# Patient Record
Sex: FEMALE | Race: WHITE | NOT HISPANIC OR LATINO | URBAN - METROPOLITAN AREA
[De-identification: names, ages, dates, MRNs, and addresses within clinical notes are randomized per-mention and may not be internally consistent; named-entity substitution may affect disease eponyms.]

---

## 2017-03-07 ENCOUNTER — OUTPATIENT (OUTPATIENT)
Dept: OUTPATIENT SERVICES | Facility: HOSPITAL | Age: 62
LOS: 1 days | Discharge: HOME | End: 2017-03-07
Payer: COMMERCIAL

## 2017-03-07 DIAGNOSIS — Z98.890 OTHER SPECIFIED POSTPROCEDURAL STATES: Chronic | ICD-10-CM

## 2017-03-07 DIAGNOSIS — S62.109A FRACTURE OF UNSPECIFIED CARPAL BONE, UNSPECIFIED WRIST, INITIAL ENCOUNTER FOR CLOSED FRACTURE: Chronic | ICD-10-CM

## 2017-03-07 DIAGNOSIS — Z90.49 ACQUIRED ABSENCE OF OTHER SPECIFIED PARTS OF DIGESTIVE TRACT: Chronic | ICD-10-CM

## 2017-03-07 DIAGNOSIS — I65.23 OCCLUSION AND STENOSIS OF BILATERAL CAROTID ARTERIES: ICD-10-CM

## 2017-03-07 LAB
ABO RH CONFIRMATION: SIGNIFICANT CHANGE UP
ANION GAP SERPL CALC-SCNC: 9 MMOL/L — SIGNIFICANT CHANGE UP (ref 5–17)
APPEARANCE UR: CLEAR — SIGNIFICANT CHANGE UP
APTT BLD: 33.2 SEC — SIGNIFICANT CHANGE UP (ref 27.5–37.4)
BACTERIA # UR AUTO: (no result)
BASOPHILS # BLD AUTO: 0.1 K/UL — SIGNIFICANT CHANGE UP (ref 0–0.2)
BASOPHILS NFR BLD AUTO: 1.4 % — SIGNIFICANT CHANGE UP (ref 0–2)
BILIRUB UR-MCNC: NEGATIVE — SIGNIFICANT CHANGE UP
BLD GP AB SCN SERPL QL: SIGNIFICANT CHANGE UP
BUN SERPL-MCNC: 19 MG/DL — SIGNIFICANT CHANGE UP (ref 7–23)
CALCIUM SERPL-MCNC: 9.7 MG/DL — SIGNIFICANT CHANGE UP (ref 8.5–10.1)
CHLORIDE SERPL-SCNC: 104 MMOL/L — SIGNIFICANT CHANGE UP (ref 96–108)
CO2 SERPL-SCNC: 28 MMOL/L — SIGNIFICANT CHANGE UP (ref 22–31)
COLOR SPEC: YELLOW — SIGNIFICANT CHANGE UP
CREAT SERPL-MCNC: 0.81 MG/DL — SIGNIFICANT CHANGE UP (ref 0.5–1.3)
DIFF PNL FLD: (no result)
EOSINOPHIL # BLD AUTO: 0.2 K/UL — SIGNIFICANT CHANGE UP (ref 0–0.5)
EOSINOPHIL NFR BLD AUTO: 2.2 % — SIGNIFICANT CHANGE UP (ref 0–6)
EPI CELLS # UR: SIGNIFICANT CHANGE UP
GLUCOSE SERPL-MCNC: 93 MG/DL — SIGNIFICANT CHANGE UP (ref 70–99)
GLUCOSE UR QL: NEGATIVE MG/DL — SIGNIFICANT CHANGE UP
HCT VFR BLD CALC: 42 % — SIGNIFICANT CHANGE UP (ref 34.5–45)
HGB BLD-MCNC: 14.4 G/DL — SIGNIFICANT CHANGE UP (ref 11.5–15.5)
INR BLD: 1.06 RATIO — SIGNIFICANT CHANGE UP (ref 0.88–1.16)
KETONES UR-MCNC: NEGATIVE — SIGNIFICANT CHANGE UP
LEUKOCYTE ESTERASE UR-ACNC: (no result)
LYMPHOCYTES # BLD AUTO: 1.8 K/UL — SIGNIFICANT CHANGE UP (ref 1–3.3)
LYMPHOCYTES # BLD AUTO: 26.2 % — SIGNIFICANT CHANGE UP (ref 13–44)
MCHC RBC-ENTMCNC: 31.4 PG — SIGNIFICANT CHANGE UP (ref 27–34)
MCHC RBC-ENTMCNC: 34.2 GM/DL — SIGNIFICANT CHANGE UP (ref 32–36)
MCV RBC AUTO: 91.9 FL — SIGNIFICANT CHANGE UP (ref 80–100)
MONOCYTES # BLD AUTO: 0.3 K/UL — SIGNIFICANT CHANGE UP (ref 0–0.9)
MONOCYTES NFR BLD AUTO: 4.7 % — SIGNIFICANT CHANGE UP (ref 2–14)
NEUTROPHILS # BLD AUTO: 4.6 K/UL — SIGNIFICANT CHANGE UP (ref 1.8–7.4)
NEUTROPHILS NFR BLD AUTO: 65.5 % — SIGNIFICANT CHANGE UP (ref 43–77)
NITRITE UR-MCNC: NEGATIVE — SIGNIFICANT CHANGE UP
PH UR: 5 — SIGNIFICANT CHANGE UP (ref 4.8–8)
PLATELET # BLD AUTO: 284 K/UL — SIGNIFICANT CHANGE UP (ref 150–400)
POTASSIUM SERPL-MCNC: 4.3 MMOL/L — SIGNIFICANT CHANGE UP (ref 3.5–5.3)
POTASSIUM SERPL-SCNC: 4.3 MMOL/L — SIGNIFICANT CHANGE UP (ref 3.5–5.3)
PROT UR-MCNC: NEGATIVE MG/DL — SIGNIFICANT CHANGE UP
PROTHROM AB SERPL-ACNC: 11.7 SEC — SIGNIFICANT CHANGE UP (ref 10–13.1)
RBC # BLD: 4.57 M/UL — SIGNIFICANT CHANGE UP (ref 3.8–5.2)
RBC # FLD: 13 % — SIGNIFICANT CHANGE UP (ref 10.3–14.5)
RBC CASTS # UR COMP ASSIST: NEGATIVE /HPF — SIGNIFICANT CHANGE UP (ref 0–4)
SODIUM SERPL-SCNC: 141 MMOL/L — SIGNIFICANT CHANGE UP (ref 135–145)
SP GR SPEC: 1.01 — SIGNIFICANT CHANGE UP (ref 1.01–1.02)
TYPE + AB SCN PNL BLD: SIGNIFICANT CHANGE UP
UROBILINOGEN FLD QL: NEGATIVE MG/DL — SIGNIFICANT CHANGE UP
WBC # BLD: 7 K/UL — SIGNIFICANT CHANGE UP (ref 3.8–10.5)
WBC # FLD AUTO: 7 K/UL — SIGNIFICANT CHANGE UP (ref 3.8–10.5)
WBC UR QL: SIGNIFICANT CHANGE UP

## 2017-03-07 PROCEDURE — 71020: CPT | Mod: 26

## 2017-03-07 PROCEDURE — 93010 ELECTROCARDIOGRAM REPORT: CPT

## 2017-03-07 NOTE — PATIENT PROFILE ADULT. - PSH
delivery delivered  1989  Fracture dislocation of wrist joint    H/O arthroscopy of shoulder  Left. "Many years ago"  H/O colonoscopy    S/P cholecystectomy  1991

## 2017-03-07 NOTE — PATIENT PROFILE ADULT. - PMH
Acute recurrent sinusitis, unspecified location    Anxiety and depression    Carotid stenosis, left    Deviated septum    Essential hypertension    Fatty liver disease, nonalcoholic    Gall stones  gall bladder removed  Heart murmur    Hiatal hernia with GERD    Hyperlipidemia, unspecified hyperlipidemia type    Hypothyroidism, unspecified type    Irritable bowel syndrome with both constipation and diarrhea    Joint pain of lower limb  bilateral  Mitral valve insufficiency, unspecified etiology    Morbid obesity due to excess calories    Nonintractable episodic headache, unspecified headache type    Peripheral edema  bilateral  Urinary tract infection with hematuria, site unspecified  frequent.  Presently on antibiotics

## 2017-03-07 NOTE — CHART NOTE - NSCHARTNOTEFT_GEN_A_CORE
Vital Signs  Heart rate 65  Resp. 18  Temp.   97.1  B/P 148/61  Ht 64 inches Wt 79.6  Plan   1. NPO after midnight  2. Take the following medications with sips of water on the day of procedure: Synthrois, Amlodipine ? Benazapril  3. Use E-Z sponge as directed  4. Drink a quart of extra  fluids the day before your surgery.  5. Medical clearance with Dr. Abraham  6. CBC, BMP,  PT /PTT /INR, Urinalysis, Type and Screen sent to lab  7. EKG and CXR done Vital Signs  Heart rate 65  Resp. 18  Temp.   97.1  B/P 148/61  Ht 64 inches Wt 124.7  Plan   1. NPO after midnight  2. Take the following medications with sips of water on the day of procedure: Synthrois, Amlodipine ? Benazapril  3. Use E-Z sponge as directed  4. Drink a quart of extra  fluids the day before your surgery.  5. Medical clearance with Dr. Abraham  6. CBC, BMP,  PT /PTT /INR, Urinalysis, Type and Screen sent to lab  7. EKG and CXR done

## 2017-03-07 NOTE — PATIENT PROFILE ADULT. - VISION (WITH CORRECTIVE LENSES IF THE PATIENT USUALLY WEARS THEM):
Progressive lenses/Normal vision: sees adequately in most situations; can see medication labels, newsprint

## 2017-03-07 NOTE — PATIENT PROFILE ADULT. - FAMILY HISTORY
Mother  Still living? No  Family history of hepatic cirrhosis, Age at diagnosis: Age Unknown  Family history of heart disease, Age at diagnosis: Age Unknown  Family history of diabetes mellitus, Age at diagnosis: Age Unknown

## 2017-03-15 ENCOUNTER — RESULT REVIEW (OUTPATIENT)
Age: 62
End: 2017-03-15

## 2017-03-15 RX ORDER — ONDANSETRON 8 MG/1
4 TABLET, FILM COATED ORAL ONCE
Qty: 0 | Refills: 0 | Status: COMPLETED | OUTPATIENT
Start: 2017-03-16 | End: 2017-03-16

## 2017-03-15 RX ORDER — SODIUM CHLORIDE 9 MG/ML
1000 INJECTION INTRAMUSCULAR; INTRAVENOUS; SUBCUTANEOUS
Qty: 0 | Refills: 0 | Status: DISCONTINUED | OUTPATIENT
Start: 2017-03-16 | End: 2017-03-16

## 2017-03-15 RX ORDER — ACETAMINOPHEN 500 MG
975 TABLET ORAL ONCE
Qty: 0 | Refills: 0 | Status: COMPLETED | OUTPATIENT
Start: 2017-03-16 | End: 2017-03-16

## 2017-03-15 RX ORDER — OXYCODONE HYDROCHLORIDE 5 MG/1
10 TABLET ORAL ONCE
Qty: 0 | Refills: 0 | Status: DISCONTINUED | OUTPATIENT
Start: 2017-03-16 | End: 2017-03-16

## 2017-03-16 ENCOUNTER — INPATIENT (INPATIENT)
Facility: HOSPITAL | Age: 62
LOS: 0 days | Discharge: HOME | End: 2017-03-17
Attending: THORACIC SURGERY (CARDIOTHORACIC VASCULAR SURGERY) | Admitting: THORACIC SURGERY (CARDIOTHORACIC VASCULAR SURGERY)
Payer: COMMERCIAL

## 2017-03-16 VITALS
WEIGHT: 272.93 LBS | RESPIRATION RATE: 16 BRPM | DIASTOLIC BLOOD PRESSURE: 69 MMHG | TEMPERATURE: 98 F | HEART RATE: 60 BPM | SYSTOLIC BLOOD PRESSURE: 152 MMHG | HEIGHT: 66 IN

## 2017-03-16 DIAGNOSIS — Z98.890 OTHER SPECIFIED POSTPROCEDURAL STATES: Chronic | ICD-10-CM

## 2017-03-16 DIAGNOSIS — S62.109A FRACTURE OF UNSPECIFIED CARPAL BONE, UNSPECIFIED WRIST, INITIAL ENCOUNTER FOR CLOSED FRACTURE: Chronic | ICD-10-CM

## 2017-03-16 DIAGNOSIS — Z90.49 ACQUIRED ABSENCE OF OTHER SPECIFIED PARTS OF DIGESTIVE TRACT: Chronic | ICD-10-CM

## 2017-03-16 LAB
ANION GAP SERPL CALC-SCNC: 9 MMOL/L — SIGNIFICANT CHANGE UP (ref 5–17)
BUN SERPL-MCNC: 9 MG/DL — SIGNIFICANT CHANGE UP (ref 7–23)
CALCIUM SERPL-MCNC: 7.9 MG/DL — LOW (ref 8.5–10.1)
CHLORIDE SERPL-SCNC: 112 MMOL/L — HIGH (ref 96–108)
CK SERPL-CCNC: 79 U/L — SIGNIFICANT CHANGE UP (ref 26–192)
CO2 SERPL-SCNC: 25 MMOL/L — SIGNIFICANT CHANGE UP (ref 22–31)
CREAT SERPL-MCNC: 0.65 MG/DL — SIGNIFICANT CHANGE UP (ref 0.5–1.3)
GLUCOSE SERPL-MCNC: 116 MG/DL — HIGH (ref 70–99)
HCT VFR BLD CALC: 33.4 % — LOW (ref 34.5–45)
HCT VFR BLD CALC: 36 % — SIGNIFICANT CHANGE UP (ref 34.5–45)
HGB BLD-MCNC: 11 G/DL — LOW (ref 11.5–15.5)
HGB BLD-MCNC: 11.9 G/DL — SIGNIFICANT CHANGE UP (ref 11.5–15.5)
MCHC RBC-ENTMCNC: 30 PG — SIGNIFICANT CHANGE UP (ref 27–34)
MCHC RBC-ENTMCNC: 30.2 PG — SIGNIFICANT CHANGE UP (ref 27–34)
MCHC RBC-ENTMCNC: 33 GM/DL — SIGNIFICANT CHANGE UP (ref 32–36)
MCHC RBC-ENTMCNC: 33.2 GM/DL — SIGNIFICANT CHANGE UP (ref 32–36)
MCV RBC AUTO: 90.7 FL — SIGNIFICANT CHANGE UP (ref 80–100)
MCV RBC AUTO: 91 FL — SIGNIFICANT CHANGE UP (ref 80–100)
PLATELET # BLD AUTO: 222 K/UL — SIGNIFICANT CHANGE UP (ref 150–400)
PLATELET # BLD AUTO: 237 K/UL — SIGNIFICANT CHANGE UP (ref 150–400)
POTASSIUM SERPL-MCNC: 3.3 MMOL/L — LOW (ref 3.5–5.3)
POTASSIUM SERPL-SCNC: 3.3 MMOL/L — LOW (ref 3.5–5.3)
RBC # BLD: 3.68 M/UL — LOW (ref 3.8–5.2)
RBC # BLD: 3.95 M/UL — SIGNIFICANT CHANGE UP (ref 3.8–5.2)
RBC # FLD: 13 % — SIGNIFICANT CHANGE UP (ref 10.3–14.5)
RBC # FLD: 13.1 % — SIGNIFICANT CHANGE UP (ref 10.3–14.5)
SODIUM SERPL-SCNC: 146 MMOL/L — HIGH (ref 135–145)
TROPONIN I SERPL-MCNC: 0.02 NG/ML — SIGNIFICANT CHANGE UP (ref 0.01–0.04)
TROPONIN I SERPL-MCNC: 0.02 NG/ML — SIGNIFICANT CHANGE UP (ref 0.01–0.04)
TROPONIN I SERPL-MCNC: <0.015 NG/ML — SIGNIFICANT CHANGE UP (ref 0.01–0.04)
WBC # BLD: 7.7 K/UL — SIGNIFICANT CHANGE UP (ref 3.8–10.5)
WBC # BLD: 7.8 K/UL — SIGNIFICANT CHANGE UP (ref 3.8–10.5)
WBC # FLD AUTO: 7.7 K/UL — SIGNIFICANT CHANGE UP (ref 3.8–10.5)
WBC # FLD AUTO: 7.8 K/UL — SIGNIFICANT CHANGE UP (ref 3.8–10.5)

## 2017-03-16 PROCEDURE — 93010 ELECTROCARDIOGRAM REPORT: CPT

## 2017-03-16 PROCEDURE — 88304 TISSUE EXAM BY PATHOLOGIST: CPT | Mod: 26

## 2017-03-16 PROCEDURE — 88311 DECALCIFY TISSUE: CPT | Mod: 26

## 2017-03-16 RX ORDER — ONDANSETRON 8 MG/1
4 TABLET, FILM COATED ORAL ONCE
Qty: 0 | Refills: 0 | Status: COMPLETED | OUTPATIENT
Start: 2017-03-16 | End: 2017-03-16

## 2017-03-16 RX ORDER — DEXTRAN 40 IN DEXTROSE 5 % 10 %
500 INTRAVENOUS SOLUTION INTRAVENOUS
Qty: 0 | Refills: 0 | Status: DISCONTINUED | OUTPATIENT
Start: 2017-03-16 | End: 2017-03-17

## 2017-03-16 RX ORDER — PROCHLORPERAZINE MALEATE 5 MG
10 TABLET ORAL ONCE
Qty: 0 | Refills: 0 | Status: COMPLETED | OUTPATIENT
Start: 2017-03-16 | End: 2017-03-16

## 2017-03-16 RX ORDER — PANTOPRAZOLE SODIUM 20 MG/1
40 TABLET, DELAYED RELEASE ORAL ONCE
Qty: 0 | Refills: 0 | Status: DISCONTINUED | OUTPATIENT
Start: 2017-03-16 | End: 2017-03-16

## 2017-03-16 RX ORDER — ALPRAZOLAM 0.25 MG
0.5 TABLET ORAL EVERY 8 HOURS
Qty: 0 | Refills: 0 | Status: DISCONTINUED | OUTPATIENT
Start: 2017-03-16 | End: 2017-03-17

## 2017-03-16 RX ORDER — MORPHINE SULFATE 50 MG/1
4 CAPSULE, EXTENDED RELEASE ORAL EVERY 4 HOURS
Qty: 0 | Refills: 0 | Status: DISCONTINUED | OUTPATIENT
Start: 2017-03-16 | End: 2017-03-17

## 2017-03-16 RX ORDER — ONDANSETRON 8 MG/1
4 TABLET, FILM COATED ORAL EVERY 6 HOURS
Qty: 0 | Refills: 0 | Status: DISCONTINUED | OUTPATIENT
Start: 2017-03-16 | End: 2017-03-17

## 2017-03-16 RX ORDER — CEFAZOLIN SODIUM 1 G
3000 VIAL (EA) INJECTION EVERY 8 HOURS
Qty: 0 | Refills: 0 | Status: COMPLETED | OUTPATIENT
Start: 2017-03-16 | End: 2017-03-17

## 2017-03-16 RX ORDER — FENTANYL CITRATE 50 UG/ML
50 INJECTION INTRAVENOUS
Qty: 0 | Refills: 0 | Status: DISCONTINUED | OUTPATIENT
Start: 2017-03-16 | End: 2017-03-16

## 2017-03-16 RX ORDER — CIPROFLOXACIN LACTATE 400MG/40ML
1 VIAL (ML) INTRAVENOUS
Qty: 0 | Refills: 0 | COMMUNITY

## 2017-03-16 RX ORDER — ASPIRIN/CALCIUM CARB/MAGNESIUM 324 MG
81 TABLET ORAL DAILY
Qty: 0 | Refills: 0 | Status: DISCONTINUED | OUTPATIENT
Start: 2017-03-16 | End: 2017-03-17

## 2017-03-16 RX ORDER — ONDANSETRON 8 MG/1
TABLET, FILM COATED ORAL
Qty: 0 | Refills: 0 | Status: DISCONTINUED | OUTPATIENT
Start: 2017-03-16 | End: 2017-03-17

## 2017-03-16 RX ORDER — PANTOPRAZOLE SODIUM 20 MG/1
40 TABLET, DELAYED RELEASE ORAL ONCE
Qty: 0 | Refills: 0 | Status: COMPLETED | OUTPATIENT
Start: 2017-03-16 | End: 2017-03-16

## 2017-03-16 RX ORDER — PSEUDOEPHEDRINE HCL 120 MG
1 TABLET, EXTENDED RELEASE ORAL
Qty: 0 | Refills: 0 | COMMUNITY

## 2017-03-16 RX ADMIN — Medication 0.5 MILLIGRAM(S): at 18:48

## 2017-03-16 RX ADMIN — Medication 20 MILLILITER(S): at 11:55

## 2017-03-16 RX ADMIN — OXYCODONE HYDROCHLORIDE 10 MILLIGRAM(S): 5 TABLET ORAL at 06:49

## 2017-03-16 RX ADMIN — Medication 10 MILLIGRAM(S): at 11:41

## 2017-03-16 RX ADMIN — Medication 200 MILLIGRAM(S): at 18:20

## 2017-03-16 RX ADMIN — ONDANSETRON 4 MILLIGRAM(S): 8 TABLET, FILM COATED ORAL at 11:10

## 2017-03-16 RX ADMIN — ONDANSETRON 4 MILLIGRAM(S): 8 TABLET, FILM COATED ORAL at 14:31

## 2017-03-16 RX ADMIN — SODIUM CHLORIDE 75 MILLILITER(S): 9 INJECTION INTRAMUSCULAR; INTRAVENOUS; SUBCUTANEOUS at 11:09

## 2017-03-16 RX ADMIN — ONDANSETRON 4 MILLIGRAM(S): 8 TABLET, FILM COATED ORAL at 23:52

## 2017-03-16 RX ADMIN — OXYCODONE HYDROCHLORIDE 10 MILLIGRAM(S): 5 TABLET ORAL at 17:13

## 2017-03-16 RX ADMIN — PANTOPRAZOLE SODIUM 40 MILLIGRAM(S): 20 TABLET, DELAYED RELEASE ORAL at 20:56

## 2017-03-16 NOTE — BRIEF OPERATIVE NOTE - PROCEDURE
Carotid endarterectomy  03/16/2017  L CEA and bovine pericardial patch angioplasty including L external carotid endarterectomy  Active  PARUL

## 2017-03-17 VITALS — RESPIRATION RATE: 15 BRPM | OXYGEN SATURATION: 100 %

## 2017-03-17 LAB
ANION GAP SERPL CALC-SCNC: 9 MMOL/L — SIGNIFICANT CHANGE UP (ref 5–17)
BUN SERPL-MCNC: 7 MG/DL — SIGNIFICANT CHANGE UP (ref 7–23)
CALCIUM SERPL-MCNC: 8.5 MG/DL — SIGNIFICANT CHANGE UP (ref 8.5–10.1)
CHLORIDE SERPL-SCNC: 111 MMOL/L — HIGH (ref 96–108)
CO2 SERPL-SCNC: 27 MMOL/L — SIGNIFICANT CHANGE UP (ref 22–31)
CREAT SERPL-MCNC: 0.62 MG/DL — SIGNIFICANT CHANGE UP (ref 0.5–1.3)
GLUCOSE SERPL-MCNC: 102 MG/DL — HIGH (ref 70–99)
HCT VFR BLD CALC: 36.6 % — SIGNIFICANT CHANGE UP (ref 34.5–45)
HGB BLD-MCNC: 11.9 G/DL — SIGNIFICANT CHANGE UP (ref 11.5–15.5)
MCHC RBC-ENTMCNC: 30.2 PG — SIGNIFICANT CHANGE UP (ref 27–34)
MCHC RBC-ENTMCNC: 32.4 GM/DL — SIGNIFICANT CHANGE UP (ref 32–36)
MCV RBC AUTO: 93.1 FL — SIGNIFICANT CHANGE UP (ref 80–100)
PLATELET # BLD AUTO: 220 K/UL — SIGNIFICANT CHANGE UP (ref 150–400)
POTASSIUM SERPL-MCNC: 3.9 MMOL/L — SIGNIFICANT CHANGE UP (ref 3.5–5.3)
POTASSIUM SERPL-SCNC: 3.9 MMOL/L — SIGNIFICANT CHANGE UP (ref 3.5–5.3)
RBC # BLD: 3.93 M/UL — SIGNIFICANT CHANGE UP (ref 3.8–5.2)
RBC # FLD: 13.3 % — SIGNIFICANT CHANGE UP (ref 10.3–14.5)
SODIUM SERPL-SCNC: 147 MMOL/L — HIGH (ref 135–145)
WBC # BLD: 7.3 K/UL — SIGNIFICANT CHANGE UP (ref 3.8–10.5)
WBC # FLD AUTO: 7.3 K/UL — SIGNIFICANT CHANGE UP (ref 3.8–10.5)

## 2017-03-17 PROCEDURE — 93010 ELECTROCARDIOGRAM REPORT: CPT

## 2017-03-17 RX ORDER — FLUTICASONE PROPIONATE 50 MCG
2 SPRAY, SUSPENSION NASAL
Qty: 0 | Refills: 0 | COMMUNITY

## 2017-03-17 RX ORDER — LEVOTHYROXINE SODIUM 125 MCG
1 TABLET ORAL
Qty: 0 | Refills: 0 | COMMUNITY

## 2017-03-17 RX ORDER — CLOPIDOGREL BISULFATE 75 MG/1
75 TABLET, FILM COATED ORAL DAILY
Qty: 0 | Refills: 0 | Status: DISCONTINUED | OUTPATIENT
Start: 2017-03-17 | End: 2017-03-17

## 2017-03-17 RX ORDER — ASPIRIN/CALCIUM CARB/MAGNESIUM 324 MG
1 TABLET ORAL
Qty: 0 | Refills: 0 | DISCHARGE
Start: 2017-03-17

## 2017-03-17 RX ORDER — CLOPIDOGREL BISULFATE 75 MG/1
1 TABLET, FILM COATED ORAL
Qty: 0 | Refills: 0 | DISCHARGE
Start: 2017-03-17

## 2017-03-17 RX ORDER — PANTOPRAZOLE SODIUM 20 MG/1
1 TABLET, DELAYED RELEASE ORAL
Qty: 0 | Refills: 0 | DISCHARGE
Start: 2017-03-17

## 2017-03-17 RX ORDER — OMEPRAZOLE 10 MG/1
1 CAPSULE, DELAYED RELEASE ORAL
Qty: 0 | Refills: 0 | COMMUNITY

## 2017-03-17 RX ORDER — SODIUM CHLORIDE 9 MG/ML
3 INJECTION INTRAMUSCULAR; INTRAVENOUS; SUBCUTANEOUS EVERY 8 HOURS
Qty: 0 | Refills: 0 | Status: DISCONTINUED | OUTPATIENT
Start: 2017-03-17 | End: 2017-03-17

## 2017-03-17 RX ORDER — PANTOPRAZOLE SODIUM 20 MG/1
40 TABLET, DELAYED RELEASE ORAL
Qty: 0 | Refills: 0 | Status: DISCONTINUED | OUTPATIENT
Start: 2017-03-17 | End: 2017-03-17

## 2017-03-17 RX ADMIN — Medication 81 MILLIGRAM(S): at 09:28

## 2017-03-17 RX ADMIN — CLOPIDOGREL BISULFATE 75 MILLIGRAM(S): 75 TABLET, FILM COATED ORAL at 09:28

## 2017-03-17 RX ADMIN — ONDANSETRON 4 MILLIGRAM(S): 8 TABLET, FILM COATED ORAL at 05:40

## 2017-03-17 RX ADMIN — Medication 0.5 MILLIGRAM(S): at 03:16

## 2017-03-17 RX ADMIN — PANTOPRAZOLE SODIUM 40 MILLIGRAM(S): 20 TABLET, DELAYED RELEASE ORAL at 08:30

## 2017-03-17 RX ADMIN — Medication 200 MILLIGRAM(S): at 02:17

## 2017-03-17 NOTE — DISCHARGE NOTE ADULT - MEDICATION SUMMARY - MEDICATIONS TO TAKE
I will START or STAY ON the medications listed below when I get home from the hospital:    Aspirin Enteric Coated 81 mg oral delayed release tablet  -- 1 tab(s) by mouth once a day  -- Indication: For Carotid stenosis, left    rosuvastatin 10 mg oral tablet  -- 1 tab(s) by mouth once a day (at bedtime)  -- Indication: For CAROTID STENOSIS    amLODIPine-benazepril 5 mg-20 mg oral capsule  -- 1 cap(s) by mouth once a day (in the morning)  -- Indication: For HTN    clopidogrel 75 mg oral tablet  -- 1 tab(s) by mouth once a day  -- Indication: For CAROTID STENOSIS    Xanax 0.5 mg oral tablet  -- 1 tab(s) by mouth 3 times a day, As Needed  -- for anxiety  -- Indication: For anxiety

## 2017-03-17 NOTE — DISCHARGE NOTE ADULT - NS AS DC STROKE ED MATERIALS
Need for Followup After Discharge/Stroke Warning Signs and Symptoms/Call 911 for Stroke/Risk Factors for Stroke/Prescribed Medications/Stroke Education Booklet

## 2017-03-17 NOTE — DISCHARGE NOTE ADULT - NS AS ACTIVITY OBS
No Heavy lifting/straining/Return to Work/School allowed/Do not drive or operate machinery/Walking-Indoors allowed/Bathing allowed/Sex allowed/no driving for one week; may return to work in one week/Showering allowed/Walking-Outdoors allowed/Stairs allowed

## 2017-03-17 NOTE — PROGRESS NOTE ADULT - ASSESSMENT
stable post op  check K  OOB  diet  discharge later today if remains stable, neurologically intact, ambulates

## 2017-03-17 NOTE — DISCHARGE NOTE ADULT - CARE PROVIDER_API CALL
Terry Vo), Vascular Surgery  [address] [place] [address]  Phone: [phone]  Fax: [phone]

## 2017-03-17 NOTE — DISCHARGE NOTE ADULT - HOSPITAL COURSE
Patient underwent L CEA on March 16 and had an uneventful post operative course remaining neurologically intact with stable vital signs and no evidence of cervical hematoma

## 2017-03-17 NOTE — DISCHARGE NOTE ADULT - CARE PLAN
Principal Discharge DX:	Carotid stenosis, left  Goal:	improved stroke free survival  Instructions for follow-up, activity and diet:	no driving for one week  resume pre operative diet

## 2017-03-17 NOTE — DISCHARGE NOTE ADULT - PATIENT PORTAL LINK FT
“You can access the Follow[employer] Patient Portal, offered by [facility], by registering with the following website: [email or web address]”

## 2017-03-17 NOTE — PROGRESS NOTE ADULT - SUBJECTIVE AND OBJECTIVE BOX
GOOLEY, DENISE    61y    Vital Signs Last 24 Hrs  T(C): 36.1, Max: 36.7 (-16 @ 10:55)  T(F): 97, Max: 98.1 (03-16 @ 10:55)  HR: 64 (41 - 73)  BP: 148/46 (101/82 - 148/58)  BP(mean): 73 (50 - 92)  RR: 14 (9 - 19)  SpO2: 95% (92% - 100%)    PAST MEDICAL & SURGICAL HISTORY:  Anxiety and depression  Morbid obesity due to excess calories  Hypothyroidism, unspecified type  Joint pain of lower limb: bilateral  Urinary tract infection with hematuria, site unspecified: frequent.  Presently on antibiotics  Fatty liver disease, nonalcoholic  Gall stones: gall bladder removed  Irritable bowel syndrome with both constipation and diarrhea  Hiatal hernia with GERD  Gastroesophageal reflux disease, esophagitis presence not specified  Peripheral edema: bilateral  Mitral valve insufficiency, unspecified etiology  Heart murmur  Hyperlipidemia, unspecified hyperlipidemia type  Essential hypertension  Acute recurrent sinusitis, unspecified location  Deviated septum  Nonintractable episodic headache, unspecified headache type  Carotid stenosis, left  H/O colonoscopy:   H/O arthroscopy of shoulder: Left. &quot;Many years ago&quot;   delivery delivered: 1989  S/P cholecystectomy: 1991  Fracture dislocation of wrist joint      PHYSICAL EXAMINATION:    Pt neurologically intact  cervical incision without hematoma  spontaneous and fluent of speech    Allergies    No Known Allergies    Intolerances         MEDICATIONS  (STANDING):  aspirin enteric coated 81milliGRAM(s) Oral daily  dextran-40 10% in dextrose 5% 500milliLiter(s) IV Continuous <Continuous>  ondansetron Injectable 4milliGRAM(s) IV Push every 6 hours  ondansetron Injectable       MEDICATIONS  (PRN):  morphine  - Injectable 4milliGRAM(s) IV Push every 4 hours PRN Moderate Pain (4 - 6)  ALPRAZolam 0.5milliGRAM(s) Oral every 8 hours PRN anxiety      CBC Full  -  ( 16 Mar 2017 16:11 )  WBC Count : 7.7 K/uL  Hemoglobin : 11.9 g/dL  Hematocrit : 36.0 %  Platelet Count - Automated : 237 K/uL  Mean Cell Volume : 91.0 fl  Mean Cell Hemoglobin : 30.2 pg  Mean Cell Hemoglobin Concentration : 33.2 gm/dL  Auto Neutrophil # : x  Auto Lymphocyte # : x  Auto Monocyte # : x  Auto Eosinophil # : x  Auto Basophil # : x  Auto Neutrophil % : x  Auto Lymphocyte % : x  Auto Monocyte % : x  Auto Eosinophil % : x  Auto Basophil % : x      16 Mar 2017 11:41    146    |  112    |  9      ----------------------------<  116    3.3     |  25     |  0.65     Ca    7.9        16 Mar 2017 11:41

## 2017-03-17 NOTE — DISCHARGE NOTE ADULT - INSTRUCTIONS
none If any increase in pain, redness, swelling, or drainage at incision site , or temperature greater than 100.5 orally call the MD or return to ED.

## 2017-03-20 DIAGNOSIS — I65.22 OCCLUSION AND STENOSIS OF LEFT CAROTID ARTERY: ICD-10-CM

## 2017-03-20 DIAGNOSIS — E78.00 PURE HYPERCHOLESTEROLEMIA, UNSPECIFIED: ICD-10-CM

## 2017-03-20 DIAGNOSIS — K58.2 MIXED IRRITABLE BOWEL SYNDROME: ICD-10-CM

## 2017-03-20 DIAGNOSIS — I34.0 NONRHEUMATIC MITRAL (VALVE) INSUFFICIENCY: ICD-10-CM

## 2017-03-20 DIAGNOSIS — E66.01 MORBID (SEVERE) OBESITY DUE TO EXCESS CALORIES: ICD-10-CM

## 2017-03-20 DIAGNOSIS — E78.5 HYPERLIPIDEMIA, UNSPECIFIED: ICD-10-CM

## 2017-03-20 DIAGNOSIS — K21.9 GASTRO-ESOPHAGEAL REFLUX DISEASE WITHOUT ESOPHAGITIS: ICD-10-CM

## 2017-03-20 DIAGNOSIS — K76.0 FATTY (CHANGE OF) LIVER, NOT ELSEWHERE CLASSIFIED: ICD-10-CM

## 2017-03-20 DIAGNOSIS — I10 ESSENTIAL (PRIMARY) HYPERTENSION: ICD-10-CM

## 2017-03-20 DIAGNOSIS — E03.9 HYPOTHYROIDISM, UNSPECIFIED: ICD-10-CM

## 2017-03-20 DIAGNOSIS — K44.9 DIAPHRAGMATIC HERNIA WITHOUT OBSTRUCTION OR GANGRENE: ICD-10-CM

## 2017-03-21 LAB — SURGICAL PATHOLOGY FINAL REPORT - CH: SIGNIFICANT CHANGE UP

## 2017-05-19 ENCOUNTER — APPOINTMENT (OUTPATIENT)
Dept: DERMATOLOGY | Facility: CLINIC | Age: 62
End: 2017-05-19

## 2017-05-19 VITALS — BODY MASS INDEX: 38.32 KG/M2 | WEIGHT: 230 LBS | HEIGHT: 65 IN

## 2017-05-19 RX ORDER — OMEPRAZOLE 40 MG/1
40 CAPSULE, DELAYED RELEASE ORAL
Refills: 0 | Status: ACTIVE | COMMUNITY

## 2017-05-19 RX ORDER — LEVOTHYROXINE SODIUM 50 UG/1
50 TABLET ORAL
Refills: 0 | Status: ACTIVE | COMMUNITY

## 2017-05-19 RX ORDER — ROSUVASTATIN CALCIUM 5 MG/1
TABLET, FILM COATED ORAL
Refills: 0 | Status: ACTIVE | COMMUNITY

## 2017-05-24 LAB — CORE LAB BIOPSY: NORMAL

## 2017-06-12 ENCOUNTER — APPOINTMENT (OUTPATIENT)
Dept: DERMATOLOGY | Facility: CLINIC | Age: 62
End: 2017-06-12

## 2017-06-26 ENCOUNTER — APPOINTMENT (OUTPATIENT)
Dept: DERMATOLOGY | Facility: CLINIC | Age: 62
End: 2017-06-26

## 2017-06-26 DIAGNOSIS — C44.310 BASAL CELL CARCINOMA OF SKIN OF UNSPECIFIED PARTS OF FACE: ICD-10-CM

## 2017-06-28 LAB — CORE LAB BIOPSY: NORMAL

## 2017-07-03 ENCOUNTER — APPOINTMENT (OUTPATIENT)
Dept: DERMATOLOGY | Facility: CLINIC | Age: 62
End: 2017-07-03

## 2017-08-01 ENCOUNTER — APPOINTMENT (OUTPATIENT)
Dept: DERMATOLOGY | Facility: CLINIC | Age: 62
End: 2017-08-01
Payer: COMMERCIAL

## 2017-08-01 PROCEDURE — 99024 POSTOP FOLLOW-UP VISIT: CPT

## 2018-01-08 ENCOUNTER — APPOINTMENT (OUTPATIENT)
Dept: DERMATOLOGY | Facility: CLINIC | Age: 63
End: 2018-01-08
Payer: COMMERCIAL

## 2018-01-08 VITALS — HEIGHT: 65 IN | WEIGHT: 219 LBS | BODY MASS INDEX: 36.49 KG/M2

## 2018-01-08 DIAGNOSIS — Z85.828 PERSONAL HISTORY OF OTHER MALIGNANT NEOPLASM OF SKIN: ICD-10-CM

## 2018-01-08 PROCEDURE — 99213 OFFICE O/P EST LOW 20 MIN: CPT

## 2018-07-11 ENCOUNTER — APPOINTMENT (OUTPATIENT)
Dept: DERMATOLOGY | Facility: CLINIC | Age: 63
End: 2018-07-11
Payer: COMMERCIAL

## 2018-07-11 PROCEDURE — 99213 OFFICE O/P EST LOW 20 MIN: CPT

## 2018-08-09 NOTE — DISCHARGE NOTE ADULT - DISCHARGE TO
Call regarding: Need clarification of famotitine tab, klor-con, and lorazepam.  Dose, directions, quantity, and number refills  Ref # [id number]  Caller: [facility]  Number to be reached at: [phone] ref # [id number]   Timeframe for callback: Friday    Pharmacy information verified:  Yes  Notified MD out of office until Friday.       Home

## 2019-01-15 PROBLEM — K21.9 GASTRO-ESOPHAGEAL REFLUX DISEASE WITHOUT ESOPHAGITIS: Chronic | Status: ACTIVE | Noted: 2017-03-07

## 2019-01-15 PROBLEM — E78.5 HYPERLIPIDEMIA, UNSPECIFIED: Chronic | Status: ACTIVE | Noted: 2017-03-07

## 2019-01-15 PROBLEM — K76.0 FATTY (CHANGE OF) LIVER, NOT ELSEWHERE CLASSIFIED: Chronic | Status: ACTIVE | Noted: 2017-03-07

## 2019-01-15 PROBLEM — J34.2 DEVIATED NASAL SEPTUM: Chronic | Status: ACTIVE | Noted: 2017-03-07

## 2019-01-15 PROBLEM — K80.20 CALCULUS OF GALLBLADDER WITHOUT CHOLECYSTITIS WITHOUT OBSTRUCTION: Chronic | Status: ACTIVE | Noted: 2017-03-07

## 2019-01-15 PROBLEM — E03.9 HYPOTHYROIDISM, UNSPECIFIED: Chronic | Status: ACTIVE | Noted: 2017-03-07

## 2019-01-15 PROBLEM — I10 ESSENTIAL (PRIMARY) HYPERTENSION: Chronic | Status: ACTIVE | Noted: 2017-03-07

## 2019-01-15 PROBLEM — I65.22 OCCLUSION AND STENOSIS OF LEFT CAROTID ARTERY: Chronic | Status: ACTIVE | Noted: 2017-03-07

## 2019-01-15 PROBLEM — E66.01 MORBID (SEVERE) OBESITY DUE TO EXCESS CALORIES: Chronic | Status: ACTIVE | Noted: 2017-03-07

## 2019-01-15 PROBLEM — R60.9 EDEMA, UNSPECIFIED: Chronic | Status: ACTIVE | Noted: 2017-03-07

## 2019-01-15 PROBLEM — I34.0 NONRHEUMATIC MITRAL (VALVE) INSUFFICIENCY: Chronic | Status: ACTIVE | Noted: 2017-03-07

## 2019-01-15 PROBLEM — K58.2 MIXED IRRITABLE BOWEL SYNDROME: Chronic | Status: ACTIVE | Noted: 2017-03-07

## 2019-01-15 PROBLEM — N39.0 URINARY TRACT INFECTION, SITE NOT SPECIFIED: Chronic | Status: ACTIVE | Noted: 2017-03-07

## 2019-01-15 PROBLEM — R01.1 CARDIAC MURMUR, UNSPECIFIED: Chronic | Status: ACTIVE | Noted: 2017-03-07

## 2019-01-15 PROBLEM — M25.50 PAIN IN UNSPECIFIED JOINT: Chronic | Status: ACTIVE | Noted: 2017-03-07

## 2019-02-04 ENCOUNTER — APPOINTMENT (OUTPATIENT)
Dept: DERMATOLOGY | Facility: CLINIC | Age: 64
End: 2019-02-04

## 2019-05-02 NOTE — ASU PREOP CHECKLIST - AICD PRESENT
Take the prescribed Naprosyn and Robaxin as directed for ongoing management of your body aches. Please follow-up with a primary care provider for further evaluation in 1 week. Use the below contact information to establish an appointment    Return to the emergency room for new, worsening, or concerning symptoms    Our goal in the emergency department is to always give you outstanding care and exceptional service. You may receive a survey by mail or e-mail in the next week regarding your experience in our ED. We would greatly appreciate your completing and returning the survey. Your feedback provides us with a way to recognize our staff who give very good care and it helps us learn how to improve when your experience was below our aspiration of excellence.    no

## 2019-07-03 ENCOUNTER — APPOINTMENT (OUTPATIENT)
Dept: DERMATOLOGY | Facility: CLINIC | Age: 64
End: 2019-07-03
Payer: COMMERCIAL

## 2019-07-03 PROCEDURE — 99213 OFFICE O/P EST LOW 20 MIN: CPT

## 2019-07-03 RX ORDER — ATENOLOL 50 MG/1
TABLET ORAL
Refills: 0 | Status: ACTIVE | COMMUNITY

## 2019-07-03 NOTE — PHYSICAL EXAM
[Full Body Skin Exam Performed] : performed [FreeTextEntry3] : Skin examination performed of the face, neck, trunk, arms, legs; \par The patient is well, alert and oriented, pleasant and cooperative.\par Eyelids, conjunctivae, oral mucosa, digits and nails all normal.  \par No cervical adenopathy.\par \par Normal findings include:\par \par Seborrheic keratoses\par Angiomas\par Lentigines\par healed scar L lateral cheek\par scaling erythematous papules; nose dorsum\par \par No lesions were suspicious for malignancy. \par \par

## 2019-07-03 NOTE — ASSESSMENT
[FreeTextEntry1] : Complete skin examination is negative for malignancy;\par Continue regular exams; Hx BCC;  f/u annually\par Prob. patchy AKs on nose bridge; 5FU x 2 weeks; Risks and benefits of topical actinic keratosis treatments were discussed including redness, scaling, discomfort crusting, oozing, and recurrence.

## 2019-07-03 NOTE — HISTORY OF PRESENT ILLNESS
[de-identified] : Pt. presents for skin check;\par No itching, bleeding, growing, changing lesions noted;\par Severity:  mild  \par Modifying factors:  none\par Associated symptoms:  none\par Context:  no association with activity \par c/o some dry spots on nose

## 2020-03-10 ENCOUNTER — APPOINTMENT (OUTPATIENT)
Dept: DERMATOLOGY | Facility: CLINIC | Age: 65
End: 2020-03-10
Payer: COMMERCIAL

## 2020-03-10 VITALS — WEIGHT: 219 LBS | HEIGHT: 65 IN | BODY MASS INDEX: 36.49 KG/M2

## 2020-03-10 DIAGNOSIS — L81.7 PIGMENTED PURPURIC DERMATOSIS: ICD-10-CM

## 2020-03-10 PROCEDURE — 99213 OFFICE O/P EST LOW 20 MIN: CPT

## 2020-03-10 NOTE — ASSESSMENT
[FreeTextEntry1] : pigmented purpura type reaction;  likely 2' leg edema, also was taking ibuprofen regularly;\par \par wears support stockings, but only at work; \par \par mometasone ointment;  BID, 2 wks on, 2 wks off; \par recheck at skin exam in Summer

## 2020-03-10 NOTE — HISTORY OF PRESENT ILLNESS
[de-identified] : Rashes on legs;  noticed @ 1 month ago;  \par was on Prednisone at the time, for hip; for 1 week; \par

## 2020-03-10 NOTE — PHYSICAL EXAM
[FreeTextEntry3] : + small purpuric macules;  scattered over both upper shins, not extending above knee or below sock line; \par no pustules or bullae;

## 2020-05-13 ENCOUNTER — APPOINTMENT (OUTPATIENT)
Dept: NEUROLOGY | Facility: CLINIC | Age: 65
End: 2020-05-13
Payer: COMMERCIAL

## 2020-05-13 DIAGNOSIS — Z86.39 PERSONAL HISTORY OF OTHER ENDOCRINE, NUTRITIONAL AND METABOLIC DISEASE: ICD-10-CM

## 2020-05-13 DIAGNOSIS — Z82.49 FAMILY HISTORY OF ISCHEMIC HEART DISEASE AND OTHER DISEASES OF THE CIRCULATORY SYSTEM: ICD-10-CM

## 2020-05-13 DIAGNOSIS — G57.12 MERALGIA PARESTHETICA, LEFT LOWER LIMB: ICD-10-CM

## 2020-05-13 PROCEDURE — 99205 OFFICE O/P NEW HI 60 MIN: CPT | Mod: 95

## 2020-05-13 NOTE — HISTORY OF PRESENT ILLNESS
[Home] : at home, [unfilled] , at the time of the visit. [Medical Office: ([facility])___] : at the medical office located in  [FreeTextEntry1] : 65-year-old right-handed female with PMHx of HTN, HLD and hypothyroidism; reports that for the past 2 months she's been experiencing tingling, numbness and pain in the left lateral thigh, symptoms are worse in sitting or supine position, improve with change in posture.\par \par Patient reports that it all started 2 months ago since she has been off from work, she works as a , is on her feet and active all the time, due to Covid-19 related school closure, she has been home  very inactive and barely exercising, she may have gained some weight in the process. She reports numbness tingling and pain in the left lateral thigh, at times there is tenderness to touch and feeling of itchiness, she reports since past one week symptoms have improved slightly. She reports that she had lost tremendous amount of weight a year ago as her son was getting , she has put on much of it weight, is attempting to join [employer].\par \par Patient denies low back pain radiating to the legs, denies weakness of the leg or foot drop, denies paresthesias in the other 3 extremities, denies bladder or bowel dysfunction. [Patient] : the patient

## 2020-05-13 NOTE — DATA REVIEWED
[de-identified] : 2/8/20: TFT's normal, vitamin D 28.7,  total cholesterol 205, HDL 98, LDL 98, triglycerides 44, CPK 60, LFTs normal

## 2020-05-13 NOTE — REASON FOR VISIT
[Consultation] : a consultation visit [FreeTextEntry1] : Referred by Dr. Abraham for evaluation of pain/numbness left lateral thigh

## 2020-05-13 NOTE — PHYSICAL EXAM
[General Appearance - Alert] : alert [General Appearance - In No Acute Distress] : in no acute distress [General Appearance - Well-Appearing] : healthy appearing [Mood] : the mood was normal [Person] : oriented to person [Place] : oriented to place [Time] : oriented to time [Registration Intact] : recent registration memory intact [Concentration Intact] : normal concentrating ability [Naming Objects] : no difficulty naming common objects [Repeating Phrases] : no difficulty repeating a phrase [Comprehension] : comprehension intact [Fluency] : fluency intact [Current Events] : adequate knowledge of current events [Cranial Nerves Optic (II)] : visual acuity intact bilaterally,  visual fields full to confrontation, pupils equal round and reactive to light [Cranial Nerves Facial (VII)] : face symmetrical [Cranial Nerves Oculomotor (III)] : extraocular motion intact [Cranial Nerves Accessory (XI - Cranial And Spinal)] : head turning and shoulder shrug symmetric [Cranial Nerves Vestibulocochlear (VIII)] : hearing was intact bilaterally [Cranial Nerves Hypoglossal (XII)] : there was no tongue deviation with protrusion [FreeTextEntry6] : Limited virtual motor examination; grossly nonfocal, no finger nose finger ataxia, gait stable [FreeTextEntry1] : Neck ROM Full

## 2020-05-13 NOTE — REVIEW OF SYSTEMS
[Recent Weight Gain (___ Lbs)] : recent [unfilled] ~Ulb weight gain [Numbness] : numbness [Tingling] : tingling [As Noted in HPI] : as noted in HPI [Hypersensitivity] : hypersensitivity [Negative] : Heme/Lymph

## 2020-06-29 ENCOUNTER — APPOINTMENT (OUTPATIENT)
Dept: NEUROLOGY | Facility: CLINIC | Age: 65
End: 2020-06-29

## 2020-09-12 ENCOUNTER — APPOINTMENT (OUTPATIENT)
Dept: DERMATOLOGY | Facility: CLINIC | Age: 65
End: 2020-09-12
Payer: COMMERCIAL

## 2020-09-12 VITALS — BODY MASS INDEX: 29.95 KG/M2 | WEIGHT: 180 LBS

## 2020-09-12 PROCEDURE — 99213 OFFICE O/P EST LOW 20 MIN: CPT

## 2020-09-12 NOTE — PHYSICAL EXAM
[Full Body Skin Exam Performed] : performed [FreeTextEntry3] : Skin examination performed of the face, neck, trunk, arms, \par \par legs not examined 2' stockings; \par \par The patient is well, alert and oriented, pleasant and cooperative.\par Eyelids, conjunctivae, oral mucosa, digits and nails all normal.  \par No cervical adenopathy.\par \par Normal findings include:\par \par Seborrheic keratoses\par Angiomas\par Lentigines\par healed scar L lateral cheek\par few scaling erythematous papules; nose dorsum\par \par No lesions were suspicious for malignancy. \par \par

## 2020-09-12 NOTE — HISTORY OF PRESENT ILLNESS
[de-identified] : Pt. presents for skin check;\par No itching, bleeding, growing, changing lesions noted;\par Severity:  mild  \par Modifying factors:  none\par Associated symptoms:  none\par Context:  no association with activity \par

## 2021-05-13 ENCOUNTER — NON-APPOINTMENT (OUTPATIENT)
Age: 66
End: 2021-05-13

## 2021-05-13 ENCOUNTER — APPOINTMENT (OUTPATIENT)
Dept: BREAST CENTER | Facility: CLINIC | Age: 66
End: 2021-05-13
Payer: COMMERCIAL

## 2021-05-13 VITALS
HEART RATE: 65 BPM | WEIGHT: 230 LBS | HEIGHT: 65 IN | BODY MASS INDEX: 38.32 KG/M2 | SYSTOLIC BLOOD PRESSURE: 135 MMHG | DIASTOLIC BLOOD PRESSURE: 70 MMHG

## 2021-05-13 DIAGNOSIS — Z87.891 PERSONAL HISTORY OF NICOTINE DEPENDENCE: ICD-10-CM

## 2021-05-13 DIAGNOSIS — Z87.39 PERSONAL HISTORY OF OTHER DISEASES OF THE MUSCULOSKELETAL SYSTEM AND CONNECTIVE TISSUE: ICD-10-CM

## 2021-05-13 DIAGNOSIS — Z86.39 PERSONAL HISTORY OF OTHER ENDOCRINE, NUTRITIONAL AND METABOLIC DISEASE: ICD-10-CM

## 2021-05-13 DIAGNOSIS — N64.4 MASTODYNIA: ICD-10-CM

## 2021-05-13 DIAGNOSIS — L29.9 PRURITUS, UNSPECIFIED: ICD-10-CM

## 2021-05-13 DIAGNOSIS — I10 ESSENTIAL (PRIMARY) HYPERTENSION: ICD-10-CM

## 2021-05-13 PROCEDURE — 99241 OFFICE CONSULTATION NEW/ESTAB PATIENT 15 MIN: CPT

## 2021-05-13 PROCEDURE — 99072 ADDL SUPL MATRL&STAF TM PHE: CPT

## 2021-05-13 RX ORDER — ASPIRIN 81 MG
81 TABLET, DELAYED RELEASE (ENTERIC COATED) ORAL
Refills: 0 | Status: DISCONTINUED | COMMUNITY
End: 2021-05-13

## 2021-05-13 RX ORDER — GABAPENTIN 100 MG/1
100 CAPSULE ORAL
Qty: 60 | Refills: 2 | Status: DISCONTINUED | COMMUNITY
Start: 2020-05-13 | End: 2021-05-13

## 2021-05-13 RX ORDER — MOMETASONE FUROATE 1 MG/G
0.1 OINTMENT TOPICAL
Qty: 1 | Refills: 1 | Status: DISCONTINUED | COMMUNITY
Start: 2020-03-10 | End: 2021-05-13

## 2021-05-13 NOTE — PHYSICAL EXAM
[Normocephalic] : normocephalic [Sclera nonicteric] : sclera nonicteric [Conjunctiva pink] : conjunctiva pink [No JVD] : no jugular venous distension [Clear to Auscultation Bilat] : clear to auscultation bilaterally [Normal S1, S2] : normal S1 and S2 [Examined in the supine and seated position] : examined in the supine and seated position [Asymmetrical] : asymmetrical [Grade 3] : Ptosis Grade 3 [No dominant masses] : no dominant masses in right breast  [No dominant masses] : no dominant masses left breast [No Nipple Retraction] : no left nipple retraction [No Nipple Discharge] : no left nipple discharge [Soft] : abdomen soft [Not Tender] : non-tender [No Edema] : no edema [No Rashes] : no rashes [No Ulceration] : no ulceration [de-identified] :  + murmur  [de-identified] : Right slightly larger than left [de-identified] : left nipple with evidence of white flaky skin / no erythema or rash noted

## 2021-05-13 NOTE — PAST MEDICAL HISTORY
[Postmenopausal] : The patient is postmenopausal [Menarche Age ____] : age at menarche was [unfilled] [Menopause Age____] : age at menopause was [unfilled] [Total Preg ___] : G[unfilled] [Full Term ___] : Full Term: [unfilled] [Age At Live Birth ___] : Age at live birth: [unfilled] [History of Hormone Replacement Treatment] : has no history of hormone replacement treatment [FreeTextEntry8] : denies

## 2021-05-13 NOTE — HISTORY OF PRESENT ILLNESS
[FreeTextEntry1] : This is a 65 y/o female who presents today with c/o left nipple pruritus over the last month duration and occasional breast pain L>R  over last week or so.  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  She denies any nipple discharge but states that she has a small "scab" on the left nipple.  Patient is not up to date with breast imaging - her most recent mammogram and  breast ultra sound was in 2017 and were without any abnormal findings.  She was seen by her gynecologist for the nipple discomfort and advised to try an OTC cortisone cream  which she only used for a few days but did help somewhat.  She denies any family history of breast / ovarian or other cancers.\par \par Of note patient had COVID last year for which she did not require hospitalization and she has been fully vaccinated.

## 2021-05-13 NOTE — REVIEW OF SYSTEMS
[As Noted in HPI] : as noted in HPI [FreeTextEntry5] : h/o of heart murmur [FreeTextEntry7] : h/o GERD [FreeTextEntry9] : knee arthritis

## 2021-05-13 NOTE — DATA REVIEWED
[No studies available for review at this time.] : No studies available for review at this time. [FreeTextEntry1] : mammogram 6/29/2017 - No mammographic evidence of malignancy\par US breast complete bilateral (6/29/2017)l - no sonographic evidence of malignancy

## 2021-06-08 ENCOUNTER — APPOINTMENT (OUTPATIENT)
Dept: DERMATOLOGY | Facility: CLINIC | Age: 66
End: 2021-06-08
Payer: COMMERCIAL

## 2021-06-08 DIAGNOSIS — L30.9 DERMATITIS, UNSPECIFIED: ICD-10-CM

## 2021-06-08 PROCEDURE — 99214 OFFICE O/P EST MOD 30 MIN: CPT

## 2021-06-08 PROCEDURE — 99072 ADDL SUPL MATRL&STAF TM PHE: CPT

## 2021-06-08 NOTE — PHYSICAL EXAM
[Full Body Skin Exam Performed] : performed [FreeTextEntry3] : slight thickening of skin Left breast areola; \par

## 2021-06-08 NOTE — HISTORY OF PRESENT ILLNESS
[de-identified] : Rash, itching on Left breast;  \par had eval by breast surgeon, lesion was removed; no suspicion of CA\par improved, but still slightly itchy; \par \par

## 2021-06-08 NOTE — ASSESSMENT
[FreeTextEntry1] : eczema L areola; \par now improved; \par Therapeutic options and their risks and benefits; along with multiple diagnostic possibilities were discussed at length; risks and benefits of further study were discussed;\par \par vaseline daily for maintenance;  rx given for fluticasone ointment qd x 7-10 d prn for flares; \par \par will check at upcoming TBSE in Summer.

## 2021-07-14 ENCOUNTER — APPOINTMENT (OUTPATIENT)
Dept: DERMATOLOGY | Facility: CLINIC | Age: 66
End: 2021-07-14
Payer: MEDICARE

## 2021-07-14 PROCEDURE — 99072 ADDL SUPL MATRL&STAF TM PHE: CPT

## 2021-07-14 PROCEDURE — 99214 OFFICE O/P EST MOD 30 MIN: CPT

## 2021-07-14 NOTE — HISTORY OF PRESENT ILLNESS
[de-identified] : Pt. presents for skin check;\par c/o few spots of concern;  scaling spots on nose\par Severity:  mild  \par Modifying factors:  none\par Associated symptoms:  none\par Context:  no association with activity\par \par

## 2021-07-14 NOTE — PHYSICAL EXAM
[Full Body Skin Exam Performed] : performed [FreeTextEntry3] : Skin examination performed of the face, neck, trunk, arms, \par \par legs not examined 2' stockings; \par \par The patient is well, alert and oriented, pleasant and cooperative.\par Eyelids, conjunctivae, oral mucosa, digits and nails all normal.  \par No cervical adenopathy.\par \par Normal findings include:\par \par Seborrheic keratoses\par Angiomas\par Lentigines\par healed scar L lateral cheek\par few scaling erythematous papules; nose dorsum, chest\par \par No lesions were suspicious for malignancy. \par \par

## 2022-03-11 ENCOUNTER — APPOINTMENT (OUTPATIENT)
Dept: DERMATOLOGY | Facility: CLINIC | Age: 67
End: 2022-03-11
Payer: MEDICARE

## 2022-03-11 DIAGNOSIS — L82.0 INFLAMED SEBORRHEIC KERATOSIS: ICD-10-CM

## 2022-03-11 PROCEDURE — 17110 DESTRUCTION B9 LES UP TO 14: CPT

## 2022-03-11 PROCEDURE — 99213 OFFICE O/P EST LOW 20 MIN: CPT | Mod: 25

## 2022-03-11 NOTE — ASSESSMENT
[FreeTextEntry1] : LN2 to inflamed SK with LSC changes; Left mid chest;\par \par Therapeutic options and their risks and benefits; along with multiple diagnostic possibilities were discussed at length; risks and benefits of further study were discussed;\par \par AKs;  restart 5FU, try to get through 2 week course;\par f/u as scheduled in 7/2021 for TBSE

## 2022-03-11 NOTE — HISTORY OF PRESENT ILLNESS
[de-identified] : The patient has been fit in for urgent appointment.\par c/o very itchy spot on left chest;  scratches daily\par also:  recently used 5FU, but only for 1 week; on nose

## 2022-03-11 NOTE — PHYSICAL EXAM
[FreeTextEntry3] : Left mid chest; + inflamed, waxy, keratotic papule; \par \par nose;  scaling erythematous papules; subtle

## 2022-03-17 ENCOUNTER — APPOINTMENT (OUTPATIENT)
Dept: DERMATOLOGY | Facility: CLINIC | Age: 67
End: 2022-03-17
Payer: MEDICARE

## 2022-03-17 DIAGNOSIS — D48.5 NEOPLASM OF UNCERTAIN BEHAVIOR OF SKIN: ICD-10-CM

## 2022-03-17 PROCEDURE — 99213 OFFICE O/P EST LOW 20 MIN: CPT | Mod: 25,24

## 2022-03-17 PROCEDURE — 11102 TANGNTL BX SKIN SINGLE LES: CPT | Mod: 79

## 2022-03-24 LAB — CORE LAB BIOPSY: NORMAL

## 2022-04-21 ENCOUNTER — APPOINTMENT (OUTPATIENT)
Dept: DERMATOLOGY | Facility: CLINIC | Age: 67
End: 2022-04-21
Payer: MEDICARE

## 2022-04-21 PROCEDURE — 99214 OFFICE O/P EST MOD 30 MIN: CPT

## 2022-05-12 ENCOUNTER — APPOINTMENT (OUTPATIENT)
Dept: DERMATOLOGY | Facility: CLINIC | Age: 67
End: 2022-05-12
Payer: MEDICARE

## 2022-05-12 DIAGNOSIS — L81.4 OTHER MELANIN HYPERPIGMENTATION: ICD-10-CM

## 2022-05-12 PROCEDURE — 99213 OFFICE O/P EST LOW 20 MIN: CPT

## 2022-07-15 ENCOUNTER — APPOINTMENT (OUTPATIENT)
Dept: DERMATOLOGY | Facility: CLINIC | Age: 67
End: 2022-07-15

## 2022-07-15 PROCEDURE — 99214 OFFICE O/P EST MOD 30 MIN: CPT

## 2022-07-15 RX ORDER — FLUOROURACIL 50 MG/G
5 CREAM TOPICAL
Qty: 1 | Refills: 1 | Status: ACTIVE | COMMUNITY
Start: 2019-07-03 | End: 1900-01-01

## 2022-07-15 NOTE — HISTORY OF PRESENT ILLNESS
[de-identified] : Pt. presents for skin check;\par c/o few spots of concern;  recent AK on chest healed well\par Severity:  mild  \par Modifying factors:  none\par Associated symptoms:  none\par Context:  no association with activity\par \par

## 2022-07-15 NOTE — PHYSICAL EXAM
[Full Body Skin Exam Performed] : performed [FreeTextEntry3] : Skin examination performed of the face, neck, trunk, arms, \par \par legs not examined 2' stockings; \par \par The patient is well, alert and oriented, pleasant and cooperative.\par Eyelids, conjunctivae, oral mucosa, digits and nails all normal.  \par No cervical adenopathy.\par \par Normal findings include:\par \par Seborrheic keratoses\par Angiomas\par Lentigines\par healed scar L lateral cheek\par \par healed scar L chest with slight hypertrophy; \par scaling erythematous papules; patchy;  nose, chest\par \par

## 2022-07-15 NOTE — ASSESSMENT
[FreeTextEntry1] : Complete skin examination is negative for malignancy; Multiple new concerns were addressed and discussed.\par Therapeutic options and their risks and benefits; along with multiple diagnostic possibilities were discussed at length;\par risks and benefits of skin biopsy and/or other further study were discussed;\par \par Continue regular exams; Hx BCC;  f/u annually for TBSE\par f/u 6 mos; recent AK biopsied on L chest 2022\par \par Prob. patchy AKs on nose bridge; used 5FU too sparingly in 2019; did better on chest- use on both areas; renew\par \par  5FU x 2 weeks; Risks and benefits of topical actinic keratosis treatments were discussed including redness, scaling, discomfort crusting, oozing, and recurrence.

## 2022-10-14 ENCOUNTER — EMERGENCY (EMERGENCY)
Facility: HOSPITAL | Age: 67
LOS: 0 days | Discharge: HOME | End: 2022-10-14
Attending: STUDENT IN AN ORGANIZED HEALTH CARE EDUCATION/TRAINING PROGRAM
Payer: MEDICARE

## 2022-10-14 VITALS
HEART RATE: 72 BPM | SYSTOLIC BLOOD PRESSURE: 155 MMHG | TEMPERATURE: 98 F | OXYGEN SATURATION: 100 % | RESPIRATION RATE: 19 BRPM | DIASTOLIC BLOOD PRESSURE: 83 MMHG

## 2022-10-14 VITALS — WEIGHT: 179.9 LBS | HEIGHT: 66 IN

## 2022-10-14 DIAGNOSIS — H53.8 OTHER VISUAL DISTURBANCES: ICD-10-CM

## 2022-10-14 DIAGNOSIS — Z79.82 LONG TERM (CURRENT) USE OF ASPIRIN: ICD-10-CM

## 2022-10-14 DIAGNOSIS — S62.109A FRACTURE OF UNSPECIFIED CARPAL BONE, UNSPECIFIED WRIST, INITIAL ENCOUNTER FOR CLOSED FRACTURE: Chronic | ICD-10-CM

## 2022-10-14 DIAGNOSIS — Z90.49 ACQUIRED ABSENCE OF OTHER SPECIFIED PARTS OF DIGESTIVE TRACT: ICD-10-CM

## 2022-10-14 DIAGNOSIS — K44.9 DIAPHRAGMATIC HERNIA WITHOUT OBSTRUCTION OR GANGRENE: ICD-10-CM

## 2022-10-14 DIAGNOSIS — R20.2 PARESTHESIA OF SKIN: ICD-10-CM

## 2022-10-14 DIAGNOSIS — J34.2 DEVIATED NASAL SEPTUM: ICD-10-CM

## 2022-10-14 DIAGNOSIS — Z98.890 OTHER SPECIFIED POSTPROCEDURAL STATES: Chronic | ICD-10-CM

## 2022-10-14 DIAGNOSIS — E78.5 HYPERLIPIDEMIA, UNSPECIFIED: ICD-10-CM

## 2022-10-14 DIAGNOSIS — K21.9 GASTRO-ESOPHAGEAL REFLUX DISEASE WITHOUT ESOPHAGITIS: ICD-10-CM

## 2022-10-14 DIAGNOSIS — I10 ESSENTIAL (PRIMARY) HYPERTENSION: ICD-10-CM

## 2022-10-14 DIAGNOSIS — I34.0 NONRHEUMATIC MITRAL (VALVE) INSUFFICIENCY: ICD-10-CM

## 2022-10-14 DIAGNOSIS — Z87.59 PERSONAL HISTORY OF OTHER COMPLICATIONS OF PREGNANCY, CHILDBIRTH AND THE PUERPERIUM: ICD-10-CM

## 2022-10-14 DIAGNOSIS — R42 DIZZINESS AND GIDDINESS: ICD-10-CM

## 2022-10-14 DIAGNOSIS — K76.0 FATTY (CHANGE OF) LIVER, NOT ELSEWHERE CLASSIFIED: ICD-10-CM

## 2022-10-14 DIAGNOSIS — E66.01 MORBID (SEVERE) OBESITY DUE TO EXCESS CALORIES: ICD-10-CM

## 2022-10-14 DIAGNOSIS — I65.29 OCCLUSION AND STENOSIS OF UNSPECIFIED CAROTID ARTERY: ICD-10-CM

## 2022-10-14 DIAGNOSIS — F41.8 OTHER SPECIFIED ANXIETY DISORDERS: ICD-10-CM

## 2022-10-14 DIAGNOSIS — J01.91 ACUTE RECURRENT SINUSITIS, UNSPECIFIED: ICD-10-CM

## 2022-10-14 DIAGNOSIS — E03.9 HYPOTHYROIDISM, UNSPECIFIED: ICD-10-CM

## 2022-10-14 DIAGNOSIS — K58.9 IRRITABLE BOWEL SYNDROME WITHOUT DIARRHEA: ICD-10-CM

## 2022-10-14 DIAGNOSIS — Z90.49 ACQUIRED ABSENCE OF OTHER SPECIFIED PARTS OF DIGESTIVE TRACT: Chronic | ICD-10-CM

## 2022-10-14 DIAGNOSIS — R51.9 HEADACHE, UNSPECIFIED: ICD-10-CM

## 2022-10-14 DIAGNOSIS — Z79.02 LONG TERM (CURRENT) USE OF ANTITHROMBOTICS/ANTIPLATELETS: ICD-10-CM

## 2022-10-14 LAB
ALBUMIN SERPL ELPH-MCNC: 3.4 G/DL — SIGNIFICANT CHANGE UP (ref 3.3–5)
ALP SERPL-CCNC: 209 U/L — HIGH (ref 40–120)
ALT FLD-CCNC: 41 U/L — SIGNIFICANT CHANGE UP (ref 12–78)
ANION GAP SERPL CALC-SCNC: 5 MMOL/L — SIGNIFICANT CHANGE UP (ref 5–17)
AST SERPL-CCNC: 21 U/L — SIGNIFICANT CHANGE UP (ref 15–37)
BASOPHILS # BLD AUTO: 0.07 K/UL — SIGNIFICANT CHANGE UP (ref 0–0.2)
BASOPHILS NFR BLD AUTO: 0.6 % — SIGNIFICANT CHANGE UP (ref 0–2)
BILIRUB SERPL-MCNC: 0.3 MG/DL — SIGNIFICANT CHANGE UP (ref 0.2–1.2)
BUN SERPL-MCNC: 25 MG/DL — HIGH (ref 7–23)
CALCIUM SERPL-MCNC: 9.2 MG/DL — SIGNIFICANT CHANGE UP (ref 8.5–10.1)
CHLORIDE SERPL-SCNC: 107 MMOL/L — SIGNIFICANT CHANGE UP (ref 96–108)
CO2 SERPL-SCNC: 29 MMOL/L — SIGNIFICANT CHANGE UP (ref 22–31)
CREAT SERPL-MCNC: 0.84 MG/DL — SIGNIFICANT CHANGE UP (ref 0.5–1.3)
EGFR: 77 ML/MIN/1.73M2 — SIGNIFICANT CHANGE UP
EOSINOPHIL # BLD AUTO: 0.15 K/UL — SIGNIFICANT CHANGE UP (ref 0–0.5)
EOSINOPHIL NFR BLD AUTO: 1.4 % — SIGNIFICANT CHANGE UP (ref 0–6)
GLUCOSE SERPL-MCNC: 106 MG/DL — HIGH (ref 70–99)
HCT VFR BLD CALC: 42.3 % — SIGNIFICANT CHANGE UP (ref 34.5–45)
HGB BLD-MCNC: 13.8 G/DL — SIGNIFICANT CHANGE UP (ref 11.5–15.5)
IMM GRANULOCYTES NFR BLD AUTO: 0.5 % — SIGNIFICANT CHANGE UP (ref 0–0.9)
LYMPHOCYTES # BLD AUTO: 2.48 K/UL — SIGNIFICANT CHANGE UP (ref 1–3.3)
LYMPHOCYTES # BLD AUTO: 22.6 % — SIGNIFICANT CHANGE UP (ref 13–44)
MAGNESIUM SERPL-MCNC: 2.2 MG/DL — SIGNIFICANT CHANGE UP (ref 1.6–2.6)
MCHC RBC-ENTMCNC: 30.9 PG — SIGNIFICANT CHANGE UP (ref 27–34)
MCHC RBC-ENTMCNC: 32.6 GM/DL — SIGNIFICANT CHANGE UP (ref 32–36)
MCV RBC AUTO: 94.6 FL — SIGNIFICANT CHANGE UP (ref 80–100)
MONOCYTES # BLD AUTO: 0.54 K/UL — SIGNIFICANT CHANGE UP (ref 0–0.9)
MONOCYTES NFR BLD AUTO: 4.9 % — SIGNIFICANT CHANGE UP (ref 2–14)
NEUTROPHILS # BLD AUTO: 7.67 K/UL — HIGH (ref 1.8–7.4)
NEUTROPHILS NFR BLD AUTO: 70 % — SIGNIFICANT CHANGE UP (ref 43–77)
PLATELET # BLD AUTO: 331 K/UL — SIGNIFICANT CHANGE UP (ref 150–400)
POTASSIUM SERPL-MCNC: 4 MMOL/L — SIGNIFICANT CHANGE UP (ref 3.5–5.3)
POTASSIUM SERPL-SCNC: 4 MMOL/L — SIGNIFICANT CHANGE UP (ref 3.5–5.3)
PROT SERPL-MCNC: 7.5 GM/DL — SIGNIFICANT CHANGE UP (ref 6–8.3)
RBC # BLD: 4.47 M/UL — SIGNIFICANT CHANGE UP (ref 3.8–5.2)
RBC # FLD: 14.6 % — HIGH (ref 10.3–14.5)
SODIUM SERPL-SCNC: 141 MMOL/L — SIGNIFICANT CHANGE UP (ref 135–145)
WBC # BLD: 10.96 K/UL — HIGH (ref 3.8–10.5)
WBC # FLD AUTO: 10.96 K/UL — HIGH (ref 3.8–10.5)

## 2022-10-14 PROCEDURE — 99283 EMERGENCY DEPT VISIT LOW MDM: CPT

## 2022-10-14 PROCEDURE — 93010 ELECTROCARDIOGRAM REPORT: CPT

## 2022-10-14 PROCEDURE — 99284 EMERGENCY DEPT VISIT MOD MDM: CPT | Mod: FS

## 2022-10-14 PROCEDURE — 83735 ASSAY OF MAGNESIUM: CPT

## 2022-10-14 PROCEDURE — 36415 COLL VENOUS BLD VENIPUNCTURE: CPT

## 2022-10-14 PROCEDURE — 85025 COMPLETE CBC W/AUTO DIFF WBC: CPT

## 2022-10-14 PROCEDURE — 80053 COMPREHEN METABOLIC PANEL: CPT

## 2022-10-14 PROCEDURE — 93005 ELECTROCARDIOGRAM TRACING: CPT

## 2022-10-14 NOTE — ED ADULT NURSE NOTE - OBJECTIVE STATEMENT
Pt comes to the ED complaining of dizziness, blurred vision and tingling through the whole body [facility]. Pt states that symptoms have resolved at this time. Pt extremely anxious. Pt lives alone in [place] in an apartment where she just recently moved to.

## 2022-10-14 NOTE — ED STATDOCS - NSICDXPASTSURGICALHX_GEN_ALL_CORE_FT
PAST SURGICAL HISTORY:   delivery delivered 1989    Fracture dislocation of wrist joint     H/O arthroscopy of shoulder Left. "Many years ago"    H/O colonoscopy     S/P cholecystectomy 1991       Elidel Counseling: Patient may experience a mild burning sensation during topical application. Elidel is not approved in children less than 2 years of age. There have been case reports of hematologic and skin malignancies in patients using topical calcineurin inhibitors although causality is questionable.

## 2022-10-14 NOTE — ED STATDOCS - PROGRESS NOTE DETAILS
labs WNL, recommended and ordered CT however pt refused, 2/2 to anxiety, also states she needs to be discharged immediately as she cannot drive at night, pt denies any visual complaints, visual acuity 20/25 b/l and L and R eyes separately, pt feeling well will d/c home with prompt neuro f/u, pt attributes episode of blurry vision to recent scratched cornea of L eye  Kasey Gutierrez PA-C Patient seen and evaluated, ED attending note and orders reviewed, will continue with patient follow up and care -Kasey Gutierrez PA-C

## 2022-10-14 NOTE — ED STATDOCS - CARE PROVIDER_API CALL
Edie Rodríguez (MD)  Clinical Neurophysiology; EEGEpilepsy; Neurology; Sleep Medicine  [address], [place] [address]  Phone: [phone]  Fax: [phone]  Follow Up Time:

## 2022-10-14 NOTE — ED ADULT NURSE NOTE - NSIMPLEMENTINTERV_GEN_ALL_ED
Implemented All Universal Safety Interventions:  [place] to call system. Call bell, personal items and telephone within reach. Instruct patient to call for assistance. Room bathroom lighting operational. Non-slip footwear when patient is off stretcher. Physically safe environment: no spills, clutter or unnecessary equipment. Stretcher in lowest position, wheels locked, appropriate side rails in place.

## 2022-10-14 NOTE — ED STATDOCS - NS ED ATTENDING STATEMENT MOD
This was a shared visit with the TERRIE. I reviewed and verified the documentation and independently performed the documented:

## 2022-10-14 NOTE — ED STATDOCS - NSFOLLOWUPINSTRUCTIONS_ED_ALL_ED_FT
Blurred Vision, Adult        Eyeglasses hovering over a Snellen eye chart. The glasses reveal clear letters, while the other letters are blurry.       A person having an eye exam. Eye equipment is placed on the face.     Having blurred vision means that you cannot see things clearly. Your vision may seem fuzzy or out of focus. It can involve your vision for objects that are close or far away. It may affect one or both eyes. There are many causes of blurred vision, including cataracts, macular degeneration, eye inflammation (uveitis), and diabetic retinopathy.    In many cases, blurred vision has to do with the shape of your eye. An abnormal eye shape means you cannot focus well (refractive error). When this happens, it can cause:  •Faraway objects to look blurry (nearsightedness).      •Close objects to look blurry (farsightedness).      •Blurry vision at any distance (astigmatism).      Refractive errors are often corrected with glasses or contacts.    If you have blurred vision, it is best to see an eye care specialist. Blurred vision can be diagnosed based on your symptoms and a complete eye exam. Tell your eye care specialist about any other health problems you have, any recent eye injury, and any prior surgeries. You may need to see an eye care specialist who specializes in medical and surgical eye problems (ophthalmologist). Your treatment will depend on what is causing your blurred vision.      Follow these instructions at home:    •Keep all follow-up visits. This is important. These include any visits to your eye specialists.      • Do not drive or use machinery if your vision is blurry.      •Use eye drops only as told by your health care provider.      •If you were prescribed glasses or contact lenses, wear the glasses or contacts as told by your health care provider.      •Schedule eye exams regularly.      •Pay attention to any changes in your symptoms.      •Avoid rubbing your eyes.        Contact a health care provider if:    •Your symptoms do not improve or they get worse.    •You have:  •New symptoms.      •A headache.      •Trouble seeing at night.      •Trouble noticing the difference between colors.      •You notice:  •Drooping of your eyelids.      •Drainage coming from your eyes.      •A rash around your eyes.          Get help right away if:  •You have:  •Severe eye pain.      •A severe headache.      •A sudden change in vision.      •A sudden loss of vision.      •A vision change after an injury.        •You notice flashing lights in your field of vision. Your field of vision is the area that you can see without moving your eyes.        Summary    •Having blurred vision means that you cannot see things clearly. Your vision may seem fuzzy or out of focus.      •There are many causes of blurred vision. In many cases, blurred vision has to do with an abnormal eye shape (refractive error), and it can be corrected with glasses or contact lenses.      •Pay attention to any changes in your symptoms. Contact a health care provider if your symptoms do not improve or if you have any new symptoms.      This information is not intended to replace advice given to you by your health care provider. Make sure you discuss any questions you have with your health care provider.

## 2022-10-14 NOTE — ED STATDOCS - CLINICAL SUMMARY MEDICAL DECISION MAKING FREE TEXT BOX
Elderly female presents with sudden onset tension around head, blurry vision of left eye, tingling to left face that progressed to whole body. Pt currently asymptomatic. Vitals normal. Neuro intact. Will CT head and check electrolytes. If normal, will d/c with neuro and optho follow up.

## 2022-10-14 NOTE — ED STATDOCS - NSICDXPASTMEDICALHX_GEN_ALL_CORE_FT
PAST MEDICAL HISTORY:  Acute recurrent sinusitis, unspecified location     Anxiety and depression     Carotid stenosis, left     Deviated septum     Essential hypertension     Fatty liver disease, nonalcoholic     Gall stones gall bladder removed    Heart murmur     Hiatal hernia with GERD     Hyperlipidemia, unspecified hyperlipidemia type     Hypothyroidism, unspecified type     Irritable bowel syndrome with both constipation and diarrhea     Joint pain of lower limb bilateral    Mitral valve insufficiency, unspecified etiology     Morbid obesity due to excess calories     Nonintractable episodic headache, unspecified headache type     Peripheral edema bilateral    Urinary tract infection with hematuria, site unspecified frequent.  Presently on antibiotics

## 2022-10-14 NOTE — ED ADULT TRIAGE NOTE - CHIEF COMPLAINT QUOTE
pt presents to ed ambulatory for evaluation of dizziness and whole body tingling approx 1.5 hours PTA.  pt reports symptoms have resolved. reports left eye pain. no code stroke per [facility] su.

## 2022-10-14 NOTE — ED STATDOCS - PATIENT PORTAL LINK FT
You can access the FollowMyHealth Patient Portal offered by [facility] by registering at the following website: [email or web address]. By joining [facility]’s FollowMyHealth portal, you will also be able to view your health information using other applications (apps) compatible with our system.

## 2022-10-14 NOTE — ED STATDOCS - OBJECTIVE STATEMENT
66 y/o female with a PMHx of acute recurrent sinusitis, anxiety, carotid stenosis, deviated septum, essential HTN, fatty liver, gall stones, heart murmur, hiatal hernia with GERD, HLD, hypothyroid, IBS, joint pain, mitral valve insufficiency, morbid obesity, nonintractable HA, peripheral edema, UTI presents to the ED c/o dizziness. [facility] she had onset dizziness then she developed whole body tingling. Pt then had onset of HA. Symptoms completely resolved. No LOC. Denies vision loss, slurred speech, extremity weakness. No other complaints at this time.

## 2022-10-14 NOTE — ED ADULT NURSE NOTE - NSICDXPASTSURGICALHX_GEN_ALL_CORE_FT
PAST SURGICAL HISTORY:   delivery delivered 1989    Fracture dislocation of wrist joint     H/O arthroscopy of shoulder Left. "Many years ago"    H/O colonoscopy     S/P cholecystectomy 1991

## 2022-10-14 NOTE — ED STATDOCS - NSICDXFAMILYHX_GEN_ALL_CORE_FT
FAMILY HISTORY:  Mother  Still living? No  Family history of diabetes mellitus, Age at diagnosis: Age Unknown  Family history of heart disease, Age at diagnosis: Age Unknown  Family history of hepatic cirrhosis, Age at diagnosis: Age Unknown

## 2022-10-14 NOTE — ED ADULT NURSE NOTE - NS ED NOTE  TALK SOMEONE YN
Documentation:  74 YOF presented with SOB  ** Cardiology consult 11/2: Hypoxic RF (85% on ra on admission).  ** H&P: SOB -worse on exertion, vascular congestion on Xray responded to IV lasix, and improving with BiPAP     Vital ED: sat 85% on RA    Lab:   Blood Gas Profile - Venous (11.02.20 @ 10:23)    pH: 7.30:     pCO2: 53    pO2: 30:     HCO3: 26    Base Excess: 1.3    Oxygen Saturation: 45      CT chest 11/3: Lung ground glass attenuation, which may be attributed to pulmonary edema in the appropriate clinical setting.    Treatment:   In the ED pt was placed on NC 5L that was upgraded to BiPAP  Lasix  40 mg IV                                                     Based on your clinical evaluation of the patient, can you please provide us with a medical diagnosis that explains the above findings  • Acute hypoxic respiratory failure  • Other (please specify).  • Unable to determine. No

## 2022-11-30 ENCOUNTER — APPOINTMENT (OUTPATIENT)
Dept: VASCULAR SURGERY | Facility: CLINIC | Age: 67
End: 2022-11-30

## 2022-11-30 VITALS
BODY MASS INDEX: 36.96 KG/M2 | WEIGHT: 230 LBS | SYSTOLIC BLOOD PRESSURE: 168 MMHG | DIASTOLIC BLOOD PRESSURE: 85 MMHG | HEART RATE: 76 BPM | HEIGHT: 66 IN

## 2022-11-30 PROCEDURE — 99212 OFFICE O/P EST SF 10 MIN: CPT

## 2022-11-30 NOTE — PHYSICAL EXAM
[FreeTextEntry1] : 1-2+ edema bilaterally; no phlebitic segments, Homans' sign, or calf tenderness [de-identified] : Grossly neurologically intact

## 2022-11-30 NOTE — HISTORY OF PRESENT ILLNESS
[FreeTextEntry1] : Patient presents for evaluation and discussion of the results of her recent carotid duplex study performed at Dr. Sukh Valiente's office on November 18, 2022.  This study demonstrated elevated flow velocities in the right internal carotid artery consistent with a mid range 50 to 79% stenosis.  Her left carotid artery, status post carotid endarterectomy in 2017, had insignificant disease.  She denies neurologic events since her last office visit.\par \par She does note that she has retired since her last office visit and does not ambulate much.  Utilizing compression stockings but still develops lower extremity edema.  He denies intercurrent history of deep or superficial thrombophlebitis.  The edema is essentially unchanged in the morning upon arising.

## 2022-11-30 NOTE — ASSESSMENT
[FreeTextEntry1] : 67-year-old female with moderate carotid disease on the right side and less than 50% narrowing on the left.  I reviewed the results of the carotid duplex study with the patient and suggested surveillance at 6-month intervals.\par \par Her lower extremity edema will be evaluated with a venous duplex study.  In addition I will prescribe compression stockings although the patient states she is utilizing them consistently.

## 2022-12-16 ENCOUNTER — APPOINTMENT (OUTPATIENT)
Dept: VASCULAR SURGERY | Facility: CLINIC | Age: 67
End: 2022-12-16

## 2022-12-16 VITALS
HEART RATE: 74 BPM | SYSTOLIC BLOOD PRESSURE: 130 MMHG | WEIGHT: 230 LBS | HEIGHT: 66 IN | BODY MASS INDEX: 36.96 KG/M2 | DIASTOLIC BLOOD PRESSURE: 80 MMHG

## 2022-12-16 PROCEDURE — 93970 EXTREMITY STUDY: CPT

## 2022-12-16 PROCEDURE — 99212 OFFICE O/P EST SF 10 MIN: CPT | Mod: 25

## 2022-12-16 NOTE — ASSESSMENT
[FreeTextEntry1] : 67-year-old female with obese and edematous lower extremities with venous duplex study performed today demonstrating superficial system incompetence on the right leg and a chronic appearing left popliteal DVT.\par \par I noted the results of the duplex to the patient and that I did not believe she was in a life-threatening situation given the chronic appearing nature of the DVT.  However, I have decided on treating her with a reduced dose of Xarelto for 3 months and then repeating the duplex study.  She has no history of recent major surgery, lower or upper GI bleeding, or recent head trauma.\par \par The patient was instructed to call me should she develop left calf pain or tenderness, or shortness of breath.  In addition, she was instructed to call me should she develop any episode of bleeding.\par \par I will see her again in 6 weeks time for routine reevaluation.

## 2022-12-16 NOTE — HISTORY OF PRESENT ILLNESS
[FreeTextEntry1] : Patient presents for routine reevaluation status post venous duplex study performed today.  This study demonstrated no evidence of right leg deep vein thrombosis but did show right greater saphenous vein reflux greater than 2 seconds.  In addition she had reflux within the right short saphenous vein also greater than 2 seconds.  A chronic left leg popliteal venous thrombosis was noted.\par \par The patient denied known history of prior left leg DVT and denies pain or worsening of edema in the left leg.

## 2022-12-26 ENCOUNTER — NON-APPOINTMENT (OUTPATIENT)
Age: 67
End: 2022-12-26

## 2023-01-02 ENCOUNTER — EMERGENCY (EMERGENCY)
Facility: HOSPITAL | Age: 68
LOS: 0 days | Discharge: HOME | End: 2023-01-02
Attending: EMERGENCY MEDICINE
Payer: MEDICARE

## 2023-01-02 VITALS — WEIGHT: 250 LBS | HEIGHT: 66 IN

## 2023-01-02 VITALS
OXYGEN SATURATION: 95 % | HEART RATE: 79 BPM | RESPIRATION RATE: 18 BRPM | SYSTOLIC BLOOD PRESSURE: 198 MMHG | TEMPERATURE: 98 F | DIASTOLIC BLOOD PRESSURE: 76 MMHG

## 2023-01-02 DIAGNOSIS — Z90.49 ACQUIRED ABSENCE OF OTHER SPECIFIED PARTS OF DIGESTIVE TRACT: Chronic | ICD-10-CM

## 2023-01-02 DIAGNOSIS — I34.0 NONRHEUMATIC MITRAL (VALVE) INSUFFICIENCY: ICD-10-CM

## 2023-01-02 DIAGNOSIS — Z98.890 OTHER SPECIFIED POSTPROCEDURAL STATES: Chronic | ICD-10-CM

## 2023-01-02 DIAGNOSIS — S62.109A FRACTURE OF UNSPECIFIED CARPAL BONE, UNSPECIFIED WRIST, INITIAL ENCOUNTER FOR CLOSED FRACTURE: Chronic | ICD-10-CM

## 2023-01-02 DIAGNOSIS — E78.5 HYPERLIPIDEMIA, UNSPECIFIED: ICD-10-CM

## 2023-01-02 DIAGNOSIS — J34.2 DEVIATED NASAL SEPTUM: ICD-10-CM

## 2023-01-02 DIAGNOSIS — R79.89 OTHER SPECIFIED ABNORMAL FINDINGS OF BLOOD CHEMISTRY: ICD-10-CM

## 2023-01-02 DIAGNOSIS — Z79.02 LONG TERM (CURRENT) USE OF ANTITHROMBOTICS/ANTIPLATELETS: ICD-10-CM

## 2023-01-02 DIAGNOSIS — K76.0 FATTY (CHANGE OF) LIVER, NOT ELSEWHERE CLASSIFIED: ICD-10-CM

## 2023-01-02 DIAGNOSIS — Z79.82 LONG TERM (CURRENT) USE OF ASPIRIN: ICD-10-CM

## 2023-01-02 DIAGNOSIS — K21.9 GASTRO-ESOPHAGEAL REFLUX DISEASE WITHOUT ESOPHAGITIS: ICD-10-CM

## 2023-01-02 DIAGNOSIS — Z87.19 PERSONAL HISTORY OF OTHER DISEASES OF THE DIGESTIVE SYSTEM: ICD-10-CM

## 2023-01-02 DIAGNOSIS — N39.0 URINARY TRACT INFECTION, SITE NOT SPECIFIED: ICD-10-CM

## 2023-01-02 DIAGNOSIS — K44.9 DIAPHRAGMATIC HERNIA WITHOUT OBSTRUCTION OR GANGRENE: ICD-10-CM

## 2023-01-02 DIAGNOSIS — K58.9 IRRITABLE BOWEL SYNDROME WITHOUT DIARRHEA: ICD-10-CM

## 2023-01-02 DIAGNOSIS — E03.9 HYPOTHYROIDISM, UNSPECIFIED: ICD-10-CM

## 2023-01-02 DIAGNOSIS — F41.8 OTHER SPECIFIED ANXIETY DISORDERS: ICD-10-CM

## 2023-01-02 DIAGNOSIS — I10 ESSENTIAL (PRIMARY) HYPERTENSION: ICD-10-CM

## 2023-01-02 DIAGNOSIS — Z86.39 PERSONAL HISTORY OF OTHER ENDOCRINE, NUTRITIONAL AND METABOLIC DISEASE: ICD-10-CM

## 2023-01-02 DIAGNOSIS — R10.9 UNSPECIFIED ABDOMINAL PAIN: ICD-10-CM

## 2023-01-02 DIAGNOSIS — Z90.49 ACQUIRED ABSENCE OF OTHER SPECIFIED PARTS OF DIGESTIVE TRACT: ICD-10-CM

## 2023-01-02 LAB
ALBUMIN SERPL ELPH-MCNC: 3.2 G/DL — LOW (ref 3.3–5)
ALP SERPL-CCNC: 254 U/L — HIGH (ref 40–120)
ALT FLD-CCNC: 84 U/L — HIGH (ref 12–78)
ANION GAP SERPL CALC-SCNC: 5 MMOL/L — SIGNIFICANT CHANGE UP (ref 5–17)
APPEARANCE UR: CLEAR — SIGNIFICANT CHANGE UP
AST SERPL-CCNC: 51 U/L — HIGH (ref 15–37)
BACTERIA # UR AUTO: ABNORMAL
BASOPHILS # BLD AUTO: 0.06 K/UL — SIGNIFICANT CHANGE UP (ref 0–0.2)
BASOPHILS NFR BLD AUTO: 0.7 % — SIGNIFICANT CHANGE UP (ref 0–2)
BILIRUB SERPL-MCNC: 0.5 MG/DL — SIGNIFICANT CHANGE UP (ref 0.2–1.2)
BILIRUB UR-MCNC: NEGATIVE — SIGNIFICANT CHANGE UP
BUN SERPL-MCNC: 15 MG/DL — SIGNIFICANT CHANGE UP (ref 7–23)
CALCIUM SERPL-MCNC: 9.7 MG/DL — SIGNIFICANT CHANGE UP (ref 8.5–10.1)
CHLORIDE SERPL-SCNC: 105 MMOL/L — SIGNIFICANT CHANGE UP (ref 96–108)
CO2 SERPL-SCNC: 28 MMOL/L — SIGNIFICANT CHANGE UP (ref 22–31)
COLOR SPEC: YELLOW — SIGNIFICANT CHANGE UP
CREAT SERPL-MCNC: 0.73 MG/DL — SIGNIFICANT CHANGE UP (ref 0.5–1.3)
DIFF PNL FLD: ABNORMAL
EGFR: 91 ML/MIN/1.73M2 — SIGNIFICANT CHANGE UP
EOSINOPHIL # BLD AUTO: 0.24 K/UL — SIGNIFICANT CHANGE UP (ref 0–0.5)
EOSINOPHIL NFR BLD AUTO: 2.7 % — SIGNIFICANT CHANGE UP (ref 0–6)
EPI CELLS # UR: SIGNIFICANT CHANGE UP
GLUCOSE SERPL-MCNC: 113 MG/DL — HIGH (ref 70–99)
GLUCOSE UR QL: NEGATIVE — SIGNIFICANT CHANGE UP
HCT VFR BLD CALC: 45.4 % — HIGH (ref 34.5–45)
HGB BLD-MCNC: 14.9 G/DL — SIGNIFICANT CHANGE UP (ref 11.5–15.5)
IMM GRANULOCYTES NFR BLD AUTO: 0.3 % — SIGNIFICANT CHANGE UP (ref 0–0.9)
KETONES UR-MCNC: NEGATIVE — SIGNIFICANT CHANGE UP
LEUKOCYTE ESTERASE UR-ACNC: ABNORMAL
LIDOCAIN IGE QN: 80 U/L — SIGNIFICANT CHANGE UP (ref 73–393)
LYMPHOCYTES # BLD AUTO: 1.92 K/UL — SIGNIFICANT CHANGE UP (ref 1–3.3)
LYMPHOCYTES # BLD AUTO: 21.8 % — SIGNIFICANT CHANGE UP (ref 13–44)
MCHC RBC-ENTMCNC: 30.7 PG — SIGNIFICANT CHANGE UP (ref 27–34)
MCHC RBC-ENTMCNC: 32.8 GM/DL — SIGNIFICANT CHANGE UP (ref 32–36)
MCV RBC AUTO: 93.6 FL — SIGNIFICANT CHANGE UP (ref 80–100)
MONOCYTES # BLD AUTO: 0.41 K/UL — SIGNIFICANT CHANGE UP (ref 0–0.9)
MONOCYTES NFR BLD AUTO: 4.7 % — SIGNIFICANT CHANGE UP (ref 2–14)
NEUTROPHILS # BLD AUTO: 6.13 K/UL — SIGNIFICANT CHANGE UP (ref 1.8–7.4)
NEUTROPHILS NFR BLD AUTO: 69.8 % — SIGNIFICANT CHANGE UP (ref 43–77)
NITRITE UR-MCNC: NEGATIVE — SIGNIFICANT CHANGE UP
PH UR: 5 — SIGNIFICANT CHANGE UP (ref 5–8)
PLATELET # BLD AUTO: 331 K/UL — SIGNIFICANT CHANGE UP (ref 150–400)
POTASSIUM SERPL-MCNC: 3.9 MMOL/L — SIGNIFICANT CHANGE UP (ref 3.5–5.3)
POTASSIUM SERPL-SCNC: 3.9 MMOL/L — SIGNIFICANT CHANGE UP (ref 3.5–5.3)
PROT SERPL-MCNC: 7.9 GM/DL — SIGNIFICANT CHANGE UP (ref 6–8.3)
PROT UR-MCNC: NEGATIVE — SIGNIFICANT CHANGE UP
RBC # BLD: 4.85 M/UL — SIGNIFICANT CHANGE UP (ref 3.8–5.2)
RBC # FLD: 14 % — SIGNIFICANT CHANGE UP (ref 10.3–14.5)
RBC CASTS # UR COMP ASSIST: SIGNIFICANT CHANGE UP /HPF (ref 0–4)
SODIUM SERPL-SCNC: 138 MMOL/L — SIGNIFICANT CHANGE UP (ref 135–145)
SP GR SPEC: 1.02 — SIGNIFICANT CHANGE UP (ref 1.01–1.02)
UROBILINOGEN FLD QL: NEGATIVE — SIGNIFICANT CHANGE UP
WBC # BLD: 8.79 K/UL — SIGNIFICANT CHANGE UP (ref 3.8–10.5)
WBC # FLD AUTO: 8.79 K/UL — SIGNIFICANT CHANGE UP (ref 3.8–10.5)
WBC UR QL: ABNORMAL /HPF (ref 0–5)

## 2023-01-02 PROCEDURE — 99283 EMERGENCY DEPT VISIT LOW MDM: CPT

## 2023-01-02 PROCEDURE — 80053 COMPREHEN METABOLIC PANEL: CPT

## 2023-01-02 PROCEDURE — 36415 COLL VENOUS BLD VENIPUNCTURE: CPT

## 2023-01-02 PROCEDURE — 85025 COMPLETE CBC W/AUTO DIFF WBC: CPT

## 2023-01-02 PROCEDURE — 83690 ASSAY OF LIPASE: CPT

## 2023-01-02 PROCEDURE — 81001 URINALYSIS AUTO W/SCOPE: CPT

## 2023-01-02 PROCEDURE — 99284 EMERGENCY DEPT VISIT MOD MDM: CPT | Mod: FS

## 2023-01-02 RX ORDER — FAMOTIDINE 10 MG/ML
20 INJECTION INTRAVENOUS ONCE
Refills: 0 | Status: DISCONTINUED | OUTPATIENT
Start: 2023-01-02 | End: 2023-01-02

## 2023-01-02 RX ORDER — CEPHALEXIN 500 MG
1 CAPSULE ORAL
Qty: 21 | Refills: 0
Start: 2023-01-02

## 2023-01-02 RX ORDER — SODIUM CHLORIDE 9 MG/ML
1000 INJECTION INTRAMUSCULAR; INTRAVENOUS; SUBCUTANEOUS ONCE
Refills: 0 | Status: COMPLETED | OUTPATIENT
Start: 2023-01-02 | End: 2023-01-02

## 2023-01-02 RX ORDER — CEPHALEXIN 500 MG
500 CAPSULE ORAL ONCE
Refills: 0 | Status: COMPLETED | OUTPATIENT
Start: 2023-01-02 | End: 2023-01-02

## 2023-01-02 RX ADMIN — Medication 500 MILLIGRAM(S): at 12:57

## 2023-01-02 NOTE — ED ADULT NURSE NOTE - OBJECTIVE STATEMENT
66 y/o alert female presents to the ED for c/o of abd pain and bloating since last night. pt denies abd pain at this time, PT refusing IV at this time, reports "that she only needs blood work.

## 2023-01-02 NOTE — ED STATDOCS - OBJECTIVE STATEMENT
66 y/o female with a PMHx of acute recurrent sinusitis, anxiety, carotid stenosis, depression, deviated septum, essential HTN, fatty liver, gall stones, heart murmur, hiatal hernia with GERD, HLD, hypothyroid, IBS, joint pain of lower limb, mitral valve insufficiency, morbid obesity, nonintractable episodic HA, peripheral edema, UTI, h/o cholecystectomy presents to the ED c/o abd pain and bloating since last night. NKDA. No other complaints at this time. GI: Dr. Baker.

## 2023-01-02 NOTE — ED STATDOCS - CARE PLAN
Principal Discharge DX:	Acute UTI  Secondary Diagnosis:	Abdominal pain  Secondary Diagnosis:	Elevated LFTs   1

## 2023-01-02 NOTE — ED ADULT TRIAGE NOTE - CHIEF COMPLAINT QUOTE
Patient c/o generalized abdominal pain wrapping around to the back and felt like a balloon.  Denies n/v

## 2023-01-02 NOTE — ED STATDOCS - NSFOLLOWUPINSTRUCTIONS_ED_ALL_ED_FT
Urinary Tract Infection, Adult       A urinary tract infection (UTI) is an infection of any part of the urinary tract. The urinary tract includes the kidneys, ureters, bladder, and urethra. These organs make, store, and get rid of urine in the body.    An upper UTI affects the ureters and kidneys. A lower UTI affects the bladder and urethra.      What are the causes?    Most urinary tract infections are caused by bacteria in your genital area around your urethra, where urine leaves your body. These bacteria grow and cause inflammation of your urinary tract.      What increases the risk?    You are more likely to develop this condition if:  •You have a urinary catheter that stays in place.      •You are not able to control when you urinate or have a bowel movement (incontinence).    •You are female and you:  •Use a spermicide or diaphragm for birth control.      •Have low estrogen levels.      •Are pregnant.        •You have certain genes that increase your risk.      •You are sexually active.      •You take antibiotic medicines.    •You have a condition that causes your flow of urine to slow down, such as:  •An enlarged prostate, if you are male.      •Blockage in your urethra.      •A kidney stone.      •A nerve condition that affects your bladder control (neurogenic bladder).      •Not getting enough to drink, or not urinating often.      •You have certain medical conditions, such as:  •Diabetes.      •A weak disease-fighting system (immunesystem).      •Sickle cell disease.      •Gout.      •Spinal cord injury.          What are the signs or symptoms?    Symptoms of this condition include:  •Needing to urinate right away (urgency).      •Frequent urination. This may include small amounts of urine each time you urinate.      •Pain or burning with urination.      •Blood in the urine.      •Urine that smells bad or unusual.      •Trouble urinating.      •Cloudy urine.      •Vaginal discharge, if you are female.      •Pain in the abdomen or the lower back.      You may also have:  •Vomiting or a decreased appetite.      •Confusion.      •Irritability or tiredness.      •A fever or chills.      •Diarrhea.      The first symptom in older adults may be confusion. In some cases, they may not have any symptoms until the infection has worsened.      How is this diagnosed?    This condition is diagnosed based on your medical history and a physical exam. You may also have other tests, including:  •Urine tests.      •Blood tests.      •Tests for STIs (sexually transmitted infections).      If you have had more than one UTI, a cystoscopy or imaging studies may be done to determine the cause of the infections.      How is this treated?    Treatment for this condition includes:  •Antibiotic medicine.      •Over-the-counter medicines to treat discomfort.      •Drinking enough water to stay hydrated.      If you have frequent infections or have other conditions such as a kidney stone, you may need to see a health care provider who specializes in the urinary tract (urologist).    In rare cases, urinary tract infections can cause sepsis. Sepsis is a life-threatening condition that occurs when the body responds to an infection. Sepsis is treated in the hospital with IV antibiotics, fluids, and other medicines.      Follow these instructions at home:     Medicines     •Take over-the-counter and prescription medicines only as told by your health care provider.      •If you were prescribed an antibiotic medicine, take it as told by your health care provider. Do not stop using the antibiotic even if you start to feel better.      General instructions   •Make sure you:  •Empty your bladder often and completely. Do not hold urine for long periods of time.      •Empty your bladder after sex.      •Wipe from front to back after urinating or having a bowel movement if you are female. Use each tissue only one time when you wipe.        •Drink enough fluid to keep your urine pale yellow.      •Keep all follow-up visits. This is important.        Contact a health care provider if:    •Your symptoms do not get better after 1–2 days.      •Your symptoms go away and then return.        Get help right away if:    •You have severe pain in your back or your lower abdomen.      •You have a fever or chills.      •You have nausea or vomiting.        Summary    •A urinary tract infection (UTI) is an infection of any part of the urinary tract, which includes the kidneys, ureters, bladder, and urethra.      •Most urinary tract infections are caused by bacteria in your genital area.      •Treatment for this condition often includes antibiotic medicines.      •If you were prescribed an antibiotic medicine, take it as told by your health care provider. Do not stop using the antibiotic even if you start to feel better.      •Keep all follow-up visits. This is important.      This information is not intended to replace advice given to you by your health care provider. Make sure you discuss any questions you have with your health care provider.          Abdominal Pain, Adult      Pain in the abdomen (abdominal pain) can be caused by many things. Often, abdominal pain is not serious and it gets better with no treatment or by being treated at home. However, sometimes abdominal pain is serious.    Your health care provider will ask questions about your medical history and do a physical exam to try to determine the cause of your abdominal pain.      Follow these instructions at home:    Medicines     •Take over-the-counter and prescription medicines only as told by your health care provider.      • Do not take a laxative unless told by your health care provider.        General instructions      •Watch your condition for any changes.      •Drink enough fluid to keep your urine pale yellow.      •Keep all follow-up visits as told by your health care provider. This is important.        Contact a health care provider if:    •Your abdominal pain changes or gets worse.      •You are not hungry or you lose weight without trying.      •You are constipated or have diarrhea for more than 2–3 days.      •You have pain when you urinate or have a bowel movement.      •Your abdominal pain wakes you up at night.      •Your pain gets worse with meals, after eating, or with certain foods.      •You are vomiting and cannot keep anything down.      •You have a fever.      •You have blood in your urine.        Get help right away if:    •Your pain does not go away as soon as your health care provider told you to expect.      •You cannot stop vomiting.      •Your pain is only in areas of the abdomen, such as the right side or the left lower portion of the abdomen. Pain on the right side could be caused by appendicitis.      •You have bloody or black stools, or stools that look like tar.      •You have severe pain, cramping, or bloating in your abdomen.    •You have signs of dehydration, such as:  •Dark urine, very little urine, or no urine.      •Cracked lips.      •Dry mouth.      •Sunken eyes.      •Sleepiness.      •Weakness.        •You have trouble breathing or chest pain.        Summary    •Often, abdominal pain is not serious and it gets better with no treatment or by being treated at home. However, sometimes abdominal pain is serious.      •Watch your condition for any changes.      •Take over-the-counter and prescription medicines only as told by your health care provider.      •Contact a health care provider if your abdominal pain changes or gets worse.      •Get help right away if you have severe pain, cramping, or bloating in your abdomen.      This information is not intended to replace advice given to you by your health care provider. Make sure you discuss any questions you have with your health care provider.

## 2023-01-02 NOTE — ED STATDOCS - CARE PROVIDER_API CALL
Sam Baker (MD)  Gastroenterology; Internal Medicine  [address] [facility], [address] [place] [address]  Phone: [phone]  Fax: [phone]  Follow Up Time: Urgent

## 2023-01-02 NOTE — ED STATDOCS - ATTENDING APP SHARED VISIT CONTRIBUTION OF CARE
I personally saw the patient with the TERRIE, and completed the key components of the history and physical exam. I then discussed the management plan with the TERRIE.

## 2023-01-02 NOTE — ED STATDOCS - PROGRESS NOTE DETAILS
PA: Patient is a 68 y/o female with PMHx of elevated LFT's, recurrent sinusitis, anxiety, carotid stenosis, depression, deviated septum, essential HTN, fatty liver, gall stones, heart murmur, hiatal hernia with GERD, HLD, hypothyroid, IBS, joint pain of lower limb, mitral valve insufficiency, morbid obesity, nonintractable episodic HA, peripheral edema, UTI, h/o cholecystectomy presents to the ED c/o abd pain and bloating since last night. ~Christophe Nuñez PA-C PA note: All labwork/radiology results discussed in detail with patient. Patient re-examined and re-evaluated. Patient feels much better at this time. ED evaluation, Diagnosis and management discussed with the patient in detail. Workup results discussed with ED attending, [place] to [place] home. Close GI follow up encouraged, aftercare to assist with scheduling appointment ASAP. Strict ED return instructions discussed in detail and patient given the opportunity to ask any questions about their discharge diagnosis and instructions. Patient verbalized understanding. ~ Christophe Nuñez PA-C

## 2023-01-02 NOTE — ED STATDOCS - PATIENT PORTAL LINK FT
You can access the FollowMyHealth Patient Portal offered by [facility] by registering at the following website: [email or web address]. By joining [facility]’s FollowMyHealth portal, you will also be able to view your health information using other applications (apps) compatible with our system.

## 2023-01-02 NOTE — ED STATDOCS - CLINICAL SUMMARY MEDICAL DECISION MAKING FREE TEXT BOX
Daily Note     Today's date: 2021  Patient name: Rodri Mooney  : 1973  MRN: [id number]  Referring provider: Mariposa Mabry DO  Dx:   Encounter Diagnosis     ICD-10-CM    1  Cervical radiculopathy  M54 12    2  Acute pain of left shoulder  M25 512                   Subjective: patient states that he was feeling a bit better however notes that he was having a bit more of a headache from last treatment  Objective: See treatment diary below      Assessment: Tolerated treatment well  Patient demonstrated fatigue post treatment, exhibited good technique with therapeutic exercises and would benefit from continued PT  Focus on manual therapy and HEP  Plan: Continue per plan of care        Precautions: none  Daily Treatment Diary     Manual                                                                                   Exercise Diary              Heat supine 10min            pec stretch [id number]            No monnies 15x2 blue band            Shoulder extension 15x2 blue band            Scapular retractions 15x2 black band            Horizontal abduction 15x2 blue band            Chin tucks 15x2            Thoracic extension over foam roller             Foam roller on wall Pt with midepigastric pain and abd distention, now improved. Pt with hx of GERD. Will check labs, treat with Pepcid, follow up with Dr. Baker. Pt with midepigastric pain and abd distention, now improved. Pt with hx of GERD. Will check labs, treat with Pepcid, follow up with Dr. Baker.      PA note: All labwork/radiology results discussed in detail with patient. Patient re-examined and re-evaluated. Patient feels much better at this time. ED evaluation, Diagnosis and management discussed with the patient in detail. Workup results discussed with ED attending, OK to dc home. Close GI follow up encouraged, aftercare to assist with scheduling appointment ASAP. Strict ED return instructions discussed in detail and patient given the opportunity to ask any questions about their discharge diagnosis and instructions. Patient verbalized understanding. ~ Christophe Nuñez PA-C

## 2023-01-02 NOTE — ED STATDOCS - PHYSICAL EXAMINATION
May 9, 2022       Soniya Downs MD  [address] [address]  Via In Basket      Patient: Gely Bolaños   YOB: 2014   Date of Visit: 5/9/2022       Dear Dr. Downs:    Thank you for referring Gely Bolaños to me for evaluation. Below are my notes for this visit with her.    If you have questions, please do not hesitate to call me. I look forward to following your patient along with you.      Sincerely,        Fito Watson MD        CC: No Recipients  Fito Watson MD  5/9/2022  2:48 PM  Signed  Gely Bolaños is a 7 year old female who presents to the Dermatology clinic in consultation for evaluation of a rash   Referred by Soniya Downs MD  Location: around mouth, eyelids  Duration: 6 months   Symptoms: none  Treatment: none  Soap: \"Baby\" liquid unscented   Moisturizer: Cetaphil cr qd   No inhaler use  Has eczema, uses HC AC/popliteal, applies herself    PMH: none  Fhx: no melanoma   Shx: no tobacco exposure  All other systems reviewed and negative.    PHYSICAL EXAMINATION:   Well appearing, normal mood and affect.  A general skin exam including scalp, face, eyelids, lips, neck, chest, abdomen, back, arms, legs, digits and nails was performed today.   -tiny flesh colored monomorphic clustered papules perioral, perinasal, periocular    ASSESSMENT AND PLAN:  Perioral dermatitis (aka periorificial dermatitis).   This is a benign condition seen in young children that can resolve spontaneously, but as that takes months to years, treatment is usually recommended.   While most cases clear with treatment within a few weeks, occasionally it may take up to 2 months.  Start tacrolimus 0.03% bid until clear.   Wash hands after applying HC to eczema on rest of body.   Perioral derm handout provided and discussed.                   PA NOTE: GEN: AOX3, NAD. HEENT: Throat clear. Airway is patent. EYES: PERRLA. EOMI. Head: NC/AT. NECK: Supple, No JVD. FROM. C-spine non-tender. CV:S1S2, RRR, LUNGS: Non-labored breathing, no tachypnea. O2sat 100% RA. CTA b/l. No w/r/r. CHEST: Equal chest expansion and rise. No deformity. ABD: Soft, NT/ND, no rebound, no guarding. No CVAT. EXT: No e/c/c. 2+ distal pulses. SKIN: No rashes. NEURO: No focal deficits. CN II-XII intact. FROM. 5/5 motor and sensory. ~Christophe Nuñez PA-C

## 2023-01-03 ENCOUNTER — EMERGENCY (EMERGENCY)
Facility: HOSPITAL | Age: 68
LOS: 0 days | Discharge: HOME | End: 2023-01-03
Attending: EMERGENCY MEDICINE
Payer: MEDICARE

## 2023-01-03 ENCOUNTER — APPOINTMENT (OUTPATIENT)
Dept: NEUROLOGY | Facility: CLINIC | Age: 68
End: 2023-01-03

## 2023-01-03 VITALS
HEART RATE: 84 BPM | RESPIRATION RATE: 16 BRPM | DIASTOLIC BLOOD PRESSURE: 68 MMHG | TEMPERATURE: 98 F | SYSTOLIC BLOOD PRESSURE: 145 MMHG | OXYGEN SATURATION: 100 %

## 2023-01-03 VITALS — HEIGHT: 66 IN | WEIGHT: 250 LBS

## 2023-01-03 DIAGNOSIS — I65.22 OCCLUSION AND STENOSIS OF LEFT CAROTID ARTERY: ICD-10-CM

## 2023-01-03 DIAGNOSIS — I10 ESSENTIAL (PRIMARY) HYPERTENSION: ICD-10-CM

## 2023-01-03 DIAGNOSIS — S62.109A FRACTURE OF UNSPECIFIED CARPAL BONE, UNSPECIFIED WRIST, INITIAL ENCOUNTER FOR CLOSED FRACTURE: Chronic | ICD-10-CM

## 2023-01-03 DIAGNOSIS — Z79.82 LONG TERM (CURRENT) USE OF ASPIRIN: ICD-10-CM

## 2023-01-03 DIAGNOSIS — Z98.890 OTHER SPECIFIED POSTPROCEDURAL STATES: Chronic | ICD-10-CM

## 2023-01-03 DIAGNOSIS — F41.8 OTHER SPECIFIED ANXIETY DISORDERS: ICD-10-CM

## 2023-01-03 DIAGNOSIS — Z87.59 PERSONAL HISTORY OF OTHER COMPLICATIONS OF PREGNANCY, CHILDBIRTH AND THE PUERPERIUM: ICD-10-CM

## 2023-01-03 DIAGNOSIS — K58.0 IRRITABLE BOWEL SYNDROME WITH DIARRHEA: ICD-10-CM

## 2023-01-03 DIAGNOSIS — I34.0 NONRHEUMATIC MITRAL (VALVE) INSUFFICIENCY: ICD-10-CM

## 2023-01-03 DIAGNOSIS — Z90.49 ACQUIRED ABSENCE OF OTHER SPECIFIED PARTS OF DIGESTIVE TRACT: ICD-10-CM

## 2023-01-03 DIAGNOSIS — Z79.02 LONG TERM (CURRENT) USE OF ANTITHROMBOTICS/ANTIPLATELETS: ICD-10-CM

## 2023-01-03 DIAGNOSIS — E03.9 HYPOTHYROIDISM, UNSPECIFIED: ICD-10-CM

## 2023-01-03 DIAGNOSIS — R79.89 OTHER SPECIFIED ABNORMAL FINDINGS OF BLOOD CHEMISTRY: ICD-10-CM

## 2023-01-03 DIAGNOSIS — Z87.19 PERSONAL HISTORY OF OTHER DISEASES OF THE DIGESTIVE SYSTEM: ICD-10-CM

## 2023-01-03 DIAGNOSIS — R42 DIZZINESS AND GIDDINESS: ICD-10-CM

## 2023-01-03 DIAGNOSIS — K21.9 GASTRO-ESOPHAGEAL REFLUX DISEASE WITHOUT ESOPHAGITIS: ICD-10-CM

## 2023-01-03 DIAGNOSIS — K58.1 IRRITABLE BOWEL SYNDROME WITH CONSTIPATION: ICD-10-CM

## 2023-01-03 DIAGNOSIS — N39.0 URINARY TRACT INFECTION, SITE NOT SPECIFIED: ICD-10-CM

## 2023-01-03 DIAGNOSIS — Z20.822 CONTACT WITH AND (SUSPECTED) EXPOSURE TO COVID-19: ICD-10-CM

## 2023-01-03 DIAGNOSIS — E78.5 HYPERLIPIDEMIA, UNSPECIFIED: ICD-10-CM

## 2023-01-03 DIAGNOSIS — Z90.49 ACQUIRED ABSENCE OF OTHER SPECIFIED PARTS OF DIGESTIVE TRACT: Chronic | ICD-10-CM

## 2023-01-03 LAB
ALBUMIN SERPL ELPH-MCNC: 3.2 G/DL — LOW (ref 3.3–5)
ALP SERPL-CCNC: 238 U/L — HIGH (ref 40–120)
ALT FLD-CCNC: 77 U/L — SIGNIFICANT CHANGE UP (ref 12–78)
ANION GAP SERPL CALC-SCNC: 8 MMOL/L — SIGNIFICANT CHANGE UP (ref 5–17)
APPEARANCE UR: ABNORMAL
AST SERPL-CCNC: 43 U/L — HIGH (ref 15–37)
BACTERIA # UR AUTO: ABNORMAL
BASOPHILS # BLD AUTO: 0.07 K/UL — SIGNIFICANT CHANGE UP (ref 0–0.2)
BASOPHILS NFR BLD AUTO: 0.8 % — SIGNIFICANT CHANGE UP (ref 0–2)
BILIRUB SERPL-MCNC: 0.4 MG/DL — SIGNIFICANT CHANGE UP (ref 0.2–1.2)
BILIRUB UR-MCNC: NEGATIVE — SIGNIFICANT CHANGE UP
BUN SERPL-MCNC: 16 MG/DL — SIGNIFICANT CHANGE UP (ref 7–23)
CALCIUM SERPL-MCNC: 9.7 MG/DL — SIGNIFICANT CHANGE UP (ref 8.5–10.1)
CHLORIDE SERPL-SCNC: 103 MMOL/L — SIGNIFICANT CHANGE UP (ref 96–108)
CO2 SERPL-SCNC: 28 MMOL/L — SIGNIFICANT CHANGE UP (ref 22–31)
COLOR SPEC: YELLOW — SIGNIFICANT CHANGE UP
COMMENT - URINE: SIGNIFICANT CHANGE UP
CREAT SERPL-MCNC: 0.74 MG/DL — SIGNIFICANT CHANGE UP (ref 0.5–1.3)
DIFF PNL FLD: ABNORMAL
EGFR: 89 ML/MIN/1.73M2 — SIGNIFICANT CHANGE UP
EOSINOPHIL # BLD AUTO: 0.21 K/UL — SIGNIFICANT CHANGE UP (ref 0–0.5)
EOSINOPHIL NFR BLD AUTO: 2.3 % — SIGNIFICANT CHANGE UP (ref 0–6)
EPI CELLS # UR: ABNORMAL
FLUAV AG NPH QL: SIGNIFICANT CHANGE UP
FLUBV AG NPH QL: SIGNIFICANT CHANGE UP
GLUCOSE SERPL-MCNC: 101 MG/DL — HIGH (ref 70–99)
GLUCOSE UR QL: NEGATIVE — SIGNIFICANT CHANGE UP
HCT VFR BLD CALC: 46.4 % — HIGH (ref 34.5–45)
HGB BLD-MCNC: 14.9 G/DL — SIGNIFICANT CHANGE UP (ref 11.5–15.5)
HYALINE CASTS # UR AUTO: ABNORMAL /LPF
IMM GRANULOCYTES NFR BLD AUTO: 0.5 % — SIGNIFICANT CHANGE UP (ref 0–0.9)
KETONES UR-MCNC: NEGATIVE — SIGNIFICANT CHANGE UP
LEUKOCYTE ESTERASE UR-ACNC: ABNORMAL
LYMPHOCYTES # BLD AUTO: 2.52 K/UL — SIGNIFICANT CHANGE UP (ref 1–3.3)
LYMPHOCYTES # BLD AUTO: 27.2 % — SIGNIFICANT CHANGE UP (ref 13–44)
MAGNESIUM SERPL-MCNC: 2.1 MG/DL — SIGNIFICANT CHANGE UP (ref 1.6–2.6)
MCHC RBC-ENTMCNC: 30.3 PG — SIGNIFICANT CHANGE UP (ref 27–34)
MCHC RBC-ENTMCNC: 32.1 GM/DL — SIGNIFICANT CHANGE UP (ref 32–36)
MCV RBC AUTO: 94.5 FL — SIGNIFICANT CHANGE UP (ref 80–100)
MONOCYTES # BLD AUTO: 0.46 K/UL — SIGNIFICANT CHANGE UP (ref 0–0.9)
MONOCYTES NFR BLD AUTO: 5 % — SIGNIFICANT CHANGE UP (ref 2–14)
NEUTROPHILS # BLD AUTO: 5.94 K/UL — SIGNIFICANT CHANGE UP (ref 1.8–7.4)
NEUTROPHILS NFR BLD AUTO: 64.2 % — SIGNIFICANT CHANGE UP (ref 43–77)
NITRITE UR-MCNC: NEGATIVE — SIGNIFICANT CHANGE UP
PH UR: 5 — SIGNIFICANT CHANGE UP (ref 5–8)
PHOSPHATE SERPL-MCNC: 3.4 MG/DL — SIGNIFICANT CHANGE UP (ref 2.5–4.5)
PLATELET # BLD AUTO: 340 K/UL — SIGNIFICANT CHANGE UP (ref 150–400)
POTASSIUM SERPL-MCNC: 3.5 MMOL/L — SIGNIFICANT CHANGE UP (ref 3.5–5.3)
POTASSIUM SERPL-SCNC: 3.5 MMOL/L — SIGNIFICANT CHANGE UP (ref 3.5–5.3)
PROT SERPL-MCNC: 7.8 GM/DL — SIGNIFICANT CHANGE UP (ref 6–8.3)
PROT UR-MCNC: NEGATIVE — SIGNIFICANT CHANGE UP
RBC # BLD: 4.91 M/UL — SIGNIFICANT CHANGE UP (ref 3.8–5.2)
RBC # FLD: 14 % — SIGNIFICANT CHANGE UP (ref 10.3–14.5)
RBC CASTS # UR COMP ASSIST: ABNORMAL /HPF (ref 0–4)
RSV RNA NPH QL NAA+NON-PROBE: SIGNIFICANT CHANGE UP
SARS-COV-2 RNA SPEC QL NAA+PROBE: SIGNIFICANT CHANGE UP
SODIUM SERPL-SCNC: 139 MMOL/L — SIGNIFICANT CHANGE UP (ref 135–145)
SP GR SPEC: 1.02 — SIGNIFICANT CHANGE UP (ref 1.01–1.02)
TROPONIN I, HIGH SENSITIVITY RESULT: 15.34 NG/L — SIGNIFICANT CHANGE UP
UROBILINOGEN FLD QL: NEGATIVE — SIGNIFICANT CHANGE UP
WBC # BLD: 9.25 K/UL — SIGNIFICANT CHANGE UP (ref 3.8–10.5)
WBC # FLD AUTO: 9.25 K/UL — SIGNIFICANT CHANGE UP (ref 3.8–10.5)
WBC UR QL: ABNORMAL /HPF (ref 0–5)

## 2023-01-03 PROCEDURE — 84100 ASSAY OF PHOSPHORUS: CPT

## 2023-01-03 PROCEDURE — 85025 COMPLETE CBC W/AUTO DIFF WBC: CPT

## 2023-01-03 PROCEDURE — 83735 ASSAY OF MAGNESIUM: CPT

## 2023-01-03 PROCEDURE — 36415 COLL VENOUS BLD VENIPUNCTURE: CPT

## 2023-01-03 PROCEDURE — 93005 ELECTROCARDIOGRAM TRACING: CPT

## 2023-01-03 PROCEDURE — 99283 EMERGENCY DEPT VISIT LOW MDM: CPT

## 2023-01-03 PROCEDURE — 80053 COMPREHEN METABOLIC PANEL: CPT

## 2023-01-03 PROCEDURE — 0241U: CPT

## 2023-01-03 PROCEDURE — 87086 URINE CULTURE/COLONY COUNT: CPT

## 2023-01-03 PROCEDURE — 81001 URINALYSIS AUTO W/SCOPE: CPT

## 2023-01-03 PROCEDURE — 99285 EMERGENCY DEPT VISIT HI MDM: CPT | Mod: FS

## 2023-01-03 PROCEDURE — 84484 ASSAY OF TROPONIN QUANT: CPT

## 2023-01-03 PROCEDURE — 93010 ELECTROCARDIOGRAM REPORT: CPT

## 2023-01-03 RX ORDER — SODIUM CHLORIDE 9 MG/ML
500 INJECTION INTRAMUSCULAR; INTRAVENOUS; SUBCUTANEOUS ONCE
Refills: 0 | Status: COMPLETED | OUTPATIENT
Start: 2023-01-03 | End: 2023-01-03

## 2023-01-03 RX ADMIN — SODIUM CHLORIDE 1000 MILLILITER(S): 9 INJECTION INTRAMUSCULAR; INTRAVENOUS; SUBCUTANEOUS at 12:05

## 2023-01-03 NOTE — ED STATDOCS - CARE PROVIDER_API CALL
Sam Baker (MD)  Gastroenterology; Internal Medicine  [address] [facility], [address] [place] [address]  Phone: [phone]  Fax: [phone]  Follow Up Time: Urgent

## 2023-01-03 NOTE — ED STATDOCS - PATIENT PORTAL LINK FT
You can access the FollowMyHealth Patient Portal offered by [facility] by registering at the following website: [email or web address]. By joining [employer]’s FollowMyHealth portal, you will also be able to view your health information using other applications (apps) compatible with our system.

## 2023-01-03 NOTE — ED ADULT TRIAGE NOTE - HAVE YOU RECEIVED AT LEAST TWO PFIZER AND/OR MODERNA VACCINATIONS (IN ANY COMBINATION) AND/OR ONE JOHNSON & JOHNSON VACCINATION?
Yes [Negative] : Heme/Lymph [de-identified] : as per HPI  [FreeTextEntry4] : as per HPI  [de-identified] : as per HPI

## 2023-01-03 NOTE — ED STATDOCS - ATTENDING APP SHARED VISIT CONTRIBUTION OF CARE
I, Larry Mckeon MD,  performed the initial face to face bedside interview with this patient regarding history of present illness, review of symptoms and relevant past medical, social and family history.  I completed an independent physical examination.  I was the initial provider who evaluated this patient.   I personally saw the patient and performed a substantive portion of the visit including all aspects of the medical decision making.  I have signed out the follow up of any pending tests (i.e. labs, radiological studies) to the [facility].  I have communicated the patient’s plan of care and disposition with the TERRI[facility].  The history, relevant review of systems, past medical and surgical history, medical decision making, and physical examination was documented by the scribe in my presence and I attest to the accuracy of the documentation.

## 2023-01-03 NOTE — ED STATDOCS - OBJECTIVE STATEMENT
68 y/o female with PMHx of carotid stenosis, HTN, anxiety, depression, UTI, cholecystectomy, hypothyroid, HLD, GERD, heart murmur presents to the ED for feeling dizzy, pt saw PCP yesterday, found to have UTI, started on ABx , but has not filled scrip yet. No nausea, vomiting, diarrhea, no head strike, pt called PCP who suggested to go to the ER.

## 2023-01-03 NOTE — ED ADULT NURSE NOTE - OBJECTIVE STATEMENT
Pt presents to the ED  AOx4 c/o dizziness this morning when she woke up. Pt denies blurred vision, weakness, change in gait, slurred speech, palpitations, or N/V. Pt reports she is currently being treated for a UTI. Pt repots she does not drink a lot of fluids at home.

## 2023-01-03 NOTE — ED STATDOCS - CLINICAL SUMMARY MEDICAL DECISION MAKING FREE TEXT BOX
68 y/o pt presents to the ER for intermittent dizziness, now resolved, possibly from UTI, dehydration or cardiac arrythmia. Plan EKG, labs, urine.

## 2023-01-03 NOTE — ED STATDOCS - PROGRESS NOTE DETAILS
Mara Mckeon:  Pt evaluated on arrival in triage. Pt with no symptoms at this time. Pt not a code stroke. pt aware of her labs and ua results recommend continue her antibiotics as directed for UTI and fu with her PMD. pt aware of her elevated LFTS follows Dr. Baker for it. pt well appearing on dc and agrees with plan. -Lashonda Ford PA-C

## 2023-01-03 NOTE — ED ADULT NURSE NOTE - NSIMPLEMENTINTERV_GEN_ALL_ED
Implemented All Universal Safety Interventions:  [place] to call system. Call bell, personal items and telephone within reach. Instruct patient to call for assistance. Room bathroom lighting operational. Non-slip footwear when patient is off stretcher. Physically safe environment: no spills, clutter or unnecessary equipment. Stretcher in lowest position, wheels locked, appropriate side rails in place. Implemented All Fall with Harm Risk Interventions:  [place] to call system. Call bell, personal items and telephone within reach. Instruct patient to call for assistance. Room bathroom lighting operational. Non-slip footwear when patient is off stretcher. Physically safe environment: no spills, clutter or unnecessary equipment. Stretcher in lowest position, wheels locked, appropriate side rails in place. Provide visual cue, wrist band, yellow gown, etc. Monitor gait and stability. Monitor for mental status changes and reorient to person, place, and time. Review medications for side effects contributing to fall risk. Reinforce activity limits and safety measures with patient and family. Provide visual clues: red socks.

## 2023-01-03 NOTE — ED ADULT TRIAGE NOTE - CHIEF COMPLAINT QUOTE
PT C/O INTERMITTENT DIZZINESS WHEN SHE WOKE UP THIS MORNING.  PT'S PMD SENT PT TO ED FOR EVAL.  PT REPORTS "FEELING FINE." MD BERTHA YEPEZ FOR STROKE, NO CODE STROKE INITIATED.  PT CURRENTLY BEING TREATED FOR UTI

## 2023-01-03 NOTE — ED STATDOCS - NSFOLLOWUPINSTRUCTIONS_ED_ALL_ED_FT
Urinary Tract Infection    A urinary tract infection (UTI) is an infection of any part of the urinary tract, which includes the kidneys, ureters, bladder, and urethra. Risk factors include ignoring your need to urinate, wiping back to front if female, being an uncircumcised male, and having diabetes or a weak immune system. Symptoms include frequent urination, pain or burning with urination, foul smelling urine, cloudy urine, pain in the lower abdomen, blood in the urine, and fever. If you were prescribed an antibiotic medicine, take it as told by your health care provider. Do not stop taking the antibiotic even if you start to feel better.    SEEK IMMEDIATE MEDICAL CARE IF YOU HAVE ANY OF THE FOLLOWING SYMPTOMS: severe back or abdominal pain, fever, inability to keep fluids or medicine down, dizziness/lightheadedness, or a change in mental status.      continue your antibiotics as directed  increase fluids and hydration  fu with GI as directed, limit fatty foods

## 2023-01-04 LAB
CULTURE RESULTS: SIGNIFICANT CHANGE UP
SPECIMEN SOURCE: SIGNIFICANT CHANGE UP

## 2023-01-05 ENCOUNTER — NON-APPOINTMENT (OUTPATIENT)
Age: 68
End: 2023-01-05

## 2023-01-19 ENCOUNTER — NON-APPOINTMENT (OUTPATIENT)
Age: 68
End: 2023-01-19

## 2023-01-30 ENCOUNTER — APPOINTMENT (OUTPATIENT)
Dept: VASCULAR SURGERY | Facility: CLINIC | Age: 68
End: 2023-01-30
Payer: MEDICARE

## 2023-01-30 VITALS
WEIGHT: 240 LBS | HEART RATE: 72 BPM | HEIGHT: 66 IN | SYSTOLIC BLOOD PRESSURE: 177 MMHG | BODY MASS INDEX: 38.57 KG/M2 | DIASTOLIC BLOOD PRESSURE: 75 MMHG

## 2023-01-30 PROCEDURE — 99212 OFFICE O/P EST SF 10 MIN: CPT

## 2023-01-30 NOTE — HISTORY OF PRESENT ILLNESS
[FreeTextEntry1] : Patient presents for routine reevaluation.  She notes that she did not consistently take her anticoagulation (Xarelto) because she believed it made her nauseous.\par \par She denies any leg pain or change in her overall clinical status.

## 2023-01-30 NOTE — ASSESSMENT
[FreeTextEntry1] : 67-year-old with potentially chronic DVT of the left popliteal vein intermittently anticoagulated (patient's choice) secondary to what she believes was nausea related to its use.\par \par We discussed utilizing a different anticoagulant (Eliquis) and a prescription was provided to her today.\par \par She will follow-up with me for repeat venous duplex study in March and I will make further recommendations at that time.\par \par All questions were answered.

## 2023-02-14 ENCOUNTER — NON-APPOINTMENT (OUTPATIENT)
Age: 68
End: 2023-02-14

## 2023-03-02 ENCOUNTER — EMERGENCY (EMERGENCY)
Facility: HOSPITAL | Age: 68
LOS: 0 days | Discharge: HOME | End: 2023-03-03
Attending: FAMILY MEDICINE
Payer: MEDICARE

## 2023-03-02 VITALS — HEIGHT: 66 IN

## 2023-03-02 VITALS
HEART RATE: 66 BPM | OXYGEN SATURATION: 99 % | RESPIRATION RATE: 18 BRPM | TEMPERATURE: 98 F | SYSTOLIC BLOOD PRESSURE: 121 MMHG | DIASTOLIC BLOOD PRESSURE: 85 MMHG

## 2023-03-02 DIAGNOSIS — I10 ESSENTIAL (PRIMARY) HYPERTENSION: ICD-10-CM

## 2023-03-02 DIAGNOSIS — I34.0 NONRHEUMATIC MITRAL (VALVE) INSUFFICIENCY: ICD-10-CM

## 2023-03-02 DIAGNOSIS — Z98.890 OTHER SPECIFIED POSTPROCEDURAL STATES: Chronic | ICD-10-CM

## 2023-03-02 DIAGNOSIS — Z90.49 ACQUIRED ABSENCE OF OTHER SPECIFIED PARTS OF DIGESTIVE TRACT: Chronic | ICD-10-CM

## 2023-03-02 DIAGNOSIS — D72.829 ELEVATED WHITE BLOOD CELL COUNT, UNSPECIFIED: ICD-10-CM

## 2023-03-02 DIAGNOSIS — Z20.822 CONTACT WITH AND (SUSPECTED) EXPOSURE TO COVID-19: ICD-10-CM

## 2023-03-02 DIAGNOSIS — Z87.442 PERSONAL HISTORY OF URINARY CALCULI: ICD-10-CM

## 2023-03-02 DIAGNOSIS — F41.8 OTHER SPECIFIED ANXIETY DISORDERS: ICD-10-CM

## 2023-03-02 DIAGNOSIS — K76.89 OTHER SPECIFIED DISEASES OF LIVER: ICD-10-CM

## 2023-03-02 DIAGNOSIS — K44.9 DIAPHRAGMATIC HERNIA WITHOUT OBSTRUCTION OR GANGRENE: ICD-10-CM

## 2023-03-02 DIAGNOSIS — E78.5 HYPERLIPIDEMIA, UNSPECIFIED: ICD-10-CM

## 2023-03-02 DIAGNOSIS — K58.0 IRRITABLE BOWEL SYNDROME WITH DIARRHEA: ICD-10-CM

## 2023-03-02 DIAGNOSIS — J34.2 DEVIATED NASAL SEPTUM: ICD-10-CM

## 2023-03-02 DIAGNOSIS — R10.12 LEFT UPPER QUADRANT PAIN: ICD-10-CM

## 2023-03-02 DIAGNOSIS — R10.11 RIGHT UPPER QUADRANT PAIN: ICD-10-CM

## 2023-03-02 DIAGNOSIS — Z86.39 PERSONAL HISTORY OF OTHER ENDOCRINE, NUTRITIONAL AND METABOLIC DISEASE: ICD-10-CM

## 2023-03-02 DIAGNOSIS — E03.9 HYPOTHYROIDISM, UNSPECIFIED: ICD-10-CM

## 2023-03-02 DIAGNOSIS — R11.2 NAUSEA WITH VOMITING, UNSPECIFIED: ICD-10-CM

## 2023-03-02 DIAGNOSIS — K76.0 FATTY (CHANGE OF) LIVER, NOT ELSEWHERE CLASSIFIED: ICD-10-CM

## 2023-03-02 DIAGNOSIS — Z90.49 ACQUIRED ABSENCE OF OTHER SPECIFIED PARTS OF DIGESTIVE TRACT: ICD-10-CM

## 2023-03-02 DIAGNOSIS — S62.109A FRACTURE OF UNSPECIFIED CARPAL BONE, UNSPECIFIED WRIST, INITIAL ENCOUNTER FOR CLOSED FRACTURE: Chronic | ICD-10-CM

## 2023-03-02 DIAGNOSIS — K58.1 IRRITABLE BOWEL SYNDROME WITH CONSTIPATION: ICD-10-CM

## 2023-03-02 DIAGNOSIS — Z79.02 LONG TERM (CURRENT) USE OF ANTITHROMBOTICS/ANTIPLATELETS: ICD-10-CM

## 2023-03-02 DIAGNOSIS — Z79.82 LONG TERM (CURRENT) USE OF ASPIRIN: ICD-10-CM

## 2023-03-02 DIAGNOSIS — K21.9 GASTRO-ESOPHAGEAL REFLUX DISEASE WITHOUT ESOPHAGITIS: ICD-10-CM

## 2023-03-02 LAB
ALBUMIN SERPL ELPH-MCNC: 3.3 G/DL — SIGNIFICANT CHANGE UP (ref 3.3–5)
ALP SERPL-CCNC: 150 U/L — HIGH (ref 40–120)
ALT FLD-CCNC: 51 U/L — SIGNIFICANT CHANGE UP (ref 12–78)
ANION GAP SERPL CALC-SCNC: 3 MMOL/L — LOW (ref 5–17)
APPEARANCE UR: CLEAR — SIGNIFICANT CHANGE UP
APTT BLD: 31.2 SEC — SIGNIFICANT CHANGE UP (ref 27.5–35.5)
AST SERPL-CCNC: 39 U/L — HIGH (ref 15–37)
BACTERIA # UR AUTO: NEGATIVE — SIGNIFICANT CHANGE UP
BILIRUB SERPL-MCNC: 0.5 MG/DL — SIGNIFICANT CHANGE UP (ref 0.2–1.2)
BILIRUB UR-MCNC: NEGATIVE — SIGNIFICANT CHANGE UP
BUN SERPL-MCNC: 22 MG/DL — SIGNIFICANT CHANGE UP (ref 7–23)
CALCIUM SERPL-MCNC: 9.5 MG/DL — SIGNIFICANT CHANGE UP (ref 8.5–10.1)
CHLORIDE SERPL-SCNC: 108 MMOL/L — SIGNIFICANT CHANGE UP (ref 96–108)
CO2 SERPL-SCNC: 26 MMOL/L — SIGNIFICANT CHANGE UP (ref 22–31)
COLOR SPEC: YELLOW — SIGNIFICANT CHANGE UP
CREAT SERPL-MCNC: 0.92 MG/DL — SIGNIFICANT CHANGE UP (ref 0.5–1.3)
DIFF PNL FLD: ABNORMAL
EGFR: 69 ML/MIN/1.73M2 — SIGNIFICANT CHANGE UP
EPI CELLS # UR: NEGATIVE — SIGNIFICANT CHANGE UP
GLUCOSE SERPL-MCNC: 110 MG/DL — HIGH (ref 70–99)
GLUCOSE UR QL: NEGATIVE — SIGNIFICANT CHANGE UP
INR BLD: 1.06 RATIO — SIGNIFICANT CHANGE UP (ref 0.88–1.16)
KETONES UR-MCNC: NEGATIVE — SIGNIFICANT CHANGE UP
LACTATE SERPL-SCNC: 1 MMOL/L — SIGNIFICANT CHANGE UP (ref 0.7–2)
LEUKOCYTE ESTERASE UR-ACNC: ABNORMAL
LIDOCAIN IGE QN: 98 U/L — SIGNIFICANT CHANGE UP (ref 73–393)
NITRITE UR-MCNC: NEGATIVE — SIGNIFICANT CHANGE UP
PH UR: 5 — SIGNIFICANT CHANGE UP (ref 5–8)
POTASSIUM SERPL-MCNC: 3.7 MMOL/L — SIGNIFICANT CHANGE UP (ref 3.5–5.3)
POTASSIUM SERPL-SCNC: 3.7 MMOL/L — SIGNIFICANT CHANGE UP (ref 3.5–5.3)
PROT SERPL-MCNC: 7.6 GM/DL — SIGNIFICANT CHANGE UP (ref 6–8.3)
PROT UR-MCNC: NEGATIVE — SIGNIFICANT CHANGE UP
PROTHROM AB SERPL-ACNC: 12.3 SEC — SIGNIFICANT CHANGE UP (ref 10.5–13.4)
RBC CASTS # UR COMP ASSIST: ABNORMAL /HPF (ref 0–4)
SODIUM SERPL-SCNC: 137 MMOL/L — SIGNIFICANT CHANGE UP (ref 135–145)
SP GR SPEC: 1.01 — SIGNIFICANT CHANGE UP (ref 1.01–1.02)
TROPONIN I, HIGH SENSITIVITY RESULT: 17.28 NG/L — SIGNIFICANT CHANGE UP
UROBILINOGEN FLD QL: NEGATIVE — SIGNIFICANT CHANGE UP
WBC UR QL: SIGNIFICANT CHANGE UP /HPF (ref 0–5)

## 2023-03-02 PROCEDURE — 83690 ASSAY OF LIPASE: CPT

## 2023-03-02 PROCEDURE — 85025 COMPLETE CBC W/AUTO DIFF WBC: CPT

## 2023-03-02 PROCEDURE — 74177 CT ABD & PELVIS W/CONTRAST: CPT | Mod: MA

## 2023-03-02 PROCEDURE — 85610 PROTHROMBIN TIME: CPT

## 2023-03-02 PROCEDURE — 83605 ASSAY OF LACTIC ACID: CPT

## 2023-03-02 PROCEDURE — 93005 ELECTROCARDIOGRAM TRACING: CPT

## 2023-03-02 PROCEDURE — 96375 TX/PRO/DX INJ NEW DRUG ADDON: CPT

## 2023-03-02 PROCEDURE — 86901 BLOOD TYPING SEROLOGIC RH(D): CPT

## 2023-03-02 PROCEDURE — 99285 EMERGENCY DEPT VISIT HI MDM: CPT | Mod: FS

## 2023-03-02 PROCEDURE — 87086 URINE CULTURE/COLONY COUNT: CPT

## 2023-03-02 PROCEDURE — 96374 THER/PROPH/DIAG INJ IV PUSH: CPT | Mod: XU

## 2023-03-02 PROCEDURE — 85730 THROMBOPLASTIN TIME PARTIAL: CPT

## 2023-03-02 PROCEDURE — 74177 CT ABD & PELVIS W/CONTRAST: CPT | Mod: 26,MA

## 2023-03-02 PROCEDURE — 80053 COMPREHEN METABOLIC PANEL: CPT

## 2023-03-02 PROCEDURE — 36415 COLL VENOUS BLD VENIPUNCTURE: CPT

## 2023-03-02 PROCEDURE — 93010 ELECTROCARDIOGRAM REPORT: CPT

## 2023-03-02 PROCEDURE — 0225U NFCT DS DNA&RNA 21 SARSCOV2: CPT

## 2023-03-02 PROCEDURE — 86900 BLOOD TYPING SEROLOGIC ABO: CPT

## 2023-03-02 PROCEDURE — 99285 EMERGENCY DEPT VISIT HI MDM: CPT | Mod: 25

## 2023-03-02 PROCEDURE — 84484 ASSAY OF TROPONIN QUANT: CPT

## 2023-03-02 PROCEDURE — 81001 URINALYSIS AUTO W/SCOPE: CPT

## 2023-03-02 PROCEDURE — 86850 RBC ANTIBODY SCREEN: CPT

## 2023-03-02 RX ORDER — ONDANSETRON 8 MG/1
4 TABLET, FILM COATED ORAL ONCE
Refills: 0 | Status: COMPLETED | OUTPATIENT
Start: 2023-03-02 | End: 2023-03-02

## 2023-03-02 RX ORDER — SODIUM CHLORIDE 9 MG/ML
1000 INJECTION INTRAMUSCULAR; INTRAVENOUS; SUBCUTANEOUS ONCE
Refills: 0 | Status: COMPLETED | OUTPATIENT
Start: 2023-03-02 | End: 2023-03-02

## 2023-03-02 RX ORDER — ACETAMINOPHEN 500 MG
650 TABLET ORAL ONCE
Refills: 0 | Status: DISCONTINUED | OUTPATIENT
Start: 2023-03-02 | End: 2023-03-03

## 2023-03-02 RX ORDER — HYDROMORPHONE HYDROCHLORIDE 2 MG/ML
0.5 INJECTION INTRAMUSCULAR; INTRAVENOUS; SUBCUTANEOUS ONCE
Refills: 0 | Status: DISCONTINUED | OUTPATIENT
Start: 2023-03-02 | End: 2023-03-02

## 2023-03-02 RX ADMIN — HYDROMORPHONE HYDROCHLORIDE 0.5 MILLIGRAM(S): 2 INJECTION INTRAMUSCULAR; INTRAVENOUS; SUBCUTANEOUS at 17:53

## 2023-03-02 RX ADMIN — ONDANSETRON 4 MILLIGRAM(S): 8 TABLET, FILM COATED ORAL at 17:53

## 2023-03-02 RX ADMIN — SODIUM CHLORIDE 1000 MILLILITER(S): 9 INJECTION INTRAMUSCULAR; INTRAVENOUS; SUBCUTANEOUS at 18:53

## 2023-03-02 RX ADMIN — SODIUM CHLORIDE 1000 MILLILITER(S): 9 INJECTION INTRAMUSCULAR; INTRAVENOUS; SUBCUTANEOUS at 17:53

## 2023-03-02 NOTE — ED STATDOCS - PROGRESS NOTE DETAILS
66 y/o Female with PMHx of HTN presents to the ED c/o sudden onset of bloating sensation to abdomen  then pain onset across top of abdomen.  Associated with nausea and vomiting.  Pt uncomfortable in appearance in Intake room.  Pt had labs, meds and CT imaging ordered.  On initial re-eval in , pt c/o dizziness s/p Dilaudid and Zofran.  Offered to have pt moved to bed in  to lay down, but she refused.  IVF increased.  Will f/u Labs, CT.  Ratna Wilkes PA-C WBC elevated to 17,000.  AST 39, ALT 51.  Lipase 98, Trop not elevated.  Lactate 1.  U/A pending.  Pt appears more comfortable on re-eval.  Does not feel like she has to urinate yet.  Abd: Obese, Active Bs x4, Soft, (+) RUQ tenderness to palp.  CTA B. RRR.  Ratna Wilkes PA-C Pt has been unable to urinate yet in the ED.  Pt having sips of liquid and IVF running.  Has had 700ml of IVF.  Encouraged urination.  Discussed case with Surgery Resident for consultation of pt with RUQ Pain / nausea and vomiting / WBC 17,000.   CT with indeterminate, multi-focal calcifications along the right heptic capsule, may be post-surgical or post-infection.  Gallbladder WNL, but pt reports Gallbladder was removed years ago.  Dr. Garcia called Radiology to update reading.  Ratna Wilkes PA-C

## 2023-03-02 NOTE — CONSULT NOTE ADULT - SUBJECTIVE AND OBJECTIVE BOX
Pt is a 66 YO F that is presenting with a 1 day complaint of epigastric pain with vomiting. Pt stated that [facility] and had [facility] to eat and approximately 1-2 hrs later she began to have epigastric pain    PsHx: LEft Hip Arthroscopy, Lap efrain, Left Carotid Endarterectomy (2017)     Vital Signs Last 24 Hrs  T(C): 36.6 (02 Mar 2023 21:47), Max: 36.6 (02 Mar 2023 21:47)  T(F): 97.8 (02 Mar 2023 21:47), Max: 97.8 (02 Mar 2023 21:47)  HR: 66 (02 Mar 2023 21:47) (66 - 97)  BP: 121/85 (02 Mar 2023 21:47) (121/85 - 156/59)  BP(mean): 90 (02 Mar 2023 21:47) (85 - 90)  ABP: --  ABP(mean): --  RR: 18 (02 Mar 2023 21:47) (18 - 20)  SpO2: 99% (02 Mar 2023 21:47) (99% - 100%)    O2 Parameters below as of 02 Mar 2023 21:47  Patient On (Oxygen Delivery Method): room air        PE:  General: NAD, Morbid obesity  Neck: Supple, healed scar on left neck  Chest: Equal expansion bilaterally  CV: S1S2 Present  Abdomen: Soft, non distended, non-tender to palpation, non-tympanic, bowel sounds present  Extremities: Grossly symmetric    Labs:                        14.6                 137  | 26   | 22           17.31<H> >-----------< 271     ------------------------< 110<H>                           44.2                 3.7  | 108  | 0.92                                                                      Ca 9.5   Mg x     Ph x          CT Abdomen and Pelvis w/ IV Cont (03.02.23 @ 19:06) >  LIVER: Focal capsular calcification measuring 3.8 x 1.0 cm in the   posterior medial liver (2:30) and along the gallbladder fossa measuring   2.3 cm. Capsular calcification seen along the lateral aspect of the right   hepatic lobe (2:22).  BILE DUCTS: Normal caliber.  GALLBLADDER: Within normal limits.  SPLEEN: Within normal limits.  PANCREAS: Within normal limits.  ADRENALS: Within normal limits.  KIDNEYS/URETERS: Subcentimeter cortical hypodensities in the right kidney   are too small to characterize. No hydronephrosis or obstructive renal   calculi.    BLADDER: Within normal limits.  REPRODUCTIVE ORGANS: Uterus and adnexa within normal limits.    BOWEL: Nobowel obstruction. Appendix is not visualized. No evidence of   inflammation in the pericecal region.  PERITONEUM: No ascites.  VESSELS: Within normal limits.  RETROPERITONEUM/LYMPH NODES: No lymphadenopathy.  ABDOMINAL WALL: Within normal limits.  BONES: Within normal limits.    IMPRESSION:  1.  No acute CT findings to explain the patient's symptoms. No bowel   obstruction or inflammation.    2.  Indeterminate, multifocal calcifications along the right hepatic   capsule, which may be postsurgical or post-infection. Comparison to prior   outside imaging would be helpful to evaluate stability.       Pt is a 66 YO F that is presenting with a 1 day complaint of epigastric pain with vomiting. Pt stated that she was ate the senior center and had something to eat and approximately 1-2 hrs later she began to have epigastric pain. Denied fever, chills, SOB and previous episode of vomiting. Admitted to burning when she urinated with a foul order of the urine. Pain was completing around the epigastric area.     PsHx: Left Shoulder Arthroscopy, Lap efrain, Left Carotid Endarterectomy (2017)     Vital Signs Last 24 Hrs  T(C): 36.6 (02 Mar 2023 21:47), Max: 36.6 (02 Mar 2023 21:47)  T(F): 97.8 (02 Mar 2023 21:47), Max: 97.8 (02 Mar 2023 21:47)  HR: 66 (02 Mar 2023 21:47) (66 - 97)  BP: 121/85 (02 Mar 2023 21:47) (121/85 - 156/59)  BP(mean): 90 (02 Mar 2023 21:47) (85 - 90)  ABP: --  ABP(mean): --  RR: 18 (02 Mar 2023 21:47) (18 - 20)  SpO2: 99% (02 Mar 2023 21:47) (99% - 100%)    O2 Parameters below as of 02 Mar 2023 21:47  Patient On (Oxygen Delivery Method): room air        PE:  General: NAD, Morbid obesity  Neck: Supple, healed scar on left neck  Chest: Equal expansion bilaterally  CV: S1S2 Present  Abdomen: Soft, non distended, non-tender to palpation, non-tympanic, bowel sounds present, well healed surgical scars  Extremities: Grossly symmetric    Labs:                        14.6                 137  | 26   | 22           17.31<H> >-----------< 271     ------------------------< 110<H>                           44.2                 3.7  | 108  | 0.92                                                                      Ca 9.5   Mg x     Ph x          CT Abdomen and Pelvis w/ IV Cont (03.02.23 @ 19:06) >  LIVER: Focal capsular calcification measuring 3.8 x 1.0 cm in the   posterior medial liver (2:30) and along the gallbladder fossa measuring   2.3 cm. Capsular calcification seen along the lateral aspect of the right   hepatic lobe (2:22).  BILE DUCTS: Normal caliber.  GALLBLADDER: Within normal limits.  SPLEEN: Within normal limits.  PANCREAS: Within normal limits.  ADRENALS: Within normal limits.  KIDNEYS/URETERS: Subcentimeter cortical hypodensities in the right kidney   are too small to characterize. No hydronephrosis or obstructive renal   calculi.    BLADDER: Within normal limits.  REPRODUCTIVE ORGANS: Uterus and adnexa within normal limits.    BOWEL: Nobowel obstruction. Appendix is not visualized. No evidence of   inflammation in the pericecal region.  PERITONEUM: No ascites.  VESSELS: Within normal limits.  RETROPERITONEUM/LYMPH NODES: No lymphadenopathy.  ABDOMINAL WALL: Within normal limits.  BONES: Within normal limits.    IMPRESSION:  1.  No acute CT findings to explain the patient's symptoms. No bowel   obstruction or inflammation.    2.  Indeterminate, multifocal calcifications along the right hepatic   capsule, which may be postsurgical or post-infection. Comparison to prior   outside imaging would be helpful to evaluate stability.

## 2023-03-02 NOTE — CONSULT NOTE ADULT - ASSESSMENT
68 YO F with image finding of hepatic capsular calcification    No inflammation noted on imaging  Unclear response for a leukocytosis  Possible Gastroenteritis based on pt's history  Does not meet criteria for a surgical admission      Case discussed with Dr Suazo

## 2023-03-02 NOTE — ED STATDOCS - CONSIDERATION OF ADMISSION OBSERVATION
pt considered for admission due to inc wbc. No acute pathology on ct scan and pt with clinical improvement. Pt has good follow up Consideration of Admission/Observation

## 2023-03-02 NOTE — ED STATDOCS - CLINICAL SUMMARY MEDICAL DECISION MAKING FREE TEXT BOX
Patient with hx of HTN, here with sudden onset of abd pain radiating to back with nausea. Hx of UTI 2 months ago. Plan for labs, IV fluids, Zofran, pain meds, CAT scan.

## 2023-03-02 NOTE — ED STATDOCS - NSFOLLOWUPINSTRUCTIONS_ED_ALL_ED_FT
Drink lots of fluids. Take tylenol every four hours for pain. Follow up with your doctor. Drink lots of fluids. Take tylenol every four hours for pain. Follow up with your doctor. Return to ER if worse.

## 2023-03-02 NOTE — ED STATDOCS - OBJECTIVE STATEMENT
67 year old female with PMHx of carotid stenosis, HTN, anxiety, depression, UTI x2 months ago, cholecystectomy, hypothyroid, HLD, GERD, heart murmur presents to the ED c/o sudden onset intermittent left upper abd pain radiating to back and nausea x30 minutes PTA, also reporting an episode of vomiting in ED. States she was laying down with her cat when the pain began. 3/10 pain at this time. Denies urinary sx, sore throat, ear pain. NKDA. Denies hx of kidney stone. PCP Leigh.

## 2023-03-02 NOTE — ED STATDOCS - PATIENT PORTAL LINK FT
You can access the FollowMyHealth Patient Portal offered by [facility] by registering at the following website: [email or web address]. By joining [email or web address]’s FollowMyHealth portal, you will also be able to view your health information using other applications (apps) compatible with our system.

## 2023-03-04 LAB
CULTURE RESULTS: SIGNIFICANT CHANGE UP
SPECIMEN SOURCE: SIGNIFICANT CHANGE UP

## 2023-03-29 ENCOUNTER — APPOINTMENT (OUTPATIENT)
Dept: DERMATOLOGY | Facility: CLINIC | Age: 68
End: 2023-03-29
Payer: MEDICARE

## 2023-03-29 PROCEDURE — 99214 OFFICE O/P EST MOD 30 MIN: CPT

## 2023-03-29 RX ORDER — RIVAROXABAN 10 MG/1
10 TABLET, FILM COATED ORAL DAILY
Qty: 30 | Refills: 0 | Status: DISCONTINUED | COMMUNITY
Start: 2022-12-16 | End: 2023-03-29

## 2023-03-29 NOTE — PHYSICAL EXAM
[FreeTextEntry3] : R index finger;\par significant DIP arthritic changes, with median nail dystrophy/habit tic\par no signs onycho

## 2023-03-29 NOTE — ASSESSMENT
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

## 2023-04-19 ENCOUNTER — NON-APPOINTMENT (OUTPATIENT)
Age: 68
End: 2023-04-19

## 2023-04-24 ENCOUNTER — APPOINTMENT (OUTPATIENT)
Dept: VASCULAR SURGERY | Facility: CLINIC | Age: 68
End: 2023-04-24

## 2023-04-24 ENCOUNTER — EMERGENCY (EMERGENCY)
Facility: HOSPITAL | Age: 68
LOS: 0 days | Discharge: HOME | End: 2023-04-24
Attending: STUDENT IN AN ORGANIZED HEALTH CARE EDUCATION/TRAINING PROGRAM
Payer: MEDICARE

## 2023-04-24 VITALS
TEMPERATURE: 98 F | HEART RATE: 76 BPM | OXYGEN SATURATION: 100 % | SYSTOLIC BLOOD PRESSURE: 161 MMHG | DIASTOLIC BLOOD PRESSURE: 59 MMHG | RESPIRATION RATE: 18 BRPM

## 2023-04-24 VITALS
RESPIRATION RATE: 18 BRPM | DIASTOLIC BLOOD PRESSURE: 78 MMHG | SYSTOLIC BLOOD PRESSURE: 185 MMHG | OXYGEN SATURATION: 98 % | WEIGHT: 240.08 LBS | TEMPERATURE: 98 F | HEART RATE: 78 BPM | HEIGHT: 66 IN

## 2023-04-24 DIAGNOSIS — S62.109A FRACTURE OF UNSPECIFIED CARPAL BONE, UNSPECIFIED WRIST, INITIAL ENCOUNTER FOR CLOSED FRACTURE: Chronic | ICD-10-CM

## 2023-04-24 DIAGNOSIS — J34.2 DEVIATED NASAL SEPTUM: ICD-10-CM

## 2023-04-24 DIAGNOSIS — K58.1 IRRITABLE BOWEL SYNDROME WITH CONSTIPATION: ICD-10-CM

## 2023-04-24 DIAGNOSIS — K76.0 FATTY (CHANGE OF) LIVER, NOT ELSEWHERE CLASSIFIED: ICD-10-CM

## 2023-04-24 DIAGNOSIS — K21.9 GASTRO-ESOPHAGEAL REFLUX DISEASE WITHOUT ESOPHAGITIS: ICD-10-CM

## 2023-04-24 DIAGNOSIS — Z87.19 PERSONAL HISTORY OF OTHER DISEASES OF THE DIGESTIVE SYSTEM: ICD-10-CM

## 2023-04-24 DIAGNOSIS — Z98.890 OTHER SPECIFIED POSTPROCEDURAL STATES: Chronic | ICD-10-CM

## 2023-04-24 DIAGNOSIS — E03.9 HYPOTHYROIDISM, UNSPECIFIED: ICD-10-CM

## 2023-04-24 DIAGNOSIS — Z86.39 PERSONAL HISTORY OF OTHER ENDOCRINE, NUTRITIONAL AND METABOLIC DISEASE: ICD-10-CM

## 2023-04-24 DIAGNOSIS — K58.0 IRRITABLE BOWEL SYNDROME WITH DIARRHEA: ICD-10-CM

## 2023-04-24 DIAGNOSIS — Z79.82 LONG TERM (CURRENT) USE OF ASPIRIN: ICD-10-CM

## 2023-04-24 DIAGNOSIS — Z90.49 ACQUIRED ABSENCE OF OTHER SPECIFIED PARTS OF DIGESTIVE TRACT: Chronic | ICD-10-CM

## 2023-04-24 DIAGNOSIS — E78.5 HYPERLIPIDEMIA, UNSPECIFIED: ICD-10-CM

## 2023-04-24 DIAGNOSIS — K44.9 DIAPHRAGMATIC HERNIA WITHOUT OBSTRUCTION OR GANGRENE: ICD-10-CM

## 2023-04-24 DIAGNOSIS — Z79.02 LONG TERM (CURRENT) USE OF ANTITHROMBOTICS/ANTIPLATELETS: ICD-10-CM

## 2023-04-24 DIAGNOSIS — I10 ESSENTIAL (PRIMARY) HYPERTENSION: ICD-10-CM

## 2023-04-24 DIAGNOSIS — R10.32 LEFT LOWER QUADRANT PAIN: ICD-10-CM

## 2023-04-24 DIAGNOSIS — F41.8 OTHER SPECIFIED ANXIETY DISORDERS: ICD-10-CM

## 2023-04-24 DIAGNOSIS — R21 RASH AND OTHER NONSPECIFIC SKIN ERUPTION: ICD-10-CM

## 2023-04-24 DIAGNOSIS — Z90.49 ACQUIRED ABSENCE OF OTHER SPECIFIED PARTS OF DIGESTIVE TRACT: ICD-10-CM

## 2023-04-24 DIAGNOSIS — I34.0 NONRHEUMATIC MITRAL (VALVE) INSUFFICIENCY: ICD-10-CM

## 2023-04-24 LAB
ADD ON TEST-SPECIMEN IN LAB: SIGNIFICANT CHANGE UP
ALBUMIN SERPL ELPH-MCNC: 3.6 G/DL — SIGNIFICANT CHANGE UP (ref 3.3–5)
ALP SERPL-CCNC: 220 U/L — HIGH (ref 40–120)
ALT FLD-CCNC: 64 U/L — SIGNIFICANT CHANGE UP (ref 12–78)
ANION GAP SERPL CALC-SCNC: 3 MMOL/L — LOW (ref 5–17)
APPEARANCE UR: CLEAR — SIGNIFICANT CHANGE UP
AST SERPL-CCNC: 58 U/L — HIGH (ref 15–37)
BACTERIA # UR AUTO: ABNORMAL
BASOPHILS # BLD AUTO: 0.06 K/UL — SIGNIFICANT CHANGE UP (ref 0–0.2)
BASOPHILS NFR BLD AUTO: 0.7 % — SIGNIFICANT CHANGE UP (ref 0–2)
BILIRUB SERPL-MCNC: 0.7 MG/DL — SIGNIFICANT CHANGE UP (ref 0.2–1.2)
BILIRUB UR-MCNC: NEGATIVE — SIGNIFICANT CHANGE UP
BUN SERPL-MCNC: 18 MG/DL — SIGNIFICANT CHANGE UP (ref 7–23)
CALCIUM SERPL-MCNC: 10.3 MG/DL — HIGH (ref 8.5–10.1)
CHLORIDE SERPL-SCNC: 101 MMOL/L — SIGNIFICANT CHANGE UP (ref 96–108)
CK SERPL-CCNC: 85 U/L — SIGNIFICANT CHANGE UP (ref 26–192)
CO2 SERPL-SCNC: 28 MMOL/L — SIGNIFICANT CHANGE UP (ref 22–31)
COLOR SPEC: YELLOW — SIGNIFICANT CHANGE UP
CREAT SERPL-MCNC: 0.76 MG/DL — SIGNIFICANT CHANGE UP (ref 0.5–1.3)
DIFF PNL FLD: ABNORMAL
EGFR: 86 ML/MIN/1.73M2 — SIGNIFICANT CHANGE UP
EOSINOPHIL # BLD AUTO: 0.15 K/UL — SIGNIFICANT CHANGE UP (ref 0–0.5)
EOSINOPHIL NFR BLD AUTO: 1.6 % — SIGNIFICANT CHANGE UP (ref 0–6)
EPI CELLS # UR: SIGNIFICANT CHANGE UP
GLUCOSE SERPL-MCNC: 110 MG/DL — HIGH (ref 70–99)
GLUCOSE UR QL: NEGATIVE — SIGNIFICANT CHANGE UP
HCT VFR BLD CALC: 43.7 % — SIGNIFICANT CHANGE UP (ref 34.5–45)
HGB BLD-MCNC: 14.7 G/DL — SIGNIFICANT CHANGE UP (ref 11.5–15.5)
IMM GRANULOCYTES NFR BLD AUTO: 0.7 % — SIGNIFICANT CHANGE UP (ref 0–0.9)
KETONES UR-MCNC: NEGATIVE — SIGNIFICANT CHANGE UP
LEUKOCYTE ESTERASE UR-ACNC: ABNORMAL
LYMPHOCYTES # BLD AUTO: 1.61 K/UL — SIGNIFICANT CHANGE UP (ref 1–3.3)
LYMPHOCYTES # BLD AUTO: 17.7 % — SIGNIFICANT CHANGE UP (ref 13–44)
MAGNESIUM SERPL-MCNC: 2.1 MG/DL — SIGNIFICANT CHANGE UP (ref 1.6–2.6)
MCHC RBC-ENTMCNC: 30.6 PG — SIGNIFICANT CHANGE UP (ref 27–34)
MCHC RBC-ENTMCNC: 33.6 GM/DL — SIGNIFICANT CHANGE UP (ref 32–36)
MCV RBC AUTO: 90.9 FL — SIGNIFICANT CHANGE UP (ref 80–100)
MONOCYTES # BLD AUTO: 0.75 K/UL — SIGNIFICANT CHANGE UP (ref 0–0.9)
MONOCYTES NFR BLD AUTO: 8.2 % — SIGNIFICANT CHANGE UP (ref 2–14)
NEUTROPHILS # BLD AUTO: 6.47 K/UL — SIGNIFICANT CHANGE UP (ref 1.8–7.4)
NEUTROPHILS NFR BLD AUTO: 71.1 % — SIGNIFICANT CHANGE UP (ref 43–77)
NITRITE UR-MCNC: NEGATIVE — SIGNIFICANT CHANGE UP
NT-PROBNP SERPL-SCNC: 112 PG/ML — SIGNIFICANT CHANGE UP (ref 0–125)
PH UR: 5 — SIGNIFICANT CHANGE UP (ref 5–8)
PLATELET # BLD AUTO: 314 K/UL — SIGNIFICANT CHANGE UP (ref 150–400)
POTASSIUM SERPL-MCNC: 3.5 MMOL/L — SIGNIFICANT CHANGE UP (ref 3.5–5.3)
POTASSIUM SERPL-SCNC: 3.5 MMOL/L — SIGNIFICANT CHANGE UP (ref 3.5–5.3)
PROT SERPL-MCNC: 8.2 GM/DL — SIGNIFICANT CHANGE UP (ref 6–8.3)
PROT UR-MCNC: NEGATIVE — SIGNIFICANT CHANGE UP
RBC # BLD: 4.81 M/UL — SIGNIFICANT CHANGE UP (ref 3.8–5.2)
RBC # FLD: 14.6 % — HIGH (ref 10.3–14.5)
RBC CASTS # UR COMP ASSIST: SIGNIFICANT CHANGE UP /HPF (ref 0–4)
SODIUM SERPL-SCNC: 132 MMOL/L — LOW (ref 135–145)
SP GR SPEC: 1.01 — SIGNIFICANT CHANGE UP (ref 1.01–1.02)
UROBILINOGEN FLD QL: NEGATIVE — SIGNIFICANT CHANGE UP
WBC # BLD: 9.1 K/UL — SIGNIFICANT CHANGE UP (ref 3.8–10.5)
WBC # FLD AUTO: 9.1 K/UL — SIGNIFICANT CHANGE UP (ref 3.8–10.5)
WBC UR QL: SIGNIFICANT CHANGE UP /HPF (ref 0–5)

## 2023-04-24 PROCEDURE — 82550 ASSAY OF CK (CPK): CPT

## 2023-04-24 PROCEDURE — 96374 THER/PROPH/DIAG INJ IV PUSH: CPT

## 2023-04-24 PROCEDURE — 93970 EXTREMITY STUDY: CPT | Mod: 26

## 2023-04-24 PROCEDURE — 99285 EMERGENCY DEPT VISIT HI MDM: CPT | Mod: 25

## 2023-04-24 PROCEDURE — 74176 CT ABD & PELVIS W/O CONTRAST: CPT | Mod: 26,MA

## 2023-04-24 PROCEDURE — 87086 URINE CULTURE/COLONY COUNT: CPT

## 2023-04-24 PROCEDURE — 74176 CT ABD & PELVIS W/O CONTRAST: CPT | Mod: MA

## 2023-04-24 PROCEDURE — 87186 SC STD MICRODIL/AGAR DIL: CPT

## 2023-04-24 PROCEDURE — 85025 COMPLETE CBC W/AUTO DIFF WBC: CPT

## 2023-04-24 PROCEDURE — 36415 COLL VENOUS BLD VENIPUNCTURE: CPT

## 2023-04-24 PROCEDURE — 81001 URINALYSIS AUTO W/SCOPE: CPT

## 2023-04-24 PROCEDURE — 83735 ASSAY OF MAGNESIUM: CPT

## 2023-04-24 PROCEDURE — 96375 TX/PRO/DX INJ NEW DRUG ADDON: CPT

## 2023-04-24 PROCEDURE — 93970 EXTREMITY STUDY: CPT

## 2023-04-24 PROCEDURE — 83880 ASSAY OF NATRIURETIC PEPTIDE: CPT

## 2023-04-24 PROCEDURE — 87077 CULTURE AEROBIC IDENTIFY: CPT

## 2023-04-24 PROCEDURE — 99285 EMERGENCY DEPT VISIT HI MDM: CPT

## 2023-04-24 PROCEDURE — 80053 COMPREHEN METABOLIC PANEL: CPT

## 2023-04-24 RX ORDER — NYSTATIN CREAM 100000 [USP'U]/G
1 CREAM TOPICAL
Refills: 0 | Status: DISCONTINUED | OUTPATIENT
Start: 2023-04-24 | End: 2023-04-24

## 2023-04-24 RX ORDER — METHOCARBAMOL 500 MG/1
750 TABLET, FILM COATED ORAL ONCE
Refills: 0 | Status: COMPLETED | OUTPATIENT
Start: 2023-04-24 | End: 2023-04-24

## 2023-04-24 RX ORDER — OXYCODONE AND ACETAMINOPHEN 5; 325 MG/1; MG/1
1 TABLET ORAL
Qty: 9 | Refills: 0
Start: 2023-04-24 | End: 2023-04-26

## 2023-04-24 RX ORDER — ACETAMINOPHEN 500 MG
1000 TABLET ORAL ONCE
Refills: 0 | Status: COMPLETED | OUTPATIENT
Start: 2023-04-24 | End: 2023-04-24

## 2023-04-24 RX ORDER — KETOROLAC TROMETHAMINE 30 MG/ML
30 SYRINGE (ML) INJECTION ONCE
Refills: 0 | Status: DISCONTINUED | OUTPATIENT
Start: 2023-04-24 | End: 2023-04-24

## 2023-04-24 RX ORDER — LIDOCAINE 4 G/100G
1 CREAM TOPICAL ONCE
Refills: 0 | Status: COMPLETED | OUTPATIENT
Start: 2023-04-24 | End: 2023-04-24

## 2023-04-24 RX ADMIN — NYSTATIN CREAM 1 APPLICATION(S): 100000 CREAM TOPICAL at 12:35

## 2023-04-24 RX ADMIN — METHOCARBAMOL 750 MILLIGRAM(S): 500 TABLET, FILM COATED ORAL at 08:34

## 2023-04-24 RX ADMIN — Medication 30 MILLIGRAM(S): at 08:10

## 2023-04-24 RX ADMIN — Medication 400 MILLIGRAM(S): at 07:39

## 2023-04-24 RX ADMIN — LIDOCAINE 1 PATCH: 4 CREAM TOPICAL at 07:39

## 2023-04-24 RX ADMIN — Medication 1000 MILLIGRAM(S): at 08:10

## 2023-04-24 RX ADMIN — Medication 30 MILLIGRAM(S): at 07:40

## 2023-04-24 NOTE — ED ADULT NURSE NOTE - OBJECTIVE STATEMENT
Pt presents to ED AOx4 from home ambulatory c/o left-sided groin pain beginning 1x week. Pt saw orthopedist and said it could be related to her hip. Pt is unable to sit in stretcher due to position of comfort.

## 2023-04-24 NOTE — ED PROVIDER NOTE - PATIENT PORTAL LINK FT
You can access the FollowMyHealth Patient Portal offered by [facility] by registering at the following website: [email or web address]. By joining [employer]’s FollowMyHealth portal, you will also be able to view your health information using other applications (apps) compatible with our system. You can access the FollowMyHealth Patient Portal offered by [facility] by registering at the following website: [email or web address]. By joining [employer]’s FollowMyHealth portal, you will also be able to view your health information using other applications (apps) compatible with our system.

## 2023-04-24 NOTE — ED PROVIDER NOTE - CARE PROVIDER_API CALL
Dilcia Polo (DO)  Orthopaedic Surgery Surgery  [address] [facility], [address] [place] [address]  Phone: [phone]  Fax: [phone]  Follow Up Time:

## 2023-04-24 NOTE — ED PROVIDER NOTE - OBJECTIVE STATEMENT
Patient is a 69 yo female who reports atraumatic left groin pain x 2 weeks; seen by orthopedist for "injection" for her knee at that time- concerned that pain was referred from her hip; given rx for nsaids and patient reports pill was too big so has been taking motrin TID for pain with relief; pain though became worse yesterday morning; no incontinence of bowel or bladder function; no weakness; no numbness or tingling; also reports rash to right groin; ? swelling to lower extremities though no pain; no fever or chills; no back pain.

## 2023-04-24 NOTE — ED ADULT TRIAGE NOTE - NSWEIGHTCALCTOOLDRUG_GEN_A_CORE
DISPLAY PLAN FREE TEXT
 used
DISPLAY PLAN FREE TEXT
DISPLAY PLAN FREE TEXT

## 2023-04-24 NOTE — ED PROVIDER NOTE - PROGRESS NOTE DETAILS
Key DODD: patient reports worsening swelling to b/l lower extremities- has a diuretic that she is supposed to be taking and does not; also now reporting hx of dvt in left lower extremity that she was not put on anticoagulation for; due to see vascular- Dr. Vo today for repeat us- will order at this time. No urinary complaints- will not treat urine. Key DO: after patient discharged; unable to get into car- brought back into ED; SW re-consulted; rec PT consult at this time. Key DO: after patient discharged; unable to get into car- brought back into ED; SW re-consulted; rec PT consult at this time.    Per SW- patient to go home with walker; patient comfortable with plan. Key DO: Patient will require walker due to unsteady gait for discharge.    15:09pm- patient able to ambulate with walker; comfortable with discharge home. Family to be with patient. strict return precautions given.

## 2023-04-24 NOTE — ED ADULT NURSE REASSESSMENT NOTE - NS ED NURSE REASSESS COMMENT FT1
Report received from BOSSMAN Buckley. Pt is on chair next to bed. Pt is unable to move from bed RT pain. Pain 10/10. MD aware. All safety and comfort measures in place. Will continue to monitor.

## 2023-04-24 NOTE — ED PROVIDER NOTE - SKIN, MLM
Skin normal color for race, warm, dry and intact. (+) mild erythematous stellate rash to right groin consistent with fungal rash;

## 2023-04-24 NOTE — ED PROVIDER NOTE - MUSCULOSKELETAL, MLM
Spine appears normal, range of motion is not limited, (+) mild tenderness to left groin- no obvious deformity;

## 2023-04-24 NOTE — ED PROVIDER NOTE - CLINICAL SUMMARY MEDICAL DECISION MAKING FREE TEXT BOX
67 yo female with left groin pain x 2 weeks; worsening x yesterday; will order CT abd/pelvis for evaluation of renal etiology; back/msk etiology; concern for most likely radicular component; right groin rash with fungal rash- will rx nystatin; labs; urine; pain control and re-eval    9:40AM- results d/w patient at bedside; pain mildly improved; patient defers narcotics; robaxin given; will continue to re-eval; SW consulted for services at home. 67 yo female with left groin pain x 2 weeks; worsening x yesterday; will order CT abd/pelvis for evaluation of renal etiology; back/msk etiology; concern for most likely radicular component; right groin rash with fungal rash- will rx nystatin; labs; urine; pain control and re-eval    9:40AM- results d/w patient at bedside; pain mildly improved; patient defers narcotics; robaxin given; will continue to re-eval; SW consulted for services at home.    12:37pm- dopplers neg; bnp wnl; patient able to walk around room; put clothes on; patient with appt for ortho on Friday and will f/u then; offered sw assistance at home and refused; patient wanting to go home; rx for percocet sent as requested; all questions answered at this time; strict return precautions given.

## 2023-04-24 NOTE — CHART NOTE - NSCHARTNOTEFT_GEN_A_CORE
12:30 pm  Pt is a 68 year old English speaking female with PMHx of carotid stenosis, HTN, anxiety, depression, UTI x2 months ago, cholecystectomy, hypothyroid, HLD, GERD, and heart murmur. Pt presents to  ED c/o left groin pain x 2 weeks. Pt also has b/l swelling of lower extremities.   SW consulted to assist as pt resides alone and has difficulty walking. SW met with pt at bedside, reviewed role in coordination of care and discharge planning. Pt resides alone in an apartment, TE. Pt reports she is independent in ambulation and ADLs at baseline. Pt reports she manages herself at home, continues to drive, and has active support system of women through [facility]. Pt declines all resources at this time. MD made aware.

## 2023-04-24 NOTE — ED ADULT NURSE NOTE - NSIMPLEMENTINTERV_GEN_ALL_ED
Implemented All Fall Risk Interventions:  [place] to call system. Call bell, personal items and telephone within reach. Instruct patient to call for assistance. Room bathroom lighting operational. Non-slip footwear when patient is off stretcher. Physically safe environment: no spills, clutter or unnecessary equipment. Stretcher in lowest position, wheels locked, appropriate side rails in place. Provide visual cue, wrist band, yellow gown, etc. Monitor gait and stability. Monitor for mental status changes and reorient to person, place, and time. Review medications for side effects contributing to fall risk. Reinforce activity limits and safety measures with patient and family.

## 2023-04-24 NOTE — ED ADULT TRIAGE NOTE - CHIEF COMPLAINT QUOTE
patient complaining of worsening L. groin pain x 1wk with L. foot swelling and back pain. no meds taken PTA

## 2023-04-26 LAB
-  AMIKACIN: SIGNIFICANT CHANGE UP
-  AMOXICILLIN/CLAVULANIC ACID: SIGNIFICANT CHANGE UP
-  AMPICILLIN/SULBACTAM: SIGNIFICANT CHANGE UP
-  AMPICILLIN: SIGNIFICANT CHANGE UP
-  AMPICILLIN: SIGNIFICANT CHANGE UP
-  AZTREONAM: SIGNIFICANT CHANGE UP
-  CEFAZOLIN: SIGNIFICANT CHANGE UP
-  CEFEPIME: SIGNIFICANT CHANGE UP
-  CEFOXITIN: SIGNIFICANT CHANGE UP
-  CEFTRIAXONE: SIGNIFICANT CHANGE UP
-  CEFUROXIME: SIGNIFICANT CHANGE UP
-  CIPROFLOXACIN: SIGNIFICANT CHANGE UP
-  CIPROFLOXACIN: SIGNIFICANT CHANGE UP
-  ERTAPENEM: SIGNIFICANT CHANGE UP
-  GENTAMICIN: SIGNIFICANT CHANGE UP
-  IMIPENEM: SIGNIFICANT CHANGE UP
-  LEVOFLOXACIN: SIGNIFICANT CHANGE UP
-  LEVOFLOXACIN: SIGNIFICANT CHANGE UP
-  MEROPENEM: SIGNIFICANT CHANGE UP
-  NITROFURANTOIN: SIGNIFICANT CHANGE UP
-  NITROFURANTOIN: SIGNIFICANT CHANGE UP
-  PIPERACILLIN/TAZOBACTAM: SIGNIFICANT CHANGE UP
-  TETRACYCLINE: SIGNIFICANT CHANGE UP
-  TOBRAMYCIN: SIGNIFICANT CHANGE UP
-  TRIMETHOPRIM/SULFAMETHOXAZOLE: SIGNIFICANT CHANGE UP
-  VANCOMYCIN: SIGNIFICANT CHANGE UP
CULTURE RESULTS: SIGNIFICANT CHANGE UP
METHOD TYPE: SIGNIFICANT CHANGE UP
METHOD TYPE: SIGNIFICANT CHANGE UP
ORGANISM # SPEC MICROSCOPIC CNT: SIGNIFICANT CHANGE UP
SPECIMEN SOURCE: SIGNIFICANT CHANGE UP

## 2023-04-27 RX ORDER — CIPROFLOXACIN LACTATE 400MG/40ML
1 VIAL (ML) INTRAVENOUS
Qty: 14 | Refills: 0
Start: 2023-04-27

## 2023-05-03 ENCOUNTER — APPOINTMENT (OUTPATIENT)
Dept: VASCULAR SURGERY | Facility: CLINIC | Age: 68
End: 2023-05-03
Payer: MEDICARE

## 2023-05-03 VITALS — SYSTOLIC BLOOD PRESSURE: 159 MMHG | DIASTOLIC BLOOD PRESSURE: 81 MMHG | HEART RATE: 88 BPM

## 2023-05-03 PROCEDURE — 99212 OFFICE O/P EST SF 10 MIN: CPT

## 2023-05-03 NOTE — HISTORY OF PRESENT ILLNESS
[FreeTextEntry1] : Patient referred for evaluation by her orthopedic surgeon Dr. Cody Zhao.  She recently was seen in the [facility] emergency room for lower extremity enlargement and back discomfort.  A venous duplex study performed on April 24 demonstrated no evidence of deep vein thrombosis.  The CT scan demonstrated no evidence of acute abdominal or pelvic pathology.\par \par The patient is utilizing her compression stockings and denies lower extremity pain or discomfort.

## 2023-05-03 NOTE — PHYSICAL EXAM
[FreeTextEntry1] : Obese lower extremities without phlebitic segments, Homans' sign, or calf tenderness

## 2023-05-03 NOTE — ASSESSMENT
[FreeTextEntry1] : 68-year-old with history of obesity and lower extremity edema currently well managed with compression stockings.  In addition, recent venous duplex studies showed no evidence of DVT.\par \par The patient is currently not on any anticoagulation.\par \par I noted to her that she should discuss utilizing a diuretic with her primary care doctor and we will see her again in 1 month's time for routine reevaluation.  In the interim, she will continue to utilize compression stockings and elevate her legs is much as possible.  She has no difficulty walking and I suggested continued ambulation is much as possible.\par \par All questions were answered.

## 2023-05-15 ENCOUNTER — APPOINTMENT (OUTPATIENT)
Dept: VASCULAR SURGERY | Facility: CLINIC | Age: 68
End: 2023-05-15

## 2023-05-19 ENCOUNTER — EMERGENCY (EMERGENCY)
Facility: HOSPITAL | Age: 68
LOS: 0 days | Discharge: HOME | End: 2023-05-19
Attending: EMERGENCY MEDICINE
Payer: MEDICARE

## 2023-05-19 VITALS
SYSTOLIC BLOOD PRESSURE: 149 MMHG | OXYGEN SATURATION: 100 % | TEMPERATURE: 97 F | HEART RATE: 86 BPM | DIASTOLIC BLOOD PRESSURE: 62 MMHG | RESPIRATION RATE: 18 BRPM

## 2023-05-19 VITALS — WEIGHT: 240.08 LBS | HEIGHT: 66 IN

## 2023-05-19 DIAGNOSIS — E78.5 HYPERLIPIDEMIA, UNSPECIFIED: ICD-10-CM

## 2023-05-19 DIAGNOSIS — F41.8 OTHER SPECIFIED ANXIETY DISORDERS: ICD-10-CM

## 2023-05-19 DIAGNOSIS — Z90.49 ACQUIRED ABSENCE OF OTHER SPECIFIED PARTS OF DIGESTIVE TRACT: ICD-10-CM

## 2023-05-19 DIAGNOSIS — L03.116 CELLULITIS OF LEFT LOWER LIMB: ICD-10-CM

## 2023-05-19 DIAGNOSIS — Z79.82 LONG TERM (CURRENT) USE OF ASPIRIN: ICD-10-CM

## 2023-05-19 DIAGNOSIS — L03.115 CELLULITIS OF RIGHT LOWER LIMB: ICD-10-CM

## 2023-05-19 DIAGNOSIS — S62.109A FRACTURE OF UNSPECIFIED CARPAL BONE, UNSPECIFIED WRIST, INITIAL ENCOUNTER FOR CLOSED FRACTURE: Chronic | ICD-10-CM

## 2023-05-19 DIAGNOSIS — Z87.59 PERSONAL HISTORY OF OTHER COMPLICATIONS OF PREGNANCY, CHILDBIRTH AND THE PUERPERIUM: ICD-10-CM

## 2023-05-19 DIAGNOSIS — Z87.19 PERSONAL HISTORY OF OTHER DISEASES OF THE DIGESTIVE SYSTEM: ICD-10-CM

## 2023-05-19 DIAGNOSIS — R60.0 LOCALIZED EDEMA: ICD-10-CM

## 2023-05-19 DIAGNOSIS — Z98.890 OTHER SPECIFIED POSTPROCEDURAL STATES: Chronic | ICD-10-CM

## 2023-05-19 DIAGNOSIS — E03.9 HYPOTHYROIDISM, UNSPECIFIED: ICD-10-CM

## 2023-05-19 DIAGNOSIS — Z90.49 ACQUIRED ABSENCE OF OTHER SPECIFIED PARTS OF DIGESTIVE TRACT: Chronic | ICD-10-CM

## 2023-05-19 DIAGNOSIS — I10 ESSENTIAL (PRIMARY) HYPERTENSION: ICD-10-CM

## 2023-05-19 DIAGNOSIS — Z79.02 LONG TERM (CURRENT) USE OF ANTITHROMBOTICS/ANTIPLATELETS: ICD-10-CM

## 2023-05-19 DIAGNOSIS — Z86.39 PERSONAL HISTORY OF OTHER ENDOCRINE, NUTRITIONAL AND METABOLIC DISEASE: ICD-10-CM

## 2023-05-19 LAB
ALBUMIN SERPL ELPH-MCNC: 3.2 G/DL — LOW (ref 3.3–5)
ALP SERPL-CCNC: 223 U/L — HIGH (ref 40–120)
ALT FLD-CCNC: 70 U/L — SIGNIFICANT CHANGE UP (ref 12–78)
ANION GAP SERPL CALC-SCNC: 6 MMOL/L — SIGNIFICANT CHANGE UP (ref 5–17)
APTT BLD: 31.8 SEC — SIGNIFICANT CHANGE UP (ref 27.5–35.5)
AST SERPL-CCNC: 37 U/L — SIGNIFICANT CHANGE UP (ref 15–37)
BASOPHILS # BLD AUTO: 0.04 K/UL — SIGNIFICANT CHANGE UP (ref 0–0.2)
BASOPHILS NFR BLD AUTO: 0.5 % — SIGNIFICANT CHANGE UP (ref 0–2)
BILIRUB SERPL-MCNC: 0.4 MG/DL — SIGNIFICANT CHANGE UP (ref 0.2–1.2)
BUN SERPL-MCNC: 22 MG/DL — SIGNIFICANT CHANGE UP (ref 7–23)
CALCIUM SERPL-MCNC: 9.2 MG/DL — SIGNIFICANT CHANGE UP (ref 8.5–10.1)
CHLORIDE SERPL-SCNC: 107 MMOL/L — SIGNIFICANT CHANGE UP (ref 96–108)
CO2 SERPL-SCNC: 26 MMOL/L — SIGNIFICANT CHANGE UP (ref 22–31)
CREAT SERPL-MCNC: 0.82 MG/DL — SIGNIFICANT CHANGE UP (ref 0.5–1.3)
EGFR: 78 ML/MIN/1.73M2 — SIGNIFICANT CHANGE UP
EOSINOPHIL # BLD AUTO: 0.24 K/UL — SIGNIFICANT CHANGE UP (ref 0–0.5)
EOSINOPHIL NFR BLD AUTO: 2.9 % — SIGNIFICANT CHANGE UP (ref 0–6)
GLUCOSE SERPL-MCNC: 94 MG/DL — SIGNIFICANT CHANGE UP (ref 70–99)
HCT VFR BLD CALC: 40.7 % — SIGNIFICANT CHANGE UP (ref 34.5–45)
HGB BLD-MCNC: 13.1 G/DL — SIGNIFICANT CHANGE UP (ref 11.5–15.5)
IMM GRANULOCYTES NFR BLD AUTO: 0.6 % — SIGNIFICANT CHANGE UP (ref 0–0.9)
INR BLD: 0.97 RATIO — SIGNIFICANT CHANGE UP (ref 0.88–1.16)
LACTATE SERPL-SCNC: 1.2 MMOL/L — SIGNIFICANT CHANGE UP (ref 0.7–2)
LYMPHOCYTES # BLD AUTO: 1.34 K/UL — SIGNIFICANT CHANGE UP (ref 1–3.3)
LYMPHOCYTES # BLD AUTO: 16.3 % — SIGNIFICANT CHANGE UP (ref 13–44)
MCHC RBC-ENTMCNC: 30.3 PG — SIGNIFICANT CHANGE UP (ref 27–34)
MCHC RBC-ENTMCNC: 32.2 GM/DL — SIGNIFICANT CHANGE UP (ref 32–36)
MCV RBC AUTO: 94 FL — SIGNIFICANT CHANGE UP (ref 80–100)
MONOCYTES # BLD AUTO: 0.37 K/UL — SIGNIFICANT CHANGE UP (ref 0–0.9)
MONOCYTES NFR BLD AUTO: 4.5 % — SIGNIFICANT CHANGE UP (ref 2–14)
NEUTROPHILS # BLD AUTO: 6.18 K/UL — SIGNIFICANT CHANGE UP (ref 1.8–7.4)
NEUTROPHILS NFR BLD AUTO: 75.2 % — SIGNIFICANT CHANGE UP (ref 43–77)
PLATELET # BLD AUTO: 276 K/UL — SIGNIFICANT CHANGE UP (ref 150–400)
POTASSIUM SERPL-MCNC: 3.7 MMOL/L — SIGNIFICANT CHANGE UP (ref 3.5–5.3)
POTASSIUM SERPL-SCNC: 3.7 MMOL/L — SIGNIFICANT CHANGE UP (ref 3.5–5.3)
PROT SERPL-MCNC: 7.4 GM/DL — SIGNIFICANT CHANGE UP (ref 6–8.3)
PROTHROM AB SERPL-ACNC: 11.3 SEC — SIGNIFICANT CHANGE UP (ref 10.5–13.4)
RBC # BLD: 4.33 M/UL — SIGNIFICANT CHANGE UP (ref 3.8–5.2)
RBC # FLD: 15 % — HIGH (ref 10.3–14.5)
SODIUM SERPL-SCNC: 139 MMOL/L — SIGNIFICANT CHANGE UP (ref 135–145)
WBC # BLD: 8.22 K/UL — SIGNIFICANT CHANGE UP (ref 3.8–10.5)
WBC # FLD AUTO: 8.22 K/UL — SIGNIFICANT CHANGE UP (ref 3.8–10.5)

## 2023-05-19 PROCEDURE — 99284 EMERGENCY DEPT VISIT MOD MDM: CPT

## 2023-05-19 PROCEDURE — 93970 EXTREMITY STUDY: CPT | Mod: 26

## 2023-05-19 RX ORDER — FLUCONAZOLE 150 MG/1
1 TABLET ORAL
Qty: 1 | Refills: 0
Start: 2023-05-19

## 2023-05-19 RX ORDER — SODIUM CHLORIDE 9 MG/ML
3 INJECTION INTRAMUSCULAR; INTRAVENOUS; SUBCUTANEOUS ONCE
Refills: 0 | Status: DISCONTINUED | OUTPATIENT
Start: 2023-05-19 | End: 2023-05-19

## 2023-05-19 RX ORDER — CEFAZOLIN SODIUM 1 G
1000 VIAL (EA) INJECTION ONCE
Refills: 0 | Status: COMPLETED | OUTPATIENT
Start: 2023-05-19 | End: 2023-05-19

## 2023-05-19 RX ADMIN — Medication 1000 MILLIGRAM(S): at 10:06

## 2023-05-19 NOTE — ED PROVIDER NOTE - CLINICAL SUMMARY MEDICAL DECISION MAKING FREE TEXT BOX
68-year-old WF, PMH of HTN, HLD, hypothyroid, ROSETTA, chronic leg swelling, ambulatory to ED complaining of few days bilateral lower leg redness, "tight" discomfort with associated increase swelling, gradual onset, worsening despite 2 dd. po Amoxil/Lasix.    Exam: B/L foreleg erythema, tactile warmth c/w cellulitis, no abscess noted.  Plan: labs incl. BCs, lactate, coags; B/L LE Venous Dopplers.  Observe, reassess. 68-year-old WF, PMH of HTN, HLD, hypothyroid, ROSETTA, chronic leg swelling, ambulatory to ED complaining of few days bilateral lower leg redness, "tight" discomfort with associated increase swelling, gradual onset, worsening despite 2 dd. po Amoxil/Lasix.    Exam: B/L foreleg erythema, tactile warmth c/w cellulitis, no abscess noted.  Plan: labs incl. BCs, lactate, coags; B/L LE Venous Dopplers.  Observe, reassess.    10:25, C MD [place]:  Labs inclk. WBC & lactate, normal.  Venous Dopplers neg. fro DVT.  Pt in good comfort, NAD.  Pt received 1 dose IV Ancef in ED.  Will DC home on po cephalosporin for outpt PCP f/u. 68-year-old WF, PMH of HTN, HLD, hypothyroid, chronic leg swelling, ambulatory to ED complaining of few days bilateral lower leg redness with associated  "tight" discomfort & increased swelling, gradual onset, worsening despite 2 dd. po Amoxil/Lasix.    Exam: B/L foreleg erythema, tactile warmth c/w cellulitis, no abscess noted.  Plan: labs incl. BCs, lactate, coags; B/L LE Venous Dopplers.  Observe, reassess.    10:25, C MD [place]:  Labs incl. WBC & lactate, normal.  Venous Dopplers neg. for DVT.  Pt in good comfort, NAD.  Pt received 1 dose IV Ancef in ED.  Results of all studies' results d/w pt, as well as Dx of B/L LE cellulitis: pt expressed her understanding & agrees with DC home on po cephalosporin for outpt PCP f/u.

## 2023-05-19 NOTE — ED PROVIDER NOTE - MUSCULOSKELETAL, MLM
Spine appears normal, range of motion is not limited.  B/L LEs + non-pitting 2+ edema, no focal tender.  B/L hips & knees AFROM w/o pain, NT.

## 2023-05-19 NOTE — ED ADULT TRIAGE NOTE - CHIEF COMPLAINT QUOTE
Ambulatory to ER with c/o b/l lower leg redness/swelling x 3 days. Patient seen by PMD x 2 days ago started on amoxicillin, patient feels pain and swelling worsening. No pain medication taken prior to arrival.

## 2023-05-19 NOTE — ED PROVIDER NOTE - PATIENT PORTAL LINK FT
You can access the FollowMyHealth Patient Portal offered by [facility] by registering at the following website: [email or web address]. By joining [employer]’s FollowMyHealth portal, you will also be able to view your health information using other applications (apps) compatible with our system.

## 2023-05-19 NOTE — ED ADULT NURSE NOTE - NS ED NURSE DC INFO COMPLEXITY
Alert and oriented, no focal deficits, no motor or sensory deficits. Simple: Patient demonstrates quick and easy understanding/Patient asked questions/Verbalized Understanding

## 2023-05-19 NOTE — ED ADULT NURSE NOTE - OBJECTIVE STATEMENT
Ambulatory to ER with c/o b/l lower leg redness/swelling x 3 days. Patient seen by PMD x 2 days ago started on amoxicillin, patient feels pain and swelling worsening. No pain medication taken prior to arrival. Statement Selected

## 2023-05-19 NOTE — ED PROVIDER NOTE - SKIN, MLM
Skin normal color for race, warm, dry and intact. Skin dry.  B/L lower legs: + diffuse macular erythema with + tactile warmth, no SQ crepitus, no fluctuance

## 2023-05-19 NOTE — ED PROVIDER NOTE - ENMT, MLM
Airway patent, Nasal mucosa clear. Mouth with mildly dry mucosa. Throat has no vesicles, no oropharyngeal exudates and uvula is midline.  + Denture.

## 2023-05-19 NOTE — ED PROVIDER NOTE - CONSTITUTIONAL, MLM
Elderly WF, awake, alert, oriented to person, place, time/situation and in no apparent distress. normal...

## 2023-05-19 NOTE — ED PROVIDER NOTE - OBJECTIVE STATEMENT
68-year-old WF, PMH of HTN, HLD, hypothyroid, ROSETTA, chronic leg swelling, ambulatory to ED complaining of few days bilateral lower leg redness, "tight" discomfort with associated increase swelling.  + Gradual onset, worsening.  PCP eval this week and started on p.o. Amoxicillin 500 3 times daily & Lasix 40 mg qd X past 2 days, but patient complains symptoms worsening especially when awoke this morning.    Patient denies F/C, SOB, chest pain, LE weakness/numbness/tingling, ERON, nor any discharge from skin of the legs.    Patient did not take her a.m. meds which she states she usually takes at lunchtime.  Patient's not on AC medication.  PCP: Leigh 68-year-old WF, PMH of HTN, HLD, hypothyroid, ROSETTA, chronic leg swelling, ambulatory to ED complaining of few days bilateral lower leg redness, "tight" discomfort & increase in lower leg swelling.  + Gradual onset, worsening.  PCP robe this week: started on p.o. Amoxicillin 500 3 times daily & Lasix 40 mg qd X past 2 days, but patient complains symptoms worsening, especially when awoke this morning.    Patient denies F/C, SOB, chest pain, LE weakness/numbness/tingling, ERON, nor any discharge from skin of the legs.    Patient did not take her a.m. meds, which she states she usually takes at lunchtime.  Patient's not on AC medication.  PCP: Leigh

## 2023-05-19 NOTE — ED PROVIDER NOTE - NSFOLLOWUPINSTRUCTIONS_ED_ALL_ED_FT
Stop Amoxicillin.  Start new antibiotic as prescribed today.  Have breakfast & take your usual meds today.  Follow up next week with your own primary doctor.    Cellulitis, Adult  A person's legs and feet. One leg is normal and the other leg is affected by cellulitis.  Cellulitis is a skin infection. The infected area is usually warm, red, swollen, and tender. This condition occurs most often in the arms and lower legs. The infection can travel to the muscles, blood, and underlying tissue and become serious. It is very important to get treated for this condition.    What are the causes?  Cellulitis is caused by bacteria. The bacteria enter through a break in the skin, such as a cut, burn, insect bite, open sore, or crack.    What increases the risk?  This condition is more likely to occur in people who:  Have a weak body defense system (immune system).  Have open wounds on the skin, such as cuts, burns, bites, and scrapes. Bacteria can enter the body through these open wounds.  Are older than 60 years of age.  Have diabetes.  Have a type of long-lasting (chronic) liver disease (cirrhosis) or kidney disease.  Are obese.  Have a skin condition such as:  Itchy rash (eczema).  Slow movement of blood in the veins (venous stasis).  Fluid buildup below the skin (edema).  Have had radiation therapy.  Use IV drugs.  What are the signs or symptoms?  Symptoms of this condition include:  Redness, streaking, or spotting on the skin.  Swollen area of the skin.  Tenderness or pain when an area of the skin is touched.  Warm skin.  A fever.  Chills.  Blisters.  How is this diagnosed?  This condition is diagnosed based on a medical history and physical exam. You may also have tests, including:  Blood tests.  Imaging tests.  How is this treated?  Treatment for this condition may include:  Medicines, such as antibiotic medicines or medicines to treat allergies (antihistamines).  Supportive care, such as rest and application of cold or warm cloths (compresses) to the skin.  Hospital care, if the condition is severe.  The infection usually starts to get better within 1–2 days of treatment.    Follow these instructions at home:  A comparison of three sample cups showing dark yellow, yellow, and pale yellow urine.  Medicines    Take over-the-counter and prescription medicines only as told by your health care provider.  If you were prescribed an antibiotic medicine, take it as told by your health care provider. Do not stop taking the antibiotic even if you start to feel better.  General instructions    Drink enough fluid to keep your urine pale yellow.  Do not touch or rub the infected area.  Raise (elevate) the infected area above the level of your heart while you are sitting or lying down.  Apply warm or cold compresses to the affected area as told by your health care provider.  Keep all follow-up visits as told by your health care provider. This is important. These visits let your health care provider make sure a more serious infection is not developing.  Contact a health care provider if:  You have a fever.  Your symptoms do not begin to improve within 1–2 days of starting treatment.  Your bone or joint underneath the infected area becomes painful after the skin has healed.  Your infection returns in the same area or another area.  You notice a swollen bump in the infected area.  You develop new symptoms.  You have a general ill feeling (malaise) with muscle aches and pains.  Get help right away if:  Your symptoms get worse.  You feel very sleepy.  You develop vomiting or diarrhea that persists.  You notice red streaks coming from the infected area.  Your red area gets larger or turns dark in color.  These symptoms may represent a serious problem that is an emergency. Do not wait to see if the symptoms will go away. Get medical help right away. Call your local emergency services (911 in the [place] Do not drive yourself to the hospital.    Summary  Cellulitis is a skin infection. This condition occurs most often in the arms and lower legs.  Treatment for this condition may include medicines, such as antibiotic medicines or antihistamines.  Take over-the-counter and prescription medicines only as told by your health care provider. If you were prescribed an antibiotic medicine, do not stop taking the antibiotic even if you start to feel better.  Contact a health care provider if your symptoms do not begin to improve within 1–2 days of starting treatment or your symptoms get worse.  Keep all follow-up visits as told by your health care provider. This is important. These visits let your health care provider make sure that a more serious infection is not developing.  This information is not intended to replace advice given to you by your health care provider. Make sure you discuss any questions you have with your health care provider.

## 2023-05-21 ENCOUNTER — EMERGENCY (EMERGENCY)
Facility: HOSPITAL | Age: 68
LOS: 0 days | Discharge: HOME | End: 2023-05-21
Attending: EMERGENCY MEDICINE
Payer: MEDICARE

## 2023-05-21 VITALS — HEIGHT: 66 IN | WEIGHT: 199.96 LBS

## 2023-05-21 VITALS
DIASTOLIC BLOOD PRESSURE: 58 MMHG | TEMPERATURE: 98 F | RESPIRATION RATE: 20 BRPM | SYSTOLIC BLOOD PRESSURE: 145 MMHG | OXYGEN SATURATION: 99 % | HEART RATE: 77 BPM

## 2023-05-21 DIAGNOSIS — Z86.39 PERSONAL HISTORY OF OTHER ENDOCRINE, NUTRITIONAL AND METABOLIC DISEASE: ICD-10-CM

## 2023-05-21 DIAGNOSIS — G47.33 OBSTRUCTIVE SLEEP APNEA (ADULT) (PEDIATRIC): ICD-10-CM

## 2023-05-21 DIAGNOSIS — E78.5 HYPERLIPIDEMIA, UNSPECIFIED: ICD-10-CM

## 2023-05-21 DIAGNOSIS — Z79.82 LONG TERM (CURRENT) USE OF ASPIRIN: ICD-10-CM

## 2023-05-21 DIAGNOSIS — Z98.890 OTHER SPECIFIED POSTPROCEDURAL STATES: Chronic | ICD-10-CM

## 2023-05-21 DIAGNOSIS — K58.0 IRRITABLE BOWEL SYNDROME WITH DIARRHEA: ICD-10-CM

## 2023-05-21 DIAGNOSIS — Z79.02 LONG TERM (CURRENT) USE OF ANTITHROMBOTICS/ANTIPLATELETS: ICD-10-CM

## 2023-05-21 DIAGNOSIS — I10 ESSENTIAL (PRIMARY) HYPERTENSION: ICD-10-CM

## 2023-05-21 DIAGNOSIS — K76.0 FATTY (CHANGE OF) LIVER, NOT ELSEWHERE CLASSIFIED: ICD-10-CM

## 2023-05-21 DIAGNOSIS — K58.1 IRRITABLE BOWEL SYNDROME WITH CONSTIPATION: ICD-10-CM

## 2023-05-21 DIAGNOSIS — F41.8 OTHER SPECIFIED ANXIETY DISORDERS: ICD-10-CM

## 2023-05-21 DIAGNOSIS — S62.109A FRACTURE OF UNSPECIFIED CARPAL BONE, UNSPECIFIED WRIST, INITIAL ENCOUNTER FOR CLOSED FRACTURE: Chronic | ICD-10-CM

## 2023-05-21 DIAGNOSIS — Z90.49 ACQUIRED ABSENCE OF OTHER SPECIFIED PARTS OF DIGESTIVE TRACT: ICD-10-CM

## 2023-05-21 DIAGNOSIS — R19.7 DIARRHEA, UNSPECIFIED: ICD-10-CM

## 2023-05-21 DIAGNOSIS — I34.0 NONRHEUMATIC MITRAL (VALVE) INSUFFICIENCY: ICD-10-CM

## 2023-05-21 DIAGNOSIS — K21.9 GASTRO-ESOPHAGEAL REFLUX DISEASE WITHOUT ESOPHAGITIS: ICD-10-CM

## 2023-05-21 DIAGNOSIS — E03.9 HYPOTHYROIDISM, UNSPECIFIED: ICD-10-CM

## 2023-05-21 DIAGNOSIS — R60.0 LOCALIZED EDEMA: ICD-10-CM

## 2023-05-21 DIAGNOSIS — K44.9 DIAPHRAGMATIC HERNIA WITHOUT OBSTRUCTION OR GANGRENE: ICD-10-CM

## 2023-05-21 DIAGNOSIS — M79.89 OTHER SPECIFIED SOFT TISSUE DISORDERS: ICD-10-CM

## 2023-05-21 DIAGNOSIS — Z90.49 ACQUIRED ABSENCE OF OTHER SPECIFIED PARTS OF DIGESTIVE TRACT: Chronic | ICD-10-CM

## 2023-05-21 DIAGNOSIS — J34.2 DEVIATED NASAL SEPTUM: ICD-10-CM

## 2023-05-21 PROCEDURE — 99283 EMERGENCY DEPT VISIT LOW MDM: CPT | Mod: FS

## 2023-05-21 NOTE — ED STATDOCS - ATTENDING APP SHARED VISIT CONTRIBUTION OF CARE
I,Ronal Butler MD,  performed the initial face to face bedside interview with this patient regarding history of present illness, review of symptoms and relevant past medical, social and family history.  I completed an independent physical examination.  I was the initial provider who evaluated this patient. I have signed out the follow up of any pending tests (i.e. labs, radiological studies) to the ACP.  I have communicated the patient’s plan of care and disposition with the ACP.  The history, relevant review of systems, past medical and surgical history, medical decision making, and physical examination was documented by the scribe in my presence and I attest to the accuracy of the documentation.

## 2023-05-21 NOTE — ED ADULT NURSE NOTE - OBJECTIVE STATEMENT
presents to ed with bilateral leg redness and swelling. patient was seen two days ago and prescribed antibiotics. patient states she had diarrhea from the antibiotic. patient presents to ed for symptoms not resolved. instructed by er MD/PA to follow up with vascular surgery/ continue antibiotics in two days after taking probiotics for stomach. no other needs at this time. redness noted on legs, not warm to touch

## 2023-05-21 NOTE — ED STATDOCS - PROGRESS NOTE DETAILS
PA: Patient is a 68-year-old female with PMHx of HTN, HLD, hypothyroid, ROSETTA, chronic leg swelling, who presents to ED c/o persistent and worsening b/l LE edema x 1 week. pt was evaluated 2 days ago in the ED and placed on abx. pt is now c/o diarrhea and continued b/l LE swelling. pt has been taking 40 mg lasix once a day without improvement. ~Christophe Nuñez PA-C PA note: Patient likely has leg edema as opposed to cellulitis. No further intervention necessary at this time as per dr. Butler, patient can restart abx with probiotics to help with side effects. Patient re-examined and re-evaluated. Patient feels much better at this time. ED evaluation, Diagnosis and management discussed with the patient in detail. Workup results discussed with ED attending, OK to [place] home. Close VASCULAR Dr. Vo follow up encouraged, aftercare to assist with scheduling appointment ASAP. Strict ED return instructions discussed in detail and patient given the opportunity to ask any questions about their discharge diagnosis and instructions. Patient verbalized understanding. ~ Christophe Nuñez PA-C

## 2023-05-21 NOTE — ED STATDOCS - CLINICAL SUMMARY MEDICAL DECISION MAKING FREE TEXT BOX
pt with a week of pedal edema seen by PMD put on amoxicillin and then in ED for possible cellulitis. Pt was changed to a different abx but hasn't been taking it because of diarrhea. Labs reviewed from 19th, pt with no white count or lactate, blood cx redrawn and negative, sono was negative, clinically doubt cellulitis. Infection is possible, sx likely due to worsening pedal edema. pt needs to take lasix as rx 40 mg bid for the next few days and follow-up with pmd. discuss return precaution, pt agrees with plan. pt with a week of pedal edema seen by PMD put on amoxicillin and then in ED for possible cellulitis. Pt was changed to a different abx but hasn't been taking it because of diarrhea. Labs reviewed from 19th, pt with no white count or lactate, blood cx redrawn and negative, sono was negative, clinically doubt cellulitis. Infection is possible, sx likely due to worsening pedal edema. pt needs to take lasix as rx 40 mg bid for the next few days and follow-up with pmd. discuss return precaution, pt agrees with plan.    PA note: Patient likely has leg edema as opposed to cellulitis. No further intervention necessary at this time as per dr. Butler, patient can restart abx with probiotics to help with side effects. Patient re-examined and re-evaluated. Patient feels much better at this time. ED evaluation, Diagnosis and management discussed with the patient in detail. Workup results discussed with ED attending, [place] to [place] home. Close VASCULAR Dr. Vo follow up encouraged, aftercare to assist with scheduling appointment ASAP. Strict ED return instructions discussed in detail and patient given the opportunity to ask any questions about their discharge diagnosis and instructions. Patient verbalized understanding. ~ Christophe Nuñez PA-C

## 2023-05-21 NOTE — ED STATDOCS - MUSCULOSKELETAL, MLM
range of motion is not limited and there is no muscle tenderness. 2+ edema of b/l LE with mild erythema

## 2023-05-21 NOTE — ED STATDOCS - PHYSICAL EXAMINATION
PA NOTE: GEN: AOX3, NAD. HEENT: Throat clear. Airway is patent. EYES: PERRLA. EOMI. Head: NC/AT. NECK: Supple, No JVD. FROM. C-spine non-tender. CV:S1S2, RRR, LUNGS: Non-labored breathing, no tachypnea. O2sat 100% RA. CTA b/l. No w/r/r. CHEST: Equal chest expansion and rise. No deformity. ABD: Soft, NT/ND, no rebound, no guarding. No CVAT. EXT: +2+ pitting edema b/l LE's. Mild erythema, skin temp is normal. No oozing. No tenderness. 2+ distal pulses. SKIN: No rashes. NEURO: No focal deficits. CN II-XII intact. FROM. 5/5 motor and sensory. ~Christophe Nuñez PA-C

## 2023-05-21 NOTE — ED STATDOCS - NSFOLLOWUPINSTRUCTIONS_ED_ALL_ED_FT
Peripheral Edema  A person's legs and feet. One leg is normal and the other leg is affected by edema.  Peripheral edema is swelling that is caused by a buildup of fluid. Peripheral edema most often affects the lower legs, ankles, and feet. It can also develop in the arms, hands, and face. The area of the body that has peripheral edema will look swollen. It may also feel heavy or warm. Your clothes may start to feel tight. Pressing on the area may make a temporary dent in your skin (pitting edema). You may not be able to move your swollen arm or leg as much as usual.    There are many causes of peripheral edema. It can happen because of a complication of other conditions such as heart failure, kidney disease, or a problem with your circulation. It also can be a side effect of certain medicines or happen because of an infection. It often happens to women during pregnancy. Sometimes, the cause is not known.    Follow these instructions at home:  Managing pain, stiffness, and swelling    Compression stockings on a person's lower legs.  Raise (elevate) your legs while you are sitting or lying down.  Move around often to prevent stiffness and to reduce swelling.  Do not sit or stand for long periods of time.  Do not wear tight clothing. Do not wear garters on your upper legs.  Exercise your legs to get your circulation going. This helps to move the fluid back into your blood vessels, and it may help the swelling go down.  Wear compression stockings as told by your health care provider. These stockings help to prevent blood clots and reduce swelling in your legs. It is important that these are the correct size. These stockings should be prescribed by your doctor to prevent possible injuries.  If elastic bandages or wraps are recommended, use them as told by your health care provider.  Medicines    Take over-the-counter and prescription medicines only as told by your health care provider.  Your health care provider may prescribe medicine to help your body get rid of excess water (diuretic). Take this medicine if you are told to take it.  General instructions    Eat a low-salt (low-sodium) diet as told by your health care provider. Sometimes, eating less salt may reduce swelling.  Pay attention to any changes in your symptoms.  Moisturize your skin daily to help prevent skin from cracking and draining.  Keep all follow-up visits. This is important.  Contact a health care provider if:  You have a fever.  You have swelling in only one leg.  You have increased swelling, redness, or pain in one or both of your legs.  You have drainage or sores at the area where you have edema.  Get help right away if:  You have edema that starts suddenly or is getting worse, especially if you are pregnant or have a medical condition.  You develop shortness of breath, especially when you are lying down.  You have pain in your chest or abdomen.  You feel weak.  You feel like you will faint.  These symptoms may be an emergency. Get help right away. Call 911.  Do not wait to see if the symptoms will go away.  Do not drive yourself to the hospital.  Summary  Peripheral edema is swelling that is caused by a buildup of fluid. Peripheral edema most often affects the lower legs, ankles, and feet.  Move around often to prevent stiffness and to reduce swelling. Do not sit or stand for long periods of time.  Pay attention to any changes in your symptoms.  Contact a health care provider if you have edema that starts suddenly or is getting worse, especially if you are pregnant or have a medical condition.  Get help right away if you develop shortness of breath, especially when lying down.  This information is not intended to replace advice given to you by your health care provider. Make sure you discuss any questions you have with your health care provider.    Document Revised: 08/22/2022 Document Reviewed: 08/22/2022

## 2023-05-21 NOTE — ED STATDOCS - PATIENT PORTAL LINK FT
You can access the FollowMyHealth Patient Portal offered by [facility] by registering at the following website: [email or web address]. By joining [facility]’s FollowMyHealth portal, you will also be able to view your health information using other applications (apps) compatible with our system.

## 2023-05-21 NOTE — ED STATDOCS - OBJECTIVE STATEMENT
68-year-old WF, PMH of HTN, HLD, hypothyroid, ROSETTA, chronic leg swelling, ambulatory to ED c/o persistent and worsening b/l LE edema x 1 week. pt was evaluated 2 days ago in the ED and placed on abx. pt is now c/o diarrhea and continued b/l LE swelling. pt has been taking 40 mg lasix once a day without improvement.

## 2023-05-21 NOTE — ED ADULT TRIAGE NOTE - CHIEF COMPLAINT QUOTE
pt presents to ed for evaluation of worsening bilateral leg swelling and redness- pt was seen at  ED  few days ago and  prescribed cefadroxil with no improvement of legs and been having diarrhea

## 2023-05-21 NOTE — ED STATDOCS - CARE PROVIDER_API CALL
Terry Vo)  Vascular Surgery  [address], [place] [address]  Phone: [phone]  Fax: [phone]  Follow Up Time: Urgent

## 2023-05-21 NOTE — ED ADULT NURSE NOTE - NSFALLUNIVINTERV_ED_ALL_ED
Bed/Stretcher in lowest position, wheels locked, appropriate side rails in place/Call bell, personal items and telephone in reach/Instruct patient to call for assistance before getting out of bed/chair/stretcher/Non-slip footwear applied when patient is off stretcher/[place] to call system/Physically safe environment - no spills, clutter or unnecessary equipment/Purposeful proactive rounding/Room/bathroom lighting operational, light cord in reach

## 2023-05-22 ENCOUNTER — APPOINTMENT (OUTPATIENT)
Dept: VASCULAR SURGERY | Facility: CLINIC | Age: 68
End: 2023-05-22
Payer: MEDICARE

## 2023-05-22 ENCOUNTER — INPATIENT (INPATIENT)
Facility: HOSPITAL | Age: 68
LOS: 11 days | Discharge: HOME | DRG: 603 | End: 2023-06-03
Attending: INTERNAL MEDICINE | Admitting: FAMILY MEDICINE
Payer: MEDICARE

## 2023-05-22 VITALS
DIASTOLIC BLOOD PRESSURE: 82 MMHG | HEART RATE: 80 BPM | HEIGHT: 66 IN | BODY MASS INDEX: 39.37 KG/M2 | SYSTOLIC BLOOD PRESSURE: 159 MMHG | WEIGHT: 245 LBS

## 2023-05-22 VITALS — WEIGHT: 240.08 LBS | HEIGHT: 66 IN

## 2023-05-22 DIAGNOSIS — Z98.890 OTHER SPECIFIED POSTPROCEDURAL STATES: Chronic | ICD-10-CM

## 2023-05-22 DIAGNOSIS — S62.109A FRACTURE OF UNSPECIFIED CARPAL BONE, UNSPECIFIED WRIST, INITIAL ENCOUNTER FOR CLOSED FRACTURE: Chronic | ICD-10-CM

## 2023-05-22 DIAGNOSIS — L29.9 PRURITUS, UNSPECIFIED: ICD-10-CM

## 2023-05-22 DIAGNOSIS — R60.0 LOCALIZED EDEMA: ICD-10-CM

## 2023-05-22 DIAGNOSIS — Z90.49 ACQUIRED ABSENCE OF OTHER SPECIFIED PARTS OF DIGESTIVE TRACT: Chronic | ICD-10-CM

## 2023-05-22 LAB
ALBUMIN SERPL ELPH-MCNC: 3.4 G/DL — SIGNIFICANT CHANGE UP (ref 3.3–5)
ALP SERPL-CCNC: 288 U/L — HIGH (ref 40–120)
ALT FLD-CCNC: 74 U/L — SIGNIFICANT CHANGE UP (ref 12–78)
ANION GAP SERPL CALC-SCNC: 5 MMOL/L — SIGNIFICANT CHANGE UP (ref 5–17)
APTT BLD: 22.1 SEC — LOW (ref 27.5–35.5)
AST SERPL-CCNC: 73 U/L — HIGH (ref 15–37)
BASOPHILS # BLD AUTO: 0.05 K/UL — SIGNIFICANT CHANGE UP (ref 0–0.2)
BASOPHILS NFR BLD AUTO: 0.6 % — SIGNIFICANT CHANGE UP (ref 0–2)
BILIRUB SERPL-MCNC: 0.7 MG/DL — SIGNIFICANT CHANGE UP (ref 0.2–1.2)
BUN SERPL-MCNC: 15 MG/DL — SIGNIFICANT CHANGE UP (ref 7–23)
CALCIUM SERPL-MCNC: 9.9 MG/DL — SIGNIFICANT CHANGE UP (ref 8.5–10.1)
CHLORIDE SERPL-SCNC: 104 MMOL/L — SIGNIFICANT CHANGE UP (ref 96–108)
CO2 SERPL-SCNC: 29 MMOL/L — SIGNIFICANT CHANGE UP (ref 22–31)
CREAT SERPL-MCNC: 0.86 MG/DL — SIGNIFICANT CHANGE UP (ref 0.5–1.3)
EGFR: 74 ML/MIN/1.73M2 — SIGNIFICANT CHANGE UP
EOSINOPHIL # BLD AUTO: 0.14 K/UL — SIGNIFICANT CHANGE UP (ref 0–0.5)
EOSINOPHIL NFR BLD AUTO: 1.6 % — SIGNIFICANT CHANGE UP (ref 0–6)
GLUCOSE SERPL-MCNC: 103 MG/DL — HIGH (ref 70–99)
HCT VFR BLD CALC: 40.5 % — SIGNIFICANT CHANGE UP (ref 34.5–45)
HGB BLD-MCNC: 13.2 G/DL — SIGNIFICANT CHANGE UP (ref 11.5–15.5)
IMM GRANULOCYTES NFR BLD AUTO: 0.2 % — SIGNIFICANT CHANGE UP (ref 0–0.9)
INR BLD: 1.06 RATIO — SIGNIFICANT CHANGE UP (ref 0.88–1.16)
LACTATE SERPL-SCNC: 1.1 MMOL/L — SIGNIFICANT CHANGE UP (ref 0.7–2)
LYMPHOCYTES # BLD AUTO: 1.83 K/UL — SIGNIFICANT CHANGE UP (ref 1–3.3)
LYMPHOCYTES # BLD AUTO: 21.4 % — SIGNIFICANT CHANGE UP (ref 13–44)
MCHC RBC-ENTMCNC: 30.1 PG — SIGNIFICANT CHANGE UP (ref 27–34)
MCHC RBC-ENTMCNC: 32.6 GM/DL — SIGNIFICANT CHANGE UP (ref 32–36)
MCV RBC AUTO: 92.5 FL — SIGNIFICANT CHANGE UP (ref 80–100)
MONOCYTES # BLD AUTO: 0.44 K/UL — SIGNIFICANT CHANGE UP (ref 0–0.9)
MONOCYTES NFR BLD AUTO: 5.2 % — SIGNIFICANT CHANGE UP (ref 2–14)
NEUTROPHILS # BLD AUTO: 6.06 K/UL — SIGNIFICANT CHANGE UP (ref 1.8–7.4)
NEUTROPHILS NFR BLD AUTO: 71 % — SIGNIFICANT CHANGE UP (ref 43–77)
PLATELET # BLD AUTO: 302 K/UL — SIGNIFICANT CHANGE UP (ref 150–400)
POTASSIUM SERPL-MCNC: 3.9 MMOL/L — SIGNIFICANT CHANGE UP (ref 3.5–5.3)
POTASSIUM SERPL-SCNC: 3.9 MMOL/L — SIGNIFICANT CHANGE UP (ref 3.5–5.3)
PROT SERPL-MCNC: 7.7 GM/DL — SIGNIFICANT CHANGE UP (ref 6–8.3)
PROTHROM AB SERPL-ACNC: 12.3 SEC — SIGNIFICANT CHANGE UP (ref 10.5–13.4)
RBC # BLD: 4.38 M/UL — SIGNIFICANT CHANGE UP (ref 3.8–5.2)
RBC # FLD: 14.8 % — HIGH (ref 10.3–14.5)
SODIUM SERPL-SCNC: 138 MMOL/L — SIGNIFICANT CHANGE UP (ref 135–145)
WBC # BLD: 8.54 K/UL — SIGNIFICANT CHANGE UP (ref 3.8–10.5)
WBC # FLD AUTO: 8.54 K/UL — SIGNIFICANT CHANGE UP (ref 3.8–10.5)

## 2023-05-22 PROCEDURE — 97530 THERAPEUTIC ACTIVITIES: CPT | Mod: GP

## 2023-05-22 PROCEDURE — 80048 BASIC METABOLIC PNL TOTAL CA: CPT

## 2023-05-22 PROCEDURE — 80053 COMPREHEN METABOLIC PANEL: CPT

## 2023-05-22 PROCEDURE — 80074 ACUTE HEPATITIS PANEL: CPT

## 2023-05-22 PROCEDURE — 86803 HEPATITIS C AB TEST: CPT

## 2023-05-22 PROCEDURE — 99497 ADVNCD CARE PLAN 30 MIN: CPT | Mod: 25

## 2023-05-22 PROCEDURE — 85025 COMPLETE CBC W/AUTO DIFF WBC: CPT

## 2023-05-22 PROCEDURE — 97163 PT EVAL HIGH COMPLEX 45 MIN: CPT | Mod: GP

## 2023-05-22 PROCEDURE — 93005 ELECTROCARDIOGRAM TRACING: CPT

## 2023-05-22 PROCEDURE — 97116 GAIT TRAINING THERAPY: CPT | Mod: GP

## 2023-05-22 PROCEDURE — 99212 OFFICE O/P EST SF 10 MIN: CPT

## 2023-05-22 PROCEDURE — 81001 URINALYSIS AUTO W/SCOPE: CPT

## 2023-05-22 PROCEDURE — 99285 EMERGENCY DEPT VISIT HI MDM: CPT | Mod: FS

## 2023-05-22 PROCEDURE — 36415 COLL VENOUS BLD VENIPUNCTURE: CPT

## 2023-05-22 PROCEDURE — 99223 1ST HOSP IP/OBS HIGH 75: CPT | Mod: 25

## 2023-05-22 PROCEDURE — 93010 ELECTROCARDIOGRAM REPORT: CPT

## 2023-05-22 PROCEDURE — 71045 X-RAY EXAM CHEST 1 VIEW: CPT | Mod: 26

## 2023-05-22 RX ORDER — ALPRAZOLAM 0.25 MG
1 TABLET ORAL
Qty: 0 | Refills: 0 | DISCHARGE

## 2023-05-22 RX ORDER — LOSARTAN POTASSIUM 100 MG/1
100 TABLET, FILM COATED ORAL DAILY
Refills: 0 | Status: DISCONTINUED | OUTPATIENT
Start: 2023-05-22 | End: 2023-05-24

## 2023-05-22 RX ORDER — AMLODIPINE BESYLATE AND BENAZEPRIL HYDROCHLORIDE 10; 20 MG/1; MG/1
1 CAPSULE ORAL
Qty: 0 | Refills: 0 | DISCHARGE

## 2023-05-22 RX ORDER — CEFTRIAXONE 500 MG/1
1000 INJECTION, POWDER, FOR SOLUTION INTRAMUSCULAR; INTRAVENOUS ONCE
Refills: 0 | Status: COMPLETED | OUTPATIENT
Start: 2023-05-22 | End: 2023-05-22

## 2023-05-22 RX ORDER — VANCOMYCIN HCL 1 G
1750 VIAL (EA) INTRAVENOUS ONCE
Refills: 0 | Status: COMPLETED | OUTPATIENT
Start: 2023-05-22 | End: 2023-05-22

## 2023-05-22 RX ORDER — AZELASTINE 137 UG/1
2 SPRAY, METERED NASAL
Refills: 0 | DISCHARGE

## 2023-05-22 RX ORDER — ERGOCALCIFEROL 1.25 MG/1
1 CAPSULE ORAL
Refills: 0 | DISCHARGE

## 2023-05-22 RX ORDER — ENOXAPARIN SODIUM 100 MG/ML
40 INJECTION SUBCUTANEOUS EVERY 24 HOURS
Refills: 0 | Status: DISCONTINUED | OUTPATIENT
Start: 2023-05-22 | End: 2023-06-03

## 2023-05-22 RX ORDER — LANOLIN ALCOHOL/MO/W.PET/CERES
3 CREAM (GRAM) TOPICAL AT BEDTIME
Refills: 0 | Status: DISCONTINUED | OUTPATIENT
Start: 2023-05-22 | End: 2023-06-03

## 2023-05-22 RX ORDER — ATORVASTATIN CALCIUM 80 MG/1
40 TABLET, FILM COATED ORAL AT BEDTIME
Refills: 0 | Status: DISCONTINUED | OUTPATIENT
Start: 2023-05-22 | End: 2023-05-23

## 2023-05-22 RX ORDER — LEVOTHYROXINE SODIUM 125 MCG
50 TABLET ORAL DAILY
Refills: 0 | Status: DISCONTINUED | OUTPATIENT
Start: 2023-05-22 | End: 2023-06-03

## 2023-05-22 RX ORDER — PANTOPRAZOLE SODIUM 20 MG/1
40 TABLET, DELAYED RELEASE ORAL
Refills: 0 | Status: DISCONTINUED | OUTPATIENT
Start: 2023-05-22 | End: 2023-06-03

## 2023-05-22 RX ORDER — FUROSEMIDE 40 MG
40 TABLET ORAL DAILY
Refills: 0 | Status: DISCONTINUED | OUTPATIENT
Start: 2023-05-22 | End: 2023-05-26

## 2023-05-22 RX ORDER — HYOSCYAMINE SULFATE 0.13 MG
1 TABLET ORAL
Refills: 0 | DISCHARGE

## 2023-05-22 RX ORDER — LIFITEGRAST 50 MG/ML
1 SOLUTION/ DROPS OPHTHALMIC
Refills: 0 | DISCHARGE

## 2023-05-22 RX ORDER — ACETAMINOPHEN 500 MG
650 TABLET ORAL EVERY 6 HOURS
Refills: 0 | Status: DISCONTINUED | OUTPATIENT
Start: 2023-05-22 | End: 2023-06-03

## 2023-05-22 RX ORDER — FUROSEMIDE 40 MG
40 TABLET ORAL ONCE
Refills: 0 | Status: DISCONTINUED | OUTPATIENT
Start: 2023-05-22 | End: 2023-05-23

## 2023-05-22 RX ORDER — ONDANSETRON 8 MG/1
4 TABLET, FILM COATED ORAL EVERY 8 HOURS
Refills: 0 | Status: DISCONTINUED | OUTPATIENT
Start: 2023-05-22 | End: 2023-06-03

## 2023-05-22 RX ORDER — IBUPROFEN 200 MG
1 TABLET ORAL
Refills: 0 | DISCHARGE

## 2023-05-22 RX ORDER — POTASSIUM CHLORIDE 20 MEQ
10 PACKET (EA) ORAL DAILY
Refills: 0 | Status: DISCONTINUED | OUTPATIENT
Start: 2023-05-22 | End: 2023-06-03

## 2023-05-22 RX ORDER — BEPOTASTINE BESILATE 1.5 %
1 DROPS OPHTHALMIC (EYE)
Refills: 0 | DISCHARGE

## 2023-05-22 RX ORDER — CEFEPIME 1 G/1
2000 INJECTION, POWDER, FOR SOLUTION INTRAMUSCULAR; INTRAVENOUS EVERY 8 HOURS
Refills: 0 | Status: DISCONTINUED | OUTPATIENT
Start: 2023-05-22 | End: 2023-05-23

## 2023-05-22 RX ORDER — VANCOMYCIN HCL 1 G
1500 VIAL (EA) INTRAVENOUS EVERY 8 HOURS
Refills: 0 | Status: DISCONTINUED | OUTPATIENT
Start: 2023-05-22 | End: 2023-05-23

## 2023-05-22 RX ORDER — FLUTICASONE PROPIONATE 50 MCG
1 SPRAY, SUSPENSION NASAL
Refills: 0 | Status: DISCONTINUED | OUTPATIENT
Start: 2023-05-22 | End: 2023-06-03

## 2023-05-22 RX ORDER — CEFTRIAXONE 500 MG/1
1000 INJECTION, POWDER, FOR SOLUTION INTRAMUSCULAR; INTRAVENOUS ONCE
Refills: 0 | Status: DISCONTINUED | OUTPATIENT
Start: 2023-05-22 | End: 2023-05-22

## 2023-05-22 RX ADMIN — Medication 250 MILLIGRAM(S): at 19:30

## 2023-05-22 RX ADMIN — CEFTRIAXONE 1000 MILLIGRAM(S): 500 INJECTION, POWDER, FOR SOLUTION INTRAMUSCULAR; INTRAVENOUS at 18:50

## 2023-05-22 NOTE — H&P ADULT - NSHPSOCIALHISTORY_GEN_ALL_CORE
Lives by herself. Independent with ADLs and IADLs. Quit smoking many years ago. Denies alcohol or drug use.

## 2023-05-22 NOTE — PHARMACOTHERAPY INTERVENTION NOTE - INTERVENTION CATEGORIES
Smoking Cessation - topics covered   []  Health Risks  []  Benefits of Quitting   []  Smoking Cessation  []  Patient has no history of tobacco use per note in significant history. []  Patient is former smoker per note in significant history. Patient quit in   []  No need for tobacco cessation education. []  Booklet given  [x]  Patient verbalizes understanding. [x]  Patient denies need for tobacco cessation education. []  Unable to meet with patient today. Will follow up as able.   CANDACE SEWELL  1:20 PM Med Reconciliation

## 2023-05-22 NOTE — H&P ADULT - ASSESSMENT
67 y/o F presented with lower extremity redness and swelling    1. Bilateral lower extremity erythema and swelling likely secondary to cellulitis (failed outpt abx tx with Cefadroxil)   - Admit to med/surg   - Does not meet SIRS criteria   - s/p Ceftriaxone, Vancomycin in ER; will continue Cefepime and Vancomycin   - f/u UCx, BCx x 2; adjust abx based on sensitivities  - Trend WBC, monitor for temperatures   - Tylenol for temperatures PRN   - Add Acidophilus   - Eucerin for dry skin   - ID consult - Dr. Elizabeth     2. Lower extremity swelling   - Likely local swelling from cellulitis vs. venous stasis   - Pt recently had b/l venous dopplers on 5/19/23 which were negative for DVT   - Leg elevation, compression   - Will give 1 dose of lasix 40 mg IVP now   - Pt had recent cardiac workup (ECHO and stress test) 2 months ago with Dr. Valiente outpt which was reported to be negative, BNP WNL today     3. Elevated ALT and alkaline phosphatase   - Alkaline 288, ALT 73, trend   - If persistent elevation will order RUQ US     4. History of HTN, HLD, hypothyroidism, GERD, b/l carotid artery stenosis, basal cell carcinoma of the skin  - c/w home medications; verified with pt at the bedside     DVT ppx: Lovenox 40 mg subcutaneous daily   Code status: Full code (Pt agrees to chest compressions and intubation if required).   Emergency contact: Gloria Mancilla (sister) [phone]     I spent a total of 75 minutes on the date of this encounter coordinating the patient's care. This includes reviewing prior documentation, results and imaging in addition to completing a full history and physical examination on the patient. Further tests, medications, and procedures have been ordered as indicated. Laboratory results and the plan of care were communicated to the patient and/or their family member. Supporting documentation was completed and added to the patient's chart.  67 y/o F presented with lower extremity redness and swelling    1. Bilateral lower extremity erythema and swelling likely secondary to cellulitis (failed outpt abx tx with Cefadroxil)   - Admit to med/surg   - Does not meet SIRS criteria   - s/p Ceftriaxone, Vancomycin in ER; will continue Cefepime and Vancomycin   - f/u UCx, BCx x 2; adjust abx based on sensitivities  - Trend WBC, monitor for temperatures   - Tylenol for temperatures PRN   - Add Acidophilus   - Eucerin for dry skin   - ID consult - Dr. Elizabeth     2. Lower extremity swelling   - Likely local swelling from cellulitis vs. venous stasis   - Pt recently had b/l venous dopplers on 5/19/23 which were negative for DVT   - Leg elevation, compression   - Will give 1 dose of lasix 40 mg IVP now   - Pt had recent cardiac workup (ECHO and stress test) 2 months ago with Dr. Valiente outpt which was reported to be negative, BNP WNL today     3. Elevated ALT and alkaline phosphatase   - Alkaline 288, ALT 73, trend   - If persistent elevation will order RUQ US     4. Onychomycosis   - f/u with podiatry outpt   - Pt has tried topical treatments in the past without relief     5. History of HTN, HLD, hypothyroidism, GERD, b/l carotid artery stenosis, basal cell carcinoma of the skin  - c/w home medications; verified with pt at the bedside     DVT ppx: Lovenox 40 mg subcutaneous daily   Code status: Full code (Pt agrees to chest compressions and intubation if required).   Emergency contact: Gloria Mancilla (sister) [phone]     I spent a total of 75 minutes on the date of this encounter coordinating the patient's care. This includes reviewing prior documentation, results and imaging in addition to completing a full history and physical examination on the patient. Further tests, medications, and procedures have been ordered as indicated. Laboratory results and the plan of care were communicated to the patient and/or their family member. Supporting documentation was completed and added to the patient's chart.

## 2023-05-22 NOTE — REASON FOR VISIT
[Follow-Up: _____] : a [unfilled] follow-up visit [FreeTextEntry1] : hospital follow BLE swelling and in pain

## 2023-05-22 NOTE — H&P ADULT - NSHPREVIEWOFSYSTEMS_GEN_ALL_CORE
Constitutional: negative for fatigue, negative for fever, negative for chills, negative for decreased appetite.  Skin: positive for rashes, negative for open wounds, negative for jaundice.   Eyes: negative for blurry vision, negative for double vision.   Ears, nose, throat: negative for ear pain, negative for nasal congestion, negative for sore throat, negative for lymph node swelling.   Cardiovascular: negative for chest pain, negative for palpitations, negative for lower extremity swelling.   Respiratory: negative for shortness of breath, negative for wheezing, negative for cough.   Gastrointestinal: negative for abdominal pain, negative for nausea, negative for vomiting, positive for diarrhea, negative for constipation, negative for blood in the stool, negative for black tarry stools.   Genitourinary: negative for burning on urination, negative for urinary urgency or frequency, negative for blood in the urine.   Endocrine: negative for cold intolerance, negative for heat intolerance, negative for increased thirst.   Hematologic: negative for easy bruising or bleeding.   Musculoskeletal: negative for muscle/joint pain, negative for decreased range of motion.   Neurological: negative for dizziness, negative for headaches, negative for loss of consciousness, negative for motor weakness, negative for sensory deficits.   Psychiatric: negative for depression, negative for anxiety.

## 2023-05-22 NOTE — ED STATDOCS - PROGRESS NOTE DETAILS
CHELSEY Jackson: labs reviewed. pt failing outpt po abx. pt will be admitted at this time. d/w hospitalist Dr. Cruz. Pt agrees with plan. CHELSEY Jackson: I participated in the care of this patient. I agree with the history, physical and plan.

## 2023-05-22 NOTE — H&P ADULT - NSHPPHYSICALEXAM_GEN_ALL_CORE
ICU Vital Signs Last 24 Hrs  T(C): 36.6 (22 May 2023 23:32), Max: 36.7 (22 May 2023 22:07)  T(F): 97.9 (22 May 2023 23:32), Max: 98.1 (22 May 2023 22:07)  HR: 72 (22 May 2023 23:32) (69 - 74)  BP: 131/84 (22 May 2023 23:32) (131/84 - 151/63)  BP(mean): 89 (22 May 2023 23:32) (87 - 89)  RR: 18 (22 May 2023 23:32) (16 - 20)  SpO2: 100% (22 May 2023 23:32) (100% - 100%)    O2 Parameters below as of 22 May 2023 23:32  Patient On (Oxygen Delivery Method): room air    General: Awake and alert, cooperative with exam. No acute distress.   Skin: Warm, dry, and pink.   Eyes: Pupils equal and reactive to light. Extraocular eye movements intact. No conjunctival injection, discharge, or scleral icterus.   HEENT: Atraumatic, normocephalic. Moist mucus membranes.  Cardiology: Normal S1, S2. Systolic ejection murmur. Regular rate and rhythm.   Respiratory: Lungs clear to ascultation bilaterally. Good air exchange. No wheezes, rales, or rhonchi. Normal chest expansion.   Gastrointestinal: Positive bowel sounds. Soft, non-tender, non-distended. No guarding, rigidity, or rebound tenderness. No hepatosplenomegaly.   Musculoskeletal: 5/5 motor strength in all extremities. Normal range of motion.   Extremities: 2+ peripheral edema bilaterally. Dorsalis pedis pulses 2+ bilaterally. Bilateral lower extremities are erythematous, tender to touch, warm. RLE appears worse than left. Erythema extends from the ankle to the knee.   Neurological: A+Ox3 (person, place, and time). Cranial nerves 2-12 intact. Normal speech. No facial droop. No focal neurological deficits.   Psychiatric: Normal affect. Normal mood.

## 2023-05-22 NOTE — H&P ADULT - HISTORY OF PRESENT ILLNESS
69 y/o F with PMH HTN, HLD, hypothyroidism, GERD, b/l carotid artery stenosis s/p left CEA, basal cell carcinoma of the skin presented with lower extremity redness and swelling. Pt was seen in the ER 1 week ago and diagnosed with cellulitis. She was given Cefadroxil and had a few doses left to continue. She presented to the ER 2 days ago and she was advised to stop the antibiotic because she was having loose stools. She went to see Dr. Vo today and was advised to come to the ER because her legs bilaterally were red, swollen, tender to touch. She reports chills, difficulty walking d/t pain. She feels that her RLE is more red than her LLE and states that her legs do not normally have this discoloration. Pt also states that her diarrhea has improved, she had 1 semi formed stool today. Denies headaches, blurry vision, dizziness, fevers, chills, chest pain, SOB, abdominal pain, N/V, constipation.      ER course: VSS. Labs: CBC unremarkable, glucose 103, alkaline phosphatase 288, ALT 73. EKG: pending, f/u result . CXR: no consolidation, no effusion, no pneumothorax (personally reviewed).     Pt was given Ceftriaxone, Vancomycin. She is being admitted to med/surg for further management.  67 y/o F with PMH HTN, HLD, hypothyroidism, GERD, b/l carotid artery stenosis s/p left CEA, basal cell carcinoma of the skin presented with lower extremity redness and swelling. Pt was seen in the ER 1 week ago and diagnosed with cellulitis. She was given Cefadroxil and had a few doses left to continue. She presented to the ER 2 days ago and she was advised to stop the antibiotic because she was having loose stools. She went to see Dr. Vo today and was advised to come to the ER because her legs bilaterally were red, swollen, tender to touch. She reports chills, difficulty walking d/t pain. She feels that her RLE is more red than her LLE and states that her legs do not normally have this discoloration. Pt also states that her diarrhea has improved, she had 1 semi formed stool today. Denies headaches, blurry vision, dizziness, fevers, chills, chest pain, SOB, abdominal pain, N/V, constipation.      ER course: VSS. Labs: CBC unremarkable, glucose 103, alkaline phosphatase 288, ALT 73. EKG: pending, f/u result . CXR: no consolidation, no effusion, no pneumothorax (personally reviewed). EKG: NSR with HR 77 bpm, normal intervals, no ST segment changes, no T wave inversions (personally reviewed).     Pt was given Ceftriaxone, Vancomycin. She is being admitted to med/surg for further management.

## 2023-05-22 NOTE — ED STATDOCS - MUSCULOSKELETAL, MLM
This is a chronic problem that has been well controlled in the past. He is now taking Hydrochlorothiazide, lisinopril 5 mg two tablets (total 10 mg daily) Denies dizziness. Reports his BP appears stable at home. BP today 136/80.      pitting edema b/l LE. chronic venous stasis changes with worsening cellulitis LLE

## 2023-05-22 NOTE — PATIENT PROFILE ADULT - FUNCTIONAL ASSESSMENT - DAILY ACTIVITY 3.
Transitional planning:  Called [facility] hosp[facility] home care for updates per [facility] note. States [facility] hosp[facility] following. Pt on HF at 9L O2. HOB elevated. Face flushed. Today [facility] hosp[facility] RN will be out to see pt around early afternoon. 1 [place] Met with Lenin Soriano, pt's children at bedside and notified of above. They voiced gratitude. 3 = A little assistance

## 2023-05-22 NOTE — ED ADULT TRIAGE NOTE - CHIEF COMPLAINT QUOTE
Pt ambulatory to triage, sent to ED by Dr. Vo for admission for b/l lower extremities swelling and redness x1 week. HX of HTN, HDL, hypothyroidism, and GERD. Denies chest pain, SOB, and fevers.

## 2023-05-22 NOTE — PHARMACOTHERAPY INTERVENTION NOTE - COMMENTS
Medication reconciliation completed.  Reviewed Medication list and confirmed med allergies with patient; confirmed with Dr. Browne Med[facility]x.  pt confirms that she hasn't taken any medication today PTA.

## 2023-05-22 NOTE — ED STATDOCS - OBJECTIVE STATEMENT
67 y/o F w/PMHx of HTN, HLD, hypothyroid, ROSETTA, chronic leg swelling presents to ED sent in by Dr. landaverde c/o b/l LE swelling x 1 week. pt was here yesterday and was dx with cellulitis and LE edema. pt went to MD and was told to come to ED for admission. pt on water pill with no improvement. pt was a former smoker.

## 2023-05-22 NOTE — ED STATDOCS - CLINICAL SUMMARY MEDICAL DECISION MAKING FREE TEXT BOX
67 y/o F presents with LE edema and worsening cellulitis. failed outpatient therapy. will require outpatient treatment sent in by dr. landaverde. check labs, ekg, and admit pt.

## 2023-05-22 NOTE — PHYSICAL EXAM
[FreeTextEntry1] : Bilateral cellulitic lower extremities from the mid calf distally (right greater than left)

## 2023-05-22 NOTE — ASSESSMENT
[FreeTextEntry1] : 68-year-old white female with right and left lower extremity cellulitis requiring hospitalization for IV antibiotics.  She did this to the patient she will now follow-up in the [facility] emergency room.\par \par All questions were answered.

## 2023-05-22 NOTE — HISTORY OF PRESENT ILLNESS
[FreeTextEntry1] : Patient presents for routine reevaluation of her lower extremities.  She had been seen on May 3 for this problem but at that time had no evidence of phlebitic segments, Homans' sign, or calf tenderness.\par \par Over the last 2 weeks, she developed erythema of the right leg worse than the left and discomfort.  She went to the [facility] emergency room within the past 24 hours and was recommended discharge but always suggested that she could come back should her discomfort worsen.\par \par Since her last office visit, her legs have become more edematous and erythematous and uncomfortable.

## 2023-05-23 LAB
ALBUMIN SERPL ELPH-MCNC: 2.8 G/DL — LOW (ref 3.3–5)
ALP SERPL-CCNC: 239 U/L — HIGH (ref 40–120)
ALT FLD-CCNC: 61 U/L — SIGNIFICANT CHANGE UP (ref 12–78)
ANION GAP SERPL CALC-SCNC: 8 MMOL/L — SIGNIFICANT CHANGE UP (ref 5–17)
APPEARANCE UR: CLEAR — SIGNIFICANT CHANGE UP
AST SERPL-CCNC: 47 U/L — HIGH (ref 15–37)
BACTERIA # UR AUTO: ABNORMAL
BASOPHILS # BLD AUTO: 0.04 K/UL — SIGNIFICANT CHANGE UP (ref 0–0.2)
BASOPHILS NFR BLD AUTO: 0.5 % — SIGNIFICANT CHANGE UP (ref 0–2)
BILIRUB SERPL-MCNC: 0.7 MG/DL — SIGNIFICANT CHANGE UP (ref 0.2–1.2)
BILIRUB UR-MCNC: NEGATIVE — SIGNIFICANT CHANGE UP
BUN SERPL-MCNC: 14 MG/DL — SIGNIFICANT CHANGE UP (ref 7–23)
CALCIUM SERPL-MCNC: 9 MG/DL — SIGNIFICANT CHANGE UP (ref 8.5–10.1)
CHLORIDE SERPL-SCNC: 102 MMOL/L — SIGNIFICANT CHANGE UP (ref 96–108)
CO2 SERPL-SCNC: 27 MMOL/L — SIGNIFICANT CHANGE UP (ref 22–31)
COLOR SPEC: YELLOW — SIGNIFICANT CHANGE UP
CREAT SERPL-MCNC: 0.82 MG/DL — SIGNIFICANT CHANGE UP (ref 0.5–1.3)
DIFF PNL FLD: ABNORMAL
EGFR: 78 ML/MIN/1.73M2 — SIGNIFICANT CHANGE UP
EOSINOPHIL # BLD AUTO: 0.22 K/UL — SIGNIFICANT CHANGE UP (ref 0–0.5)
EOSINOPHIL NFR BLD AUTO: 2.7 % — SIGNIFICANT CHANGE UP (ref 0–6)
EPI CELLS # UR: SIGNIFICANT CHANGE UP
GLUCOSE SERPL-MCNC: 129 MG/DL — HIGH (ref 70–99)
GLUCOSE UR QL: NEGATIVE — SIGNIFICANT CHANGE UP
HCT VFR BLD CALC: 34.8 % — SIGNIFICANT CHANGE UP (ref 34.5–45)
HCV AB S/CO SERPL IA: 0.09 S/CO — SIGNIFICANT CHANGE UP (ref 0–0.99)
HCV AB SERPL-IMP: SIGNIFICANT CHANGE UP
HGB BLD-MCNC: 11.3 G/DL — LOW (ref 11.5–15.5)
IMM GRANULOCYTES NFR BLD AUTO: 0.7 % — SIGNIFICANT CHANGE UP (ref 0–0.9)
KETONES UR-MCNC: NEGATIVE — SIGNIFICANT CHANGE UP
LEUKOCYTE ESTERASE UR-ACNC: ABNORMAL
LYMPHOCYTES # BLD AUTO: 1.81 K/UL — SIGNIFICANT CHANGE UP (ref 1–3.3)
LYMPHOCYTES # BLD AUTO: 22.1 % — SIGNIFICANT CHANGE UP (ref 13–44)
MCHC RBC-ENTMCNC: 29.9 PG — SIGNIFICANT CHANGE UP (ref 27–34)
MCHC RBC-ENTMCNC: 32.5 GM/DL — SIGNIFICANT CHANGE UP (ref 32–36)
MCV RBC AUTO: 92.1 FL — SIGNIFICANT CHANGE UP (ref 80–100)
MONOCYTES # BLD AUTO: 0.47 K/UL — SIGNIFICANT CHANGE UP (ref 0–0.9)
MONOCYTES NFR BLD AUTO: 5.7 % — SIGNIFICANT CHANGE UP (ref 2–14)
NEUTROPHILS # BLD AUTO: 5.6 K/UL — SIGNIFICANT CHANGE UP (ref 1.8–7.4)
NEUTROPHILS NFR BLD AUTO: 68.3 % — SIGNIFICANT CHANGE UP (ref 43–77)
NITRITE UR-MCNC: NEGATIVE — SIGNIFICANT CHANGE UP
PH UR: 6 — SIGNIFICANT CHANGE UP (ref 5–8)
PLATELET # BLD AUTO: 260 K/UL — SIGNIFICANT CHANGE UP (ref 150–400)
POTASSIUM SERPL-MCNC: 3.4 MMOL/L — LOW (ref 3.5–5.3)
POTASSIUM SERPL-SCNC: 3.4 MMOL/L — LOW (ref 3.5–5.3)
PROT SERPL-MCNC: 6.6 GM/DL — SIGNIFICANT CHANGE UP (ref 6–8.3)
PROT UR-MCNC: NEGATIVE — SIGNIFICANT CHANGE UP
RBC # BLD: 3.78 M/UL — LOW (ref 3.8–5.2)
RBC # FLD: 14.9 % — HIGH (ref 10.3–14.5)
RBC CASTS # UR COMP ASSIST: ABNORMAL /HPF (ref 0–4)
SODIUM SERPL-SCNC: 137 MMOL/L — SIGNIFICANT CHANGE UP (ref 135–145)
SP GR SPEC: 1.01 — SIGNIFICANT CHANGE UP (ref 1.01–1.02)
UROBILINOGEN FLD QL: NEGATIVE — SIGNIFICANT CHANGE UP
WBC # BLD: 8.2 K/UL — SIGNIFICANT CHANGE UP (ref 3.8–10.5)
WBC # FLD AUTO: 8.2 K/UL — SIGNIFICANT CHANGE UP (ref 3.8–10.5)
WBC UR QL: SIGNIFICANT CHANGE UP /HPF (ref 0–5)

## 2023-05-23 PROCEDURE — 99232 SBSQ HOSP IP/OBS MODERATE 35: CPT

## 2023-05-23 RX ORDER — LACTOBACILLUS ACIDOPHILUS 100MM CELL
1 CAPSULE ORAL
Refills: 0 | Status: DISCONTINUED | OUTPATIENT
Start: 2023-05-23 | End: 2023-06-03

## 2023-05-23 RX ORDER — IBUPROFEN 200 MG
600 TABLET ORAL EVERY 8 HOURS
Refills: 0 | Status: DISCONTINUED | OUTPATIENT
Start: 2023-05-23 | End: 2023-05-25

## 2023-05-23 RX ORDER — CEFTRIAXONE 500 MG/1
2000 INJECTION, POWDER, FOR SOLUTION INTRAMUSCULAR; INTRAVENOUS EVERY 24 HOURS
Refills: 0 | Status: COMPLETED | OUTPATIENT
Start: 2023-05-23 | End: 2023-05-29

## 2023-05-23 RX ORDER — CEFTRIAXONE 500 MG/1
2000 INJECTION, POWDER, FOR SOLUTION INTRAMUSCULAR; INTRAVENOUS EVERY 24 HOURS
Refills: 0 | Status: DISCONTINUED | OUTPATIENT
Start: 2023-05-23 | End: 2023-05-23

## 2023-05-23 RX ORDER — PETROLATUM,WHITE
1 JELLY (GRAM) TOPICAL
Refills: 0 | Status: DISCONTINUED | OUTPATIENT
Start: 2023-05-23 | End: 2023-06-03

## 2023-05-23 RX ADMIN — CEFEPIME 100 MILLIGRAM(S): 1 INJECTION, POWDER, FOR SOLUTION INTRAMUSCULAR; INTRAVENOUS at 05:09

## 2023-05-23 RX ADMIN — Medication 1 TABLET(S): at 09:04

## 2023-05-23 RX ADMIN — Medication 40 MILLIGRAM(S): at 00:02

## 2023-05-23 RX ADMIN — PANTOPRAZOLE SODIUM 40 MILLIGRAM(S): 20 TABLET, DELAYED RELEASE ORAL at 09:15

## 2023-05-23 RX ADMIN — Medication 50 MICROGRAM(S): at 05:10

## 2023-05-23 RX ADMIN — Medication 30 MILLILITER(S): at 13:14

## 2023-05-23 RX ADMIN — Medication 1 TABLET(S): at 17:29

## 2023-05-23 RX ADMIN — CEFTRIAXONE 2000 MILLIGRAM(S): 500 INJECTION, POWDER, FOR SOLUTION INTRAMUSCULAR; INTRAVENOUS at 17:29

## 2023-05-23 RX ADMIN — Medication 30 MILLILITER(S): at 17:29

## 2023-05-23 RX ADMIN — Medication 1 DROP(S): at 05:11

## 2023-05-23 RX ADMIN — Medication 1 APPLICATION(S): at 09:16

## 2023-05-23 RX ADMIN — Medication 1 APPLICATION(S): at 21:09

## 2023-05-23 RX ADMIN — Medication 1 DROP(S): at 13:18

## 2023-05-23 RX ADMIN — Medication 40 MILLIGRAM(S): at 09:06

## 2023-05-23 RX ADMIN — Medication 300 MILLIGRAM(S): at 06:34

## 2023-05-23 RX ADMIN — Medication 10 MILLIEQUIVALENT(S): at 09:03

## 2023-05-23 RX ADMIN — ENOXAPARIN SODIUM 40 MILLIGRAM(S): 100 INJECTION SUBCUTANEOUS at 05:09

## 2023-05-23 RX ADMIN — Medication 600 MILLIGRAM(S): at 14:03

## 2023-05-23 RX ADMIN — Medication 100 MILLIGRAM(S): at 21:13

## 2023-05-23 RX ADMIN — Medication 1 DROP(S): at 01:53

## 2023-05-23 RX ADMIN — LOSARTAN POTASSIUM 100 MILLIGRAM(S): 100 TABLET, FILM COATED ORAL at 09:07

## 2023-05-23 RX ADMIN — Medication 1 DROP(S): at 17:29

## 2023-05-23 NOTE — PHYSICAL THERAPY INITIAL EVALUATION ADULT - MODALITIES TREATMENT COMMENTS
pt left in bed supine post Eval; bed alarm on; LE's elevated on pillows; callbell in reach; pt instructed not to get up alone; call nursing for assist; xin well; denied pain

## 2023-05-24 LAB
ALBUMIN SERPL ELPH-MCNC: 3 G/DL — LOW (ref 3.3–5)
ALP SERPL-CCNC: 259 U/L — HIGH (ref 40–120)
ALT FLD-CCNC: 58 U/L — SIGNIFICANT CHANGE UP (ref 12–78)
ANION GAP SERPL CALC-SCNC: 7 MMOL/L — SIGNIFICANT CHANGE UP (ref 5–17)
AST SERPL-CCNC: 42 U/L — HIGH (ref 15–37)
BILIRUB SERPL-MCNC: 0.6 MG/DL — SIGNIFICANT CHANGE UP (ref 0.2–1.2)
BUN SERPL-MCNC: 29 MG/DL — HIGH (ref 7–23)
CALCIUM SERPL-MCNC: 9.3 MG/DL — SIGNIFICANT CHANGE UP (ref 8.5–10.1)
CHLORIDE SERPL-SCNC: 98 MMOL/L — SIGNIFICANT CHANGE UP (ref 96–108)
CO2 SERPL-SCNC: 26 MMOL/L — SIGNIFICANT CHANGE UP (ref 22–31)
CREAT SERPL-MCNC: 1.65 MG/DL — HIGH (ref 0.5–1.3)
CULTURE RESULTS: SIGNIFICANT CHANGE UP
CULTURE RESULTS: SIGNIFICANT CHANGE UP
EGFR: 34 ML/MIN/1.73M2 — LOW
GLUCOSE SERPL-MCNC: 132 MG/DL — HIGH (ref 70–99)
HAV IGM SER-ACNC: SIGNIFICANT CHANGE UP
HBV CORE IGM SER-ACNC: SIGNIFICANT CHANGE UP
HBV SURFACE AG SER-ACNC: SIGNIFICANT CHANGE UP
HCV AB S/CO SERPL IA: 0.11 S/CO — SIGNIFICANT CHANGE UP (ref 0–0.99)
HCV AB SERPL-IMP: SIGNIFICANT CHANGE UP
POTASSIUM SERPL-MCNC: 4.1 MMOL/L — SIGNIFICANT CHANGE UP (ref 3.5–5.3)
POTASSIUM SERPL-SCNC: 4.1 MMOL/L — SIGNIFICANT CHANGE UP (ref 3.5–5.3)
PROT SERPL-MCNC: 7.6 GM/DL — SIGNIFICANT CHANGE UP (ref 6–8.3)
SODIUM SERPL-SCNC: 131 MMOL/L — LOW (ref 135–145)
SPECIMEN SOURCE: SIGNIFICANT CHANGE UP
SPECIMEN SOURCE: SIGNIFICANT CHANGE UP

## 2023-05-24 PROCEDURE — 99232 SBSQ HOSP IP/OBS MODERATE 35: CPT

## 2023-05-24 RX ADMIN — Medication 100 MILLIGRAM(S): at 22:00

## 2023-05-24 RX ADMIN — Medication 1 DROP(S): at 17:11

## 2023-05-24 RX ADMIN — Medication 1 TABLET(S): at 10:10

## 2023-05-24 RX ADMIN — Medication 1 DROP(S): at 23:02

## 2023-05-24 RX ADMIN — Medication 1 DROP(S): at 05:33

## 2023-05-24 RX ADMIN — Medication 1 TABLET(S): at 17:11

## 2023-05-24 RX ADMIN — Medication 1 APPLICATION(S): at 10:21

## 2023-05-24 RX ADMIN — Medication 1 DROP(S): at 00:00

## 2023-05-24 RX ADMIN — Medication 100 MILLIGRAM(S): at 10:10

## 2023-05-24 RX ADMIN — CEFTRIAXONE 2000 MILLIGRAM(S): 500 INJECTION, POWDER, FOR SOLUTION INTRAMUSCULAR; INTRAVENOUS at 17:12

## 2023-05-24 RX ADMIN — ENOXAPARIN SODIUM 40 MILLIGRAM(S): 100 INJECTION SUBCUTANEOUS at 05:48

## 2023-05-24 RX ADMIN — Medication 1 DROP(S): at 12:39

## 2023-05-24 RX ADMIN — Medication 1 APPLICATION(S): at 22:01

## 2023-05-24 RX ADMIN — Medication 50 MICROGRAM(S): at 05:48

## 2023-05-24 RX ADMIN — PANTOPRAZOLE SODIUM 40 MILLIGRAM(S): 20 TABLET, DELAYED RELEASE ORAL at 05:48

## 2023-05-24 RX ADMIN — Medication 10 MILLIEQUIVALENT(S): at 10:19

## 2023-05-25 LAB
ANION GAP SERPL CALC-SCNC: 6 MMOL/L — SIGNIFICANT CHANGE UP (ref 5–17)
BUN SERPL-MCNC: 28 MG/DL — HIGH (ref 7–23)
CALCIUM SERPL-MCNC: 9.8 MG/DL — SIGNIFICANT CHANGE UP (ref 8.5–10.1)
CHLORIDE SERPL-SCNC: 103 MMOL/L — SIGNIFICANT CHANGE UP (ref 96–108)
CO2 SERPL-SCNC: 29 MMOL/L — SIGNIFICANT CHANGE UP (ref 22–31)
CREAT SERPL-MCNC: 1.23 MG/DL — SIGNIFICANT CHANGE UP (ref 0.5–1.3)
EGFR: 48 ML/MIN/1.73M2 — LOW
GLUCOSE SERPL-MCNC: 93 MG/DL — SIGNIFICANT CHANGE UP (ref 70–99)
POTASSIUM SERPL-MCNC: 3.8 MMOL/L — SIGNIFICANT CHANGE UP (ref 3.5–5.3)
POTASSIUM SERPL-SCNC: 3.8 MMOL/L — SIGNIFICANT CHANGE UP (ref 3.5–5.3)
SODIUM SERPL-SCNC: 138 MMOL/L — SIGNIFICANT CHANGE UP (ref 135–145)

## 2023-05-25 PROCEDURE — 99232 SBSQ HOSP IP/OBS MODERATE 35: CPT

## 2023-05-25 PROCEDURE — 99222 1ST HOSP IP/OBS MODERATE 55: CPT

## 2023-05-25 RX ADMIN — Medication 1 APPLICATION(S): at 09:43

## 2023-05-25 RX ADMIN — Medication 1 TABLET(S): at 09:25

## 2023-05-25 RX ADMIN — ENOXAPARIN SODIUM 40 MILLIGRAM(S): 100 INJECTION SUBCUTANEOUS at 05:26

## 2023-05-25 RX ADMIN — Medication 1 APPLICATION(S): at 21:33

## 2023-05-25 RX ADMIN — Medication 1 TABLET(S): at 17:45

## 2023-05-25 RX ADMIN — Medication 650 MILLIGRAM(S): at 14:27

## 2023-05-25 RX ADMIN — Medication 100 MILLIGRAM(S): at 09:27

## 2023-05-25 RX ADMIN — Medication 40 MILLIGRAM(S): at 09:26

## 2023-05-25 RX ADMIN — Medication 100 MILLIGRAM(S): at 21:33

## 2023-05-25 RX ADMIN — PANTOPRAZOLE SODIUM 40 MILLIGRAM(S): 20 TABLET, DELAYED RELEASE ORAL at 05:26

## 2023-05-25 RX ADMIN — Medication 10 MILLIEQUIVALENT(S): at 09:26

## 2023-05-25 RX ADMIN — Medication 50 MICROGRAM(S): at 05:26

## 2023-05-25 RX ADMIN — Medication 650 MILLIGRAM(S): at 15:10

## 2023-05-25 RX ADMIN — CEFTRIAXONE 2000 MILLIGRAM(S): 500 INJECTION, POWDER, FOR SOLUTION INTRAMUSCULAR; INTRAVENOUS at 17:45

## 2023-05-25 NOTE — CONSULT NOTE ADULT - ASSESSMENT
Physical Exam:   Physical Exam: ICU Vital Signs Last 24 Hrs  T(C): 36.6 (22 May 2023 23:32), Max: 36.7 (22 May 2023 22:07)  T(F): 97.9 (22 May 2023 23:32), Max: 98.1 (22 May 2023 22:07)  HR: 72 (22 May 2023 23:32) (69 - 74)  BP: 131/84 (22 May 2023 23:32) (131/84 - 151/63)  BP(mean): 89 (22 May 2023 23:32) (87 - 89)  RR: 18 (22 May 2023 23:32) (16 - 20)  SpO2: 100% (22 May 2023 23:32) (100% - 100%)    O2 Parameters below as of 22 May 2023 23:32  Patient On (Oxygen Delivery Method): room air    General: Awake and alert, cooperative with exam. No acute distress.   Skin: Warm, dry, and pink.   Eyes: Pupils equal and reactive to light. Extraocular eye movements intact. No conjunctival injection, discharge, or scleral icterus.   HEENT: Atraumatic, normocephalic. Moist mucus membranes.  Cardiology: Normal S1, S2. Systolic ejection murmur. Regular rate and rhythm.   Respiratory: Lungs clear to ascultation bilaterally. Good air exchange. No wheezes, rales, or rhonchi. Normal chest expansion.   Gastrointestinal: Positive bowel sounds. Soft, non-tender, non-distended. No guarding, rigidity, or rebound tenderness. No hepatosplenomegaly.   Musculoskeletal: 5/5 motor strength in all extremities. Normal range of motion.   Extremities: 2-3+ edema of both lower extremities but improved since last seen in office; no cellulitis currently or abscess   Neurological: A+Ox3 (person, place, and time). Cranial nerves 2-12 intact. Normal speech. No facial droop. No focal neurological deficits.   Psychiatric: Normal affect. Normal mood.      Laboratory:   Admit:    22-May-2023 21:58, Bed Request  Bed Status: CLEAN  Patient Bed Information: Nurse Station: [id number]  	Room Number: 0536  	Bed Number: 01  General Chemistry:    22-May-2023 16:04, Comprehensive Metabolic Panel  Sodium, Serum: 138, [135 - 145 mmol/L]  Potassium, Serum: 3.9, [3.5 - 5.3 mmol/L]  Chloride, Serum: 104, [96 - 108 mmol/L]  Carbon Dioxide, Serum: 29, [22 - 31 mmol/L]  Anion Gap, Serum: 5, [5 - 17 mmol/L]  Blood Urea Nitrogen, Serum: 15, [7 - 23 mg/dL]  Creatinine, Serum: 0.86, [0.50 - 1.30 mg/dL]  Glucose, Serum:   103, [70 - 99 mg/dL]  Calcium, Total Serum: 9.9, [8.5 - 10.1 mg/dL]  Protein Total, Serum: 7.7, [6.0 - 8.3 gm/dL]  Albumin, Serum: 3.4, [3.3 - 5.0 g/dL]  Bilirubin Total, Serum: 0.7, [0.2 - 1.2 mg/dL]  Alkaline Phosphatase, Serum:   288, [40 - 120 U/L]  Aspartate Aminotransferase (AST/SGOT):   73, [15 - 37 U/L]  Alanine Aminotransferase (ALT/SGPT): 74, [12 - 78 U/L]  eGFR: 74, [>=60 mL/min/1.73m2], The estimated glomerular filtration rate (eGFR) is calculated using the  	2021 CKD-EPI creatinine equation, which does not have a coefficient for  	race and is validated in individuals 18 years of age and older (N Engl J  	Med 2021; 385:1191-8167). Creatinine-based eGFR may be inaccurate in  	various situations including but not limited to extremes of muscle mass,  	altered dietary protein intake, or medications that affect renal tubular  	creatinine secretion.    22-May-2023 16:04, Lactate, Blood  Lactate, Blood: 1.1, [0.7 - 2.0 mmol/L]  Coagulation:    22-May-2023 16:04, Activated Partial Thromboplastin Time  Activated Partial Thromboplastin Time:   22.1, [27.5 - 35.5 sec], Specimen integrity verified.  	The recommended therapeutic heparin range (full dose) is 58-99 seconds.  	Argatroban range is 1.5 to 3.0 times of the baseline APTT value, not to  	exceed 100 seconds.  	Routine coagulation results should be interpreted with caution when  	taking Factor Xa inhibitors or direct thrombin inhibitors; blood sampling  	prior to drug intake is recommended.    22-May-2023 16:04, Prothrombin Time and INR, Plasma  Prothrombin Time, Plasma: 12.3, [10.5 - 13.4 sec]  INR: 1.06, [0.88 - 1.16 ratio], Recommended targets/ranges for therapeutic INR:  	2.0-3.0 Deep vein thrombosis, pulmonary embolism, atrial fibrillation  	2.0-3.0 Mechanical aortic valve, antiphospholipid syndrome with previous  	arterial or venous thromboembolism  	2.5-3.5 Mechanical mitral valve, double mechanical valve (aortic and  	mitral positions, high risk valves)  	Note: Chest 2012 Feb;141(2 Suppl):7S-47S  	Routine coagulation results should be interpreted with caution when  	taking Factor Xa inhibitors or direct thrombin inhibitors; blood sampling  	prior to drug intake is recommended.  Hematology:    22-May-2023 16:04, Complete Blood Count + Automated Diff  WBC Count: 8.54, [3.80 - 10.50 K/uL]  RBC Count: 4.38, [3.80 - 5.20 M/uL]  Hemoglobin: 13.2, [11.5 - 15.5 g/dL]  Hematocrit: 40.5, [34.5 - 45.0 %]  Mean Cell Volume: 92.5, [80.0 - 100.0 fl]  Mean Cell Hemoglobin: 30.1, [27.0 - 34.0 pg]  Mean Cell Hemoglobin Conc: 32.6, [32.0 - 36.0 gm/dL]  Red Cell Distrib Width:   14.8, [10.3 - 14.5 %]  Platelet Count - Automated: 302, [150 - 400 K/uL]  Auto Neutrophil #: 6.06, [1.80 - 7.40 K/uL]  Auto Lymphocyte #: 1.83, [1.00 - 3.30 K/uL]  Auto Monocyte #: 0.44, [0.00 - 0.90 K/uL]  Auto Eosinophil #: 0.14, [0.00 - 0.50 K/uL]  Auto Basophil #: 0.05, [0.00 - 0.20 K/uL]  Auto Neutrophil %: 71.0, [43.0 - 77.0 %], Differential percentages must be correlated with absolute numbers for  	clinical significance.  Auto Lymphocyte %: 21.4, [13.0 - 44.0 %]  Auto Monocyte %: 5.2, [2.0 - 14.0 %]  Auto Eosinophil %: 1.6, [0.0 - 6.0 %]  Auto Basophil %: 0.6, [0.0 - 2.0 %]  Auto Immature Granulocyte %: 0.2, [0.0 - 0.9 %], (Includes meta, myelo and promyelocytes). Mild elevations in immature  	granulocytes may be seen with many inflammatory processes and pregnancy;  	clinical correlation suggested.    Radiology:   X-Ray, Fluoroscopy:    22-May-2023 16:25, Xray Chest 1 View-PORTABLE IMMEDIATE  PACS Image: Image(s) Available  Xray Chest 1 View-PORTABLE IMMEDIATE:   	ACC: [id number] EXAM:  XR CHEST PORTABLE IMMED 1V   ORDERED BY: GARTH ALCAZAR   	  	PROCEDURE DATE:  05/22/2023    	  	  	  	INTERPRETATION:  INDICATION: Sepsis  	  	COMPARISON: 3/7/2017  	  	FINDINGS:  	An AP portable chest radiograph shows clear lungs bilaterally, except for   	small calcified granuloma in the peripheral left upper lobe, unchanged.   	No infiltrates are seen. There is no pneumothorax. There are no pleural   	effusions. There is no hilar or mediastinal widening. The cardiac   	silhouette is not enlarged for the projection and there is no CHF. The   	bony thorax is grossly intact. A bone infarct or enchondroma is suspected   	within the proximal right humeral metaphysis, unchanged as well.  	  	IMPRESSION: Clear lungs with no acute cardiopulmonary abnormalities.  	  	--- End of Report ---  	  	  	  	  	  	HANNAH BECKHAM MD; Attending Radiologist  	This document has been electronically signed. May 22 2023  5:21PM    Assessment and Plan:   · Completed VTE Risk Assessment(s)	Medical Assessment Completed on: 23-May-2023 00:46  · Completed VTE Risk Assessment(s)	Refer to the Assessment tab to view/cancel completed assessment.     Assessment:  · Assessment	  67 y/o F presented with lower extremity redness and swelling    1. Bilateral lower extremity erythema and swelling likely secondary to cellulitis (failed outpt abx tx with Cefadroxil)     as above, continue leg elevation and IV antibiotics; will need stockings at some point  - Admit to med/surg   - Does not meet SIRS criteria   - s/p Ceftriaxone, Vancomycin in ER; will continue Cefepime and Vancomycin   - f/u UCx, BCx x 2; adjust abx based on sensitivities  - Trend WBC, monitor for temperatures   - Tylenol for temperatures PRN   - Add Acidophilus   - Eucerin for dry skin   - ID consult - Dr. Elizabeth     2. Lower extremity swelling   - Likely local swelling from cellulitis vs. venous stasis   - Pt recently had b/l venous dopplers on 5/19/23 which were negative for DVT   - Leg elevation, compression   - Will give 1 dose of lasix 40 mg IVP now   - Pt had recent cardiac workup (ECHO and stress test) 2 months ago with Dr. Valiente outpt which was reported to be negative, BNP WNL today     3. Elevated ALT and alkaline phosphatase   - Alkaline 288, ALT 73, trend   - If persistent elevation will order RUQ US     4. Onychomycosis   - f/u with podiatry outpt   - Pt has tried topical treatments in the past without relief     5. History of HTN, HLD, hypothyroidism, GERD, b/l carotid artery stenosis, basal cell carcinoma of the skin  - c/w home medications; verified with pt at the bedside     DVT ppx: Lovenox 40 mg subcutaneous daily   Code status: Full code (Pt agrees to chest compressions and intubation if required).   Emergency contact: Gloria Mancilla (sister) [phone]               
69 y/o F with PMH HTN, HLD, hypothyroidism, GERD, b/l carotid artery stenosis s/p left CEA, basal cell carcinoma of the skin presented with lower extremity redness and swelling. Pt was seen in the ER 1 week ago and diagnosed with cellulitis. She was given Cefadroxil and had a few doses left to continue. She presented to the ER 2 days ago and she was advised to stop the antibiotic because she was having loose stools. She went to see Dr. Vo  and was advised to come to the ER because her legs bilaterally were red, swollen, tender to touch. She reports chills, difficulty walking d/t pain. She feels that her RLE is more red than her LLE and states that her legs do not normally have this discoloration. Pt also states that her diarrhea has improved, she had 1 semi formed stool today. Denies headaches, blurry vision, dizziness, fevers, chills, chest pain, SOB, abdominal pain, N/V, constipation.    Pt was given Ceftriaxone, Vancomycin. She was  admitted to med/surg for further management.     1. Bilateral LE chronic stasis dermatitis. Superimposed cellulitis. PVD  - imaging reviewed  - vascular f/u  - change vancomycin to doxycycline 100mg BID  - continue with IV rocephin dc cefepime adjust dose 2gm daily  - continue with antibiotic coverage  - f/u cultures  - monitor temps  - leg elevation  - fu cbc  - tolerating abx well so far; no side effects noted    2. other issues - care per medicine 
No

## 2023-05-25 NOTE — CONSULT NOTE ADULT - SUBJECTIVE AND OBJECTIVE BOX
Patient is a 68y old  Female who presents with a chief complaint of Lower extremity redness and swelling (22 May 2023 23:15)    HPI:  67 y/o F with PMH HTN, HLD, hypothyroidism, GERD, b/l carotid artery stenosis s/p left CEA, basal cell carcinoma of the skin presented with lower extremity redness and swelling. Pt was seen in the ER 1 week ago and diagnosed with cellulitis. She was given Cefadroxil and had a few doses left to continue. She presented to the ER 2 days ago and she was advised to stop the antibiotic because she was having loose stools. She went to see Dr. Vo  and was advised to come to the ER because her legs bilaterally were red, swollen, tender to touch. She reports chills, difficulty walking d/t pain. She feels that her RLE is more red than her LLE and states that her legs do not normally have this discoloration. Pt also states that her diarrhea has improved, she had 1 semi formed stool today. Denies headaches, blurry vision, dizziness, fevers, chills, chest pain, SOB, abdominal pain, N/V, constipation.    Pt was given Ceftriaxone, Vancomycin. She was  admitted to med/surg for further management.       PMH: as above  PSH: as above  Meds: per reconciliation sheet, noted below  MEDICATIONS  (STANDING):  AQUAPHOR (petrolatum Ointment) 1 Application(s) Topical two times a day  artificial  tears Solution 1 Drop(s) Both EYES four times a day  cefTRIAXone   IVPB 2000 milliGRAM(s) IV Intermittent every 24 hours  doxycycline monohydrate Capsule 100 milliGRAM(s) Oral every 12 hours  enoxaparin Injectable 40 milliGRAM(s) SubCutaneous every 24 hours  furosemide    Tablet 40 milliGRAM(s) Oral daily  hydrochlorothiazide 12.5 milliGRAM(s) Oral daily  lactobacillus acidophilus 1 Tablet(s) Oral two times a day with meals  levothyroxine 50 MICROGram(s) Oral daily  losartan 100 milliGRAM(s) Oral daily  pantoprazole    Tablet 40 milliGRAM(s) Oral before breakfast  potassium chloride    Tablet ER 10 milliEquivalent(s) Oral daily      Allergies    No Known Allergies    Intolerances    cefadroxil (Diarrhea)    Social: no smoking, no alcohol, no illegal drugs; no recent travel, no exposure to TB  FAMILY HISTORY:  Family history of hepatic cirrhosis (Mother)  mother    Family history of heart disease (Mother)  mother    Family history of diabetes mellitus (Mother)  mother       no history of premature cardiovascular disease in first degree relatives    ROS: the patient denies fever, no chills, no HA, no dizziness, no sore throat, no blurry vision, no CP, no palpitations, no SOB, no cough, no abdominal pain, no diarrhea, no N/V, no dysuria, no leg pain, no claudication, no rash, no joint aches, no rectal pain or bleeding, no night sweats    All other systems reviewed and are negative    Vital Signs Last 24 Hrs  T(C): 37 (23 May 2023 07:35), Max: 37 (23 May 2023 07:35)  T(F): 98.6 (23 May 2023 07:35), Max: 98.6 (23 May 2023 07:35)  HR: 72 (23 May 2023 07:35) (69 - 74)  BP: 138/59 (23 May 2023 07:35) (131/84 - 151/63)  BP(mean): 89 (22 May 2023 23:32) (87 - 89)  RR: 18 (23 May 2023 07:35) (16 - 20)  SpO2: 99% (23 May 2023 07:35) (99% - 100%)    Parameters below as of 23 May 2023 07:35  Patient On (Oxygen Delivery Method): room air      Daily Height in cm: 167.64 (22 May 2023 14:26)    Daily     PE:  Constitutional: NAD   HEENT: NC/AT, EOMI, PERRLA, conjunctivae clear; ears and nose atraumatic; pharynx benign  Neck: supple; thyroid not palpable  Back: no tenderness  Respiratory: respiratory effort normal; clear to auscultation  Cardiovascular: S1S2 regular, no murmurs  Abdomen: soft, not tender, not distended, positive BS; liver and spleen WNL  Genitourinary: no suprapubic tenderness  Lymphatic: no LN palpable  Musculoskeletal: no muscle tenderness, no joint swelling or tenderness  Extremities: no pedal edema  Neurological/ Psychiatric: AxOx3, Judgement and insight normal;  moving all extremities  Skin: no rashes; no palpable lesions b/l LE stasis changes, erythema, warmth     Labs: all available labs reviewed                        11.3   8  )-----------( 260      ( 23 May 2023 07:47 )             34.8         137  |  102  |  14  ----------------------------<  129<H>  3.4<L>   |  27  |  0.82    Ca    9.0      23 May 2023 07:47    TPro  6.6  /  Alb  2.8<L>  /  TBili  0.7  /  DBili  x   /  AST  47<H>  /  ALT  61  /  AlkPhos  239<H>       LIVER FUNCTIONS - ( 23 May 2023 07:47 )  Alb: 2.8 g/dL / Pro: 6.6 gm/dL / ALK PHOS: 239 U/L / ALT: 61 U/L / AST: 47 U/L / GGT: x           Urinalysis Basic - ( 23 May 2023 09:23 )    Color: Yellow / Appearance: Clear / S.010 / pH: x  Gluc: x / Ketone: Negative  / Bili: Negative / Urobili: Negative   Blood: x / Protein: Negative / Nitrite: Negative   Leuk Esterase: Trace / RBC: 3-5 /HPF / WBC 0-2 /HPF   Sq Epi: x / Non Sq Epi: x / Bacteria: Few          Radiology: all available radiological tests reviewed  < from: Xray Chest 1 View-PORTABLE IMMEDIATE (23 @ 16:25) >  ACC: [id number] EXAM:  XR CHEST PORTABLE IMMED 1V   ORDERED BY: GARTH ALCAZAR     PROCEDURE DATE:  2023          INTERPRETATION:  INDICATION: Sepsis    COMPARISON: 3/7/2017    FINDINGS:  An AP portable chest radiograph shows clear lungs bilaterally, except for   small calcified granuloma in the peripheral left upper lobe, unchanged.   No infiltrates are seen. There is no pneumothorax. There are no pleural   effusions. There is no hilar or mediastinal widening. The cardiac   silhouette is not enlarged for the projection and there is no CHF. The   bony thorax is grossly intact. A bone infarct or enchondroma is suspected   within the proximal right humeral metaphysis, unchanged as well.    IMPRESSION: Clear lungs with no acute cardiopulmonary abnormalities.    --- End of Report ---    < end of copied text >    Advanced directives addressed: full resuscitation
 History of Present Illness:  Reason for Admission: Lower extremity redness and swelling  History of Present Illness:   69 y/o F with PMH HTN, HLD, hypothyroidism, GERD, b/l carotid artery stenosis s/p left CEA, basal cell carcinoma of the skin presented with lower extremity redness and swelling. Pt was seen in the ER 1 week ago and diagnosed with cellulitis. She was given Cefadroxil and had a few doses left to continue. She presented to the ER 2 days ago and she was advised to stop the antibiotic because she was having loose stools. She went to see Dr. Vo today and was advised to come to the ER because her legs bilaterally were red, swollen, tender to touch. She reports chills, difficulty walking d/t pain. She feels that her RLE is more red than her LLE and states that her legs do not normally have this discoloration. Pt also states that her diarrhea has improved, she had 1 semi formed stool today. Denies headaches, blurry vision, dizziness, fevers, chills, chest pain, SOB, abdominal pain, N/V, constipation.      ER course: VSS. Labs: CBC unremarkable, glucose 103, alkaline phosphatase 288, ALT 73. EKG: pending, f/u result . CXR: no consolidation, no effusion, no pneumothorax (personally reviewed). EKG: NSR with HR 77 bpm, normal intervals, no ST segment changes, no T wave inversions (personally reviewed).     Pt was given Ceftriaxone, Vancomycin. She is being admitted to med/surg for further management.     Pt history as above.  Pt seen for last several months but developed worsening of edema and was recommended hospitalization given her erythema.  Her erythema has improved.        Review of Systems:  Review of Systems: Constitutional: negative for fatigue, negative for fever, negative for chills, negative for decreased appetite.  Skin: positive for rashes, negative for open wounds, negative for jaundice.   Eyes: negative for blurry vision, negative for double vision.   Ears, nose, throat: negative for ear pain, negative for nasal congestion, negative for sore throat, negative for lymph node swelling.   Cardiovascular: negative for chest pain, negative for palpitations, negative for lower extremity swelling.   Respiratory: negative for shortness of breath, negative for wheezing, negative for cough.   Gastrointestinal: negative for abdominal pain, negative for nausea, negative for vomiting, positive for diarrhea, negative for constipation, negative for blood in the stool, negative for black tarry stools.   Genitourinary: negative for burning on urination, negative for urinary urgency or frequency, negative for blood in the urine.   Endocrine: negative for cold intolerance, negative for heat intolerance, negative for increased thirst.   Hematologic: negative for easy bruising or bleeding.   Musculoskeletal: negative for muscle/joint pain, negative for decreased range of motion.   Neurological: negative for dizziness, negative for headaches, negative for loss of consciousness, negative for motor weakness, negative for sensory deficits.   Psychiatric: negative for depression, negative for anxiety.    Goals of Care:    GOALS OF CARE:  · Participants	Patient     Conversation Discussion:  · Conversation	Diagnosis; Prognosis; MOLST Discussed; Treatment Options  · Conversation Details	I had a detailed discussion with the patient regarding their goals of care. We discussed that cardiopulmonary resuscitation (CPR) can be completed if the patient were to go into cardiac arrest. This includes chest compressions and delivering shocks if needed to restart their heart and intubation/mechanical ventilation to maintain their airway. The risks of CPR were discussed with the patient including rib fractures, damage to internal organs, and the possibility of anoxic brain injury or increased physical debility. At this time, the patient states that they are agreeable to chest compressions and intubation if needed to save their life in an emergency.     Time Spent on Advance Care Planning:  Attending or TERRIE Only.     I personally spent 17 minutes on advance care planning services with the patient. This time is separate and distinct from any other care management services provided on this date.      Allergies and Intolerances:        Allergies:  	No Known Allergies:        Intolerances:  	cefadroxil: Drug, Diarrhea    Home Medications:   * Patient Currently Takes Medications as of 22-May-2023 22:53 documented in Structured Notes  · 	cefadroxil 500 mg oral capsule: Last Dose Taken:  , 1 cap(s) orally 2 times a day  · 	rosuvastatin 10 mg oral tablet: Last Dose Taken:  , 1 tab(s) orally once a day (at bedtime)  · 	losartan-hydroCHLOROthiazide 100 mg-12.5 mg oral tablet: Last Dose Taken:  , 1 tab(s) orally once a day  · 	furosemide 40 mg oral tablet: Last Dose Taken:  , 1 tab(s) orally once a day  · 	potassium chloride 10 mEq oral tablet, extended release: Last Dose Taken:  , 1 tab(s) orally once a day  · 	Flonase 50 mcg/inh nasal spray: Last Dose Taken:  , 1 spray(s) in each nostril 2 times a day as needed for  allergy symptoms  · 	levothyroxine 50 mcg (0.05 mg) oral tablet: Last Dose Taken:  , 1 tab(s) orally once a day  · 	omeprazole 40 mg oral delayed release capsule: Last Dose Taken:  , 1 cap(s) orally once a day    Patient History:    Past Medical, Past Surgical, and Family History:  PAST MEDICAL HISTORY:  Acute recurrent sinusitis, unspecified location     Anxiety and depression     Carotid stenosis, left     Deviated septum     Essential hypertension     Fatty liver disease, nonalcoholic     Gall stones gall bladder removed    Heart murmur     Hiatal hernia with GERD     Hyperlipidemia, unspecified hyperlipidemia type     Hypothyroidism, unspecified type     Irritable bowel syndrome with both constipation and diarrhea     Joint pain of lower limb bilateral    Mitral valve insufficiency, unspecified etiology     Morbid obesity due to excess calories     Nonintractable episodic headache, unspecified headache type     Peripheral edema bilateral    Urinary tract infection with hematuria, site unspecified frequent.  Presently on antibiotics.     PAST SURGICAL HISTORY:   delivery delivered 1989    Fracture dislocation of wrist joint     H/O arthroscopy of shoulder Left. "Many years ago"    H/O colonoscopy     S/P cholecystectomy 1991.     FAMILY HISTORY:  Mother  Still living? No  Family history of diabetes mellitus, Age at diagnosis: Age Unknown  Family history of heart disease, Age at diagnosis: Age Unknown  Family history of hepatic cirrhosis, Age at diagnosis: Age Unknown.     Social History:  · Substance use	No  · Social History (marital status, living situation, occupation, and sexual history)	Lives by herself. Independent with ADLs and IADLs. Quit smoking many years ago. Denies alcohol or drug use.     Tobacco Screening:  · Core Measure Site	Yes  · Has the patient used tobacco in the past 30 days?	No    Risk Assessment:    Present on Admission:  Deep Venous Thrombosis	no  Pulmonary Embolus	no     HIV Screening:  · In accordance with [place] State law, we offer every patient who comes to our ED an HIV test. Would you like to be tested today?	Opt out

## 2023-05-26 LAB
ANION GAP SERPL CALC-SCNC: 5 MMOL/L — SIGNIFICANT CHANGE UP (ref 5–17)
BUN SERPL-MCNC: 27 MG/DL — HIGH (ref 7–23)
CALCIUM SERPL-MCNC: 9.9 MG/DL — SIGNIFICANT CHANGE UP (ref 8.5–10.1)
CHLORIDE SERPL-SCNC: 101 MMOL/L — SIGNIFICANT CHANGE UP (ref 96–108)
CO2 SERPL-SCNC: 31 MMOL/L — SIGNIFICANT CHANGE UP (ref 22–31)
CREAT SERPL-MCNC: 1.03 MG/DL — SIGNIFICANT CHANGE UP (ref 0.5–1.3)
EGFR: 60 ML/MIN/1.73M2 — SIGNIFICANT CHANGE UP
GLUCOSE SERPL-MCNC: 105 MG/DL — HIGH (ref 70–99)
POTASSIUM SERPL-MCNC: 3.2 MMOL/L — LOW (ref 3.5–5.3)
POTASSIUM SERPL-SCNC: 3.2 MMOL/L — LOW (ref 3.5–5.3)
SODIUM SERPL-SCNC: 137 MMOL/L — SIGNIFICANT CHANGE UP (ref 135–145)

## 2023-05-26 PROCEDURE — 99232 SBSQ HOSP IP/OBS MODERATE 35: CPT

## 2023-05-26 RX ORDER — FUROSEMIDE 40 MG
40 TABLET ORAL DAILY
Refills: 0 | Status: DISCONTINUED | OUTPATIENT
Start: 2023-05-26 | End: 2023-05-31

## 2023-05-26 RX ADMIN — Medication 100 MILLIGRAM(S): at 22:28

## 2023-05-26 RX ADMIN — Medication 1 DROP(S): at 22:28

## 2023-05-26 RX ADMIN — Medication 1 DROP(S): at 00:03

## 2023-05-26 RX ADMIN — Medication 40 MILLIGRAM(S): at 09:36

## 2023-05-26 RX ADMIN — Medication 10 MILLIEQUIVALENT(S): at 09:36

## 2023-05-26 RX ADMIN — Medication 1 DROP(S): at 17:25

## 2023-05-26 RX ADMIN — PANTOPRAZOLE SODIUM 40 MILLIGRAM(S): 20 TABLET, DELAYED RELEASE ORAL at 05:48

## 2023-05-26 RX ADMIN — ENOXAPARIN SODIUM 40 MILLIGRAM(S): 100 INJECTION SUBCUTANEOUS at 05:48

## 2023-05-26 RX ADMIN — Medication 100 MILLIGRAM(S): at 09:35

## 2023-05-26 RX ADMIN — Medication 1 DROP(S): at 15:02

## 2023-05-26 RX ADMIN — Medication 1 APPLICATION(S): at 09:36

## 2023-05-26 RX ADMIN — Medication 1 TABLET(S): at 09:36

## 2023-05-26 RX ADMIN — Medication 50 MICROGRAM(S): at 05:48

## 2023-05-26 RX ADMIN — Medication 1 DROP(S): at 05:48

## 2023-05-26 RX ADMIN — Medication 40 MILLIGRAM(S): at 15:41

## 2023-05-26 RX ADMIN — Medication 1 APPLICATION(S): at 22:29

## 2023-05-26 RX ADMIN — CEFTRIAXONE 2000 MILLIGRAM(S): 500 INJECTION, POWDER, FOR SOLUTION INTRAMUSCULAR; INTRAVENOUS at 17:27

## 2023-05-26 RX ADMIN — Medication 1 TABLET(S): at 17:25

## 2023-05-26 NOTE — PROGRESS NOTE ADULT - ASSESSMENT
69 y/o F with PMH HTN, HLD, hypothyroidism, GERD, b/l carotid artery stenosis s/p left CEA, basal cell carcinoma of the skin presented with lower extremity redness and swelling. Pt was seen in the ER 1 week ago and diagnosed with cellulitis. She was given Cefadroxil and had a few doses left to continue. She presented to the ER 2 days ago and she was advised to stop the antibiotic because she was having loose stools. She went to see Dr. Vo  and was advised to come to the ER because her legs bilaterally were red, swollen, tender to touch. She reports chills, difficulty walking d/t pain. She feels that her RLE is more red than her LLE and states that her legs do not normally have this discoloration. Pt also states that her diarrhea has improved, she had 1 semi formed stool today. Denies headaches, blurry vision, dizziness, fevers, chills, chest pain, SOB, abdominal pain, N/V, constipation.    Pt was given Ceftriaxone, Vancomycin. She was  admitted to med/surg for further management.     1. Bilateral LE chronic stasis dermatitis. Superimposed cellulitis. PVD  - imaging reviewed  - vascular f/u noted   - on doxycycline 100mg BID #4  - on rocephin 2gm daily #4  - continue with antibiotic coverage  - f/u cultures - no growth   - monitor temps  - leg elevation  - fu cbc  - tolerating abx well so far; no side effects noted  - on dc po doxycycline 100mg BID complete 7-10 day course    2. other issues - care per medicine 
67 y/o F with PMH HTN, HLD, hypothyroidism, GERD, b/l carotid artery stenosis s/p left CEA, basal cell carcinoma of the skin presented with lower extremity redness and swelling. Pt was seen in the ER 1 week ago and diagnosed with cellulitis. She was given Cefadroxil and had a few doses left to continue. She presented to the ER 2 days ago and she was advised to stop the antibiotic because she was having loose stools. She went to see Dr. Vo  and was advised to come to the ER because her legs bilaterally were red, swollen, tender to touch. She reports chills, difficulty walking d/t pain. She feels that her RLE is more red than her LLE and states that her legs do not normally have this discoloration. Pt also states that her diarrhea has improved, she had 1 semi formed stool today. Denies headaches, blurry vision, dizziness, fevers, chills, chest pain, SOB, abdominal pain, N/V, constipation.    Pt was given Ceftriaxone, Vancomycin. She was  admitted to med/surg for further management.     1. Bilateral LE chronic stasis dermatitis. Superimposed cellulitis. PVD  - imaging reviewed  - vascular f/u noted   - on doxycycline 100mg BID #2  - on rocephin 2gm daily #2  - continue with antibiotic coverage  - f/u cultures - no growth   - monitor temps  - leg elevation  - fu cbc  - tolerating abx well so far; no side effects noted    2. other issues - care per medicine

## 2023-05-27 LAB
ANION GAP SERPL CALC-SCNC: 6 MMOL/L — SIGNIFICANT CHANGE UP (ref 5–17)
BUN SERPL-MCNC: 27 MG/DL — HIGH (ref 7–23)
CALCIUM SERPL-MCNC: 9.4 MG/DL — SIGNIFICANT CHANGE UP (ref 8.5–10.1)
CHLORIDE SERPL-SCNC: 100 MMOL/L — SIGNIFICANT CHANGE UP (ref 96–108)
CO2 SERPL-SCNC: 31 MMOL/L — SIGNIFICANT CHANGE UP (ref 22–31)
CREAT SERPL-MCNC: 1.07 MG/DL — SIGNIFICANT CHANGE UP (ref 0.5–1.3)
EGFR: 57 ML/MIN/1.73M2 — LOW
GLUCOSE SERPL-MCNC: 140 MG/DL — HIGH (ref 70–99)
POTASSIUM SERPL-MCNC: 3.2 MMOL/L — LOW (ref 3.5–5.3)
POTASSIUM SERPL-SCNC: 3.2 MMOL/L — LOW (ref 3.5–5.3)
SODIUM SERPL-SCNC: 137 MMOL/L — SIGNIFICANT CHANGE UP (ref 135–145)

## 2023-05-27 PROCEDURE — 99232 SBSQ HOSP IP/OBS MODERATE 35: CPT

## 2023-05-27 RX ORDER — POTASSIUM CHLORIDE 20 MEQ
40 PACKET (EA) ORAL EVERY 4 HOURS
Refills: 0 | Status: COMPLETED | OUTPATIENT
Start: 2023-05-27 | End: 2023-05-27

## 2023-05-27 RX ADMIN — Medication 40 MILLIEQUIVALENT(S): at 13:54

## 2023-05-27 RX ADMIN — Medication 1 DROP(S): at 06:27

## 2023-05-27 RX ADMIN — Medication 1 DROP(S): at 13:53

## 2023-05-27 RX ADMIN — ENOXAPARIN SODIUM 40 MILLIGRAM(S): 100 INJECTION SUBCUTANEOUS at 06:26

## 2023-05-27 RX ADMIN — Medication 1 DROP(S): at 17:15

## 2023-05-27 RX ADMIN — Medication 100 MILLIGRAM(S): at 21:30

## 2023-05-27 RX ADMIN — CEFTRIAXONE 2000 MILLIGRAM(S): 500 INJECTION, POWDER, FOR SOLUTION INTRAMUSCULAR; INTRAVENOUS at 17:28

## 2023-05-27 RX ADMIN — Medication 650 MILLIGRAM(S): at 17:28

## 2023-05-27 RX ADMIN — Medication 1 TABLET(S): at 08:55

## 2023-05-27 RX ADMIN — PANTOPRAZOLE SODIUM 40 MILLIGRAM(S): 20 TABLET, DELAYED RELEASE ORAL at 06:26

## 2023-05-27 RX ADMIN — Medication 650 MILLIGRAM(S): at 06:26

## 2023-05-27 RX ADMIN — Medication 40 MILLIEQUIVALENT(S): at 17:29

## 2023-05-27 RX ADMIN — Medication 10 MILLIEQUIVALENT(S): at 08:55

## 2023-05-27 RX ADMIN — Medication 40 MILLIEQUIVALENT(S): at 21:30

## 2023-05-27 RX ADMIN — Medication 1 TABLET(S): at 17:28

## 2023-05-27 RX ADMIN — Medication 1 APPLICATION(S): at 21:30

## 2023-05-27 RX ADMIN — Medication 1 APPLICATION(S): at 09:06

## 2023-05-27 RX ADMIN — Medication 50 MICROGRAM(S): at 06:27

## 2023-05-27 RX ADMIN — Medication 100 MILLIGRAM(S): at 08:56

## 2023-05-27 RX ADMIN — Medication 40 MILLIGRAM(S): at 08:56

## 2023-05-28 LAB
ALBUMIN SERPL ELPH-MCNC: 2.9 G/DL — LOW (ref 3.3–5)
ALP SERPL-CCNC: 230 U/L — HIGH (ref 40–120)
ALT FLD-CCNC: 64 U/L — SIGNIFICANT CHANGE UP (ref 12–78)
ANION GAP SERPL CALC-SCNC: 6 MMOL/L — SIGNIFICANT CHANGE UP (ref 5–17)
AST SERPL-CCNC: 65 U/L — HIGH (ref 15–37)
BILIRUB SERPL-MCNC: 0.4 MG/DL — SIGNIFICANT CHANGE UP (ref 0.2–1.2)
BUN SERPL-MCNC: 25 MG/DL — HIGH (ref 7–23)
CALCIUM SERPL-MCNC: 9.5 MG/DL — SIGNIFICANT CHANGE UP (ref 8.5–10.1)
CHLORIDE SERPL-SCNC: 101 MMOL/L — SIGNIFICANT CHANGE UP (ref 96–108)
CO2 SERPL-SCNC: 29 MMOL/L — SIGNIFICANT CHANGE UP (ref 22–31)
CREAT SERPL-MCNC: 0.76 MG/DL — SIGNIFICANT CHANGE UP (ref 0.5–1.3)
CULTURE RESULTS: SIGNIFICANT CHANGE UP
CULTURE RESULTS: SIGNIFICANT CHANGE UP
EGFR: 86 ML/MIN/1.73M2 — SIGNIFICANT CHANGE UP
GLUCOSE SERPL-MCNC: 101 MG/DL — HIGH (ref 70–99)
POTASSIUM SERPL-MCNC: 3.8 MMOL/L — SIGNIFICANT CHANGE UP (ref 3.5–5.3)
POTASSIUM SERPL-SCNC: 3.8 MMOL/L — SIGNIFICANT CHANGE UP (ref 3.5–5.3)
PROT SERPL-MCNC: 7 GM/DL — SIGNIFICANT CHANGE UP (ref 6–8.3)
SODIUM SERPL-SCNC: 136 MMOL/L — SIGNIFICANT CHANGE UP (ref 135–145)
SPECIMEN SOURCE: SIGNIFICANT CHANGE UP
SPECIMEN SOURCE: SIGNIFICANT CHANGE UP

## 2023-05-28 PROCEDURE — 99232 SBSQ HOSP IP/OBS MODERATE 35: CPT

## 2023-05-28 RX ADMIN — Medication 1 TABLET(S): at 18:43

## 2023-05-28 RX ADMIN — Medication 100 MILLIGRAM(S): at 21:52

## 2023-05-28 RX ADMIN — Medication 1 DROP(S): at 05:49

## 2023-05-28 RX ADMIN — Medication 1 APPLICATION(S): at 08:36

## 2023-05-28 RX ADMIN — Medication 650 MILLIGRAM(S): at 15:47

## 2023-05-28 RX ADMIN — Medication 100 MILLIGRAM(S): at 08:32

## 2023-05-28 RX ADMIN — CEFTRIAXONE 2000 MILLIGRAM(S): 500 INJECTION, POWDER, FOR SOLUTION INTRAMUSCULAR; INTRAVENOUS at 18:43

## 2023-05-28 RX ADMIN — Medication 650 MILLIGRAM(S): at 17:18

## 2023-05-28 RX ADMIN — ENOXAPARIN SODIUM 40 MILLIGRAM(S): 100 INJECTION SUBCUTANEOUS at 05:50

## 2023-05-28 RX ADMIN — Medication 1 DROP(S): at 11:49

## 2023-05-28 RX ADMIN — PANTOPRAZOLE SODIUM 40 MILLIGRAM(S): 20 TABLET, DELAYED RELEASE ORAL at 08:31

## 2023-05-28 RX ADMIN — Medication 650 MILLIGRAM(S): at 03:23

## 2023-05-28 RX ADMIN — Medication 1 APPLICATION(S): at 21:53

## 2023-05-28 RX ADMIN — Medication 1 DROP(S): at 17:18

## 2023-05-28 RX ADMIN — Medication 1 DROP(S): at 01:19

## 2023-05-28 RX ADMIN — Medication 50 MICROGRAM(S): at 05:49

## 2023-05-28 RX ADMIN — Medication 1 TABLET(S): at 08:36

## 2023-05-28 RX ADMIN — Medication 650 MILLIGRAM(S): at 02:23

## 2023-05-28 RX ADMIN — Medication 40 MILLIGRAM(S): at 08:36

## 2023-05-28 RX ADMIN — Medication 10 MILLIEQUIVALENT(S): at 08:36

## 2023-05-29 LAB
ANION GAP SERPL CALC-SCNC: 6 MMOL/L — SIGNIFICANT CHANGE UP (ref 5–17)
BUN SERPL-MCNC: 24 MG/DL — HIGH (ref 7–23)
CALCIUM SERPL-MCNC: 9.3 MG/DL — SIGNIFICANT CHANGE UP (ref 8.5–10.1)
CHLORIDE SERPL-SCNC: 104 MMOL/L — SIGNIFICANT CHANGE UP (ref 96–108)
CO2 SERPL-SCNC: 28 MMOL/L — SIGNIFICANT CHANGE UP (ref 22–31)
CREAT SERPL-MCNC: 0.95 MG/DL — SIGNIFICANT CHANGE UP (ref 0.5–1.3)
EGFR: 66 ML/MIN/1.73M2 — SIGNIFICANT CHANGE UP
GLUCOSE SERPL-MCNC: 153 MG/DL — HIGH (ref 70–99)
POTASSIUM SERPL-MCNC: 3.5 MMOL/L — SIGNIFICANT CHANGE UP (ref 3.5–5.3)
POTASSIUM SERPL-SCNC: 3.5 MMOL/L — SIGNIFICANT CHANGE UP (ref 3.5–5.3)
SODIUM SERPL-SCNC: 138 MMOL/L — SIGNIFICANT CHANGE UP (ref 135–145)

## 2023-05-29 PROCEDURE — 99232 SBSQ HOSP IP/OBS MODERATE 35: CPT

## 2023-05-29 RX ORDER — POTASSIUM CHLORIDE 20 MEQ
40 PACKET (EA) ORAL ONCE
Refills: 0 | Status: COMPLETED | OUTPATIENT
Start: 2023-05-29 | End: 2023-05-29

## 2023-05-29 RX ADMIN — CEFTRIAXONE 2000 MILLIGRAM(S): 500 INJECTION, POWDER, FOR SOLUTION INTRAMUSCULAR; INTRAVENOUS at 16:07

## 2023-05-29 RX ADMIN — Medication 100 MILLIGRAM(S): at 09:52

## 2023-05-29 RX ADMIN — Medication 40 MILLIGRAM(S): at 09:52

## 2023-05-29 RX ADMIN — PANTOPRAZOLE SODIUM 40 MILLIGRAM(S): 20 TABLET, DELAYED RELEASE ORAL at 09:52

## 2023-05-29 RX ADMIN — Medication 1 TABLET(S): at 09:52

## 2023-05-29 RX ADMIN — Medication 1 DROP(S): at 16:08

## 2023-05-29 RX ADMIN — Medication 10 MILLIEQUIVALENT(S): at 09:52

## 2023-05-29 RX ADMIN — Medication 1 TABLET(S): at 16:07

## 2023-05-29 RX ADMIN — Medication 50 MICROGRAM(S): at 05:41

## 2023-05-29 RX ADMIN — ENOXAPARIN SODIUM 40 MILLIGRAM(S): 100 INJECTION SUBCUTANEOUS at 05:42

## 2023-05-29 RX ADMIN — Medication 1 DROP(S): at 13:20

## 2023-05-29 RX ADMIN — Medication 40 MILLIEQUIVALENT(S): at 16:06

## 2023-05-29 RX ADMIN — Medication 1 APPLICATION(S): at 21:18

## 2023-05-29 RX ADMIN — Medication 1 APPLICATION(S): at 09:53

## 2023-05-29 RX ADMIN — Medication 100 MILLIGRAM(S): at 21:18

## 2023-05-29 RX ADMIN — Medication 1 DROP(S): at 05:42

## 2023-05-30 LAB
ANION GAP SERPL CALC-SCNC: 4 MMOL/L — LOW (ref 5–17)
BUN SERPL-MCNC: 25 MG/DL — HIGH (ref 7–23)
CALCIUM SERPL-MCNC: 9.4 MG/DL — SIGNIFICANT CHANGE UP (ref 8.5–10.1)
CHLORIDE SERPL-SCNC: 105 MMOL/L — SIGNIFICANT CHANGE UP (ref 96–108)
CO2 SERPL-SCNC: 30 MMOL/L — SIGNIFICANT CHANGE UP (ref 22–31)
CREAT SERPL-MCNC: 0.95 MG/DL — SIGNIFICANT CHANGE UP (ref 0.5–1.3)
EGFR: 66 ML/MIN/1.73M2 — SIGNIFICANT CHANGE UP
GLUCOSE SERPL-MCNC: 139 MG/DL — HIGH (ref 70–99)
POTASSIUM SERPL-MCNC: 3.7 MMOL/L — SIGNIFICANT CHANGE UP (ref 3.5–5.3)
POTASSIUM SERPL-SCNC: 3.7 MMOL/L — SIGNIFICANT CHANGE UP (ref 3.5–5.3)
SODIUM SERPL-SCNC: 139 MMOL/L — SIGNIFICANT CHANGE UP (ref 135–145)

## 2023-05-30 PROCEDURE — 99232 SBSQ HOSP IP/OBS MODERATE 35: CPT

## 2023-05-30 RX ORDER — FUROSEMIDE 40 MG
40 TABLET ORAL ONCE
Refills: 0 | Status: COMPLETED | OUTPATIENT
Start: 2023-05-30 | End: 2023-05-30

## 2023-05-30 RX ADMIN — Medication 100 MILLIGRAM(S): at 09:03

## 2023-05-30 RX ADMIN — Medication 1 DROP(S): at 12:14

## 2023-05-30 RX ADMIN — Medication 40 MILLIGRAM(S): at 14:54

## 2023-05-30 RX ADMIN — Medication 650 MILLIGRAM(S): at 21:22

## 2023-05-30 RX ADMIN — PANTOPRAZOLE SODIUM 40 MILLIGRAM(S): 20 TABLET, DELAYED RELEASE ORAL at 08:43

## 2023-05-30 RX ADMIN — Medication 650 MILLIGRAM(S): at 05:16

## 2023-05-30 RX ADMIN — Medication 1 DROP(S): at 18:45

## 2023-05-30 RX ADMIN — Medication 100 MILLIGRAM(S): at 21:12

## 2023-05-30 RX ADMIN — Medication 10 MILLIEQUIVALENT(S): at 09:04

## 2023-05-30 RX ADMIN — Medication 1 TABLET(S): at 08:42

## 2023-05-30 RX ADMIN — Medication 1 DROP(S): at 00:41

## 2023-05-30 RX ADMIN — ENOXAPARIN SODIUM 40 MILLIGRAM(S): 100 INJECTION SUBCUTANEOUS at 06:02

## 2023-05-30 RX ADMIN — Medication 1 DROP(S): at 21:12

## 2023-05-30 RX ADMIN — Medication 40 MILLIGRAM(S): at 09:03

## 2023-05-30 RX ADMIN — Medication 1 APPLICATION(S): at 09:09

## 2023-05-30 RX ADMIN — Medication 1 APPLICATION(S): at 21:13

## 2023-05-30 RX ADMIN — Medication 50 MICROGRAM(S): at 06:01

## 2023-05-30 RX ADMIN — Medication 1 DROP(S): at 06:02

## 2023-05-30 RX ADMIN — Medication 1 TABLET(S): at 18:45

## 2023-05-30 RX ADMIN — Medication 650 MILLIGRAM(S): at 04:16

## 2023-05-30 RX ADMIN — Medication 650 MILLIGRAM(S): at 19:59

## 2023-05-31 ENCOUNTER — APPOINTMENT (OUTPATIENT)
Dept: VASCULAR SURGERY | Facility: CLINIC | Age: 68
End: 2023-05-31

## 2023-05-31 LAB
ANION GAP SERPL CALC-SCNC: 5 MMOL/L — SIGNIFICANT CHANGE UP (ref 5–17)
BUN SERPL-MCNC: 25 MG/DL — HIGH (ref 7–23)
CALCIUM SERPL-MCNC: 9.7 MG/DL — SIGNIFICANT CHANGE UP (ref 8.5–10.1)
CHLORIDE SERPL-SCNC: 104 MMOL/L — SIGNIFICANT CHANGE UP (ref 96–108)
CO2 SERPL-SCNC: 30 MMOL/L — SIGNIFICANT CHANGE UP (ref 22–31)
CREAT SERPL-MCNC: 0.84 MG/DL — SIGNIFICANT CHANGE UP (ref 0.5–1.3)
EGFR: 76 ML/MIN/1.73M2 — SIGNIFICANT CHANGE UP
GLUCOSE SERPL-MCNC: 97 MG/DL — SIGNIFICANT CHANGE UP (ref 70–99)
POTASSIUM SERPL-MCNC: 3.9 MMOL/L — SIGNIFICANT CHANGE UP (ref 3.5–5.3)
POTASSIUM SERPL-SCNC: 3.9 MMOL/L — SIGNIFICANT CHANGE UP (ref 3.5–5.3)
SODIUM SERPL-SCNC: 139 MMOL/L — SIGNIFICANT CHANGE UP (ref 135–145)

## 2023-05-31 PROCEDURE — 99232 SBSQ HOSP IP/OBS MODERATE 35: CPT

## 2023-05-31 RX ORDER — FUROSEMIDE 40 MG
40 TABLET ORAL
Refills: 0 | Status: DISCONTINUED | OUTPATIENT
Start: 2023-05-31 | End: 2023-06-02

## 2023-05-31 RX ADMIN — Medication 30 MILLILITER(S): at 18:29

## 2023-05-31 RX ADMIN — Medication 650 MILLIGRAM(S): at 15:18

## 2023-05-31 RX ADMIN — PANTOPRAZOLE SODIUM 40 MILLIGRAM(S): 20 TABLET, DELAYED RELEASE ORAL at 04:56

## 2023-05-31 RX ADMIN — Medication 40 MILLIGRAM(S): at 15:19

## 2023-05-31 RX ADMIN — Medication 1 TABLET(S): at 08:00

## 2023-05-31 RX ADMIN — Medication 1 DROP(S): at 12:07

## 2023-05-31 RX ADMIN — Medication 1 DROP(S): at 20:52

## 2023-05-31 RX ADMIN — Medication 40 MILLIGRAM(S): at 09:36

## 2023-05-31 RX ADMIN — ENOXAPARIN SODIUM 40 MILLIGRAM(S): 100 INJECTION SUBCUTANEOUS at 04:56

## 2023-05-31 RX ADMIN — Medication 50 MICROGRAM(S): at 04:56

## 2023-05-31 RX ADMIN — Medication 100 MILLIGRAM(S): at 20:52

## 2023-05-31 RX ADMIN — Medication 1 APPLICATION(S): at 09:48

## 2023-05-31 RX ADMIN — Medication 650 MILLIGRAM(S): at 16:18

## 2023-05-31 RX ADMIN — Medication 1 TABLET(S): at 16:44

## 2023-05-31 RX ADMIN — Medication 100 MILLIGRAM(S): at 09:36

## 2023-05-31 RX ADMIN — Medication 1 APPLICATION(S): at 20:52

## 2023-05-31 RX ADMIN — Medication 1 DROP(S): at 04:56

## 2023-05-31 RX ADMIN — Medication 1 DROP(S): at 17:45

## 2023-05-31 RX ADMIN — Medication 10 MILLIEQUIVALENT(S): at 09:38

## 2023-06-01 LAB
ANION GAP SERPL CALC-SCNC: 4 MMOL/L — LOW (ref 5–17)
BUN SERPL-MCNC: 24 MG/DL — HIGH (ref 7–23)
CALCIUM SERPL-MCNC: 9.8 MG/DL — SIGNIFICANT CHANGE UP (ref 8.5–10.1)
CHLORIDE SERPL-SCNC: 102 MMOL/L — SIGNIFICANT CHANGE UP (ref 96–108)
CO2 SERPL-SCNC: 33 MMOL/L — HIGH (ref 22–31)
CREAT SERPL-MCNC: 0.9 MG/DL — SIGNIFICANT CHANGE UP (ref 0.5–1.3)
EGFR: 70 ML/MIN/1.73M2 — SIGNIFICANT CHANGE UP
GLUCOSE SERPL-MCNC: 95 MG/DL — SIGNIFICANT CHANGE UP (ref 70–99)
POTASSIUM SERPL-MCNC: 3.3 MMOL/L — LOW (ref 3.5–5.3)
POTASSIUM SERPL-SCNC: 3.3 MMOL/L — LOW (ref 3.5–5.3)
SODIUM SERPL-SCNC: 139 MMOL/L — SIGNIFICANT CHANGE UP (ref 135–145)

## 2023-06-01 PROCEDURE — 99232 SBSQ HOSP IP/OBS MODERATE 35: CPT

## 2023-06-01 RX ORDER — POTASSIUM CHLORIDE 20 MEQ
40 PACKET (EA) ORAL ONCE
Refills: 0 | Status: COMPLETED | OUTPATIENT
Start: 2023-06-01 | End: 2023-06-01

## 2023-06-01 RX ORDER — LIDOCAINE 4 G/100G
1 CREAM TOPICAL DAILY
Refills: 0 | Status: DISCONTINUED | OUTPATIENT
Start: 2023-06-01 | End: 2023-06-03

## 2023-06-01 RX ORDER — TRAMADOL HYDROCHLORIDE 50 MG/1
50 TABLET ORAL THREE TIMES A DAY
Refills: 0 | Status: DISCONTINUED | OUTPATIENT
Start: 2023-06-01 | End: 2023-06-03

## 2023-06-01 RX ORDER — TRAMADOL HYDROCHLORIDE 50 MG/1
25 TABLET ORAL EVERY 8 HOURS
Refills: 0 | Status: DISCONTINUED | OUTPATIENT
Start: 2023-06-01 | End: 2023-06-03

## 2023-06-01 RX ADMIN — Medication 30 MILLILITER(S): at 20:16

## 2023-06-01 RX ADMIN — Medication 100 MILLIGRAM(S): at 21:01

## 2023-06-01 RX ADMIN — PANTOPRAZOLE SODIUM 40 MILLIGRAM(S): 20 TABLET, DELAYED RELEASE ORAL at 05:48

## 2023-06-01 RX ADMIN — Medication 50 MICROGRAM(S): at 05:19

## 2023-06-01 RX ADMIN — Medication 1 TABLET(S): at 17:01

## 2023-06-01 RX ADMIN — Medication 1 DROP(S): at 11:36

## 2023-06-01 RX ADMIN — ENOXAPARIN SODIUM 40 MILLIGRAM(S): 100 INJECTION SUBCUTANEOUS at 05:19

## 2023-06-01 RX ADMIN — Medication 40 MILLIEQUIVALENT(S): at 17:01

## 2023-06-01 RX ADMIN — Medication 650 MILLIGRAM(S): at 11:36

## 2023-06-01 RX ADMIN — Medication 40 MILLIGRAM(S): at 13:52

## 2023-06-01 RX ADMIN — Medication 1 DROP(S): at 05:48

## 2023-06-01 RX ADMIN — Medication 1 APPLICATION(S): at 21:01

## 2023-06-01 RX ADMIN — Medication 1 DROP(S): at 21:02

## 2023-06-01 RX ADMIN — Medication 650 MILLIGRAM(S): at 23:02

## 2023-06-01 RX ADMIN — Medication 650 MILLIGRAM(S): at 05:48

## 2023-06-01 RX ADMIN — Medication 1 DROP(S): at 16:56

## 2023-06-01 RX ADMIN — Medication 40 MILLIGRAM(S): at 08:48

## 2023-06-01 RX ADMIN — Medication 1 TABLET(S): at 08:48

## 2023-06-01 RX ADMIN — Medication 650 MILLIGRAM(S): at 06:28

## 2023-06-01 RX ADMIN — Medication 1 APPLICATION(S): at 08:48

## 2023-06-01 RX ADMIN — Medication 10 MILLIEQUIVALENT(S): at 08:49

## 2023-06-01 RX ADMIN — Medication 100 MILLIGRAM(S): at 08:49

## 2023-06-02 LAB
ALBUMIN SERPL ELPH-MCNC: 2.7 G/DL — LOW (ref 3.3–5)
ALP SERPL-CCNC: 236 U/L — HIGH (ref 40–120)
ALT FLD-CCNC: 73 U/L — SIGNIFICANT CHANGE UP (ref 12–78)
ANION GAP SERPL CALC-SCNC: 5 MMOL/L — SIGNIFICANT CHANGE UP (ref 5–17)
AST SERPL-CCNC: 47 U/L — HIGH (ref 15–37)
BILIRUB SERPL-MCNC: 0.4 MG/DL — SIGNIFICANT CHANGE UP (ref 0.2–1.2)
BUN SERPL-MCNC: 27 MG/DL — HIGH (ref 7–23)
CALCIUM SERPL-MCNC: 9.1 MG/DL — SIGNIFICANT CHANGE UP (ref 8.5–10.1)
CHLORIDE SERPL-SCNC: 103 MMOL/L — SIGNIFICANT CHANGE UP (ref 96–108)
CO2 SERPL-SCNC: 30 MMOL/L — SIGNIFICANT CHANGE UP (ref 22–31)
CREAT SERPL-MCNC: 0.86 MG/DL — SIGNIFICANT CHANGE UP (ref 0.5–1.3)
EGFR: 74 ML/MIN/1.73M2 — SIGNIFICANT CHANGE UP
GLUCOSE SERPL-MCNC: 95 MG/DL — SIGNIFICANT CHANGE UP (ref 70–99)
POTASSIUM SERPL-MCNC: 3.5 MMOL/L — SIGNIFICANT CHANGE UP (ref 3.5–5.3)
POTASSIUM SERPL-SCNC: 3.5 MMOL/L — SIGNIFICANT CHANGE UP (ref 3.5–5.3)
PROT SERPL-MCNC: 6.7 GM/DL — SIGNIFICANT CHANGE UP (ref 6–8.3)
SODIUM SERPL-SCNC: 138 MMOL/L — SIGNIFICANT CHANGE UP (ref 135–145)

## 2023-06-02 PROCEDURE — 99232 SBSQ HOSP IP/OBS MODERATE 35: CPT

## 2023-06-02 RX ORDER — FUROSEMIDE 40 MG
20 TABLET ORAL DAILY
Refills: 0 | Status: DISCONTINUED | OUTPATIENT
Start: 2023-06-02 | End: 2023-06-03

## 2023-06-02 RX ADMIN — Medication 1 TABLET(S): at 10:29

## 2023-06-02 RX ADMIN — Medication 650 MILLIGRAM(S): at 21:32

## 2023-06-02 RX ADMIN — Medication 650 MILLIGRAM(S): at 11:33

## 2023-06-02 RX ADMIN — Medication 1 DROP(S): at 17:20

## 2023-06-02 RX ADMIN — Medication 1 TABLET(S): at 17:20

## 2023-06-02 RX ADMIN — Medication 50 MICROGRAM(S): at 05:16

## 2023-06-02 RX ADMIN — Medication 30 MILLILITER(S): at 01:16

## 2023-06-02 RX ADMIN — Medication 1 APPLICATION(S): at 10:29

## 2023-06-02 RX ADMIN — Medication 1 APPLICATION(S): at 21:32

## 2023-06-02 RX ADMIN — Medication 10 MILLIEQUIVALENT(S): at 10:29

## 2023-06-02 RX ADMIN — Medication 650 MILLIGRAM(S): at 00:02

## 2023-06-02 RX ADMIN — Medication 100 MILLIGRAM(S): at 10:29

## 2023-06-02 RX ADMIN — Medication 20 MILLIGRAM(S): at 15:20

## 2023-06-02 RX ADMIN — Medication 650 MILLIGRAM(S): at 22:32

## 2023-06-02 RX ADMIN — PANTOPRAZOLE SODIUM 40 MILLIGRAM(S): 20 TABLET, DELAYED RELEASE ORAL at 05:46

## 2023-06-02 RX ADMIN — Medication 100 MILLIGRAM(S): at 21:32

## 2023-06-02 RX ADMIN — Medication 1 DROP(S): at 21:34

## 2023-06-02 RX ADMIN — Medication 1 DROP(S): at 05:16

## 2023-06-02 RX ADMIN — ENOXAPARIN SODIUM 40 MILLIGRAM(S): 100 INJECTION SUBCUTANEOUS at 05:16

## 2023-06-02 RX ADMIN — Medication 1 DROP(S): at 11:33

## 2023-06-02 RX ADMIN — Medication 30 MILLILITER(S): at 15:20

## 2023-06-02 NOTE — PROGRESS NOTE ADULT - REASON FOR ADMISSION
Lower extremity redness and swelling

## 2023-06-02 NOTE — PROGRESS NOTE ADULT - SUBJECTIVE AND OBJECTIVE BOX
CC: Leg redness bilateral    History of Present Illness:   67 y/o F with PMH HTN, HLD, hypothyroidism, GERD, b/l carotid artery stenosis s/p left CEA, basal cell carcinoma of the skin presented with lower extremity redness and swelling. Pt was seen in the ER 1 week ago and diagnosed with cellulitis. She was given Cefadroxil and had a few doses left to continue. She presented to the ER 2 days ago and she was advised to stop the antibiotic because she was having loose stools. She went to see Dr. Vo today and was advised to come to the ER because her legs bilaterally were red, swollen, tender to touch. She reports chills, difficulty walking d/t pain. She feels that her RLE is more red than her LLE and states that her legs do not normally have this discoloration. Pt also states that her diarrhea has improved, she had 1 semi formed stool today. Denies headaches, blurry vision, dizziness, fevers, chills, chest pain, SOB, abdominal pain, N/V, constipation.      ER course: VSS. Labs: CBC unremarkable, glucose 103, alkaline phosphatase 288, ALT 73. EKG: pending, f/u result . CXR: no consolidation, no effusion, no pneumothorax (personally reviewed). EKG: NSR with HR 77 bpm, normal intervals, no ST segment changes, no T wave inversions (personally reviewed).  Pt was given Ceftriaxone, Vancomycin. She is being admitted to med/surg for further management.     S:  5/23: Comfortable, alert, has bilateral leg edema, erythema, states having trouble ambulating.  NO fever, chills, n, v.  05/24/23: Still with significant B/L leg edema. Redness improving  05/25/23: Still complaining of leg edema.  05/26/23: No new issues, redness improving  05/27/23: No new complaints.  05/28/23: Still with leg swelling  05/29/23: No new issues      REVIEW OF SYSTEMS: All other review of systems is negative unless indicated above.    Physical Exam:   Vital Signs Last 24 Hrs  T(C): 36.9 (29 May 2023 07:52), Max: 36.9 (29 May 2023 07:52)  T(F): 98.4 (29 May 2023 07:52), Max: 98.4 (29 May 2023 07:52)  HR: 61 (29 May 2023 07:52) (61 - 74)  BP: 136/51 (29 May 2023 07:52) (136/51 - 145/41)  BP(mean): --  RR: 18 (29 May 2023 07:52) (18 - 18)  SpO2: 97% (29 May 2023 07:52) (97% - 99%)    Parameters below as of 29 May 2023 07:52  Patient On (Oxygen Delivery Method): room air        PHYSICAL EXAM:    Constitutional: NAD, awake and alert, well-developed  HEENT: PERR, EOMI, Normal Hearing, MMM  Neck: Soft and supple  Respiratory: Breath sounds are clear bilaterally, No wheezing, rales or rhonchi  Cardiovascular: S1 and S2, regular rate and rhythm, no Murmurs, gallops or rubs  Gastrointestinal: Bowel Sounds present, soft, nontender, nondistended, no guarding, no rebound  Extremities: + b/l edema, erythema, tender to deep palpation, warm  Neurological: A/O x 3, no focal deficits in my limited exam          29 May 2023 08:49    138    |  104    |  24     ----------------------------<  153    3.5     |  28     |  0.95     Ca    9.3        29 May 2023 08:49    TPro  7.0    /  Alb  2.9    /  TBili  0.4    /  DBili  x      /  AST  65     /  ALT  64     /  AlkPhos  230    28 May 2023 07:27    LIVER FUNCTIONS - ( 28 May 2023 07:27 )  Alb: 2.9 g/dL / Pro: 7.0 gm/dL / ALK PHOS: 230 U/L / ALT: 64 U/L / AST: 65 U/L / GGT: x                   MEDICATIONS  (STANDING):  AQUAPHOR (petrolatum Ointment) 1 Application(s) Topical two times a day  artificial  tears Solution 1 Drop(s) Both EYES four times a day  cefTRIAXone Injectable. 2000 milliGRAM(s) IV Push every 24 hours  doxycycline monohydrate Capsule 100 milliGRAM(s) Oral every 12 hours  enoxaparin Injectable 40 milliGRAM(s) SubCutaneous every 24 hours  furosemide    Tablet 40 milliGRAM(s) Oral daily  hydrochlorothiazide 12.5 milliGRAM(s) Oral daily  lactobacillus acidophilus 1 Tablet(s) Oral two times a day with meals  levothyroxine 50 MICROGram(s) Oral daily  losartan 100 milliGRAM(s) Oral daily  pantoprazole    Tablet 40 milliGRAM(s) Oral before breakfast  potassium chloride    Tablet ER 10 milliEquivalent(s) Oral daily    MEDICATIONS  (PRN):  acetaminophen     Tablet .. 650 milliGRAM(s) Oral every 6 hours PRN Temp greater or equal to 38C (100.4F), Mild Pain (1 - 3)  aluminum hydroxide/magnesium hydroxide/simethicone Suspension 30 milliLiter(s) Oral every 4 hours PRN Dyspepsia  fluticasone propionate 50 MICROgram(s)/spray Nasal Spray 1 Spray(s) Both Nostrils two times a day PRN for allergy symptoms  melatonin 3 milliGRAM(s) Oral at bedtime PRN Insomnia  ondansetron Injectable 4 milliGRAM(s) IV Push every 8 hours PRN Nausea and/or Vomiting        Assessment/Plan:  67 y/o F presented with lower extremity redness and swelling    Bilateral lower extremity erythema and swelling likely secondary to cellulitis (failed outpt abx tx with Cefadroxil) in setting of PVD / chronic stasis dermatitis:   - c/w with Ceftriaxone, Vancomycin per ID   - Acidophilus   - Eucerin for dry skin   -- recent b/l venous dopplers on 5/19/23 negative for DVT   - Leg elevation, compression   - Changed lasix to IV and watch renal function  - Pt had recent cardiac workup (ECHO and stress test) 2 months ago with Dr. Valiente outpt which was reported to be negative, BNP WNL   - vascular consult Dr. Witt appreciated    S/P ANGELIQUE possibly due to meds  Renal function stable  ARB on hold  D/C HCTZ  Follow      Transaminitis:  -persistent looking back  -trend  -hepatitis panel  -CT last month  noted: capsular calcification, hepatic granuloma unchanged  - d/c statin for now    Onychomycosis:  - f/u with podiatry outpt   - Pt has tried topical treatments in the past without relief     History of HTN, HLD, hypothyroidism, GERD, b/l carotid artery stenosis, basal cell carcinoma of the skin  -Continue outpatient meds. Hold cozaar due to low BP    DVT ppx: Lovenox 40 mg subcutaneous daily     Code status: Full code     Emergency contact: Gloria Mancilla (sister) [phone]     
CC: Leg redness bilateral    History of Present Illness:   69 y/o F with PMH HTN, HLD, hypothyroidism, GERD, b/l carotid artery stenosis s/p left CEA, basal cell carcinoma of the skin presented with lower extremity redness and swelling. Pt was seen in the ER 1 week ago and diagnosed with cellulitis. She was given Cefadroxil and had a few doses left to continue. She presented to the ER 2 days ago and she was advised to stop the antibiotic because she was having loose stools. She went to see Dr. Vo today and was advised to come to the ER because her legs bilaterally were red, swollen, tender to touch. She reports chills, difficulty walking d/t pain. She feels that her RLE is more red than her LLE and states that her legs do not normally have this discoloration. Pt also states that her diarrhea has improved, she had 1 semi formed stool today. Denies headaches, blurry vision, dizziness, fevers, chills, chest pain, SOB, abdominal pain, N/V, constipation.      ER course: VSS. Labs: CBC unremarkable, glucose 103, alkaline phosphatase 288, ALT 73. EKG: pending, f/u result . CXR: no consolidation, no effusion, no pneumothorax (personally reviewed). EKG: NSR with HR 77 bpm, normal intervals, no ST segment changes, no T wave inversions (personally reviewed).  Pt was given Ceftriaxone, Vancomycin. She is being admitted to med/surg for further management.     S:  5/23: Comfortable, alert, has bilateral leg edema, erythema, states having trouble ambulating.  NO fever, chills, n, v.  05/24/23: Still with significant B/L leg edema. Redness improving  05/25/23: Still complaining of leg edema      REVIEW OF SYSTEMS: All other review of systems is negative unless indicated above.    Physical Exam:   Vital Signs Last 24 Hrs  T(C): 36.6 (25 May 2023 09:00), Max: 36.6 (24 May 2023 22:12)  T(F): 97.8 (25 May 2023 09:00), Max: 97.9 (24 May 2023 22:12)  HR: 67 (25 May 2023 09:22) (67 - 98)  BP: 122/47 (25 May 2023 09:22) (122/47 - 153/60)  BP(mean): --  RR: 18 (25 May 2023 09:00) (18 - 18)  SpO2: 99% (25 May 2023 09:00) (98% - 100%)    Parameters below as of 25 May 2023 09:00  Patient On (Oxygen Delivery Method): room air          PHYSICAL EXAM:    Constitutional: NAD, awake and alert, well-developed  HEENT: PERR, EOMI, Normal Hearing, MMM  Neck: Soft and supple  Respiratory: Breath sounds are clear bilaterally, No wheezing, rales or rhonchi  Cardiovascular: S1 and S2, regular rate and rhythm, no Murmurs, gallops or rubs  Gastrointestinal: Bowel Sounds present, soft, nontender, nondistended, no guarding, no rebound  Extremities: + b/l edema, erythema, tender to deep palpation, warm  Neurological: A/O x 3, no focal deficits in my limited exam      MEDICATIONS  (STANDING):  AQUAPHOR (petrolatum Ointment) 1 Application(s) Topical two times a day  artificial  tears Solution 1 Drop(s) Both EYES four times a day  cefTRIAXone Injectable. 2000 milliGRAM(s) IV Push every 24 hours  doxycycline monohydrate Capsule 100 milliGRAM(s) Oral every 12 hours  enoxaparin Injectable 40 milliGRAM(s) SubCutaneous every 24 hours  furosemide    Tablet 40 milliGRAM(s) Oral daily  hydrochlorothiazide 12.5 milliGRAM(s) Oral daily  lactobacillus acidophilus 1 Tablet(s) Oral two times a day with meals  levothyroxine 50 MICROGram(s) Oral daily  losartan 100 milliGRAM(s) Oral daily  pantoprazole    Tablet 40 milliGRAM(s) Oral before breakfast  potassium chloride    Tablet ER 10 milliEquivalent(s) Oral daily    MEDICATIONS  (PRN):  acetaminophen     Tablet .. 650 milliGRAM(s) Oral every 6 hours PRN Temp greater or equal to 38C (100.4F), Mild Pain (1 - 3)  aluminum hydroxide/magnesium hydroxide/simethicone Suspension 30 milliLiter(s) Oral every 4 hours PRN Dyspepsia  fluticasone propionate 50 MICROgram(s)/spray Nasal Spray 1 Spray(s) Both Nostrils two times a day PRN for allergy symptoms  melatonin 3 milliGRAM(s) Oral at bedtime PRN Insomnia  ondansetron Injectable 4 milliGRAM(s) IV Push every 8 hours PRN Nausea and/or Vomiting        Assessment/Plan:  69 y/o F presented with lower extremity redness and swelling    Bilateral lower extremity erythema and swelling likely secondary to cellulitis (failed outpt abx tx with Cefadroxil) in setting of PVD / chronic stasis dermatitis:   - c/w with Ceftriaxone, Vancomycin per ID   - Acidophilus   - Eucerin for dry skin   -- recent b/l venous dopplers on 5/19/23 negative for DVT   - Leg elevation, compression   - s/p  1 dose of lasix 40 mg IVP on admission. Continue po lasix  - Pt had recent cardiac workup (ECHO and stress test) 2 months ago with Dr. Valiente outpt which was reported to be negative, BNP WNL   - vascular consult Dr. Witt pending    S/P ANGELIQUE possibly due to meds  ARB on hold  D/C HCTZ  Follow      Transaminitis:  -persistent looking back  -trend  -hepatitis panel  -CT last month  noted: capsular calcification, hepatic granuloma unchanged  - d/c statin for now    Onychomycosis:  - f/u with podiatry outpt   - Pt has tried topical treatments in the past without relief     History of HTN, HLD, hypothyroidism, GERD, b/l carotid artery stenosis, basal cell carcinoma of the skin  -Continue outpatient meds. Hold cozaar due to low BP    DVT ppx: Lovenox 40 mg subcutaneous daily     Code status: Full code     Emergency contact: Gloria Mancilla (sister) [phone]     
PCC: Leg redness bilateral    History of Present Illness:   67 y/o F with PMH HTN, HLD, hypothyroidism, GERD, b/l carotid artery stenosis s/p left CEA, basal cell carcinoma of the skin presented with lower extremity redness and swelling. Pt was seen in the ER 1 week ago and diagnosed with cellulitis. She was given Cefadroxil and had a few doses left to continue. She presented to the ER 2 days ago and she was advised to stop the antibiotic because she was having loose stools. She went to see Dr. Vo today and was advised to come to the ER because her legs bilaterally were red, swollen, tender to touch. She reports chills, difficulty walking d/t pain. She feels that her RLE is more red than her LLE and states that her legs do not normally have this discoloration. Pt also states that her diarrhea has improved, she had 1 semi formed stool today. Denies headaches, blurry vision, dizziness, fevers, chills, chest pain, SOB, abdominal pain, N/V, constipation.      ER course: VSS. Labs: CBC unremarkable, glucose 103, alkaline phosphatase 288, ALT 73. EKG: pending, f/u result . CXR: no consolidation, no effusion, no pneumothorax (personally reviewed). EKG: NSR with HR 77 bpm, normal intervals, no ST segment changes, no T wave inversions (personally reviewed).  Pt was given Ceftriaxone, Vancomycin. She is being admitted to med/surg for further management.     S:  5/23: Comfortable, alert, has bilateral leg edema, erythema, states having trouble ambulating.  NO fever, chills, n, v.  05/24/23: Still with significant B/L leg edema. Redness improving  05/25/23: Still complaining of leg edema.  05/26/23: No new issues, redness improving  05/27/23: No new complaints.  05/28/23: Still with leg swelling  05/29/23: No new issues  05/30/23: Still with leg swelling  05/31/23: No new complaints, still complaining of leg swelling and pain.  6/1: Erythema better, still with leg edema, complaining of left leg pain throughout and heaviness of feet.    REVIEW OF SYSTEMS: All other review of systems is negative unless indicated above.    Physical Exam:   Vital Signs Last 24 Hrs  T(C): 36.7 (01 Jun 2023 08:09), Max: 36.7 (01 Jun 2023 08:09)  T(F): 98 (01 Jun 2023 08:09), Max: 98 (01 Jun 2023 08:09)  HR: 55 (01 Jun 2023 08:09) (55 - 69)  BP: 134/46 (01 Jun 2023 08:09) (121/49 - 138/56)  BP(mean): 63 (31 May 2023 20:00) (63 - 75)  RR: 18 (01 Jun 2023 08:09) (18 - 18)  SpO2: 99% (01 Jun 2023 08:09) (96% - 100%)    Parameters below as of 01 Jun 2023 08:09  Patient On (Oxygen Delivery Method): room air        PHYSICAL EXAM:    Constitutional: NAD, awake and alert, well-developed  HEENT: PERR, EOMI, Normal Hearing, MMM  Neck: Soft and supple  Respiratory: Breath sounds are clear bilaterally, No wheezing, rales or rhonchi  Cardiovascular: S1 and S2, regular rate and rhythm, no Murmurs, gallops or rubs  Gastrointestinal: Bowel Sounds present, soft, nontender, nondistended, no guarding, no rebound  Extremities: + b/l edema LE throughout, erythema resolved  Neurological: A/O x 3, no focal deficits in my limited exam      MEDICATIONS  (STANDING):  AQUAPHOR (petrolatum Ointment) 1 Application(s) Topical two times a day  artificial  tears Solution 1 Drop(s) Both EYES four times a day  doxycycline monohydrate Capsule 100 milliGRAM(s) Oral every 12 hours  enoxaparin Injectable 40 milliGRAM(s) SubCutaneous every 24 hours  furosemide   Injectable 40 milliGRAM(s) IV Push two times a day  lactobacillus acidophilus 1 Tablet(s) Oral two times a day with meals  levothyroxine 50 MICROGram(s) Oral daily  pantoprazole    Tablet 40 milliGRAM(s) Oral before breakfast  potassium chloride    Tablet ER 10 milliEquivalent(s) Oral daily  potassium chloride    Tablet ER 40 milliEquivalent(s) Oral once    MEDICATIONS  (PRN):  acetaminophen     Tablet .. 650 milliGRAM(s) Oral every 6 hours PRN Temp greater or equal to 38C (100.4F), Mild Pain (1 - 3)  aluminum hydroxide/magnesium hydroxide/simethicone Suspension 30 milliLiter(s) Oral every 4 hours PRN Dyspepsia  fluticasone propionate 50 MICROgram(s)/spray Nasal Spray 1 Spray(s) Both Nostrils two times a day PRN for allergy symptoms  lidocaine   4% Patch 1 Patch Transdermal daily PRN leg pain  melatonin 3 milliGRAM(s) Oral at bedtime PRN Insomnia  ondansetron Injectable 4 milliGRAM(s) IV Push every 8 hours PRN Nausea and/or Vomiting  traMADol 50 milliGRAM(s) Oral three times a day PRN Severe Pain (7 - 10)            06-01    139  |  102  |  24<H>  ----------------------------<  95  3.3<L>   |  33<H>  |  0.90    Ca    9.8      01 Jun 2023 09:06      CAPILLARY BLOOD GLUCOSE          Assessment/Plan:  67 y/o F presented with lower extremity redness and swelling    Bilateral lower extremity erythema and swelling likely secondary to cellulitis (failed outpt abx tx with Cefadroxil) in setting of PVD / chronic stasis dermatitis:   - S/P Ceftriaxone, Vancomycin, changed to po doxy - continue  - Acidophilus   - Eucerin for dry skin   - recent b/l venous dopplers on 5/19/23 negative for DVT   - Leg elevation, compression   - Changed lasix to IV twice daily and watch renal function,   - Pt had recent cardiac workup (ECHO and stress test) 2 months ago with Dr. Valiente outpt which was reported to be negative, BNP WNL   - vascular consult Dr. Witt appreciated  - pain management for edema induced leg pain.     S/P ANGELIQUE possibly due to meds  Renal function stable  ARB on hold  D/C HCTZ  monitor closely on lasix     Transaminitis:  - persistent looking back  - CT last month  noted: capsular calcification, hepatic granuloma unchanged  - d/c statin for now    Onychomycosis:  - f/u with podiatry outpt   - Pt has tried topical treatments in the past without relief     History of HTN, HLD, hypothyroidism, GERD, b/l carotid artery stenosis, basal cell carcinoma of the skin  -Continue outpatient meds. Hold cozaar due to low BP    DVT ppx: Lovenox 40 mg subcutaneous daily     Code status: Full code     Emergency contact: Gloria Charo (sister) [phone]     Dispo:  d/c home once medically optimized
CC: Leg redness bilateral    History of Present Illness:   67 y/o F with PMH HTN, HLD, hypothyroidism, GERD, b/l carotid artery stenosis s/p left CEA, basal cell carcinoma of the skin presented with lower extremity redness and swelling. Pt was seen in the ER 1 week ago and diagnosed with cellulitis. She was given Cefadroxil and had a few doses left to continue. She presented to the ER 2 days ago and she was advised to stop the antibiotic because she was having loose stools. She went to see Dr. Vo today and was advised to come to the ER because her legs bilaterally were red, swollen, tender to touch. She reports chills, difficulty walking d/t pain. She feels that her RLE is more red than her LLE and states that her legs do not normally have this discoloration. Pt also states that her diarrhea has improved, she had 1 semi formed stool today. Denies headaches, blurry vision, dizziness, fevers, chills, chest pain, SOB, abdominal pain, N/V, constipation.      ER course: VSS. Labs: CBC unremarkable, glucose 103, alkaline phosphatase 288, ALT 73. EKG: pending, f/u result . CXR: no consolidation, no effusion, no pneumothorax (personally reviewed). EKG: NSR with HR 77 bpm, normal intervals, no ST segment changes, no T wave inversions (personally reviewed).  Pt was given Ceftriaxone, Vancomycin. She is being admitted to med/surg for further management.     S:  5/23: Comfortable, alert, has bilateral leg edema, erythema, states having trouble ambulating.  NO fever, chills, n, v.  05/24/23: Still with significant B/L leg edema. Redness improving  05/25/23: Still complaining of leg edema.  05/26/23: No new issues, redness improving      REVIEW OF SYSTEMS: All other review of systems is negative unless indicated above.    Physical Exam:   Vital Signs Last 24 Hrs  T(C): 36.7 (26 May 2023 09:14), Max: 36.9 (26 May 2023 00:12)  T(F): 98.1 (26 May 2023 09:14), Max: 98.4 (26 May 2023 00:12)  HR: 59 (26 May 2023 09:14) (59 - 76)  BP: 127/49 (26 May 2023 09:14) (127/49 - 150/54)  BP(mean): --  RR: 18 (26 May 2023 09:14) (18 - 18)  SpO2: 100% (26 May 2023 09:14) (98% - 100%)    Parameters below as of 26 May 2023 09:14  Patient On (Oxygen Delivery Method): room air        PHYSICAL EXAM:    Constitutional: NAD, awake and alert, well-developed  HEENT: PERR, EOMI, Normal Hearing, MMM  Neck: Soft and supple  Respiratory: Breath sounds are clear bilaterally, No wheezing, rales or rhonchi  Cardiovascular: S1 and S2, regular rate and rhythm, no Murmurs, gallops or rubs  Gastrointestinal: Bowel Sounds present, soft, nontender, nondistended, no guarding, no rebound  Extremities: + b/l edema, erythema, tender to deep palpation, warm  Neurological: A/O x 3, no focal deficits in my limited exam      MEDICATIONS  (STANDING):  AQUAPHOR (petrolatum Ointment) 1 Application(s) Topical two times a day  artificial  tears Solution 1 Drop(s) Both EYES four times a day  cefTRIAXone Injectable. 2000 milliGRAM(s) IV Push every 24 hours  doxycycline monohydrate Capsule 100 milliGRAM(s) Oral every 12 hours  enoxaparin Injectable 40 milliGRAM(s) SubCutaneous every 24 hours  furosemide    Tablet 40 milliGRAM(s) Oral daily  hydrochlorothiazide 12.5 milliGRAM(s) Oral daily  lactobacillus acidophilus 1 Tablet(s) Oral two times a day with meals  levothyroxine 50 MICROGram(s) Oral daily  losartan 100 milliGRAM(s) Oral daily  pantoprazole    Tablet 40 milliGRAM(s) Oral before breakfast  potassium chloride    Tablet ER 10 milliEquivalent(s) Oral daily    MEDICATIONS  (PRN):  acetaminophen     Tablet .. 650 milliGRAM(s) Oral every 6 hours PRN Temp greater or equal to 38C (100.4F), Mild Pain (1 - 3)  aluminum hydroxide/magnesium hydroxide/simethicone Suspension 30 milliLiter(s) Oral every 4 hours PRN Dyspepsia  fluticasone propionate 50 MICROgram(s)/spray Nasal Spray 1 Spray(s) Both Nostrils two times a day PRN for allergy symptoms  melatonin 3 milliGRAM(s) Oral at bedtime PRN Insomnia  ondansetron Injectable 4 milliGRAM(s) IV Push every 8 hours PRN Nausea and/or Vomiting        Assessment/Plan:  67 y/o F presented with lower extremity redness and swelling    Bilateral lower extremity erythema and swelling likely secondary to cellulitis (failed outpt abx tx with Cefadroxil) in setting of PVD / chronic stasis dermatitis:   - c/w with Ceftriaxone, Vancomycin per ID   - Acidophilus   - Eucerin for dry skin   -- recent b/l venous dopplers on 5/19/23 negative for DVT   - Leg elevation, compression   - Change lasix to IV and watch renal function  - Pt had recent cardiac workup (ECHO and stress test) 2 months ago with Dr. Valiente outpt which was reported to be negative, BNP WNL   - vascular consult Dr. Witt appreciated    S/P ANGELIQUE possibly due to meds  ARB on hold  D/C HCTZ  Follow      Transaminitis:  -persistent looking back  -trend  -hepatitis panel  -CT last month  noted: capsular calcification, hepatic granuloma unchanged  - d/c statin for now    Onychomycosis:  - f/u with podiatry outpt   - Pt has tried topical treatments in the past without relief     History of HTN, HLD, hypothyroidism, GERD, b/l carotid artery stenosis, basal cell carcinoma of the skin  -Continue outpatient meds. Hold cozaar due to low BP    DVT ppx: Lovenox 40 mg subcutaneous daily     Code status: Full code     Emergency contact: Gloria Mancilla (sister) [phone]     
CC: Leg redness bilateral    History of Present Illness:   67 y/o F with PMH HTN, HLD, hypothyroidism, GERD, b/l carotid artery stenosis s/p left CEA, basal cell carcinoma of the skin presented with lower extremity redness and swelling. Pt was seen in the ER 1 week ago and diagnosed with cellulitis. She was given Cefadroxil and had a few doses left to continue. She presented to the ER 2 days ago and she was advised to stop the antibiotic because she was having loose stools. She went to see Dr. Vo today and was advised to come to the ER because her legs bilaterally were red, swollen, tender to touch. She reports chills, difficulty walking d/t pain. She feels that her RLE is more red than her LLE and states that her legs do not normally have this discoloration. Pt also states that her diarrhea has improved, she had 1 semi formed stool today. Denies headaches, blurry vision, dizziness, fevers, chills, chest pain, SOB, abdominal pain, N/V, constipation.      ER course: VSS. Labs: CBC unremarkable, glucose 103, alkaline phosphatase 288, ALT 73. EKG: pending, f/u result . CXR: no consolidation, no effusion, no pneumothorax (personally reviewed). EKG: NSR with HR 77 bpm, normal intervals, no ST segment changes, no T wave inversions (personally reviewed).  Pt was given Ceftriaxone, Vancomycin. She is being admitted to med/surg for further management.     S:  5/23: Comfortable, alert, has bilateral leg edema, erythema, states having trouble ambulating.  NO fever, chills, n, v.  05/24/23: Still with significant B/L leg edema. Redness improving      REVIEW OF SYSTEMS: All other review of systems is negative unless indicated above.    Physical Exam:   Vital Signs Last 24 Hrs  T(C): 36.5 (24 May 2023 07:32), Max: 36.9 (23 May 2023 16:15)  T(F): 97.7 (24 May 2023 07:32), Max: 98.5 (23 May 2023 23:10)  HR: 67 (24 May 2023 07:32) (67 - 76)  BP: 100/47 (24 May 2023 10:16) (91/61 - 117/46)  BP(mean): --  RR: 18 (24 May 2023 07:32) (18 - 18)  SpO2: 100% (24 May 2023 07:32) (97% - 100%)    Parameters below as of 24 May 2023 07:32  Patient On (Oxygen Delivery Method): room air            PHYSICAL EXAM:    Constitutional: NAD, awake and alert, well-developed  HEENT: PERR, EOMI, Normal Hearing, MMM  Neck: Soft and supple  Respiratory: Breath sounds are clear bilaterally, No wheezing, rales or rhonchi  Cardiovascular: S1 and S2, regular rate and rhythm, no Murmurs, gallops or rubs  Gastrointestinal: Bowel Sounds present, soft, nontender, nondistended, no guarding, no rebound  Extremities: + b/l edema, erythema, tender to deep palpation, warm  Neurological: A/O x 3, no focal deficits in my limited exam      MEDICATIONS  (STANDING):  AQUAPHOR (petrolatum Ointment) 1 Application(s) Topical two times a day  artificial  tears Solution 1 Drop(s) Both EYES four times a day  cefTRIAXone Injectable. 2000 milliGRAM(s) IV Push every 24 hours  doxycycline monohydrate Capsule 100 milliGRAM(s) Oral every 12 hours  enoxaparin Injectable 40 milliGRAM(s) SubCutaneous every 24 hours  furosemide    Tablet 40 milliGRAM(s) Oral daily  hydrochlorothiazide 12.5 milliGRAM(s) Oral daily  lactobacillus acidophilus 1 Tablet(s) Oral two times a day with meals  levothyroxine 50 MICROGram(s) Oral daily  losartan 100 milliGRAM(s) Oral daily  pantoprazole    Tablet 40 milliGRAM(s) Oral before breakfast  potassium chloride    Tablet ER 10 milliEquivalent(s) Oral daily    MEDICATIONS  (PRN):  acetaminophen     Tablet .. 650 milliGRAM(s) Oral every 6 hours PRN Temp greater or equal to 38C (100.4F), Mild Pain (1 - 3)  aluminum hydroxide/magnesium hydroxide/simethicone Suspension 30 milliLiter(s) Oral every 4 hours PRN Dyspepsia  fluticasone propionate 50 MICROgram(s)/spray Nasal Spray 1 Spray(s) Both Nostrils two times a day PRN for allergy symptoms  melatonin 3 milliGRAM(s) Oral at bedtime PRN Insomnia  ondansetron Injectable 4 milliGRAM(s) IV Push every 8 hours PRN Nausea and/or Vomiting        Assessment/Plan:  67 y/o F presented with lower extremity redness and swelling    Bilateral lower extremity erythema and swelling likely secondary to cellulitis (failed outpt abx tx with Cefadroxil) in setting of PVD / chronic stasis dermatitis:   - c/w with Ceftriaxone, Vancomycin per ID   - Tylenol for temperatures PRN   - Acidophilus   - Eucerin for dry skin   - f/u cultures  -- recent b/l venous dopplers on 5/19/23 negative for DVT   - Leg elevation, compression   - s/p  1 dose of lasix 40 mg IVP on admission. Continue po lasix  - Pt had recent cardiac workup (ECHO and stress test) 2 months ago with Dr. Valiente outpt which was reported to be negative, BNP WNL   - vascular consult Dr. Witt       Transaminitis:  -persistent looking back  -trend  -hepatitis panel  -CT last month  noted: capsular calcification, hepatic granuloma unchanged  - d/c statin for now    Onychomycosis:  - f/u with podiatry outpt   - Pt has tried topical treatments in the past without relief     History of HTN, HLD, hypothyroidism, GERD, b/l carotid artery stenosis, basal cell carcinoma of the skin  -Continue outpatient meds. Hold cozaar due to low BP    DVT ppx: Lovenox 40 mg subcutaneous daily     Code status: Full code     Emergency contact: Gloria Mancilla (sister) [phone]     
CC: Leg redness bilateral    History of Present Illness:   67 y/o F with PMH HTN, HLD, hypothyroidism, GERD, b/l carotid artery stenosis s/p left CEA, basal cell carcinoma of the skin presented with lower extremity redness and swelling. Pt was seen in the ER 1 week ago and diagnosed with cellulitis. She was given Cefadroxil and had a few doses left to continue. She presented to the ER 2 days ago and she was advised to stop the antibiotic because she was having loose stools. She went to see Dr. Vo today and was advised to come to the ER because her legs bilaterally were red, swollen, tender to touch. She reports chills, difficulty walking d/t pain. She feels that her RLE is more red than her LLE and states that her legs do not normally have this discoloration. Pt also states that her diarrhea has improved, she had 1 semi formed stool today. Denies headaches, blurry vision, dizziness, fevers, chills, chest pain, SOB, abdominal pain, N/V, constipation.      ER course: VSS. Labs: CBC unremarkable, glucose 103, alkaline phosphatase 288, ALT 73. EKG: pending, f/u result . CXR: no consolidation, no effusion, no pneumothorax (personally reviewed). EKG: NSR with HR 77 bpm, normal intervals, no ST segment changes, no T wave inversions (personally reviewed).  Pt was given Ceftriaxone, Vancomycin. She is being admitted to med/surg for further management.     S:  5/23: Comfortable, alert, has bilateral leg edema, erythema, states having trouble ambulating.  NO fever, chills, n, v.  05/24/23: Still with significant B/L leg edema. Redness improving  05/25/23: Still complaining of leg edema.  05/26/23: No new issues, redness improving  05/27/23: No new complaints      REVIEW OF SYSTEMS: All other review of systems is negative unless indicated above.    Physical Exam:   Vital Signs Last 24 Hrs  T(C): 36.2 (26 May 2023 23:47), Max: 36.8 (26 May 2023 16:27)  T(F): 97.2 (26 May 2023 23:47), Max: 98.2 (26 May 2023 16:27)  HR: 73 (26 May 2023 23:47) (69 - 73)  BP: 143/47 (26 May 2023 23:47) (135/48 - 143/47)  BP(mean): --  RR: 18 (26 May 2023 23:47) (18 - 18)  SpO2: 98% (26 May 2023 23:47) (98% - 100%)    Parameters below as of 26 May 2023 23:47  Patient On (Oxygen Delivery Method): room air        PHYSICAL EXAM:    Constitutional: NAD, awake and alert, well-developed  HEENT: PERR, EOMI, Normal Hearing, MMM  Neck: Soft and supple  Respiratory: Breath sounds are clear bilaterally, No wheezing, rales or rhonchi  Cardiovascular: S1 and S2, regular rate and rhythm, no Murmurs, gallops or rubs  Gastrointestinal: Bowel Sounds present, soft, nontender, nondistended, no guarding, no rebound  Extremities: + b/l edema, erythema, tender to deep palpation, warm  Neurological: A/O x 3, no focal deficits in my limited exam        27 May 2023 08:21    137    |  100    |  27     ----------------------------<  140    3.2     |  31     |  1.07     Ca    9.4        27 May 2023 08:21          MEDICATIONS  (STANDING):  AQUAPHOR (petrolatum Ointment) 1 Application(s) Topical two times a day  artificial  tears Solution 1 Drop(s) Both EYES four times a day  cefTRIAXone Injectable. 2000 milliGRAM(s) IV Push every 24 hours  doxycycline monohydrate Capsule 100 milliGRAM(s) Oral every 12 hours  enoxaparin Injectable 40 milliGRAM(s) SubCutaneous every 24 hours  furosemide    Tablet 40 milliGRAM(s) Oral daily  hydrochlorothiazide 12.5 milliGRAM(s) Oral daily  lactobacillus acidophilus 1 Tablet(s) Oral two times a day with meals  levothyroxine 50 MICROGram(s) Oral daily  losartan 100 milliGRAM(s) Oral daily  pantoprazole    Tablet 40 milliGRAM(s) Oral before breakfast  potassium chloride    Tablet ER 10 milliEquivalent(s) Oral daily    MEDICATIONS  (PRN):  acetaminophen     Tablet .. 650 milliGRAM(s) Oral every 6 hours PRN Temp greater or equal to 38C (100.4F), Mild Pain (1 - 3)  aluminum hydroxide/magnesium hydroxide/simethicone Suspension 30 milliLiter(s) Oral every 4 hours PRN Dyspepsia  fluticasone propionate 50 MICROgram(s)/spray Nasal Spray 1 Spray(s) Both Nostrils two times a day PRN for allergy symptoms  melatonin 3 milliGRAM(s) Oral at bedtime PRN Insomnia  ondansetron Injectable 4 milliGRAM(s) IV Push every 8 hours PRN Nausea and/or Vomiting        Assessment/Plan:  67 y/o F presented with lower extremity redness and swelling    Bilateral lower extremity erythema and swelling likely secondary to cellulitis (failed outpt abx tx with Cefadroxil) in setting of PVD / chronic stasis dermatitis:   - c/w with Ceftriaxone, Vancomycin per ID   - Acidophilus   - Eucerin for dry skin   -- recent b/l venous dopplers on 5/19/23 negative for DVT   - Leg elevation, compression   - Changed lasix to IV and watch renal function  - Pt had recent cardiac workup (ECHO and stress test) 2 months ago with Dr. Valiente outpt which was reported to be negative, BNP WNL   - vascular consult Dr. Witt appreciated    S/P ANGELIQUE possibly due to meds  Renal function stable  ARB on hold  D/C HCTZ  Follow      Transaminitis:  -persistent looking back  -trend  -hepatitis panel  -CT last month  noted: capsular calcification, hepatic granuloma unchanged  - d/c statin for now    Onychomycosis:  - f/u with podiatry outpt   - Pt has tried topical treatments in the past without relief     History of HTN, HLD, hypothyroidism, GERD, b/l carotid artery stenosis, basal cell carcinoma of the skin  -Continue outpatient meds. Hold cozaar due to low BP    DVT ppx: Lovenox 40 mg subcutaneous daily     Code status: Full code     Emergency contact: Gloria Mancilla (sister) [phone]     
CC: Leg redness bilateral    History of Present Illness:   67 y/o F with PMH HTN, HLD, hypothyroidism, GERD, b/l carotid artery stenosis s/p left CEA, basal cell carcinoma of the skin presented with lower extremity redness and swelling. Pt was seen in the ER 1 week ago and diagnosed with cellulitis. She was given Cefadroxil and had a few doses left to continue. She presented to the ER 2 days ago and she was advised to stop the antibiotic because she was having loose stools. She went to see Dr. Vo today and was advised to come to the ER because her legs bilaterally were red, swollen, tender to touch. She reports chills, difficulty walking d/t pain. She feels that her RLE is more red than her LLE and states that her legs do not normally have this discoloration. Pt also states that her diarrhea has improved, she had 1 semi formed stool today. Denies headaches, blurry vision, dizziness, fevers, chills, chest pain, SOB, abdominal pain, N/V, constipation.      ER course: VSS. Labs: CBC unremarkable, glucose 103, alkaline phosphatase 288, ALT 73. EKG: pending, f/u result . CXR: no consolidation, no effusion, no pneumothorax (personally reviewed). EKG: NSR with HR 77 bpm, normal intervals, no ST segment changes, no T wave inversions (personally reviewed).  Pt was given Ceftriaxone, Vancomycin. She is being admitted to med/surg for further management.     S:  5/23: Comfortable, alert, has bilateral leg edema, erythema, states having trouble ambulating.  NO fever, chills, n, v.  05/24/23: Still with significant B/L leg edema. Redness improving  05/25/23: Still complaining of leg edema.  05/26/23: No new issues, redness improving  05/27/23: No new complaints.  05/28/23: Still with leg swelling  05/29/23: No new issues  05/30/23: Still with leg swelling      REVIEW OF SYSTEMS: All other review of systems is negative unless indicated above.    Physical Exam:   Vital Signs Last 24 Hrs  T(C): 36.3 (30 May 2023 07:42), Max: 36.8 (29 May 2023 15:54)  T(F): 97.4 (30 May 2023 07:42), Max: 98.3 (29 May 2023 15:54)  HR: 65 (30 May 2023 07:42) (64 - 67)  BP: 128/74 (30 May 2023 07:42) (128/74 - 138/51)  BP(mean): --  RR: 18 (30 May 2023 07:42) (17 - 18)  SpO2: 99% (30 May 2023 07:42) (93% - 100%)    Parameters below as of 30 May 2023 07:42  Patient On (Oxygen Delivery Method): room air        PHYSICAL EXAM:    Constitutional: NAD, awake and alert, well-developed  HEENT: PERR, EOMI, Normal Hearing, MMM  Neck: Soft and supple  Respiratory: Breath sounds are clear bilaterally, No wheezing, rales or rhonchi  Cardiovascular: S1 and S2, regular rate and rhythm, no Murmurs, gallops or rubs  Gastrointestinal: Bowel Sounds present, soft, nontender, nondistended, no guarding, no rebound  Extremities: + b/l edema, erythema, tender to deep palpation, warm  Neurological: A/O x 3, no focal deficits in my limited exam            30 May 2023 08:28    139    |  105    |  25     ----------------------------<  139    3.7     |  30     |  0.95     Ca    9.4        30 May 2023 08:28          MEDICATIONS  (STANDING):  AQUAPHOR (petrolatum Ointment) 1 Application(s) Topical two times a day  artificial  tears Solution 1 Drop(s) Both EYES four times a day  cefTRIAXone Injectable. 2000 milliGRAM(s) IV Push every 24 hours  doxycycline monohydrate Capsule 100 milliGRAM(s) Oral every 12 hours  enoxaparin Injectable 40 milliGRAM(s) SubCutaneous every 24 hours  furosemide    Tablet 40 milliGRAM(s) Oral daily  hydrochlorothiazide 12.5 milliGRAM(s) Oral daily  lactobacillus acidophilus 1 Tablet(s) Oral two times a day with meals  levothyroxine 50 MICROGram(s) Oral daily  losartan 100 milliGRAM(s) Oral daily  pantoprazole    Tablet 40 milliGRAM(s) Oral before breakfast  potassium chloride    Tablet ER 10 milliEquivalent(s) Oral daily    MEDICATIONS  (PRN):  acetaminophen     Tablet .. 650 milliGRAM(s) Oral every 6 hours PRN Temp greater or equal to 38C (100.4F), Mild Pain (1 - 3)  aluminum hydroxide/magnesium hydroxide/simethicone Suspension 30 milliLiter(s) Oral every 4 hours PRN Dyspepsia  fluticasone propionate 50 MICROgram(s)/spray Nasal Spray 1 Spray(s) Both Nostrils two times a day PRN for allergy symptoms  melatonin 3 milliGRAM(s) Oral at bedtime PRN Insomnia  ondansetron Injectable 4 milliGRAM(s) IV Push every 8 hours PRN Nausea and/or Vomiting        Assessment/Plan:  67 y/o F presented with lower extremity redness and swelling    Bilateral lower extremity erythema and swelling likely secondary to cellulitis (failed outpt abx tx with Cefadroxil) in setting of PVD / chronic stasis dermatitis:   - S/P Ceftriaxone, Vancomycin, changed to po doixy  - Acidophilus   - Eucerin for dry skin   -- recent b/l venous dopplers on 5/19/23 negative for DVT   - Leg elevation, compression   - Changed lasix to IV and watch renal function, gove one extra 40 mg now  - Pt had recent cardiac workup (ECHO and stress test) 2 months ago with Dr. Valiente outpt which was reported to be negative, BNP WNL   - vascular consult Dr. Witt appreciated    S/P ANGELIQUE possibly due to meds  Renal function stable  ARB on hold  D/C HCTZ  Follow      Transaminitis:  -persistent looking back  -trend  -hepatitis panel  -CT last month  noted: capsular calcification, hepatic granuloma unchanged  - d/c statin for now    Onychomycosis:  - f/u with podiatry outpt   - Pt has tried topical treatments in the past without relief     History of HTN, HLD, hypothyroidism, GERD, b/l carotid artery stenosis, basal cell carcinoma of the skin  -Continue outpatient meds. Hold cozaar due to low BP    DVT ppx: Lovenox 40 mg subcutaneous daily     Code status: Full code     Emergency contact: Gloria Mancilla (sister) [phone]     
CC: Leg redness bilateral    History of Present Illness:   67 y/o F with PMH HTN, HLD, hypothyroidism, GERD, b/l carotid artery stenosis s/p left CEA, basal cell carcinoma of the skin presented with lower extremity redness and swelling. Pt was seen in the ER 1 week ago and diagnosed with cellulitis. She was given Cefadroxil and had a few doses left to continue. She presented to the ER 2 days ago and she was advised to stop the antibiotic because she was having loose stools. She went to see Dr. Vo today and was advised to come to the ER because her legs bilaterally were red, swollen, tender to touch. She reports chills, difficulty walking d/t pain. She feels that her RLE is more red than her LLE and states that her legs do not normally have this discoloration. Pt also states that her diarrhea has improved, she had 1 semi formed stool today. Denies headaches, blurry vision, dizziness, fevers, chills, chest pain, SOB, abdominal pain, N/V, constipation.      ER course: VSS. Labs: CBC unremarkable, glucose 103, alkaline phosphatase 288, ALT 73. EKG: pending, f/u result . CXR: no consolidation, no effusion, no pneumothorax (personally reviewed). EKG: NSR with HR 77 bpm, normal intervals, no ST segment changes, no T wave inversions (personally reviewed).  Pt was given Ceftriaxone, Vancomycin. She is being admitted to med/surg for further management.     S:  5/23: Comfortable, alert, has bilateral leg edema, erythema, states having trouble ambulating.  NO fever, chills, n, v.  05/24/23: Still with significant B/L leg edema. Redness improving  05/25/23: Still complaining of leg edema.  05/26/23: No new issues, redness improving  05/27/23: No new complaints.  05/28/23: Still with leg swelling  05/29/23: No new issues  05/30/23: Still with leg swelling  05/31/23: No new complaints, still complaining of leg swelling and pain.      REVIEW OF SYSTEMS: All other review of systems is negative unless indicated above.    Physical Exam:   Vital Signs Last 24 Hrs  T(C): 36.2 (31 May 2023 07:54), Max: 37.1 (30 May 2023 15:41)  T(F): 97.2 (31 May 2023 07:54), Max: 98.8 (30 May 2023 15:41)  HR: 58 (31 May 2023 07:54) (58 - 71)  BP: 141/52 (31 May 2023 07:54) (141/52 - 144/70)  BP(mean): --  RR: 18 (31 May 2023 07:54) (16 - 18)  SpO2: 99% (31 May 2023 07:54) (98% - 100%)    Parameters below as of 31 May 2023 07:54  Patient On (Oxygen Delivery Method): room air            PHYSICAL EXAM:    Constitutional: NAD, awake and alert, well-developed  HEENT: PERR, EOMI, Normal Hearing, MMM  Neck: Soft and supple  Respiratory: Breath sounds are clear bilaterally, No wheezing, rales or rhonchi  Cardiovascular: S1 and S2, regular rate and rhythm, no Murmurs, gallops or rubs  Gastrointestinal: Bowel Sounds present, soft, nontender, nondistended, no guarding, no rebound  Extremities: + b/l edema, erythema, tender to deep palpation, warm  Neurological: A/O x 3, no focal deficits in my limited exam          31 May 2023 08:17    139    |  104    |  25     ----------------------------<  97     3.9     |  30     |  0.84     Ca    9.7        31 May 2023 08:17            MEDICATIONS  (STANDING):  AQUAPHOR (petrolatum Ointment) 1 Application(s) Topical two times a day  artificial  tears Solution 1 Drop(s) Both EYES four times a day  cefTRIAXone Injectable. 2000 milliGRAM(s) IV Push every 24 hours  doxycycline monohydrate Capsule 100 milliGRAM(s) Oral every 12 hours  enoxaparin Injectable 40 milliGRAM(s) SubCutaneous every 24 hours  furosemide    Tablet 40 milliGRAM(s) Oral daily  hydrochlorothiazide 12.5 milliGRAM(s) Oral daily  lactobacillus acidophilus 1 Tablet(s) Oral two times a day with meals  levothyroxine 50 MICROGram(s) Oral daily  losartan 100 milliGRAM(s) Oral daily  pantoprazole    Tablet 40 milliGRAM(s) Oral before breakfast  potassium chloride    Tablet ER 10 milliEquivalent(s) Oral daily    MEDICATIONS  (PRN):  acetaminophen     Tablet .. 650 milliGRAM(s) Oral every 6 hours PRN Temp greater or equal to 38C (100.4F), Mild Pain (1 - 3)  aluminum hydroxide/magnesium hydroxide/simethicone Suspension 30 milliLiter(s) Oral every 4 hours PRN Dyspepsia  fluticasone propionate 50 MICROgram(s)/spray Nasal Spray 1 Spray(s) Both Nostrils two times a day PRN for allergy symptoms  melatonin 3 milliGRAM(s) Oral at bedtime PRN Insomnia  ondansetron Injectable 4 milliGRAM(s) IV Push every 8 hours PRN Nausea and/or Vomiting        Assessment/Plan:  67 y/o F presented with lower extremity redness and swelling    Bilateral lower extremity erythema and swelling likely secondary to cellulitis (failed outpt abx tx with Cefadroxil) in setting of PVD / chronic stasis dermatitis:   - S/P Ceftriaxone, Vancomycin, changed to po doxy  - Acidophilus   - Eucerin for dry skin   -- recent b/l venous dopplers on 5/19/23 negative for DVT   - Leg elevation, compression   - Changed lasix to IV twice daily and watch for renal function,   - Pt had recent cardiac workup (ECHO and stress test) 2 months ago with Dr. Valiente outpt which was reported to be negative, BNP WNL   - vascular consult Dr. Witt appreciated    S/P ANGELIQUE possibly due to meds  Renal function stable  ARB on hold  D/C HCTZ  Follow    Transaminitis:  -persistent looking back  -trend  -CT last month  noted: capsular calcification, hepatic granuloma unchanged  - d/c statin for now    Onychomycosis:  - f/u with podiatry outpt   - Pt has tried topical treatments in the past without relief     History of HTN, HLD, hypothyroidism, GERD, b/l carotid artery stenosis, basal cell carcinoma of the skin  -Continue outpatient meds. Hold cozaar due to low BP    DVT ppx: Lovenox 40 mg subcutaneous daily     Code status: Full code     Emergency contact: Gloria Mancilla (sister) [phone]     
CC: Leg redness bilateral    History of Present Illness:   69 y/o F with PMH HTN, HLD, hypothyroidism, GERD, b/l carotid artery stenosis s/p left CEA, basal cell carcinoma of the skin presented with lower extremity redness and swelling. Pt was seen in the ER 1 week ago and diagnosed with cellulitis. She was given Cefadroxil and had a few doses left to continue. She presented to the ER 2 days ago and she was advised to stop the antibiotic because she was having loose stools. She went to see Dr. Vo today and was advised to come to the ER because her legs bilaterally were red, swollen, tender to touch. She reports chills, difficulty walking d/t pain. She feels that her RLE is more red than her LLE and states that her legs do not normally have this discoloration. Pt also states that her diarrhea has improved, she had 1 semi formed stool today. Denies headaches, blurry vision, dizziness, fevers, chills, chest pain, SOB, abdominal pain, N/V, constipation.      ER course: VSS. Labs: CBC unremarkable, glucose 103, alkaline phosphatase 288, ALT 73. EKG: pending, f/u result . CXR: no consolidation, no effusion, no pneumothorax (personally reviewed). EKG: NSR with HR 77 bpm, normal intervals, no ST segment changes, no T wave inversions (personally reviewed).  Pt was given Ceftriaxone, Vancomycin. She is being admitted to med/surg for further management.     S:  5/23: Comfortable, alert, has bilateral leg edema, erythema, states having trouble ambulating.  NO fever, chills, n, v.  05/24/23: Still with significant B/L leg edema. Redness improving  05/25/23: Still complaining of leg edema.  05/26/23: No new issues, redness improving  05/27/23: No new complaints.  05/28/23: Still with leg swelling      REVIEW OF SYSTEMS: All other review of systems is negative unless indicated above.    Physical Exam:   Vital Signs Last 24 Hrs  T(C): 36.4 (28 May 2023 07:54), Max: 36.8 (27 May 2023 22:57)  T(F): 97.5 (28 May 2023 07:54), Max: 98.3 (27 May 2023 22:57)  HR: 65 (28 May 2023 07:54) (65 - 69)  BP: 142/51 (28 May 2023 07:54) (132/40 - 142/51)  BP(mean): --  RR: 18 (28 May 2023 07:54) (18 - 18)  SpO2: 98% (28 May 2023 07:54) (97% - 98%)    Parameters below as of 28 May 2023 07:54  Patient On (Oxygen Delivery Method): room air            PHYSICAL EXAM:    Constitutional: NAD, awake and alert, well-developed  HEENT: PERR, EOMI, Normal Hearing, MMM  Neck: Soft and supple  Respiratory: Breath sounds are clear bilaterally, No wheezing, rales or rhonchi  Cardiovascular: S1 and S2, regular rate and rhythm, no Murmurs, gallops or rubs  Gastrointestinal: Bowel Sounds present, soft, nontender, nondistended, no guarding, no rebound  Extremities: + b/l edema, erythema, tender to deep palpation, warm  Neurological: A/O x 3, no focal deficits in my limited exam          28 May 2023 07:27    136    |  101    |  25     ----------------------------<  101    3.8     |  29     |  0.76     Ca    9.5        28 May 2023 07:27    TPro  7.0    /  Alb  2.9    /  TBili  0.4    /  DBili  x      /  AST  65     /  ALT  64     /  AlkPhos  230    28 May 2023 07:27    LIVER FUNCTIONS - ( 28 May 2023 07:27 )  Alb: 2.9 g/dL / Pro: 7.0 gm/dL / ALK PHOS: 230 U/L / ALT: 64 U/L / AST: 65 U/L / GGT: x                   MEDICATIONS  (STANDING):  AQUAPHOR (petrolatum Ointment) 1 Application(s) Topical two times a day  artificial  tears Solution 1 Drop(s) Both EYES four times a day  cefTRIAXone Injectable. 2000 milliGRAM(s) IV Push every 24 hours  doxycycline monohydrate Capsule 100 milliGRAM(s) Oral every 12 hours  enoxaparin Injectable 40 milliGRAM(s) SubCutaneous every 24 hours  furosemide    Tablet 40 milliGRAM(s) Oral daily  hydrochlorothiazide 12.5 milliGRAM(s) Oral daily  lactobacillus acidophilus 1 Tablet(s) Oral two times a day with meals  levothyroxine 50 MICROGram(s) Oral daily  losartan 100 milliGRAM(s) Oral daily  pantoprazole    Tablet 40 milliGRAM(s) Oral before breakfast  potassium chloride    Tablet ER 10 milliEquivalent(s) Oral daily    MEDICATIONS  (PRN):  acetaminophen     Tablet .. 650 milliGRAM(s) Oral every 6 hours PRN Temp greater or equal to 38C (100.4F), Mild Pain (1 - 3)  aluminum hydroxide/magnesium hydroxide/simethicone Suspension 30 milliLiter(s) Oral every 4 hours PRN Dyspepsia  fluticasone propionate 50 MICROgram(s)/spray Nasal Spray 1 Spray(s) Both Nostrils two times a day PRN for allergy symptoms  melatonin 3 milliGRAM(s) Oral at bedtime PRN Insomnia  ondansetron Injectable 4 milliGRAM(s) IV Push every 8 hours PRN Nausea and/or Vomiting        Assessment/Plan:  69 y/o F presented with lower extremity redness and swelling    Bilateral lower extremity erythema and swelling likely secondary to cellulitis (failed outpt abx tx with Cefadroxil) in setting of PVD / chronic stasis dermatitis:   - c/w with Ceftriaxone, Vancomycin per ID   - Acidophilus   - Eucerin for dry skin   -- recent b/l venous dopplers on 5/19/23 negative for DVT   - Leg elevation, compression   - Changed lasix to IV and watch renal function  - Pt had recent cardiac workup (ECHO and stress test) 2 months ago with Dr. Valiente outpt which was reported to be negative, BNP WNL   - vascular consult Dr. Witt appreciated    S/P ANGELIQUE possibly due to meds  Renal function stable  ARB on hold  D/C HCTZ  Follow      Transaminitis:  -persistent looking back  -trend  -hepatitis panel  -CT last month  noted: capsular calcification, hepatic granuloma unchanged  - d/c statin for now    Onychomycosis:  - f/u with podiatry outpt   - Pt has tried topical treatments in the past without relief     History of HTN, HLD, hypothyroidism, GERD, b/l carotid artery stenosis, basal cell carcinoma of the skin  -Continue outpatient meds. Hold cozaar due to low BP    DVT ppx: Lovenox 40 mg subcutaneous daily     Code status: Full code     Emergency contact: Gloria Mancilla (sister) [phone]     
CC: Leg redness bilateral    History of Present Illness:   69 y/o F with PMH HTN, HLD, hypothyroidism, GERD, b/l carotid artery stenosis s/p left CEA, basal cell carcinoma of the skin presented with lower extremity redness and swelling. Pt was seen in the ER 1 week ago and diagnosed with cellulitis. She was given Cefadroxil and had a few doses left to continue. She presented to the ER 2 days ago and she was advised to stop the antibiotic because she was having loose stools. She went to see Dr. Vo today and was advised to come to the ER because her legs bilaterally were red, swollen, tender to touch. She reports chills, difficulty walking d/t pain. She feels that her RLE is more red than her LLE and states that her legs do not normally have this discoloration. Pt also states that her diarrhea has improved, she had 1 semi formed stool today. Denies headaches, blurry vision, dizziness, fevers, chills, chest pain, SOB, abdominal pain, N/V, constipation.      ER course: VSS. Labs: CBC unremarkable, glucose 103, alkaline phosphatase 288, ALT 73. EKG: pending, f/u result . CXR: no consolidation, no effusion, no pneumothorax (personally reviewed). EKG: NSR with HR 77 bpm, normal intervals, no ST segment changes, no T wave inversions (personally reviewed).  Pt was given Ceftriaxone, Vancomycin. She is being admitted to med/surg for further management.     S:  5/23: Comfortable, alert, has bilateral leg edema, erythema, states having trouble ambulating.  NO fever, chills, n, v.  05/24/23: Still with significant B/L leg edema. Redness improving  05/25/23: Still complaining of leg edema.  05/26/23: No new issues, redness improving  05/27/23: No new complaints.  05/28/23: Still with leg swelling  05/29/23: No new issues  05/30/23: Still with leg swelling  05/31/23: No new complaints, still complaining of leg swelling and pain.  6/1: Erythema better, still with leg edema, complaining of left leg pain throughout and heaviness of feet.  6/2: Pt still complaining of leg swelling and pain.  I reassured her to best of my ability that she has improved markedly since admission and that now moving forward this will be a chronic condition requiring ongoing management.  Discussed importance of compression stockings as well as outpatient follow up.      REVIEW OF SYSTEMS: All other review of systems is negative unless indicated above.    Physical Exam:   Vital Signs Last 24 Hrs  T(C): 36.3 (02 Jun 2023 08:42), Max: 36.4 (01 Jun 2023 21:00)  T(F): 97.4 (02 Jun 2023 08:42), Max: 97.6 (01 Jun 2023 21:00)  HR: 70 (02 Jun 2023 08:42) (63 - 75)  BP: 139/65 (02 Jun 2023 08:42) (128/52 - 139/65)  BP(mean): 69 (01 Jun 2023 21:00) (69 - 69)  RR: 18 (02 Jun 2023 08:42) (18 - 18)  SpO2: 98% (02 Jun 2023 08:42) (98% - 98%)    Parameters below as of 02 Jun 2023 08:42  Patient On (Oxygen Delivery Method): room air        PHYSICAL EXAM:    Constitutional: NAD, awake and alert, well-developed  HEENT: PERR, EOMI, Normal Hearing, MMM  Neck: Soft and supple  Respiratory: Breath sounds are clear bilaterally, No wheezing, rales or rhonchi  Cardiovascular: S1 and S2, regular rate and rhythm, no Murmurs, gallops or rubs  Gastrointestinal: Bowel Sounds present, soft, nontender, nondistended, no guarding, no rebound  Extremities: + b/l edema LE throughout, erythema resolved   Neurological: A/O x 3, no focal deficits in my limited exam      MEDICATIONS  (STANDING):  AQUAPHOR (petrolatum Ointment) 1 Application(s) Topical two times a day  artificial  tears Solution 1 Drop(s) Both EYES four times a day  doxycycline monohydrate Capsule 100 milliGRAM(s) Oral every 12 hours  enoxaparin Injectable 40 milliGRAM(s) SubCutaneous every 24 hours  furosemide    Tablet 20 milliGRAM(s) Oral daily  lactobacillus acidophilus 1 Tablet(s) Oral two times a day with meals  levothyroxine 50 MICROGram(s) Oral daily  pantoprazole    Tablet 40 milliGRAM(s) Oral before breakfast  potassium chloride    Tablet ER 10 milliEquivalent(s) Oral daily    MEDICATIONS  (PRN):  acetaminophen     Tablet .. 650 milliGRAM(s) Oral every 6 hours PRN Temp greater or equal to 38C (100.4F), Mild Pain (1 - 3)  aluminum hydroxide/magnesium hydroxide/simethicone Suspension 30 milliLiter(s) Oral every 4 hours PRN Dyspepsia  fluticasone propionate 50 MICROgram(s)/spray Nasal Spray 1 Spray(s) Both Nostrils two times a day PRN for allergy symptoms  lidocaine   4% Patch 1 Patch Transdermal daily PRN leg pain  melatonin 3 milliGRAM(s) Oral at bedtime PRN Insomnia  ondansetron Injectable 4 milliGRAM(s) IV Push every 8 hours PRN Nausea and/or Vomiting  traMADol 25 milliGRAM(s) Oral every 8 hours PRN Moderate Pain (4 - 6)  traMADol 50 milliGRAM(s) Oral three times a day PRN Severe Pain (7 - 10)            06-02    138  |  103  |  27<H>  ----------------------------<  95  3.5   |  30  |  0.86    Ca    9.1      02 Jun 2023 07:39    TPro  6.7  /  Alb  2.7<L>  /  TBili  0.4  /  DBili  x   /  AST  47<H>  /  ALT  73  /  AlkPhos  236<H>  06-02    CAPILLARY BLOOD GLUCOSE        LIVER FUNCTIONS - ( 02 Jun 2023 07:39 )  Alb: 2.7 g/dL / Pro: 6.7 gm/dL / ALK PHOS: 236 U/L / ALT: 73 U/L / AST: 47 U/L / GGT: x                   Assessment/Plan:  69 y/o F presented with lower extremity redness and swelling    Bilateral lower extremity erythema and swelling likely secondary to cellulitis (failed outpt abx tx with Cefadroxil) in setting of PVD / chronic stasis dermatitis:   - S/P Ceftriaxone, Vancomycin, changed to po doxy - continue  - Acidophilus   - Eucerin for dry skin   - recent b/l venous dopplers on 5/19/23 negative for DVT   - Leg elevation, compression   - stop IV lasix, change to oral qd  - Pt had recent cardiac workup (ECHO and stress test) 2 months ago with Dr. Valiente outpt which was reported to be negative, BNP WNL   - vascular consult Dr. Witt appreciated  - pain management for edema induced leg pain.   - start compression stockings    ANGELIQUE: RESOLVED  -ARB on hold  -D/C HCTZ  -monitor closely on lasix     Transaminitis:  - persistent looking back  - CT last month  noted: capsular calcification, hepatic granuloma unchanged  - d/c statin for now    Onychomycosis:  - f/u with podiatry outpt   - Pt has tried topical treatments in the past without relief     History of HTN, HLD, hypothyroidism, GERD, b/l carotid artery stenosis, basal cell carcinoma of the skin  -Continue outpatient meds. Hold cozaar due to low BP    DVT ppx: Lovenox 40 mg subcutaneous daily     Code status: Full code     Emergency contact: Gloria Mancilla (sister) [phone]     Dispo:  d/c home once medically optimized, likely Saturday or Sunday with outpatient PT/lymphedema clinic.       
Date of service: 05-26-23 @ 18:25    pt seen and examined  feels better  legs less swollen  less redness    ROS: no fever or chills; denies dizziness, no HA, no SOB or cough, no abdominal pain, no diarrhea or constipation; no dysuria, no urinary frequency, no legs pain, no rashes        MEDICATIONS  (STANDING):  AQUAPHOR (petrolatum Ointment) 1 Application(s) Topical two times a day  artificial  tears Solution 1 Drop(s) Both EYES four times a day  cefTRIAXone Injectable. 2000 milliGRAM(s) IV Push every 24 hours  doxycycline monohydrate Capsule 100 milliGRAM(s) Oral every 12 hours  enoxaparin Injectable 40 milliGRAM(s) SubCutaneous every 24 hours  furosemide   Injectable 40 milliGRAM(s) IV Push daily  lactobacillus acidophilus 1 Tablet(s) Oral two times a day with meals  levothyroxine 50 MICROGram(s) Oral daily  pantoprazole    Tablet 40 milliGRAM(s) Oral before breakfast  potassium chloride    Tablet ER 10 milliEquivalent(s) Oral daily      Vital Signs Last 24 Hrs  T(C): 36.8 (26 May 2023 16:27), Max: 36.9 (26 May 2023 00:12)  T(F): 98.2 (26 May 2023 16:27), Max: 98.4 (26 May 2023 00:12)  HR: 69 (26 May 2023 16:27) (59 - 76)  BP: 135/48 (26 May 2023 16:27) (127/49 - 150/54)  BP(mean): --  RR: 18 (26 May 2023 16:27) (18 - 18)  SpO2: 100% (26 May 2023 16:27) (98% - 100%)    Parameters below as of 26 May 2023 16:27  Patient On (Oxygen Delivery Method): room air            PE:  Constitutional: NAD   HEENT: NC/AT, EOMI, PERRLA, conjunctivae clear; ears and nose atraumatic; pharynx benign  Neck: supple; thyroid not palpable  Back: no tenderness  Respiratory: respiratory effort normal; clear to auscultation  Cardiovascular: S1S2 regular, no murmurs  Abdomen: soft, not tender, not distended, positive BS; liver and spleen WNL  Genitourinary: no suprapubic tenderness  Lymphatic: no LN palpable  Musculoskeletal: no muscle tenderness, no joint swelling or tenderness  Extremities: no pedal edema  Neurological/ Psychiatric: AxOx3, Judgement and insight normal;  moving all extremities  Skin: no rashes; no palpable lesions b/l LE stasis changes, erythema, warmth     Labs: all available labs reviewed             05-26    137  |  101  |  27<H>  ----------------------------<  105<H>  3.2<L>   |  31  |  1.03    Ca    9.9      26 May 2023 10:26      Culture - Blood (05.22.23 @ 16:04)   Specimen Source: .Blood None  Culture Results:   No growth to date.  Culture - Blood (05.22.23 @ 16:04)   Specimen Source: .Blood None  Culture Results:   No growth to date.        Radiology: all available radiological tests reviewed  < from: Xray Chest 1 View-PORTABLE IMMEDIATE (05.22.23 @ 16:25) >  ACC: [id number] EXAM:  XR CHEST PORTABLE IMMED 1V   ORDERED BY: GARTH ALCAZAR     PROCEDURE DATE:  05/22/2023          INTERPRETATION:  INDICATION: Sepsis    COMPARISON: 3/7/2017    FINDINGS:  An AP portable chest radiograph shows clear lungs bilaterally, except for   small calcified granuloma in the peripheral left upper lobe, unchanged.   No infiltrates are seen. There is no pneumothorax. There are no pleural   effusions. There is no hilar or mediastinal widening. The cardiac   silhouette is not enlarged for the projection and there is no CHF. The   bony thorax is grossly intact. A bone infarct or enchondroma is suspected   within the proximal right humeral metaphysis, unchanged as well.    IMPRESSION: Clear lungs with no acute cardiopulmonary abnormalities.    --- End of Report ---    < end of copied text >    Advanced directives addressed: full resuscitation
{\rtf1\agquxf77248\ansi\ajnsbwe4888\ftnbj\uc1\deff0  {\fonttbl{\f0 \fnil Segoe UI;}{\f1 \fnil \fcharset0 Segoe UI;}{\f2 \fnil Times New Eliecer;}}  {\colortbl ;\ufm704\zkdmq334\fofm513 ;\red0\green0\blue0 ;\red0\green0\vulx174 ;\red0\green0\blue0 ;}  {\stylesheet{\f0\fs20 Normal;}{\cs1 Default Paragraph Font;}{\cs2\f0\fs16 Line Number;}{\cs3\f2\fs24\ul\cf3 Hyperlink;}}  {\*\revtbl{Unknown;}}  \xtpejj06589\vvvgrz58972\lpxse8938\opvbw2797\yflpo5027\tocck6524\tspnjqg443\dbfxnlx785\nogrowautofit\vjpmud371\formshade\nofeaturethrottle1\dntblnsbdb\fet4\aendnotes\aftnnrlc\pgbrdrhead\pgbrdrfoot  \sectd\wrwwpv59278\itsutr35170\guttersxn0\dsvewyuq8068\ysaqajqf6976\jqdezbtl6354\xmtkjsse8406\gpabrld023\vxhezry881\sbkpage\pgncont\pgndec  \plain\plain\f0\fs24  \trowd\qsvdgu81\lastrow\hlsbvop02\trpaddfl3\rdxibuf27\trpaddfr3\trpaddt0\trpaddft3\trpaddb0\trpaddfb3\trleft0  \clvertalt\kkmqnq24\clpadft3\haeozz84\clpadfr3\clpadl0\clpadfl3\clpadb0\clpadfb3\yzbbh7561  \pard\intbl\ssparaaux0\s0\ql\plain\f0\fs24\plain\f1\fs16\rkrf6491\hich\f1\dbch\f1\loch\f1\cf2\fs16\b\ul CC: Leg redness bilateral\par  \plain\f1\fs16\rmxr3554\hich\f1\dbch\f1\loch\f1\cf2\fs16\b\par  {\*\bkmkstart [id number]}{\*\bkmkend [id number]}History of Present Illness: \plain\f1\fs16\kqpu1524\hich\f1\dbch\f1\loch\f1\cf2\fs16\b\ul\cell  \plain\f1\fs16\wlle2051\hich\f1\dbch\f1\loch\f1\cf2\fs16\intbl\row  \pard\ssparaaux0\s0\ql\plain\f0\fs24\plain\f1\fs16\xobh8598\hich\f1\dbch\f1\loch\f1\cf2\fs16 67 y/o F with PMH HTN, HLD, hypothyroidism, GERD, b/l carotid artery stenosis s/p left CEA, basal cell carcinoma of the skin presented with lower extremity redness   and swelling. Pt was seen in the ER 1 week ago and diagnosed with cellulitis. She was given Cefadroxil and had a few doses left to continue. She presented to the ER 2 days ago and she was advised to stop the antibiotic because she was having loose stools.   She went to see Dr. Vo today and was advised to come to the ER because her legs bilaterally were red, swollen, tender to touch. She reports chills, difficulty walking d/t pain. She feels that her RLE is more red than her LLE and states that her legs   do not normally have this discoloration. Pt also states that her diarrhea has improved, she had 1 semi formed stool today. Denies headaches, blurry vision, dizziness, fevers, chills, chest pain, SOB, abdominal pain, N/V, constipation.  \par  \par  ER course: VSS. Labs: CBC unremarkable, glucose 103, alkaline phosphatase 288, ALT 73. EKG: pending, f/u result . CXR: no consolidation, no effusion, no pneumothorax (personally reviewed). EKG: NSR with HR 77 bpm, normal intervals, no ST segment changes,   no T wave inversions (personally reviewed).  Pt was given Ceftriaxone, Vancomycin. She is being admitted to med/surg for further management. \par  \par  \plain\f1\fs16\wxws1884\hich\f1\dbch\f1\loch\f1\cf2\fs16\strike\plain\f1\fs16\ycxc5810\hich\f1\dbch\f1\loch\f1\cf2\fs16\plain\f1\fs16\hdqe1143\hich\f1\dbch\f1\loch\f1\cf2\fs16 S:\par  5/23: Comfortable, alert, has bilateral leg edema, erythema, states having trouble ambulating.  NO fever, chills, n, v.\par  \par  \par  \pard\plain\f0\fs24\plain\f1\fs16\mcvl5629\hich\f1\dbch\f1\loch\f1\cf2\fs16 REVIEW OF SYSTEMS: All other review of systems is negative unless indicated above.\par  \ql\plain\f0\fs24\plain\f1\fs16\kuhz5042\hich\f1\dbch\f1\loch\f1\cf2\fs16\par  {\*\bkmkstart [id number]}{\*\bkmkend [id number]}\plain\f1\fs16\sszs6229\hich\f1\dbch\f1\loch\f1\cf2\fs16\b\ul Physical Exam:\plain\f1\fs16\vmal6534\hich\f1\dbch\f1\loch\f1\cf2\fs16  \par  \trowd\pmlwrz95\lastrow\zmbweoi23\trpaddfl3\okhjxsf55\trpaddfr3\trpaddt0\trpaddft3\trpaddb0\trpaddfb3\trleft0  \clvertalt\dfvbet29\clpadft3\ijnkvt20\clpadfr3\clpadl0\clpadfl3\clpadb0\clpadfb3\olbcx6907  \pard\intbl\ssparaaux0\s0\ql\plain\f0\fs24\plain\f1\fs16\whij1952\hich\f1\dbch\f1\loch\f1\cf2\fs16 Vital Signs Last 24 Hrs\par  T(C): 37 (23 May 2023 07:35), Max: 37 (23 May 2023 07:35)\par  T(F): 98.6 (23 May 2023 07:35), Max: 98.6 (23 May 2023 07:35)\par  HR: 72 (23 May 2023 07:35) (69 - 74)\par  BP: 138/59 (23 May 2023 07:35) (131/84 - 151/63)\par  BP(mean): 89 (22 May 2023 23:32) (87 - 89)\par  RR: 18 (23 May 2023 07:35) (16 - 20)\par  SpO2: 99% (23 May 2023 07:35) (99% - 100%)\par  \par  Parameters below as of 23 May 2023 07:35\par  Patient On (Oxygen Delivery Method): room air\par  \par  \par  \par  PHYSICAL EXAM:\par  \par  Constitutional: NAD, awake and alert, well-developed\par  HEENT: PERR, EOMI, Normal Hearing, MMM\par  Neck: Soft and supple\par  Respiratory: Breath sounds are clear bilaterally, No wheezing, rales or rhonchi\par  Cardiovascular: S1 and S2, regular rate and rhythm, no Murmurs, gallops or rubs\par  Gastrointestinal: Bowel Sounds present, soft, nontender, nondistended, no guarding, no rebound\par  Extremities: + b/l edema, erythema, tender to deep palpation, warm\par  Neurological: A/O x 3, no focal deficits in my limited exam\cell  \intbl\row  \pard\ssparaaux0\s0\ql\plain\f0\fs24\plain\f1\fs16\absj0904\hich\f1\dbch\f1\loch\f1\cf2\fs16\par  {\*\bkmkstart [id number]}{\*\bkmkend [id number]}\par  \pard\plain\f0\fs24\plain\f1\fs16\drjc2763\hich\f1\dbch\f1\loch\f1\cf2\fs16 MEDICATIONS  (STANDING):\par  AQUAPHOR (petrolatum Ointment) 1 Application(s) Topical two times a day\par  artificial  tears Solution 1 Drop(s) Both EYES four times a day\par  cefTRIAXone Injectable. 2000 milliGRAM(s) IV Push every 24 hours\par  doxycycline monohydrate Capsule 100 milliGRAM(s) Oral every 12 hours\par  enoxaparin Injectable 40 milliGRAM(s) SubCutaneous every 24 hours\par  furosemide    Tablet 40 milliGRAM(s) Oral daily\par  hydrochlorothiazide 12.5 milliGRAM(s) Oral daily\par  lactobacillus acidophilus 1 Tablet(s) Oral two times a day with meals\par  levothyroxine 50 MICROGram(s) Oral daily\par  losartan 100 milliGRAM(s) Oral daily\par  pantoprazole    Tablet 40 milliGRAM(s) Oral before breakfast\par  potassium chloride    Tablet ER 10 milliEquivalent(s) Oral daily\par  \par  MEDICATIONS  (PRN):\par  acetaminophen     Tablet .. 650 milliGRAM(s) Oral every 6 hours PRN Temp greater or equal to 38C (100.4F), Mild Pain (1 - 3)\par  aluminum hydroxide/magnesium hydroxide/simethicone Suspension 30 milliLiter(s) Oral every 4 hours PRN Dyspepsia\par  fluticasone propionate 50 MICROgram(s)/spray Nasal Spray 1 Spray(s) Both Nostrils two times a day PRN for allergy symptoms\par  melatonin 3 milliGRAM(s) Oral at bedtime PRN Insomnia\par  ondansetron Injectable 4 milliGRAM(s) IV Push every 8 hours PRN Nausea and/or Vomiting\par  \ql\plain\f0\fs24\plain\f1\fs16\yiaz6691\hich\f1\dbch\f1\loch\f1\cf2\fs16\b\ul\par  \par  \plain\f1\fs16\pkuz5472\hich\f1\dbch\f1\loch\f1\cf2\fs16\par  \plain\f1\fs16\nzai6144\hich\f1\dbch\f1\loch\f1\cf2\fs16\b\ul{\field{\*\fldinst HYPERLINK [id number] }{\fldrslt Assessment/Plan:}}\plain\f1\fs16\twos7877\hich\f1\dbch\f1\loch\f1\cf2\fs16   67 y/o F presented with lower extremity   redness and swelling\ql\par  \par  Bilateral lower extremity erythema and swelling likely secondary to cellulitis (failed outpt abx tx with Cefadroxil) in setting of PVD / chronic stasis dermatitis: \par  - c/w withCeftriaxone, Vancomycin per ID \par  - Tylenol for temperatures PRN \par  - Acidophilus \par  - Eucerin for dry skin \par  - f/u cultures\par  -- recent b/l venous dopplers on 5/19/23 negative for DVT \par  - Leg elevation, compression \par  - s/p  1 dose of lasix 40 mg IVP on admission \par  - Pt had recent cardiac workup (ECHO and stress test) 2 months ago with Dr. Valiente outpt which was reported to be negative, BNP WNL \par  - vascular consult Dr. Witt \par  \par  \par  Transaminitis:\par  -persistent looking back\par  -trend\par  -hepatitis panel\par  -CT last month  noted: capsular calcification, hepatic granuloma unchanged\par  - d/c statin for now\par  \par  Onychomycosis:\par  - f/u with podiatry outpt \par  - Pt has tried topical treatments in the past without relief \par  \par  History of HTN, HLD, hypothyroidism, GERD, b/l carotid artery stenosis, basal cell carcinoma of the skin\par  - c/w home medications; verified with pt at the bedside \par  \par  DVT ppx: Lovenox 40 mg subcutaneous daily \par  \par  Code status: Full code \par  \par  Emergency contact: Gloria Mancilla (sister) [phone] \par  \par  \plain\f0\fs20\lpxr5255\hich\f0\dbch\f0\loch\f0\fs20\par  }  
Date of service: 05-24-23 @ 10:21    pt seen and examined  feels better  legs still swollen but less redness    ROS: no fever or chills; denies dizziness, no HA, no SOB or cough, no abdominal pain, no diarrhea or constipation; no dysuria, no urinary frequency, no legs pain, no rashes    MEDICATIONS  (STANDING):  AQUAPHOR (petrolatum Ointment) 1 Application(s) Topical two times a day  artificial  tears Solution 1 Drop(s) Both EYES four times a day  cefTRIAXone Injectable. 2000 milliGRAM(s) IV Push every 24 hours  doxycycline monohydrate Capsule 100 milliGRAM(s) Oral every 12 hours  enoxaparin Injectable 40 milliGRAM(s) SubCutaneous every 24 hours  furosemide   Injectable 40 milliGRAM(s) IV Push daily  lactobacillus acidophilus 1 Tablet(s) Oral two times a day with meals  levothyroxine 50 MICROGram(s) Oral daily  pantoprazole    Tablet 40 milliGRAM(s) Oral before breakfast  potassium chloride    Tablet ER 10 milliEquivalent(s) Oral daily      Physical Exam:   Vital Signs Last 24 Hrs  T(C): 36.5 (24 May 2023 07:32), Max: 36.9 (23 May 2023 16:15)  T(F): 97.7 (24 May 2023 07:32), Max: 98.5 (23 May 2023 23:10)  HR: 67 (24 May 2023 07:32) (67 - 76)  BP: 100/47 (24 May 2023 10:16) (91/61 - 117/46)  BP(mean): --  RR: 18 (24 May 2023 07:32) (18 - 18)  SpO2: 100% (24 May 2023 07:32) (97% - 100%)    Parameters below as of 24 May 2023 07:32  Patient On (Oxygen Delivery Method): room air          PE:  Constitutional: NAD   HEENT: NC/AT, EOMI, PERRLA, conjunctivae clear; ears and nose atraumatic; pharynx benign  Neck: supple; thyroid not palpable  Back: no tenderness  Respiratory: respiratory effort normal; clear to auscultation  Cardiovascular: S1S2 regular, no murmurs  Abdomen: soft, not tender, not distended, positive BS; liver and spleen WNL  Genitourinary: no suprapubic tenderness  Lymphatic: no LN palpable  Musculoskeletal: no muscle tenderness, no joint swelling or tenderness  Extremities: no pedal edema  Neurological/ Psychiatric: AxOx3, Judgement and insight normal;  moving all extremities  Skin: no rashes; no palpable lesions b/l LE stasis changes, erythema, warmth     Labs: all available labs reviewed             Culture - Blood (05.22.23 @ 16:04)   Specimen Source: .Blood None  Culture Results:   No growth to date.  Culture - Blood (05.22.23 @ 16:04)   Specimen Source: .Blood None  Culture Results:   No growth to date.        Radiology: all available radiological tests reviewed  < from: Xray Chest 1 View-PORTABLE IMMEDIATE (05.22.23 @ 16:25) >  ACC: [id number] EXAM:  XR CHEST PORTABLE IMMED 1V   ORDERED BY: GARTH ALCAZAR     PROCEDURE DATE:  05/22/2023          INTERPRETATION:  INDICATION: Sepsis    COMPARISON: 3/7/2017    FINDINGS:  An AP portable chest radiograph shows clear lungs bilaterally, except for   small calcified granuloma in the peripheral left upper lobe, unchanged.   No infiltrates are seen. There is no pneumothorax. There are no pleural   effusions. There is no hilar or mediastinal widening. The cardiac   silhouette is not enlarged for the projection and there is no CHF. The   bony thorax is grossly intact. A bone infarct or enchondroma is suspected   within the proximal right humeral metaphysis, unchanged as well.    IMPRESSION: Clear lungs with no acute cardiopulmonary abnormalities.    --- End of Report ---    < end of copied text >    Advanced directives addressed: full resuscitation

## 2023-06-02 NOTE — PROGRESS NOTE ADULT - PROVIDER SPECIALTY LIST ADULT
Hospitalist
Hospitalist
Infectious Disease
Hospitalist
Infectious Disease

## 2023-06-03 ENCOUNTER — TRANSCRIPTION ENCOUNTER (OUTPATIENT)
Age: 68
End: 2023-06-03

## 2023-06-03 VITALS
TEMPERATURE: 98 F | DIASTOLIC BLOOD PRESSURE: 42 MMHG | SYSTOLIC BLOOD PRESSURE: 135 MMHG | RESPIRATION RATE: 18 BRPM | OXYGEN SATURATION: 100 % | HEART RATE: 55 BPM

## 2023-06-03 LAB
ANION GAP SERPL CALC-SCNC: 3 MMOL/L — LOW (ref 5–17)
BUN SERPL-MCNC: 24 MG/DL — HIGH (ref 7–23)
CALCIUM SERPL-MCNC: 10.2 MG/DL — HIGH (ref 8.5–10.1)
CHLORIDE SERPL-SCNC: 103 MMOL/L — SIGNIFICANT CHANGE UP (ref 96–108)
CO2 SERPL-SCNC: 33 MMOL/L — HIGH (ref 22–31)
CREAT SERPL-MCNC: 0.89 MG/DL — SIGNIFICANT CHANGE UP (ref 0.5–1.3)
EGFR: 71 ML/MIN/1.73M2 — SIGNIFICANT CHANGE UP
GLUCOSE SERPL-MCNC: 98 MG/DL — SIGNIFICANT CHANGE UP (ref 70–99)
POTASSIUM SERPL-MCNC: 3.9 MMOL/L — SIGNIFICANT CHANGE UP (ref 3.5–5.3)
POTASSIUM SERPL-SCNC: 3.9 MMOL/L — SIGNIFICANT CHANGE UP (ref 3.5–5.3)
SODIUM SERPL-SCNC: 139 MMOL/L — SIGNIFICANT CHANGE UP (ref 135–145)

## 2023-06-03 PROCEDURE — 99239 HOSP IP/OBS DSCHRG MGMT >30: CPT

## 2023-06-03 RX ORDER — ACETAMINOPHEN 500 MG
2 TABLET ORAL
Qty: 0 | Refills: 0 | DISCHARGE
Start: 2023-06-03

## 2023-06-03 RX ORDER — FUROSEMIDE 40 MG
1 TABLET ORAL
Refills: 0 | DISCHARGE

## 2023-06-03 RX ORDER — TRAMADOL HYDROCHLORIDE 50 MG/1
0.5 TABLET ORAL
Qty: 21 | Refills: 0
Start: 2023-06-03 | End: 2023-06-16

## 2023-06-03 RX ORDER — LOSARTAN/HYDROCHLOROTHIAZIDE 100MG-25MG
1 TABLET ORAL
Refills: 0 | DISCHARGE

## 2023-06-03 RX ORDER — FUROSEMIDE 40 MG
1 TABLET ORAL
Qty: 30 | Refills: 0
Start: 2023-06-03 | End: 2023-07-02

## 2023-06-03 RX ORDER — PETROLATUM,WHITE
1 JELLY (GRAM) TOPICAL
Qty: 0 | Refills: 0 | DISCHARGE
Start: 2023-06-03

## 2023-06-03 RX ORDER — ROSUVASTATIN CALCIUM 5 MG/1
1 TABLET ORAL
Qty: 0 | Refills: 0 | DISCHARGE

## 2023-06-03 RX ADMIN — PANTOPRAZOLE SODIUM 40 MILLIGRAM(S): 20 TABLET, DELAYED RELEASE ORAL at 06:38

## 2023-06-03 RX ADMIN — ENOXAPARIN SODIUM 40 MILLIGRAM(S): 100 INJECTION SUBCUTANEOUS at 05:39

## 2023-06-03 RX ADMIN — Medication 1 APPLICATION(S): at 08:46

## 2023-06-03 RX ADMIN — TRAMADOL HYDROCHLORIDE 25 MILLIGRAM(S): 50 TABLET ORAL at 13:07

## 2023-06-03 RX ADMIN — Medication 100 MILLIGRAM(S): at 08:46

## 2023-06-03 RX ADMIN — Medication 1 TABLET(S): at 08:45

## 2023-06-03 RX ADMIN — Medication 650 MILLIGRAM(S): at 06:39

## 2023-06-03 RX ADMIN — Medication 20 MILLIGRAM(S): at 08:45

## 2023-06-03 RX ADMIN — Medication 50 MICROGRAM(S): at 05:39

## 2023-06-03 RX ADMIN — Medication 1 DROP(S): at 05:39

## 2023-06-03 RX ADMIN — Medication 10 MILLIEQUIVALENT(S): at 08:45

## 2023-06-03 RX ADMIN — Medication 1 DROP(S): at 10:28

## 2023-06-03 RX ADMIN — Medication 650 MILLIGRAM(S): at 05:39

## 2023-06-03 NOTE — DISCHARGE NOTE PROVIDER - NSDCFUSCHEDAPPT_GEN_ALL_CORE_FT
[facility]  VASCULAR [address]  Scheduled Appointment: 06/26/2023    Frederick Tracy  [facility]  DERM [address]  Scheduled Appointment: 07/14/2023

## 2023-06-03 NOTE — DISCHARGE NOTE PROVIDER - NSDCMRMEDTOKEN_GEN_ALL_CORE_FT
acetaminophen 325 mg oral tablet: 2 tab(s) orally every 6 hours As needed Temp greater or equal to 38C (100.4F), Mild Pain (1 - 3)  doxycycline hyclate 100 mg oral tablet: 1 tab(s) orally 2 times a day  Flonase 50 mcg/inh nasal spray: 1 spray(s) in each nostril 2 times a day as needed for  allergy symptoms  furosemide 40 mg oral tablet: 1 tab(s) orally once a day  levothyroxine 50 mcg (0.05 mg) oral tablet: 1 tab(s) orally once a day  omeprazole 40 mg oral delayed release capsule: 1 cap(s) orally once a day  petrolatum topical ointment: 1 Apply topically to affected area 2 times a day  Physical Therapy / lymphedema clinic: eval for progressive edema / lymphedema.  potassium chloride 10 mEq oral tablet, extended release: 1 tab(s) orally once a day  traMADol 50 mg oral tablet: 0.5 tab(s) orally every 8 hours as needed for  severe pain MDD: 75mg

## 2023-06-03 NOTE — DISCHARGE NOTE PROVIDER - NSDCCPCAREPLAN_GEN_ALL_CORE_FT
PRINCIPAL DISCHARGE DIAGNOSIS  Diagnosis: Lower leg edema  Assessment and Plan of Treatment: Due to chronic lymphedema   -Infectiouc component/cellulitis RESOLVED (Take antibiotics as precribed)  -continue furosemide with close out patient follow up  -Continue compression stockings  -Follow up with PCP and vascular 5-7 days  -take tylenol and or tramadol as needed for pain      SECONDARY DISCHARGE DIAGNOSES  Diagnosis: Transaminitis  Assessment and Plan of Treatment: Persistent elevation of your liver enzymes.  stable  -recommend holding cholesterol medications until out patient follow up.    Diagnosis: HTN (hypertension)  Assessment and Plan of Treatment: Take lasix as prescribed  -would continue to stop your HCTZ medication.  Your BP is borderline elevated.  YOu need out patient BP check up and re-assessment of your HCTZ BP medication.

## 2023-06-03 NOTE — DISCHARGE NOTE PROVIDER - HOSPITAL COURSE
CC: Leg redness bilateral    History of Present Illness:   69 y/o F with PMH HTN, HLD, hypothyroidism, GERD, b/l carotid artery stenosis s/p left CEA, basal cell carcinoma of the skin presented with lower extremity redness and swelling. Pt was seen in the ER 1 week ago and diagnosed with cellulitis. She was given Cefadroxil and had a few doses left to continue. She presented to the ER 2 days ago and she was advised to stop the antibiotic because she was having loose stools. She went to see Dr. Vo today and was advised to come to the ER because her legs bilaterally were red, swollen, tender to touch. She reports chills, difficulty walking d/t pain. She feels that her RLE is more red than her LLE and states that her legs do not normally have this discoloration. Pt also states that her diarrhea has improved, she had 1 semi formed stool today. Denies headaches, blurry vision, dizziness, fevers, chills, chest pain, SOB, abdominal pain, N/V, constipation.      ER course: VSS. Labs: CBC unremarkable, glucose 103, alkaline phosphatase 288, ALT 73. EKG: pending, f/u result . CXR: no consolidation, no effusion, no pneumothorax (personally reviewed). EKG: NSR with HR 77 bpm, normal intervals, no ST segment changes, no T wave inversions (personally reviewed).  Pt was given Ceftriaxone, Vancomycin. She is being admitted to med/surg for further management.     Hospital course:  Pt treated for bilateral lower extremity erythema and swelling likely secondary to cellulitis (failed outpt abx tx with Cefadroxil) in setting of PVD / chronic stasis dermatitis.  Pt treated with IV and oral antibiotics, with resolution of erythema.  She was also started on IV antibiotics for edema with improvement.  Pt now on oral antibiotics and lasix, tolerating.  Pt has leg pain/joint pain due to edema.  Compression stockings provided and encouraged daily.  Pt placed on tylenol and tramadol as needed for pain.  She is instructed to follow up with out patient PT, lymphedema clinic and close out patient pcp and vascular follow up.  Pt is HD stable, and ready for d/c home.    REVIEW OF SYSTEMS: All other review of systems is negative unless indicated above.    Vital Signs Last 24 Hrs  T(C): 36.4 (03 Jun 2023 08:32), Max: 36.4 (03 Jun 2023 08:32)  T(F): 97.6 (03 Jun 2023 08:32), Max: 97.6 (03 Jun 2023 08:32)  HR: 55 (03 Jun 2023 08:32) (55 - 66)  BP: 135/42 (03 Jun 2023 08:32) (135/42 - 148/61)  BP(mean): --  RR: 18 (03 Jun 2023 08:32) (18 - 18)  SpO2: 100% (03 Jun 2023 08:32) (98% - 100%)    Parameters below as of 03 Jun 2023 08:32  Patient On (Oxygen Delivery Method): room air    PHYSICAL EXAM:    Constitutional: NAD, awake and alert, well-developed  HEENT: PERR, EOMI, Normal Hearing, MMM  Neck: Soft and supple  Respiratory: Breath sounds are clear bilaterally, No wheezing, rales or rhonchi  Cardiovascular: S1 and S2, regular rate and rhythm, no Murmurs, gallops or rubs  Gastrointestinal: Bowel Sounds present, soft, nontender, nondistended, no guarding, no rebound  Extremities: + b/l edema LE throughout - stable, erythema resolved   Neurological: A/O x 3, no focal deficits in my limited exam      med/labs: Reviewed and interpreted     Assessment/Plan:  69 y/o F presented with lower extremity redness and swelling    Bilateral lower extremity erythema and swelling likely secondary to cellulitis (failed outpt abx tx with Cefadroxil) in setting of PVD / chronic stasis dermatitis:  IMPROVED  - S/P Ceftriaxone, Vancomycin, changed to po doxy - continue  - Acidophilus   - Eucerin for dry skin   - recent b/l venous dopplers on 5/19/23 negative for DVT   - Leg elevation, compression   - stop IV lasix, change to oral qd - continue daily dosing with outpatient follow up.  - Pt had recent cardiac workup (ECHO and stress test) 2 months ago with Dr. Valiente outpt which was reported to be negative, BNP WNL   - vascular consult Dr. Witt appreciated  - pain management for edema induced leg pain.   - compression stockings    ANGELIQUE: RESOLVED  -ARB on hold  -D/C HCTZ  -monitor closely on lasix     Transaminitis:  - persistent looking back  - CT last month  noted: capsular calcification, hepatic granuloma unchanged  - d/c statin for now    Onychomycosis:  - f/u with podiatry outpt   - Pt has tried topical treatments in the past without relief     History of HTN, HLD, hypothyroidism, GERD, b/l carotid artery stenosis, basal cell carcinoma of the skin  -Continue outpatient meds. Hold cozaar due to low BP    DVT ppx: Lovenox 40 mg subcutaneous daily     Code status: Full code     Emergency contact: Gloria Mancilla (sister) [phone]     Dispo:  d/c home  with outpatient PT/lymphedema clinic.     Attending Statement: 40 minutes spent on total encounter and discharge planning.

## 2023-06-03 NOTE — DISCHARGE NOTE NURSING/CASE MANAGEMENT/SOCIAL WORK - NSDCPEFALRISK_GEN_ALL_CORE
For information on Fall & Injury Prevention, visit: [email or web address] OR  [email or web address] OR  https://www.cdc.gov/steadi/patient.html

## 2023-06-03 NOTE — DISCHARGE NOTE NURSING/CASE MANAGEMENT/SOCIAL WORK - PATIENT PORTAL LINK FT
You can access the FollowMyHealth Patient Portal offered by [facility] by registering at the following website: [email or web address]. By joining [employer]’s FollowMyHealth portal, you will also be able to view your health information using other applications (apps) compatible with our system.

## 2023-06-05 ENCOUNTER — APPOINTMENT (OUTPATIENT)
Dept: VASCULAR SURGERY | Facility: CLINIC | Age: 68
End: 2023-06-05

## 2023-06-05 ENCOUNTER — APPOINTMENT (OUTPATIENT)
Dept: VASCULAR SURGERY | Facility: CLINIC | Age: 68
End: 2023-06-05
Payer: MEDICARE

## 2023-06-05 VITALS
SYSTOLIC BLOOD PRESSURE: 147 MMHG | WEIGHT: 245 LBS | HEART RATE: 69 BPM | BODY MASS INDEX: 39.37 KG/M2 | DIASTOLIC BLOOD PRESSURE: 76 MMHG | HEIGHT: 66 IN

## 2023-06-05 PROCEDURE — 99212 OFFICE O/P EST SF 10 MIN: CPT

## 2023-06-05 NOTE — ASSESSMENT
[FreeTextEntry1] : 68-year-old woman with improved cellulitis of both lower extremities but continued edema.\par \par I recommended continuation of compression stockings and we will see her again in 2 weeks time for routine reevaluation.\par \par I will make further decisions about management of her lower extremity edema at that time.

## 2023-06-05 NOTE — HISTORY OF PRESENT ILLNESS
[FreeTextEntry1] : Patient presents for routine reevaluation after recent hospitalization at [facility] for evidence of cellulitis and lower extremity edema.\par \par Patient was treated with IV antibiotics and has an improvement in her lower extremity edema.

## 2023-06-08 DIAGNOSIS — L03.116 CELLULITIS OF LEFT LOWER LIMB: ICD-10-CM

## 2023-06-08 DIAGNOSIS — R74.01 ELEVATION OF LEVELS OF LIVER TRANSAMINASE LEVELS: ICD-10-CM

## 2023-06-08 DIAGNOSIS — I89.0 LYMPHEDEMA, NOT ELSEWHERE CLASSIFIED: ICD-10-CM

## 2023-06-08 DIAGNOSIS — I73.9 PERIPHERAL VASCULAR DISEASE, UNSPECIFIED: ICD-10-CM

## 2023-06-08 DIAGNOSIS — I87.2 VENOUS INSUFFICIENCY (CHRONIC) (PERIPHERAL): ICD-10-CM

## 2023-06-08 DIAGNOSIS — E03.9 HYPOTHYROIDISM, UNSPECIFIED: ICD-10-CM

## 2023-06-08 DIAGNOSIS — I10 ESSENTIAL (PRIMARY) HYPERTENSION: ICD-10-CM

## 2023-06-08 DIAGNOSIS — Z88.1 ALLERGY STATUS TO OTHER ANTIBIOTIC AGENTS STATUS: ICD-10-CM

## 2023-06-08 DIAGNOSIS — E78.5 HYPERLIPIDEMIA, UNSPECIFIED: ICD-10-CM

## 2023-06-08 DIAGNOSIS — Z87.891 PERSONAL HISTORY OF NICOTINE DEPENDENCE: ICD-10-CM

## 2023-06-08 DIAGNOSIS — Z79.890 HORMONE REPLACEMENT THERAPY: ICD-10-CM

## 2023-06-08 DIAGNOSIS — B35.1 TINEA UNGUIUM: ICD-10-CM

## 2023-06-08 DIAGNOSIS — T50.2X5A ADVERSE EFFECT OF CARBONIC-ANHYDRASE INHIBITORS, BENZOTHIADIAZIDES AND OTHER DIURETICS, INITIAL ENCOUNTER: ICD-10-CM

## 2023-06-08 DIAGNOSIS — R74.8 ABNORMAL LEVELS OF OTHER SERUM ENZYMES: ICD-10-CM

## 2023-06-08 DIAGNOSIS — K21.9 GASTRO-ESOPHAGEAL REFLUX DISEASE WITHOUT ESOPHAGITIS: ICD-10-CM

## 2023-06-08 DIAGNOSIS — K44.9 DIAPHRAGMATIC HERNIA WITHOUT OBSTRUCTION OR GANGRENE: ICD-10-CM

## 2023-06-08 DIAGNOSIS — T46.5X5A ADVERSE EFFECT OF OTHER ANTIHYPERTENSIVE DRUGS, INITIAL ENCOUNTER: ICD-10-CM

## 2023-06-08 DIAGNOSIS — N17.9 ACUTE KIDNEY FAILURE, UNSPECIFIED: ICD-10-CM

## 2023-06-08 DIAGNOSIS — L03.115 CELLULITIS OF RIGHT LOWER LIMB: ICD-10-CM

## 2023-06-08 DIAGNOSIS — E66.01 MORBID (SEVERE) OBESITY DUE TO EXCESS CALORIES: ICD-10-CM

## 2023-06-19 ENCOUNTER — APPOINTMENT (OUTPATIENT)
Dept: VASCULAR SURGERY | Facility: CLINIC | Age: 68
End: 2023-06-19

## 2023-06-26 ENCOUNTER — APPOINTMENT (OUTPATIENT)
Dept: VASCULAR SURGERY | Facility: CLINIC | Age: 68
End: 2023-06-26

## 2023-06-28 ENCOUNTER — APPOINTMENT (OUTPATIENT)
Dept: SURGERY | Facility: CLINIC | Age: 68
End: 2023-06-28
Payer: MEDICARE

## 2023-06-28 VITALS
HEIGHT: 66 IN | BODY MASS INDEX: 39.37 KG/M2 | HEART RATE: 96 BPM | WEIGHT: 245 LBS | DIASTOLIC BLOOD PRESSURE: 85 MMHG | SYSTOLIC BLOOD PRESSURE: 144 MMHG | TEMPERATURE: 97.6 F

## 2023-06-28 PROCEDURE — 99213 OFFICE O/P EST LOW 20 MIN: CPT

## 2023-06-28 RX ORDER — APIXABAN 2.5 MG/1
2.5 TABLET, FILM COATED ORAL
Qty: 120 | Refills: 0 | Status: COMPLETED | COMMUNITY
Start: 2023-01-30 | End: 2023-06-28

## 2023-06-28 NOTE — CONSULT LETTER
[Dear  ___] : Dear  [unfilled], [Consult Letter:] : I had the pleasure of evaluating your patient, [unfilled]. [Please see my note below.] : Please see my note below. [Consult Closing:] : Thank you very much for allowing me to participate in the care of this patient.  If you have any questions, please do not hesitate to contact me. [Sincerely,] : Sincerely, [FreeTextEntry3] : [employer]ATH[employer]

## 2023-06-28 NOTE — ASSESSMENT
[FreeTextEntry1] : Bilat swelling. Cellulitis resolved. Does not want to go to [place] for Lymphedema therapy - may qualify for home rx. No pulses L foot with pt complaint of needing to hang foot off bed at night to relieve pain.

## 2023-06-28 NOTE — HISTORY OF PRESENT ILLNESS
[de-identified] : Was in  for 11 days for RLE cellulitis. Here for f/u with Dr Vo. Cellulitis resolved. No fever.

## 2023-06-28 NOTE — PHYSICAL EXAM
[Normal Breath Sounds] : Normal breath sounds [Normal Heart Sounds] : normal heart sounds [2+] : right 2+ [Alert] : alert [Oriented to Person] : oriented to person [Oriented to Place] : oriented to place [Oriented to Time] : oriented to time [Calm] : calm [JVD] : no jugular venous distention  [de-identified] : NAD [de-identified] : NC/AT PER [FreeTextEntry1] : not palpable on L  [de-identified] : soft [de-identified] : mod edema L > R

## 2023-06-29 ENCOUNTER — EMERGENCY (EMERGENCY)
Facility: HOSPITAL | Age: 68
LOS: 0 days | Discharge: HOME | End: 2023-06-29
Attending: STUDENT IN AN ORGANIZED HEALTH CARE EDUCATION/TRAINING PROGRAM
Payer: MEDICARE

## 2023-06-29 VITALS
SYSTOLIC BLOOD PRESSURE: 154 MMHG | HEIGHT: 66 IN | HEART RATE: 79 BPM | OXYGEN SATURATION: 99 % | RESPIRATION RATE: 18 BRPM | TEMPERATURE: 98 F | WEIGHT: 240.08 LBS | DIASTOLIC BLOOD PRESSURE: 97 MMHG

## 2023-06-29 VITALS
DIASTOLIC BLOOD PRESSURE: 71 MMHG | SYSTOLIC BLOOD PRESSURE: 154 MMHG | TEMPERATURE: 98 F | OXYGEN SATURATION: 100 % | HEART RATE: 61 BPM | RESPIRATION RATE: 18 BRPM

## 2023-06-29 DIAGNOSIS — Z87.440 PERSONAL HISTORY OF URINARY (TRACT) INFECTIONS: ICD-10-CM

## 2023-06-29 DIAGNOSIS — K44.9 DIAPHRAGMATIC HERNIA WITHOUT OBSTRUCTION OR GANGRENE: ICD-10-CM

## 2023-06-29 DIAGNOSIS — K76.0 FATTY (CHANGE OF) LIVER, NOT ELSEWHERE CLASSIFIED: ICD-10-CM

## 2023-06-29 DIAGNOSIS — Z98.890 OTHER SPECIFIED POSTPROCEDURAL STATES: Chronic | ICD-10-CM

## 2023-06-29 DIAGNOSIS — I10 ESSENTIAL (PRIMARY) HYPERTENSION: ICD-10-CM

## 2023-06-29 DIAGNOSIS — Z90.49 ACQUIRED ABSENCE OF OTHER SPECIFIED PARTS OF DIGESTIVE TRACT: Chronic | ICD-10-CM

## 2023-06-29 DIAGNOSIS — Z87.81 PERSONAL HISTORY OF (HEALED) TRAUMATIC FRACTURE: ICD-10-CM

## 2023-06-29 DIAGNOSIS — I34.0 NONRHEUMATIC MITRAL (VALVE) INSUFFICIENCY: ICD-10-CM

## 2023-06-29 DIAGNOSIS — K58.9 IRRITABLE BOWEL SYNDROME WITHOUT DIARRHEA: ICD-10-CM

## 2023-06-29 DIAGNOSIS — F41.8 OTHER SPECIFIED ANXIETY DISORDERS: ICD-10-CM

## 2023-06-29 DIAGNOSIS — Z90.49 ACQUIRED ABSENCE OF OTHER SPECIFIED PARTS OF DIGESTIVE TRACT: ICD-10-CM

## 2023-06-29 DIAGNOSIS — S62.109A FRACTURE OF UNSPECIFIED CARPAL BONE, UNSPECIFIED WRIST, INITIAL ENCOUNTER FOR CLOSED FRACTURE: Chronic | ICD-10-CM

## 2023-06-29 DIAGNOSIS — E78.5 HYPERLIPIDEMIA, UNSPECIFIED: ICD-10-CM

## 2023-06-29 DIAGNOSIS — E03.9 HYPOTHYROIDISM, UNSPECIFIED: ICD-10-CM

## 2023-06-29 DIAGNOSIS — R10.31 RIGHT LOWER QUADRANT PAIN: ICD-10-CM

## 2023-06-29 PROCEDURE — 99284 EMERGENCY DEPT VISIT MOD MDM: CPT

## 2023-06-29 RX ORDER — ACETAMINOPHEN 500 MG
975 TABLET ORAL ONCE
Refills: 0 | Status: COMPLETED | OUTPATIENT
Start: 2023-06-29 | End: 2023-06-29

## 2023-06-29 RX ORDER — IBUPROFEN 200 MG
800 TABLET ORAL ONCE
Refills: 0 | Status: COMPLETED | OUTPATIENT
Start: 2023-06-29 | End: 2023-06-29

## 2023-06-29 RX ORDER — TRAMADOL HYDROCHLORIDE 50 MG/1
25 TABLET ORAL ONCE
Refills: 0 | Status: DISCONTINUED | OUTPATIENT
Start: 2023-06-29 | End: 2023-06-29

## 2023-06-29 RX ORDER — NYSTATIN CREAM 100000 [USP'U]/G
1 CREAM TOPICAL
Refills: 0 | Status: DISCONTINUED | OUTPATIENT
Start: 2023-06-29 | End: 2023-06-29

## 2023-06-29 RX ADMIN — Medication 800 MILLIGRAM(S): at 10:15

## 2023-06-29 RX ADMIN — Medication 975 MILLIGRAM(S): at 10:09

## 2023-06-29 RX ADMIN — TRAMADOL HYDROCHLORIDE 25 MILLIGRAM(S): 50 TABLET ORAL at 10:09

## 2023-06-29 RX ADMIN — NYSTATIN CREAM 1 APPLICATION(S): 100000 CREAM TOPICAL at 10:57

## 2023-06-29 NOTE — ED PROVIDER NOTE - SKIN, MLM
Skin normal color for race, warm, dry and intact. Chronic venous stasis changes. Mild erythema right groin consistent with fungal rash

## 2023-06-29 NOTE — ED ADULT NURSE REASSESSMENT NOTE - NS ED NURSE REASSESS COMMENT FT1
Pt is ambulating in her room without any complications. Pt is able to stand and sit in the bedside chair without any complications. POC pending.

## 2023-06-29 NOTE — ED PROVIDER NOTE - PATIENT PORTAL LINK FT
You can access the FollowMyHealth Patient Portal offered by [facility] by registering at the following website: [email or web address]. By joining [employer]’s FollowMyHealth portal, you will also be able to view your health information using other applications (apps) compatible with our system.

## 2023-06-29 NOTE — ED PROVIDER NOTE - PROGRESS NOTE DETAILS
Mara Frausto for attending Dr. Mackey: Pt still with mild pain. Defers further workup at this time. Offered stronger pain meds. Pt insisting she wants to drive home and will not call family for ride. Pt requesting more time. Will continue to reeval.

## 2023-06-29 NOTE — ED ADULT NURSE NOTE - OBJECTIVE STATEMENT
Pt presented to the ER with c/o right groin pain. Pt stated that she was sleeping last night and her cat was sleeping on top of her. Pt stated that she jumped up and may have twisted the wrong way. Pt developed pain after. Pt is able to ambulate without any complications. Pt denies any numbness or tingling in her leg. Pt denies any falls.

## 2023-06-29 NOTE — ED ADULT TRIAGE NOTE - CHIEF COMPLAINT QUOTE
Pt presents to the ED c/o right groin pain radiating down her leg x1 day. Denies injury but states "the pain started overnight while the cat was sleeping on me." Pt recently admitted to  for cellulitis of b/l MANSOOR. No medication PTA.

## 2023-06-29 NOTE — ED PROVIDER NOTE - OBJECTIVE STATEMENT
69 y/o female with a PMHx of HTN, HLD, hypothyroidism, GERD, b/l carotid artery stenosis s/p left CEA, basal cell carcinoma of the skin presents to the ED for evaluation. Pt hospitalized earlier this month for LE cellulitis. Pt reports during hospitalization she had intermittent groin pain which she felt when she got out of bed and with movement. Pt reports last night she was sleeping, did not know her cat was sleeping on her, it startled her and she jumped up. Pt with worsening pain since. Denies numbness, tingling, urinary complaints, back pain. Pt did not take pain meds PTA. No other complaints at this time.

## 2023-06-29 NOTE — ED PROVIDER NOTE - CLINICAL SUMMARY MEDICAL DECISION MAKING FREE TEXT BOX
67 y/o female with right groin pain. No red flags. Low suspicion for fracture. No abd pain or back pain. Concern for groin strain. Will pain control and reeval. 69 y/o female with right groin pain. No red flags. Low suspicion for fracture. No abd pain or back pain. Concern for groin strain. Will pain control and reeval.    Key DO: patient ambulatory in the ED with steady gait; resting comfortably; instructed to f/u with pmd and private ortho as instructed; already with rx for tramadol- to continue at this time; strict return precautions given.

## 2023-06-29 NOTE — ED PROVIDER NOTE - MUSCULOSKELETAL, MLM
Spine appears normal, range of motion is not limited. TTP along right inguinal ligament. Strength and sensation normal.

## 2023-06-30 ENCOUNTER — APPOINTMENT (OUTPATIENT)
Dept: VASCULAR SURGERY | Facility: CLINIC | Age: 68
End: 2023-06-30
Payer: MEDICARE

## 2023-06-30 PROCEDURE — 93923 UPR/LXTR ART STDY 3+ LVLS: CPT

## 2023-07-06 ENCOUNTER — APPOINTMENT (OUTPATIENT)
Dept: SURGERY | Facility: CLINIC | Age: 68
End: 2023-07-06
Payer: MEDICARE

## 2023-07-06 PROCEDURE — 99212 OFFICE O/P EST SF 10 MIN: CPT

## 2023-07-06 NOTE — PHYSICAL EXAM
[JVD] : no jugular venous distention  [Normal Breath Sounds] : Normal breath sounds [Normal Heart Sounds] : normal heart sounds [Calm] : calm [de-identified] : soft [de-identified] : no wounds on L leg. R leg swelling better.

## 2023-07-07 RX ORDER — MUPIROCIN 20 MG/G
2 OINTMENT TOPICAL TWICE DAILY
Qty: 30 | Refills: 0 | Status: ACTIVE | COMMUNITY
Start: 2023-07-07 | End: 1900-01-01

## 2023-07-13 NOTE — ED ADULT TRIAGE NOTE - PAIN: PRESENCE, MLM
denies pain/discomfort Hatchet Flap Text: The defect edges were debeveled with a #15 scalpel blade. Given the location of the defect, shape of the defect and the proximity to free margins a hatchet flap was deemed most appropriate. Using a sterile surgical marker, an appropriate hatchet flap was drawn incorporating the defect and placing the expected incisions within the relaxed skin tension lines where possible. The area thus outlined was incised deep to adipose tissue with a #15 scalpel blade. The skin margins were undermined to an appropriate distance in all directions utilizing iris scissors. Following this, the designed flap was carried over into the primary defect and sutured into place.

## 2023-07-14 ENCOUNTER — APPOINTMENT (OUTPATIENT)
Dept: SURGERY | Facility: CLINIC | Age: 68
End: 2023-07-14

## 2023-07-14 ENCOUNTER — APPOINTMENT (OUTPATIENT)
Dept: DERMATOLOGY | Facility: CLINIC | Age: 68
End: 2023-07-14
Payer: MEDICARE

## 2023-07-14 DIAGNOSIS — D18.00 HEMANGIOMA UNSPECIFIED SITE: ICD-10-CM

## 2023-07-14 PROCEDURE — 99214 OFFICE O/P EST MOD 30 MIN: CPT

## 2023-07-14 NOTE — PHYSICAL EXAM
[Full Body Skin Exam Performed] : performed [FreeTextEntry3] : Skin examination performed of the face, neck, trunk, arms, \par \par legs- significant stasis changes, LDS, with edema bullae\par \par The patient is well, alert and oriented, pleasant and cooperative.\par Eyelids, conjunctivae, oral mucosa, digits and nails all normal.  \par No cervical adenopathy.\par \par Normal findings include:\par \par Seborrheic keratoses\par Angiomas\par Lentigines\par healed scar L lateral cheek\par \par healed scar L chest with slight hypertrophy; \par L index finger;  large myxoid cyst, bony changes, with nail dystrophy\par \par \par \par

## 2023-07-14 NOTE — HISTORY OF PRESENT ILLNESS
[de-identified] : Pt. presents for skin check;\par c/o few spots of concern;  recent hospitalization for "cellulitis of both legs"\par also;  recheck of left index finger\par Severity:  mild  \par Modifying factors:  none\par Associated symptoms:  none\par Context:  no association with activity\par \par

## 2023-07-14 NOTE — ASSESSMENT
[FreeTextEntry1] : Complete skin examination is negative for malignancy; Multiple new concerns were addressed and discussed.\par Therapeutic options and their risks and benefits; along with multiple diagnostic possibilities were discussed at length;\par risks and benefits of skin biopsy and/or other further study were discussed;\par \par Continue regular exams; Hx BCC;  f/u annually for TBSE\par \par Significant stasis/LDS;  with recent exacerbation/hospitalization;\par Agree with PT, needs lymphedema therapy;\par \par nail dystorphy; myxoid cyst;  no inflammatory changes;  avoid manipulation;

## 2023-07-20 ENCOUNTER — RESULT REVIEW (OUTPATIENT)
Age: 68
End: 2023-07-20

## 2023-07-20 ENCOUNTER — OUTPATIENT (OUTPATIENT)
Dept: OUTPATIENT SERVICES | Facility: HOSPITAL | Age: 68
LOS: 1 days | End: 2023-07-20
Payer: MEDICARE

## 2023-07-20 DIAGNOSIS — S62.109A FRACTURE OF UNSPECIFIED CARPAL BONE, UNSPECIFIED WRIST, INITIAL ENCOUNTER FOR CLOSED FRACTURE: Chronic | ICD-10-CM

## 2023-07-20 DIAGNOSIS — Z90.49 ACQUIRED ABSENCE OF OTHER SPECIFIED PARTS OF DIGESTIVE TRACT: Chronic | ICD-10-CM

## 2023-07-20 DIAGNOSIS — Z98.890 OTHER SPECIFIED POSTPROCEDURAL STATES: Chronic | ICD-10-CM

## 2023-07-20 DIAGNOSIS — I10 ESSENTIAL (PRIMARY) HYPERTENSION: ICD-10-CM

## 2023-07-20 PROCEDURE — 75635 CT ANGIO ABDOMINAL ARTERIES: CPT

## 2023-07-20 PROCEDURE — 75635 CT ANGIO ABDOMINAL ARTERIES: CPT | Mod: 26,MH

## 2023-07-21 DIAGNOSIS — I10 ESSENTIAL (PRIMARY) HYPERTENSION: ICD-10-CM

## 2023-07-24 ENCOUNTER — NON-APPOINTMENT (OUTPATIENT)
Age: 68
End: 2023-07-24

## 2023-07-24 ENCOUNTER — APPOINTMENT (OUTPATIENT)
Dept: SURGERY | Facility: CLINIC | Age: 68
End: 2023-07-24
Payer: MEDICARE

## 2023-07-24 PROCEDURE — 99213 OFFICE O/P EST LOW 20 MIN: CPT

## 2023-07-25 ENCOUNTER — NON-APPOINTMENT (OUTPATIENT)
Age: 68
End: 2023-07-25

## 2023-07-27 NOTE — PLAN
[FreeTextEntry1] : Take po diclox. Wash with hibiclens. May qualify for IPC - note sent to [facility].\par Patient presents with a component of primary Lymphedema that extends into truncal region. Patient has tried 4+ weeks of conservative therapy including exercise, elevation, compression along with self MLD yet symptoms persist. Hyperpigmentation and sensitive skin noted. Recommending the Flexitouch system\par \par sensitive skin and hyperpigmentation noted .\par \par Total time > 25 minutes

## 2023-07-27 NOTE — PHYSICAL EXAM
[Normal Breath Sounds] : Normal breath sounds [Normal Heart Sounds] : normal heart sounds [Calm] : calm [JVD] : no jugular venous distention  [de-identified] : obese [de-identified] : L lat ankle blister - opened at urgent center

## 2023-07-27 NOTE — HISTORY OF PRESENT ILLNESS
[de-identified] : PVR suggested PVD. Had CT angio at . Mild atherosclerotic changes but no major occlusions. Had L ankle blister - now on diclox. No fever.

## 2023-07-28 ENCOUNTER — APPOINTMENT (OUTPATIENT)
Dept: SURGERY | Facility: CLINIC | Age: 68
End: 2023-07-28
Payer: MEDICARE

## 2023-07-28 PROCEDURE — 99212 OFFICE O/P EST SF 10 MIN: CPT

## 2023-07-28 RX ORDER — DOXYCYCLINE 100 MG/1
100 CAPSULE ORAL
Qty: 14 | Refills: 0 | Status: ACTIVE | COMMUNITY
Start: 2023-07-16

## 2023-07-28 NOTE — PHYSICAL EXAM
[Normal Breath Sounds] : Normal breath sounds [Normal Heart Sounds] : normal heart sounds [Calm] : calm [de-identified] : soft [de-identified] : L ankle lat ulcer - stable. R leg - no cellulitis

## 2023-07-28 NOTE — ASSESSMENT
[FreeTextEntry1] : L ulcer - silvadene and Kerlex. Wear support hose when able. No po abc needed now.

## 2023-08-02 ENCOUNTER — APPOINTMENT (OUTPATIENT)
Dept: SURGERY | Facility: CLINIC | Age: 68
End: 2023-08-02
Payer: MEDICARE

## 2023-08-02 PROCEDURE — 99212 OFFICE O/P EST SF 10 MIN: CPT

## 2023-08-02 NOTE — PHYSICAL EXAM
[JVD] : no jugular venous distention  [Normal Breath Sounds] : Normal breath sounds [Normal Heart Sounds] : normal heart sounds [Skin Ulcer] : ulcer [Calm] : calm [de-identified] : soft, obese [de-identified] : L leg ulcer - no change. Compression sock replaced.

## 2023-08-22 ENCOUNTER — APPOINTMENT (OUTPATIENT)
Dept: SURGERY | Facility: CLINIC | Age: 68
End: 2023-08-22
Payer: MEDICARE

## 2023-08-22 PROCEDURE — 99212 OFFICE O/P EST SF 10 MIN: CPT

## 2023-08-22 NOTE — PHYSICAL EXAM
[JVD] : no jugular venous distention  [Normal Breath Sounds] : Normal breath sounds [Normal Heart Sounds] : normal heart sounds [Alert] : alert [Oriented to Person] : oriented to person [Oriented to Place] : oriented to place [Oriented to Time] : oriented to time [Calm] : calm [de-identified] : soft [de-identified] : L lat lower leg scab - healing nicely. Has support hose on.

## 2023-08-22 NOTE — PHYSICAL EXAM
[JVD] : no jugular venous distention  [Normal Breath Sounds] : Normal breath sounds [Normal Heart Sounds] : normal heart sounds [Alert] : alert [Oriented to Person] : oriented to person [Oriented to Place] : oriented to place [Oriented to Time] : oriented to time [Calm] : calm [de-identified] : soft [de-identified] : L lat lower leg scab - healing nicely. Has support hose on.

## 2023-08-28 ENCOUNTER — NON-APPOINTMENT (OUTPATIENT)
Age: 68
End: 2023-08-28

## 2023-08-30 ENCOUNTER — NON-APPOINTMENT (OUTPATIENT)
Age: 68
End: 2023-08-30

## 2023-08-31 ENCOUNTER — NON-APPOINTMENT (OUTPATIENT)
Age: 68
End: 2023-08-31

## 2023-09-18 ENCOUNTER — NON-APPOINTMENT (OUTPATIENT)
Age: 68
End: 2023-09-18

## 2023-10-18 ENCOUNTER — APPOINTMENT (OUTPATIENT)
Dept: SURGERY | Facility: CLINIC | Age: 68
End: 2023-10-18
Payer: MEDICARE

## 2023-10-18 PROCEDURE — 99212 OFFICE O/P EST SF 10 MIN: CPT

## 2023-10-22 ENCOUNTER — NON-APPOINTMENT (OUTPATIENT)
Age: 68
End: 2023-10-22

## 2023-10-26 ENCOUNTER — APPOINTMENT (OUTPATIENT)
Dept: SURGERY | Facility: CLINIC | Age: 68
End: 2023-10-26
Payer: MEDICARE

## 2023-10-26 PROCEDURE — 29580 STRAPPING UNNA BOOT: CPT | Mod: LT

## 2023-10-30 ENCOUNTER — APPOINTMENT (OUTPATIENT)
Dept: SURGERY | Facility: CLINIC | Age: 68
End: 2023-10-30
Payer: MEDICARE

## 2023-10-30 PROCEDURE — 99212 OFFICE O/P EST SF 10 MIN: CPT

## 2023-11-02 ENCOUNTER — APPOINTMENT (OUTPATIENT)
Dept: SURGERY | Facility: CLINIC | Age: 68
End: 2023-11-02
Payer: MEDICARE

## 2023-11-02 PROCEDURE — 99212 OFFICE O/P EST SF 10 MIN: CPT

## 2023-11-10 ENCOUNTER — APPOINTMENT (OUTPATIENT)
Dept: DERMATOLOGY | Facility: CLINIC | Age: 68
End: 2023-11-10
Payer: MEDICARE

## 2023-11-10 DIAGNOSIS — L82.1 OTHER SEBORRHEIC KERATOSIS: ICD-10-CM

## 2023-11-10 PROCEDURE — 99214 OFFICE O/P EST MOD 30 MIN: CPT

## 2023-11-30 ENCOUNTER — APPOINTMENT (OUTPATIENT)
Dept: SURGERY | Facility: CLINIC | Age: 68
End: 2023-11-30
Payer: MEDICARE

## 2023-11-30 PROCEDURE — 99213 OFFICE O/P EST LOW 20 MIN: CPT

## 2023-12-07 ENCOUNTER — APPOINTMENT (OUTPATIENT)
Dept: SURGERY | Facility: CLINIC | Age: 68
End: 2023-12-07

## 2023-12-14 ENCOUNTER — NON-APPOINTMENT (OUTPATIENT)
Age: 68
End: 2023-12-14

## 2023-12-26 ENCOUNTER — APPOINTMENT (OUTPATIENT)
Dept: SURGERY | Facility: CLINIC | Age: 68
End: 2023-12-26
Payer: MEDICARE

## 2023-12-26 PROCEDURE — 99212 OFFICE O/P EST SF 10 MIN: CPT

## 2023-12-26 NOTE — PHYSICAL EXAM
[JVD] : no jugular venous distention  [Normal Breath Sounds] : Normal breath sounds [Normal Heart Sounds] : normal heart sounds [Calm] : calm [de-identified] : soft [de-identified] : LE edema - no ulcers, no open wounds

## 2024-01-04 ENCOUNTER — APPOINTMENT (OUTPATIENT)
Dept: SURGERY | Facility: CLINIC | Age: 69
End: 2024-01-04
Payer: MEDICARE

## 2024-01-04 PROCEDURE — 99212 OFFICE O/P EST SF 10 MIN: CPT

## 2024-01-04 NOTE — ASSESSMENT
[FreeTextEntry1] : PVD - recent PVR provided. Should be able to heal toe wound. Care per Podiatry. Total time > 12 minutes

## 2024-01-04 NOTE — HISTORY OF PRESENT ILLNESS
[de-identified] : 67 yo female here following toe nail removal. Asked to check peripheral circulation.

## 2024-01-04 NOTE — PHYSICAL EXAM
[JVD] : no jugular venous distention  [Normal Breath Sounds] : Normal breath sounds [Normal Heart Sounds] : normal heart sounds [Alert] : alert [Oriented to Person] : oriented to person [Oriented to Place] : oriented to place [Oriented to Time] : oriented to time [Calm] : calm [de-identified] : soft [de-identified] : bilar LE swelling [de-identified] : R toenail excised

## 2024-01-25 ENCOUNTER — APPOINTMENT (OUTPATIENT)
Dept: DERMATOLOGY | Facility: CLINIC | Age: 69
End: 2024-01-25

## 2024-02-03 ENCOUNTER — NON-APPOINTMENT (OUTPATIENT)
Age: 69
End: 2024-02-03

## 2024-02-06 ENCOUNTER — OUTPATIENT (OUTPATIENT)
Dept: OUTPATIENT SERVICES | Facility: HOSPITAL | Age: 69
LOS: 1 days | End: 2024-02-06
Payer: MEDICARE

## 2024-02-06 DIAGNOSIS — K21.9 GASTRO-ESOPHAGEAL REFLUX DISEASE WITHOUT ESOPHAGITIS: ICD-10-CM

## 2024-02-06 DIAGNOSIS — Z90.49 ACQUIRED ABSENCE OF OTHER SPECIFIED PARTS OF DIGESTIVE TRACT: Chronic | ICD-10-CM

## 2024-02-06 DIAGNOSIS — S62.109A FRACTURE OF UNSPECIFIED CARPAL BONE, UNSPECIFIED WRIST, INITIAL ENCOUNTER FOR CLOSED FRACTURE: Chronic | ICD-10-CM

## 2024-02-06 DIAGNOSIS — Z98.890 OTHER SPECIFIED POSTPROCEDURAL STATES: Chronic | ICD-10-CM

## 2024-02-06 PROCEDURE — 74240 X-RAY XM UPR GI TRC 1CNTRST: CPT

## 2024-02-06 PROCEDURE — 74240 X-RAY XM UPR GI TRC 1CNTRST: CPT | Mod: 26

## 2024-02-07 DIAGNOSIS — K21.9 GASTRO-ESOPHAGEAL REFLUX DISEASE WITHOUT ESOPHAGITIS: ICD-10-CM

## 2024-02-17 ENCOUNTER — EMERGENCY (EMERGENCY)
Facility: HOSPITAL | Age: 69
LOS: 0 days | Discharge: HOME | End: 2024-02-17
Attending: EMERGENCY MEDICINE
Payer: MEDICARE

## 2024-02-17 VITALS
HEART RATE: 60 BPM | DIASTOLIC BLOOD PRESSURE: 89 MMHG | OXYGEN SATURATION: 98 % | SYSTOLIC BLOOD PRESSURE: 168 MMHG | RESPIRATION RATE: 17 BRPM | TEMPERATURE: 98 F

## 2024-02-17 VITALS — HEIGHT: 66 IN | WEIGHT: 259.93 LBS

## 2024-02-17 DIAGNOSIS — I10 ESSENTIAL (PRIMARY) HYPERTENSION: ICD-10-CM

## 2024-02-17 DIAGNOSIS — I65.29 OCCLUSION AND STENOSIS OF UNSPECIFIED CAROTID ARTERY: ICD-10-CM

## 2024-02-17 DIAGNOSIS — Z90.49 ACQUIRED ABSENCE OF OTHER SPECIFIED PARTS OF DIGESTIVE TRACT: Chronic | ICD-10-CM

## 2024-02-17 DIAGNOSIS — K21.9 GASTRO-ESOPHAGEAL REFLUX DISEASE WITHOUT ESOPHAGITIS: ICD-10-CM

## 2024-02-17 DIAGNOSIS — K76.0 FATTY (CHANGE OF) LIVER, NOT ELSEWHERE CLASSIFIED: ICD-10-CM

## 2024-02-17 DIAGNOSIS — Z87.891 PERSONAL HISTORY OF NICOTINE DEPENDENCE: ICD-10-CM

## 2024-02-17 DIAGNOSIS — Z98.890 OTHER SPECIFIED POSTPROCEDURAL STATES: Chronic | ICD-10-CM

## 2024-02-17 DIAGNOSIS — K44.9 DIAPHRAGMATIC HERNIA WITHOUT OBSTRUCTION OR GANGRENE: ICD-10-CM

## 2024-02-17 DIAGNOSIS — K80.20 CALCULUS OF GALLBLADDER WITHOUT CHOLECYSTITIS WITHOUT OBSTRUCTION: ICD-10-CM

## 2024-02-17 DIAGNOSIS — S62.109A FRACTURE OF UNSPECIFIED CARPAL BONE, UNSPECIFIED WRIST, INITIAL ENCOUNTER FOR CLOSED FRACTURE: Chronic | ICD-10-CM

## 2024-02-17 DIAGNOSIS — R42 DIZZINESS AND GIDDINESS: ICD-10-CM

## 2024-02-17 DIAGNOSIS — F32.A DEPRESSION, UNSPECIFIED: ICD-10-CM

## 2024-02-17 DIAGNOSIS — E78.5 HYPERLIPIDEMIA, UNSPECIFIED: ICD-10-CM

## 2024-02-17 DIAGNOSIS — F41.9 ANXIETY DISORDER, UNSPECIFIED: ICD-10-CM

## 2024-02-17 DIAGNOSIS — Z20.822 CONTACT WITH AND (SUSPECTED) EXPOSURE TO COVID-19: ICD-10-CM

## 2024-02-17 DIAGNOSIS — R00.1 BRADYCARDIA, UNSPECIFIED: ICD-10-CM

## 2024-02-17 LAB
ALBUMIN SERPL ELPH-MCNC: 3.3 G/DL — SIGNIFICANT CHANGE UP (ref 3.3–5)
ALP SERPL-CCNC: 237 U/L — HIGH (ref 40–120)
ALT FLD-CCNC: 76 U/L — SIGNIFICANT CHANGE UP (ref 12–78)
ANION GAP SERPL CALC-SCNC: 1 MMOL/L — LOW (ref 5–17)
APPEARANCE UR: ABNORMAL
AST SERPL-CCNC: 70 U/L — HIGH (ref 15–37)
BACTERIA # UR AUTO: ABNORMAL /HPF
BASOPHILS # BLD AUTO: 0.05 K/UL — SIGNIFICANT CHANGE UP (ref 0–0.2)
BASOPHILS NFR BLD AUTO: 0.6 % — SIGNIFICANT CHANGE UP (ref 0–2)
BILIRUB SERPL-MCNC: 0.5 MG/DL — SIGNIFICANT CHANGE UP (ref 0.2–1.2)
BILIRUB UR-MCNC: NEGATIVE — SIGNIFICANT CHANGE UP
BUN SERPL-MCNC: 25 MG/DL — HIGH (ref 7–23)
CALCIUM SERPL-MCNC: 9.4 MG/DL — SIGNIFICANT CHANGE UP (ref 8.5–10.1)
CAST: 4 /LPF — SIGNIFICANT CHANGE UP (ref 0–4)
CHLORIDE SERPL-SCNC: 107 MMOL/L — SIGNIFICANT CHANGE UP (ref 96–108)
CO2 SERPL-SCNC: 32 MMOL/L — HIGH (ref 22–31)
COLOR SPEC: YELLOW — SIGNIFICANT CHANGE UP
CREAT SERPL-MCNC: 0.88 MG/DL — SIGNIFICANT CHANGE UP (ref 0.5–1.3)
DIFF PNL FLD: ABNORMAL
EGFR: 72 ML/MIN/1.73M2 — SIGNIFICANT CHANGE UP
EOSINOPHIL # BLD AUTO: 0.1 K/UL — SIGNIFICANT CHANGE UP (ref 0–0.5)
EOSINOPHIL NFR BLD AUTO: 1.2 % — SIGNIFICANT CHANGE UP (ref 0–6)
FLUAV AG NPH QL: SIGNIFICANT CHANGE UP
FLUBV AG NPH QL: SIGNIFICANT CHANGE UP
GLUCOSE SERPL-MCNC: 107 MG/DL — HIGH (ref 70–99)
GLUCOSE UR QL: NEGATIVE MG/DL — SIGNIFICANT CHANGE UP
HCT VFR BLD CALC: 40.8 % — SIGNIFICANT CHANGE UP (ref 34.5–45)
HGB BLD-MCNC: 13.2 G/DL — SIGNIFICANT CHANGE UP (ref 11.5–15.5)
IMM GRANULOCYTES NFR BLD AUTO: 0.5 % — SIGNIFICANT CHANGE UP (ref 0–0.9)
KETONES UR-MCNC: NEGATIVE MG/DL — SIGNIFICANT CHANGE UP
LEUKOCYTE ESTERASE UR-ACNC: ABNORMAL
LYMPHOCYTES # BLD AUTO: 1.87 K/UL — SIGNIFICANT CHANGE UP (ref 1–3.3)
LYMPHOCYTES # BLD AUTO: 21.8 % — SIGNIFICANT CHANGE UP (ref 13–44)
MCHC RBC-ENTMCNC: 30.5 PG — SIGNIFICANT CHANGE UP (ref 27–34)
MCHC RBC-ENTMCNC: 32.4 GM/DL — SIGNIFICANT CHANGE UP (ref 32–36)
MCV RBC AUTO: 94.2 FL — SIGNIFICANT CHANGE UP (ref 80–100)
MONOCYTES # BLD AUTO: 0.4 K/UL — SIGNIFICANT CHANGE UP (ref 0–0.9)
MONOCYTES NFR BLD AUTO: 4.7 % — SIGNIFICANT CHANGE UP (ref 2–14)
NEUTROPHILS # BLD AUTO: 6.1 K/UL — SIGNIFICANT CHANGE UP (ref 1.8–7.4)
NEUTROPHILS NFR BLD AUTO: 71.2 % — SIGNIFICANT CHANGE UP (ref 43–77)
NITRITE UR-MCNC: NEGATIVE — SIGNIFICANT CHANGE UP
PH UR: 5 — SIGNIFICANT CHANGE UP (ref 5–8)
PLATELET # BLD AUTO: 286 K/UL — SIGNIFICANT CHANGE UP (ref 150–400)
POTASSIUM SERPL-MCNC: 3.6 MMOL/L — SIGNIFICANT CHANGE UP (ref 3.5–5.3)
POTASSIUM SERPL-SCNC: 3.6 MMOL/L — SIGNIFICANT CHANGE UP (ref 3.5–5.3)
PROT SERPL-MCNC: 7.8 GM/DL — SIGNIFICANT CHANGE UP (ref 6–8.3)
PROT UR-MCNC: NEGATIVE MG/DL — SIGNIFICANT CHANGE UP
RBC # BLD: 4.33 M/UL — SIGNIFICANT CHANGE UP (ref 3.8–5.2)
RBC # FLD: 14.3 % — SIGNIFICANT CHANGE UP (ref 10.3–14.5)
RBC CASTS # UR COMP ASSIST: 1 /HPF — SIGNIFICANT CHANGE UP (ref 0–4)
RSV RNA NPH QL NAA+NON-PROBE: SIGNIFICANT CHANGE UP
SARS-COV-2 RNA SPEC QL NAA+PROBE: SIGNIFICANT CHANGE UP
SODIUM SERPL-SCNC: 140 MMOL/L — SIGNIFICANT CHANGE UP (ref 135–145)
SP GR SPEC: 1.01 — SIGNIFICANT CHANGE UP (ref 1–1.03)
SQUAMOUS # UR AUTO: 20 /HPF — HIGH (ref 0–5)
TROPONIN I, HIGH SENSITIVITY RESULT: 13.01 NG/L — SIGNIFICANT CHANGE UP
UROBILINOGEN FLD QL: 0.2 MG/DL — SIGNIFICANT CHANGE UP (ref 0.2–1)
WBC # BLD: 8.56 K/UL — SIGNIFICANT CHANGE UP (ref 3.8–10.5)
WBC # FLD AUTO: 8.56 K/UL — SIGNIFICANT CHANGE UP (ref 3.8–10.5)
WBC UR QL: 6 /HPF — HIGH (ref 0–5)

## 2024-02-17 PROCEDURE — 93005 ELECTROCARDIOGRAM TRACING: CPT

## 2024-02-17 PROCEDURE — 99285 EMERGENCY DEPT VISIT HI MDM: CPT | Mod: 25

## 2024-02-17 PROCEDURE — 85025 COMPLETE CBC W/AUTO DIFF WBC: CPT

## 2024-02-17 PROCEDURE — 36415 COLL VENOUS BLD VENIPUNCTURE: CPT

## 2024-02-17 PROCEDURE — 70496 CT ANGIOGRAPHY HEAD: CPT | Mod: 26,MA

## 2024-02-17 PROCEDURE — 0241U: CPT

## 2024-02-17 PROCEDURE — 70496 CT ANGIOGRAPHY HEAD: CPT | Mod: MA

## 2024-02-17 PROCEDURE — 71045 X-RAY EXAM CHEST 1 VIEW: CPT

## 2024-02-17 PROCEDURE — 99285 EMERGENCY DEPT VISIT HI MDM: CPT | Mod: FS

## 2024-02-17 PROCEDURE — 80053 COMPREHEN METABOLIC PANEL: CPT

## 2024-02-17 PROCEDURE — 71045 X-RAY EXAM CHEST 1 VIEW: CPT | Mod: 26

## 2024-02-17 PROCEDURE — 93010 ELECTROCARDIOGRAM REPORT: CPT

## 2024-02-17 PROCEDURE — 81001 URINALYSIS AUTO W/SCOPE: CPT

## 2024-02-17 PROCEDURE — 70498 CT ANGIOGRAPHY NECK: CPT | Mod: 26,MA

## 2024-02-17 PROCEDURE — 84484 ASSAY OF TROPONIN QUANT: CPT

## 2024-02-17 PROCEDURE — 70498 CT ANGIOGRAPHY NECK: CPT | Mod: MA

## 2024-02-17 RX ORDER — MECLIZINE HCL 12.5 MG
25 TABLET ORAL ONCE
Refills: 0 | Status: COMPLETED | OUTPATIENT
Start: 2024-02-17 | End: 2024-02-17

## 2024-02-17 RX ORDER — MECLIZINE HCL 12.5 MG
1 TABLET ORAL
Qty: 15 | Refills: 0
Start: 2024-02-17 | End: 2024-02-21

## 2024-02-17 RX ADMIN — Medication 25 MILLIGRAM(S): at 13:02

## 2024-02-17 NOTE — ED ADULT NURSE NOTE - NSFALLUNIVINTERV_ED_ALL_ED
Bed/Stretcher in lowest position, wheels locked, appropriate side rails in place/Call bell, personal items and telephone in reach/Instruct patient to call for assistance before getting out of bed/chair/stretcher/Non-slip footwear applied when patient is off stretcher/[place] to call system/Physically safe environment - no spills, clutter or unnecessary equipment/Purposeful proactive rounding/Room/bathroom lighting operational, light cord in reach

## 2024-02-17 NOTE — ED ADULT NURSE NOTE - OBJECTIVE STATEMENT
68y female presents A/OX4, c/o dizziness. Pt reports that after breakfast she had an episode of dizziness, denies chest pain or vision changes. Pt went to  and was sent to ED. Pt reports symptoms have resolved now.

## 2024-02-17 NOTE — ED STATDOCS - PATIENT PORTAL LINK FT
You can access the FollowMyHealth Patient Portal offered by [facility] by registering at the following website: [email or web address]. By joining [employer]’s FollowMyHealth portal, you will also be able to view your health information using other applications (apps) compatible with our system.

## 2024-02-17 NOTE — ED STATDOCS - NSFOLLOWUPINSTRUCTIONS_ED_ALL_ED_FT
Dizziness  Dizziness is a common problem. It is a feeling of unsteadiness or light-headedness. You may feel like you are about to faint. Dizziness can lead to injury if you stumble or fall. Anyone can become dizzy, but dizziness is more common in older adults. This condition can be caused by a number of things, including medicines, dehydration, or illness.    Follow these instructions at home:  Eating and drinking    A comparison of three sample cups showing dark yellow, yellow, and pale yellow urine.  Drink enough fluid to keep your urine pale yellow. This helps to keep you from becoming dehydrated. Try to drink more clear fluids, such as water.  Do not drink alcohol.  Limit your caffeine intake if told to do so by your health care provider. Check ingredients and nutrition facts to see if a food or beverage contains caffeine.  Limit your salt (sodium) intake if told to do so by your health care provider. Check ingredients and nutrition facts to see if a food or beverage contains sodium.  Activity    A sign showing that a person should not drive.  Avoid making quick movements.  Rise slowly from chairs and steady yourself until you feel okay.  In the morning, first sit up on the side of the bed. When you feel okay, stand slowly while you hold onto something until you know that your balance is good.  If you need to  one place for a long time, move your legs often. Tighten and relax the muscles in your legs while you are standing.  Do not drive or use machinery if you feel dizzy.  Avoid bending down if you feel dizzy. Place items in your home so that they are easy for you to reach without leaning over.  Lifestyle    Do not use any products that contain nicotine or tobacco. These products include cigarettes, chewing tobacco, and vaping devices, such as e-cigarettes. If you need help quitting, ask your health care provider.  Try to reduce your stress level by using methods such as yoga or meditation. Talk with your health care provider if you need help to manage your stress.  General instructions    Watch your dizziness for any changes.  Take over-the-counter and prescription medicines only as told by your health care provider. Talk with your health care provider if you think that your dizziness is caused by a medicine that you are taking.  Tell a friend or a family member that you are feeling dizzy. If he or she notices any changes in your behavior, have this person call your health care provider.  Keep all follow-up visits. This is important.  Contact a health care provider if:  Your dizziness does not go away or you have new symptoms.  Your dizziness or light-headedness gets worse.  You feel nauseous.  You have reduced hearing.  You have a fever.  You have neck pain or a stiff neck.  Your dizziness leads to an injury or a fall.  Get help right away if:  You vomit or have diarrhea and are unable to eat or drink anything.  You have problems talking, walking, swallowing, or using your arms, hands, or legs.  You feel generally weak.  You have any bleeding.  You are not thinking clearly or you have trouble forming sentences. It may take a friend or family member to notice this.  You have chest pain, abdominal pain, shortness of breath, or sweating.  Your vision changes or you develop a severe headache.  These symptoms may represent a serious problem that is an emergency. Do not wait to see if the symptoms will go away. Get medical help right away. Call your local emergency services (911 in the [place] Do not drive yourself to the hospital.    Summary  Dizziness is a feeling of unsteadiness or light-headedness. This condition can be caused by a number of things, including medicines, dehydration, or illness.  Anyone can become dizzy, but dizziness is more common in older adults.  Drink enough fluid to keep your urine pale yellow. Do not drink alcohol.  Avoid making quick movements if you feel dizzy. Monitor your dizziness for any changes.  This information is not intended to replace advice given to you by your health care provider. Make sure you discuss any questions you have with your health care provider.    Carotid Artery Disease  Outline of the head showing the carotid artery with a close-up of narrowing and plaque in the artery.  The carotid arteries are the two main blood vessels on either side of the neck. They supply blood to the brain, other parts of the head, and the neck.    Carotid artery disease is the narrowing or blockage of one or both carotid arteries. This condition is also called carotid artery stenosis.    Carotid artery disease increases your risk for a stroke or a transient ischemic attack (TIA). A TIA is a "mini-stroke" that causes stroke-like symptoms that then go away quickly.    What are the causes?  This condition is mainly caused by a narrowing and hardening of the carotid arteries (atherosclerosis). The carotid arteries can become narrow or clogged with a buildup of fat, cholesterol, calcium, and other substances (plaque).    What increases the risk?  The following factors may make you more likely to develop this condition:  Having certain medical conditions, such as:  High cholesterol.  High blood pressure (hypertension).  Diabetes.  Obesity.  Smoking.  A family history of cardiovascular disease.  Not being active or not exercising regularly.  Being male. People who are male have an increased risk of developing atherosclerosis earlier in life than people who are female.  Being male and older than 45 years old.  Being female and older than 55 years old.  What are the signs or symptoms?  This condition may not have any signs or symptoms until a stroke or TIA occurs. In some cases, your health care provider may be able to hear a whooshing sound (bruit) with a stethoscope. This can mean that there a change in blood flow caused by plaque buildup.    How is this diagnosed?  This condition may be diagnosed with a physical exam, your medical history, and your family's medical history. You may also have tests that look at the blood flow in your carotid arteries, such as:  Carotid artery ultrasound, which uses sound waves to create pictures to show if the arteries are narrow or blocked.  Tests that use a dye injected into a vein to highlight your arteries on images, such as:  Carotid or cerebral angiography, which uses X-rays.  Computerized tomographic angiography (CTA), which uses CT scans.  Magnetic resonance angiography (MRA), which uses MRI.  An eye exam can also help find signs of this condition.    How is this treated?  This condition may be treated with more than one treatment. Treatment may include:  Lifestyle changes, such as:  Quitting smoking.  Exercising regularly as told by your health care provider.  Eating a heart-healthy diet.  Managing stress.  Getting to and staying at a healthy weight.  Medicines to control blood pressure, cholesterol, and blood clotting.  Surgery. You may have:  A carotid endarterectomy. This is a surgery to remove the blockages in the carotid arteries.  A carotid angioplasty with stenting. This is a procedure in which a small mesh tube (stent) is used to widen the blocked carotid arteries.  Follow these instructions at home:  Eating and drinking    A plate with examples of foods in a healthy diet.  Follow instructions from your health care provider about what you may eat and drink. It is important to:  Eat a healthy diet that is low in saturated fats and includes plenty of fresh fruits, vegetables, and lean meats.  Avoid foods that are high in fat and salt (sodium).  Avoid foods that are fried, overly processed, or have poor nutritional value.  Lifestyle    A sign telling a person not to smoke.   Maintain a healthy weight.  Do exercises as told by your health care provider. Each week you should get at least 150 minutes of moderate-intensity exercise or 75 minutes of vigorous exercise that takes a lot of effort.  Do not use any products that contain nicotine or tobacco. These products include cigarettes, chewing tobacco, and vaping devices, such as e-cigarettes. If you need help quitting, ask your health care provider.  Do not drink alcohol if:  Your health care provider tells you not to drink.  You are pregnant, may be pregnant, or are planning to become pregnant.  If you drink alcohol:  Limit how much you have to:  0–1 drink a day for women.  0–2 drinks a day for men.  Know how much alcohol is in your drink. In the [place] one drink equals one 12 oz bottle of beer (355 mL), one 5 oz glass of wine (148 mL), or one 1½ oz glass of hard liquor (44 mL).  Do not use drugs.  Manage your stress. Ask your health care provider for stress management tips.  General instructions    Take over-the-counter and prescription medicines only as told by your health care provider.  Keep all follow-up visits. Your health care provider will monitor your condition and may need to change your treatment plan over time.  Where to find more information  American Heart Association: heart.org  Get help right away if:  You have any symptoms of a stroke. "BE FAST" is an easy way to remember the main warning signs of a stroke:  B - Balance. Signs are dizziness, sudden trouble walking, or loss of balance.  E - Eyes. Signs are trouble seeing or a sudden change in vision.  F - Face. Signs are sudden weakness or numbness of the face, or the face or eyelid drooping on one side.  A - Arms. Signs are weakness or numbness in an arm. This happens suddenly and usually on one side of the body.  S - Speech. Signs are sudden trouble speaking, slurred speech, or trouble understanding what people say.  T - Time. Time to call emergency services. Write down what time symptoms started.  You have other signs of a stroke, such as:  A sudden, severe headache with no known cause.  Nausea or vomiting.  Seizure.  These symptoms may be an emergency. Get help right away. Call 911.  Do not wait to see if the symptoms will go away.  Do not drive yourself to the hospital.  This information is not intended to replace advice given to you by your health care provider. Make sure you discuss any questions you have with your health care provider.

## 2024-02-17 NOTE — ED ADULT NURSE NOTE - NS_SISCREENINGSR_GEN_ALL_ED
Michael is a pleasant 71-year-old male who presents today for follow up. Status post PCI x3 on 8/23/2022. Patient previously relayed history of pancreatic surgery for previous pancreatic cyst done at [facility] but no record available for review.  Last visit noted 2 months of straining with bowel movements and occasional rectal bleeding despite senna and MiraLAX daily.  Notes after bowel movement abdominal pain relieved.  Noted dysphagia to solids as well as more heartburn as of late.  History of cardiac stents recently placed taking Brilinta and ASA 81 mg daily.  He was started on omeprazole 20 mg daily and referred to colorectal surgery for evaluation of external hemorrhoids.  Advised high-fiber diet, Metamucil daily and continuing senna/MiraLAX as needed.  Barium esophagram, blood work and FibroScan were last visit not completed.  EGD dated 9/20/2019 demonstrated a regular Z line.  A single sessile polyp was found and biopsied at the gastroesophageal junction.  A small hiatal hernia was present.  Inflammation was found in the gastric antrum.  The remaining portion of the exam was otherwise unremarkable. Recommended EUS for further evaluation of duodenal nodule measuring 7 mm in the second portion of the duodenum.  Duodenitis in the second portion of the duodenum.  Pathology demonstrated benign mucosa demonstrating reactive changes with prominent underlying adipose tissue no definitive dysplasia or malignancy.  Benign mucosa with areas demonstrating reactive changes but no evidence of celiac disease.  Chronic gastritis and reactive changes negative for H. pylori.  GE junction mucosa benign.  No intestinal metaplasia.   Colonoscopy dated 9/20/2019 demonstrated one diminutive tubular adenoma removed from the mid sigmoid colon.  One diminutive tubular adenoma was removed from the mid transverse colon. One hyperplastic polyp removed from rectum. Nonbleeding internal hemorrhoids were present upon retroflexion and the remaining portion of the exam was otherwise unremarkable.  In light of patient's extremely difficult colonoscopy may consider virtual colonography when patient is due again for his surveillance modality.  Labs dated 8/26/2022 showed RBC 3.85, hemoglobin 12.8, hematocrit 38.  Platelets normal.  PT/INR normal.  Normal renal function.  Normal LFTs.  CT abdomen/pelvis with contrast dated 6/1/2022 showed diffuse hepatic steatosis.  Postsurgical changes of distal pancreatectomy.  Pancreatic head and uncinate process have normal morphology.  Simple subcentimeter right renal cysts which are not needed to follow-up  CT abdomen/pelvis with contrast dated 7/12/2022 showed cholelithiasis without cholecystitis.  Otherwise no acute abnormality.
Negative

## 2024-02-17 NOTE — ED STATDOCS - PHYSICAL EXAMINATION
Constitutional: NAD AOx3  Eyes: PERRL EOMI  Head: Normocephalic atraumatic  Mouth: MMM  Cardiac: regular rate and rhythm  Resp: Lungs CTAB  GI: Abd s/nd/nt  Neuro: A&Ox3, PERRLA, EOMI, no nystagmus, CN2-12 grossly intact, no pronator drift, normal finger to nose and rapid alternating finger movements, normal sensation and 5/5 strength in all extremities, normal gait. NIH = 0.  Skin: No visible rashes

## 2024-02-17 NOTE — ED STATDOCS - OBJECTIVE STATEMENT
67 y/o female w/ a PMHx of anxiety, depression, carotid stenosis, essential HTN, gall stones, fatty liver, heart murmur, HLD, hiatal hernia, GERD, and former smoker presents to the ED s/p episode of dizziness/lightheadedness. Pt states Sx came on after eating at a restaurant, episode lasted approx 30 min. Pt also c/o posterior neck stiffness. Denies hx of similar Sx, CP, SOB. Pt not on blood thinners. Pt states Sx are mostly improved, generalized feeling of unwell and some facial pressure. Pt notes No other complaints at this time. GI: Dr. Colon. PCP: Dr. Abraham.

## 2024-02-17 NOTE — ED STATDOCS - PROGRESS NOTE DETAILS
Pt. is a 68 year old female Hx depression, carotid stenosis, HTN, anxiety, heart murmur, HLD, GERD, presents with dizziness/lightheadedness.  Pt. was at a restaurant and after eating felt dizzy for approximately 30 minutes.  Neck pain reported.  Self resolved.  Pt .also had some pressure in her face.  She denies CP, SOB, N/V.  NO other complaints at present.  Pt. was able to drive to the ED.  Tabby Richardson PA-C Pt. laid down in CT and was very dizzy.  Meclizine ordered.  Tabby Richardson PA-C verbal reports from radiologist restenosed left carotid artery.  PT. will follow with her vascular specialist.  Will DC with Meclizine.  Return for any concerns.   Tabby Richardson PA-C

## 2024-02-17 NOTE — ED ADULT TRIAGE NOTE - CHIEF COMPLAINT QUOTE
pt comes in to the ER with complaining of sudden onset of dizziness and lightheadedness starting 2 hours ago at breakfast. pt states symptoms have resolved at this time and denies any dizziness, blurred vision, light handedness. BEFAST negative. MD calix brought to pt for eval. no code stroke as per MD. pt is axo4. ambulatory.

## 2024-02-17 NOTE — ED ADULT NURSE REASSESSMENT NOTE - NS ED NURSE REASSESS COMMENT FT1
pt reporting "something stuck in her throat," but have no problem swallowing water, pt took few sips of water, tolerated well, able to tolerated PO med.  pt c/o "I feel like the pill is stuck in my throat," md randle and jackson bean made aware, pt not drooling, tolerating PO.  pt also requesting to something to eat. awaiting for PA to re-eval pt.

## 2024-02-17 NOTE — ED STATDOCS - CLINICAL SUMMARY MEDICAL DECISION MAKING FREE TEXT BOX
Plan for labs, urine, CT head, and reeval. Plan for labs, urine, CT head, and reeval.        verbal reports from radiologist restenosed left carotid artery.  PT. will follow with her vascular specialist.  Will DC with Meclizine.  Return for any concerns.   Tabby Richardson PA-C

## 2024-02-20 ENCOUNTER — APPOINTMENT (OUTPATIENT)
Dept: SURGERY | Facility: CLINIC | Age: 69
End: 2024-02-20
Payer: MEDICARE

## 2024-02-20 PROCEDURE — 99213 OFFICE O/P EST LOW 20 MIN: CPT

## 2024-02-20 NOTE — HISTORY OF PRESENT ILLNESS
[de-identified] : 67 yo female with c/o dizziness on Saturday night. CT angio suggested 75% L ICA stenosis above prior endarterectomy. No other sx.

## 2024-02-20 NOTE — PHYSICAL EXAM
[JVD] : no jugular venous distention  [Carotid Bruits] : no carotid bruits [Normal Breath Sounds] : Normal breath sounds [Normal Heart Sounds] : normal heart sounds [No Rash or Lesion] : No rash or lesion [Alert] : alert [Oriented to Person] : oriented to person [Oriented to Place] : oriented to place [Oriented to Time] : oriented to time [Calm] : calm [de-identified] : NC/AT PER [de-identified] : soft [de-identified] : no localizing weakness

## 2024-02-26 ENCOUNTER — APPOINTMENT (OUTPATIENT)
Dept: VASCULAR SURGERY | Facility: CLINIC | Age: 69
End: 2024-02-26
Payer: MEDICARE

## 2024-02-26 PROCEDURE — 93880 EXTRACRANIAL BILAT STUDY: CPT

## 2024-02-29 ENCOUNTER — APPOINTMENT (OUTPATIENT)
Dept: SURGERY | Facility: CLINIC | Age: 69
End: 2024-02-29
Payer: MEDICARE

## 2024-02-29 PROCEDURE — 99212 OFFICE O/P EST SF 10 MIN: CPT

## 2024-02-29 NOTE — PHYSICAL EXAM
[Normal Breath Sounds] : Normal breath sounds [Carotid Bruits] : no carotid bruits [Alert] : alert [Normal Heart Sounds] : normal heart sounds [Oriented to Person] : oriented to person [Oriented to Place] : oriented to place [Oriented to Time] : oriented to time [Calm] : calm [de-identified] : soft [de-identified] : no cellulitis

## 2024-02-29 NOTE — PLAN
[FreeTextEntry1] : Pt has an appt with Dr Love. F/U with her re: intervention. Total time > 12 minutes

## 2024-03-01 ENCOUNTER — APPOINTMENT (OUTPATIENT)
Dept: DERMATOLOGY | Facility: CLINIC | Age: 69
End: 2024-03-01
Payer: MEDICARE

## 2024-03-01 DIAGNOSIS — L85.1 ACQUIRED KERATOSIS [KERATODERMA] PALMARIS ET PLANTARIS: ICD-10-CM

## 2024-03-01 DIAGNOSIS — Z85.828 PERSONAL HISTORY OF OTHER MALIGNANT NEOPLASM OF SKIN: ICD-10-CM

## 2024-03-01 DIAGNOSIS — L60.3 NAIL DYSTROPHY: ICD-10-CM

## 2024-03-01 DIAGNOSIS — L57.0 ACTINIC KERATOSIS: ICD-10-CM

## 2024-03-01 PROCEDURE — 99214 OFFICE O/P EST MOD 30 MIN: CPT | Mod: 25

## 2024-03-01 PROCEDURE — 17000 DESTRUCT PREMALG LESION: CPT

## 2024-03-01 RX ORDER — CEPHALEXIN 500 MG/1
500 CAPSULE ORAL
Qty: 21 | Refills: 0 | Status: ACTIVE | OUTPATIENT
Start: 2024-03-01

## 2024-03-01 NOTE — ASSESSMENT
[FreeTextEntry1] : AK Left index finger; cryo today;   no tx needed for myxoid cyst/ nail dystrophy    Therapeutic options and their risks and benefits; along with multiple diagnostic possibilities were discussed at length; risks and benefits of further study were discussed;  Stasis/LDS of both legs; with keratotic plaque R medial lower leg continue management with vascular;  no need for ATBx or biopsy at present  f/u for TBSE as scheduled

## 2024-03-01 NOTE — HISTORY OF PRESENT ILLNESS
[de-identified] : Pt. c/o lesion on finger, hx problems with nail;  also:  check of legs, sees vascular;

## 2024-03-01 NOTE — PHYSICAL EXAM
[FreeTextEntry3] : Left index finger; + myxoid cyst at base of nail, with nail deformity; + scaling erythematous papule; proximal to cyst on dorsal PIP  LEs;  pronounced edema with sclerotic stasis changes;  no signs infection;   large hyperkeratotic plaque R medial lower leg

## 2024-03-04 ENCOUNTER — APPOINTMENT (OUTPATIENT)
Dept: VASCULAR SURGERY | Facility: CLINIC | Age: 69
End: 2024-03-04
Payer: MEDICARE

## 2024-03-04 ENCOUNTER — APPOINTMENT (OUTPATIENT)
Dept: VASCULAR SURGERY | Facility: CLINIC | Age: 69
End: 2024-03-04

## 2024-03-04 VITALS
BODY MASS INDEX: 40.18 KG/M2 | HEIGHT: 66 IN | SYSTOLIC BLOOD PRESSURE: 155 MMHG | HEART RATE: 68 BPM | OXYGEN SATURATION: 97 % | DIASTOLIC BLOOD PRESSURE: 80 MMHG | WEIGHT: 250 LBS

## 2024-03-04 PROCEDURE — 99214 OFFICE O/P EST MOD 30 MIN: CPT

## 2024-03-04 NOTE — PHYSICAL EXAM
[Normal Breath Sounds] : Normal breath sounds [JVD] : no jugular venous distention  [Normal Rate and Rhythm] : normal rate and rhythm [2+] : left 2+ [de-identified] : well appearing ambulates unassisted [de-identified] : wnl [FreeTextEntry1] : CEAP C-5 b/l venous stasis dermatitis and signs of lymphedema

## 2024-03-04 NOTE — ASSESSMENT
[FreeTextEntry1] : 68 yr F with worsening right carotid stenosis as seen on doppler studies reviewed CTA head and neck severe right stenosis with calcified long plaque and a low bifurcation  -addressed patient's concerns about right sided carotid endarterectomy- patient requesting procedure in April due to traveling; discussed risks benefits and alternatives for intervention discussed risk of stroke over next 5 yrs with BMT versus intervention -no evidence of LLE cellulitis  -continue daily compression stockings to manage LE edema  will schedule for endarterectomy

## 2024-03-04 NOTE — HISTORY OF PRESENT ILLNESS
[FreeTextEntry1] : 68 yr F with bilateral carotid stenosis, hld, obesity, prior patient of Dr. Vo's was being followed for cellulitis and bilateral carotid stenosis. She is s/p left carotid endarterectomy in 2017. [de-identified] : Today patient presents for possible cellulitis of LLE and for follow up of right carotid stenosis. She states podiatry put her on an antibiotic for cellulitis which is bothering her stomach.  Most recent carotid doppler 2/26/24 demonstrated 70-99% right sided stenosis calcified plaque and 50-69% re-stenosis of left sided endarterectomy. She denies any neurological symptoms. She is compliant with antiplatelet and statin. She has never had symptoms of TIA,CVA, amaurosis. Regarding LLE it's improving regards to swelling and pain.

## 2024-03-07 ENCOUNTER — APPOINTMENT (OUTPATIENT)
Dept: SURGERY | Facility: CLINIC | Age: 69
End: 2024-03-07
Payer: MEDICARE

## 2024-03-07 PROCEDURE — 99212 OFFICE O/P EST SF 10 MIN: CPT

## 2024-03-07 NOTE — PHYSICAL EXAM
[JVD] : no jugular venous distention  [Normal Breath Sounds] : Normal breath sounds [Normal Heart Sounds] : normal heart sounds [Calm] : calm [de-identified] : soft [de-identified] : erythema improved

## 2024-03-09 RX ORDER — FLUCONAZOLE 100 MG/1
100 TABLET ORAL DAILY
Qty: 1 | Refills: 0 | Status: ACTIVE | COMMUNITY
Start: 2024-03-09 | End: 1900-01-01

## 2024-03-19 ENCOUNTER — INPATIENT (INPATIENT)
Facility: HOSPITAL | Age: 69
LOS: 1 days | Discharge: HOME | DRG: 69 | End: 2024-03-21
Attending: STUDENT IN AN ORGANIZED HEALTH CARE EDUCATION/TRAINING PROGRAM | Admitting: STUDENT IN AN ORGANIZED HEALTH CARE EDUCATION/TRAINING PROGRAM
Payer: MEDICARE

## 2024-03-19 VITALS
SYSTOLIC BLOOD PRESSURE: 157 MMHG | HEART RATE: 66 BPM | RESPIRATION RATE: 19 BRPM | HEIGHT: 66 IN | TEMPERATURE: 98 F | DIASTOLIC BLOOD PRESSURE: 70 MMHG | WEIGHT: 259.93 LBS | OXYGEN SATURATION: 100 %

## 2024-03-19 DIAGNOSIS — Z98.890 OTHER SPECIFIED POSTPROCEDURAL STATES: Chronic | ICD-10-CM

## 2024-03-19 DIAGNOSIS — Z90.49 ACQUIRED ABSENCE OF OTHER SPECIFIED PARTS OF DIGESTIVE TRACT: Chronic | ICD-10-CM

## 2024-03-19 DIAGNOSIS — S62.109A FRACTURE OF UNSPECIFIED CARPAL BONE, UNSPECIFIED WRIST, INITIAL ENCOUNTER FOR CLOSED FRACTURE: Chronic | ICD-10-CM

## 2024-03-19 DIAGNOSIS — I63.9 CEREBRAL INFARCTION, UNSPECIFIED: ICD-10-CM

## 2024-03-19 LAB
ALBUMIN SERPL ELPH-MCNC: 3.2 G/DL — LOW (ref 3.3–5)
ALP SERPL-CCNC: 253 U/L — HIGH (ref 40–120)
ALT FLD-CCNC: 43 U/L — SIGNIFICANT CHANGE UP (ref 12–78)
ANION GAP SERPL CALC-SCNC: 5 MMOL/L — SIGNIFICANT CHANGE UP (ref 5–17)
APTT BLD: 32.4 SEC — SIGNIFICANT CHANGE UP (ref 24.5–35.6)
AST SERPL-CCNC: 33 U/L — SIGNIFICANT CHANGE UP (ref 15–37)
BASOPHILS # BLD AUTO: 0.03 K/UL — SIGNIFICANT CHANGE UP (ref 0–0.2)
BASOPHILS NFR BLD AUTO: 0.3 % — SIGNIFICANT CHANGE UP (ref 0–2)
BILIRUB SERPL-MCNC: 0.4 MG/DL — SIGNIFICANT CHANGE UP (ref 0.2–1.2)
BUN SERPL-MCNC: 23 MG/DL — SIGNIFICANT CHANGE UP (ref 7–23)
CALCIUM SERPL-MCNC: 10.2 MG/DL — HIGH (ref 8.5–10.1)
CHLORIDE SERPL-SCNC: 107 MMOL/L — SIGNIFICANT CHANGE UP (ref 96–108)
CO2 SERPL-SCNC: 28 MMOL/L — SIGNIFICANT CHANGE UP (ref 22–31)
CREAT SERPL-MCNC: 0.85 MG/DL — SIGNIFICANT CHANGE UP (ref 0.5–1.3)
EGFR: 75 ML/MIN/1.73M2 — SIGNIFICANT CHANGE UP
EOSINOPHIL # BLD AUTO: 0.06 K/UL — SIGNIFICANT CHANGE UP (ref 0–0.5)
EOSINOPHIL NFR BLD AUTO: 0.7 % — SIGNIFICANT CHANGE UP (ref 0–6)
ERYTHROCYTE [SEDIMENTATION RATE] IN BLOOD: 66 MM/HR — HIGH (ref 0–20)
GLUCOSE BLDC GLUCOMTR-MCNC: 104 MG/DL — HIGH (ref 70–99)
GLUCOSE SERPL-MCNC: 113 MG/DL — HIGH (ref 70–99)
HCT VFR BLD CALC: 40.3 % — SIGNIFICANT CHANGE UP (ref 34.5–45)
HGB BLD-MCNC: 13 G/DL — SIGNIFICANT CHANGE UP (ref 11.5–15.5)
IMM GRANULOCYTES NFR BLD AUTO: 0.5 % — SIGNIFICANT CHANGE UP (ref 0–0.9)
INR BLD: 0.97 RATIO — SIGNIFICANT CHANGE UP (ref 0.85–1.18)
LYMPHOCYTES # BLD AUTO: 1.98 K/UL — SIGNIFICANT CHANGE UP (ref 1–3.3)
LYMPHOCYTES # BLD AUTO: 22.9 % — SIGNIFICANT CHANGE UP (ref 13–44)
MCHC RBC-ENTMCNC: 30.3 PG — SIGNIFICANT CHANGE UP (ref 27–34)
MCHC RBC-ENTMCNC: 32.3 GM/DL — SIGNIFICANT CHANGE UP (ref 32–36)
MCV RBC AUTO: 93.9 FL — SIGNIFICANT CHANGE UP (ref 80–100)
MONOCYTES # BLD AUTO: 0.35 K/UL — SIGNIFICANT CHANGE UP (ref 0–0.9)
MONOCYTES NFR BLD AUTO: 4 % — SIGNIFICANT CHANGE UP (ref 2–14)
NEUTROPHILS # BLD AUTO: 6.2 K/UL — SIGNIFICANT CHANGE UP (ref 1.8–7.4)
NEUTROPHILS NFR BLD AUTO: 71.6 % — SIGNIFICANT CHANGE UP (ref 43–77)
PLATELET # BLD AUTO: 316 K/UL — SIGNIFICANT CHANGE UP (ref 150–400)
POTASSIUM SERPL-MCNC: 3.9 MMOL/L — SIGNIFICANT CHANGE UP (ref 3.5–5.3)
POTASSIUM SERPL-SCNC: 3.9 MMOL/L — SIGNIFICANT CHANGE UP (ref 3.5–5.3)
PROT SERPL-MCNC: 7.7 GM/DL — SIGNIFICANT CHANGE UP (ref 6–8.3)
PROTHROM AB SERPL-ACNC: 11 SEC — SIGNIFICANT CHANGE UP (ref 9.5–13)
RBC # BLD: 4.29 M/UL — SIGNIFICANT CHANGE UP (ref 3.8–5.2)
RBC # FLD: 14.7 % — HIGH (ref 10.3–14.5)
SODIUM SERPL-SCNC: 140 MMOL/L — SIGNIFICANT CHANGE UP (ref 135–145)
TROPONIN I, HIGH SENSITIVITY RESULT: 10.25 NG/L — SIGNIFICANT CHANGE UP
WBC # BLD: 8.66 K/UL — SIGNIFICANT CHANGE UP (ref 3.8–10.5)
WBC # FLD AUTO: 8.66 K/UL — SIGNIFICANT CHANGE UP (ref 3.8–10.5)

## 2024-03-19 PROCEDURE — 84165 PROTEIN E-PHORESIS SERUM: CPT

## 2024-03-19 PROCEDURE — 97530 THERAPEUTIC ACTIVITIES: CPT | Mod: GP

## 2024-03-19 PROCEDURE — 86160 COMPLEMENT ANTIGEN: CPT

## 2024-03-19 PROCEDURE — 86235 NUCLEAR ANTIGEN ANTIBODY: CPT

## 2024-03-19 PROCEDURE — 86038 ANTINUCLEAR ANTIBODIES: CPT

## 2024-03-19 PROCEDURE — 70450 CT HEAD/BRAIN W/O DYE: CPT | Mod: 26,MC,XU

## 2024-03-19 PROCEDURE — 86036 ANCA SCREEN EACH ANTIBODY: CPT

## 2024-03-19 PROCEDURE — 70498 CT ANGIOGRAPHY NECK: CPT | Mod: 26,MC

## 2024-03-19 PROCEDURE — 85027 COMPLETE CBC AUTOMATED: CPT

## 2024-03-19 PROCEDURE — 80061 LIPID PANEL: CPT

## 2024-03-19 PROCEDURE — 93005 ELECTROCARDIOGRAM TRACING: CPT

## 2024-03-19 PROCEDURE — 99291 CRITICAL CARE FIRST HOUR: CPT

## 2024-03-19 PROCEDURE — 99222 1ST HOSP IP/OBS MODERATE 55: CPT

## 2024-03-19 PROCEDURE — 92523 SPEECH SOUND LANG COMPREHEN: CPT | Mod: GN

## 2024-03-19 PROCEDURE — 70496 CT ANGIOGRAPHY HEAD: CPT | Mod: 26,MC

## 2024-03-19 PROCEDURE — 99223 1ST HOSP IP/OBS HIGH 75: CPT

## 2024-03-19 PROCEDURE — 83735 ASSAY OF MAGNESIUM: CPT

## 2024-03-19 PROCEDURE — 36415 COLL VENOUS BLD VENIPUNCTURE: CPT

## 2024-03-19 PROCEDURE — 0042T: CPT | Mod: MC

## 2024-03-19 PROCEDURE — 93306 TTE W/DOPPLER COMPLETE: CPT | Mod: 26

## 2024-03-19 PROCEDURE — 86141 C-REACTIVE PROTEIN HS: CPT

## 2024-03-19 PROCEDURE — 97116 GAIT TRAINING THERAPY: CPT | Mod: GP

## 2024-03-19 PROCEDURE — 97161 PT EVAL LOW COMPLEX 20 MIN: CPT | Mod: GP

## 2024-03-19 PROCEDURE — 83036 HEMOGLOBIN GLYCOSYLATED A1C: CPT

## 2024-03-19 PROCEDURE — 93306 TTE W/DOPPLER COMPLETE: CPT

## 2024-03-19 PROCEDURE — 86334 IMMUNOFIX E-PHORESIS SERUM: CPT

## 2024-03-19 PROCEDURE — 80048 BASIC METABOLIC PNL TOTAL CA: CPT

## 2024-03-19 PROCEDURE — 93010 ELECTROCARDIOGRAM REPORT: CPT

## 2024-03-19 PROCEDURE — 85610 PROTHROMBIN TIME: CPT

## 2024-03-19 PROCEDURE — 85730 THROMBOPLASTIN TIME PARTIAL: CPT

## 2024-03-19 PROCEDURE — 93880 EXTRACRANIAL BILAT STUDY: CPT

## 2024-03-19 PROCEDURE — 84100 ASSAY OF PHOSPHORUS: CPT

## 2024-03-19 PROCEDURE — 84155 ASSAY OF PROTEIN SERUM: CPT

## 2024-03-19 PROCEDURE — 92610 EVALUATE SWALLOWING FUNCTION: CPT | Mod: GN

## 2024-03-19 PROCEDURE — 86225 DNA ANTIBODY NATIVE: CPT

## 2024-03-19 PROCEDURE — 85652 RBC SED RATE AUTOMATED: CPT

## 2024-03-19 RX ORDER — ASPIRIN/CALCIUM CARB/MAGNESIUM 324 MG
324 TABLET ORAL ONCE
Refills: 0 | Status: COMPLETED | OUTPATIENT
Start: 2024-03-19 | End: 2024-03-19

## 2024-03-19 RX ORDER — PANTOPRAZOLE SODIUM 20 MG/1
40 TABLET, DELAYED RELEASE ORAL
Refills: 0 | Status: DISCONTINUED | OUTPATIENT
Start: 2024-03-19 | End: 2024-03-21

## 2024-03-19 RX ORDER — HEPARIN SODIUM 5000 [USP'U]/ML
5000 INJECTION INTRAVENOUS; SUBCUTANEOUS EVERY 12 HOURS
Refills: 0 | Status: DISCONTINUED | OUTPATIENT
Start: 2024-03-19 | End: 2024-03-21

## 2024-03-19 RX ORDER — LOSARTAN POTASSIUM 100 MG/1
100 TABLET, FILM COATED ORAL DAILY
Refills: 0 | Status: DISCONTINUED | OUTPATIENT
Start: 2024-03-19 | End: 2024-03-21

## 2024-03-19 RX ORDER — CALCIUM CARBONATE 500(1250)
1 TABLET ORAL EVERY 6 HOURS
Refills: 0 | Status: DISCONTINUED | OUTPATIENT
Start: 2024-03-19 | End: 2024-03-21

## 2024-03-19 RX ORDER — ASPIRIN/CALCIUM CARB/MAGNESIUM 324 MG
300 TABLET ORAL DAILY
Refills: 0 | Status: DISCONTINUED | OUTPATIENT
Start: 2024-03-19 | End: 2024-03-19

## 2024-03-19 RX ORDER — LEVOTHYROXINE SODIUM 125 MCG
50 TABLET ORAL DAILY
Refills: 0 | Status: DISCONTINUED | OUTPATIENT
Start: 2024-03-19 | End: 2024-03-21

## 2024-03-19 RX ORDER — POTASSIUM CHLORIDE 20 MEQ
1 PACKET (EA) ORAL
Refills: 0 | DISCHARGE

## 2024-03-19 RX ORDER — ATORVASTATIN CALCIUM 80 MG/1
80 TABLET, FILM COATED ORAL AT BEDTIME
Refills: 0 | Status: DISCONTINUED | OUTPATIENT
Start: 2024-03-19 | End: 2024-03-21

## 2024-03-19 RX ORDER — OMEPRAZOLE 10 MG/1
1 CAPSULE, DELAYED RELEASE ORAL
Refills: 0 | DISCHARGE

## 2024-03-19 RX ORDER — FLUTICASONE PROPIONATE 50 MCG
1 SPRAY, SUSPENSION NASAL
Refills: 0 | DISCHARGE

## 2024-03-19 RX ORDER — ASPIRIN/CALCIUM CARB/MAGNESIUM 324 MG
81 TABLET ORAL DAILY
Refills: 0 | Status: DISCONTINUED | OUTPATIENT
Start: 2024-03-19 | End: 2024-03-21

## 2024-03-19 RX ORDER — ACETAMINOPHEN 500 MG
650 TABLET ORAL EVERY 6 HOURS
Refills: 0 | Status: DISCONTINUED | OUTPATIENT
Start: 2024-03-19 | End: 2024-03-21

## 2024-03-19 RX ADMIN — Medication 324 MILLIGRAM(S): at 06:47

## 2024-03-19 RX ADMIN — Medication 1 TABLET(S): at 17:28

## 2024-03-19 RX ADMIN — HEPARIN SODIUM 5000 UNIT(S): 5000 INJECTION INTRAVENOUS; SUBCUTANEOUS at 21:29

## 2024-03-19 RX ADMIN — ATORVASTATIN CALCIUM 80 MILLIGRAM(S): 80 TABLET, FILM COATED ORAL at 21:30

## 2024-03-19 RX ADMIN — PANTOPRAZOLE SODIUM 40 MILLIGRAM(S): 20 TABLET, DELAYED RELEASE ORAL at 12:19

## 2024-03-19 RX ADMIN — Medication 50 MICROGRAM(S): at 12:19

## 2024-03-19 NOTE — PHYSICAL THERAPY INITIAL EVALUATION ADULT - LEVEL OF CONSCIOUSNESS, REHAB EVAL
[facility] Initial Evaluation  Subjective:  Patient is a 56 year old female presenting with rehab left knee hpi.   Jesenia Fofana is a 56 year old female with a left knee condition.  Condition occurred with:  Insidious onset.  Condition occurred: other.  This is a new condition  Onset: 1 year ago but has been on/off since HS; CC: swelling, aches. All the time.  Medial more than lateral. Also sharp/shooting pain anteriorly; .    Patient reports pain:  Medial and anterior.    Pain is described as aching and sharp and is intermittent and reported as 6/10.  Associated symptoms:  Loss of motion/stiffness.   Symptoms are exacerbated by ascending stairs, descending stairs, bending/squatting and sitting (after prolonged inactivity; allergic to aspirin) and relieved by nothing.  Since onset symptoms are gradually worsening.                                                      Objective:  System                                                Knee Evaluation:  ROM:  Strength wnl knee: proximal hip strength grossly 4+/5.  AROM    Hyperextension:  Left:  0    Right: 0  Extension:  Left: 8    Right:  0  Flexion: Left: 127    Right: 136              Palpation:    Left knee tenderness present at:  Medial Joint Line and Patellar Tendon    Edema:  Edema of the knee: moderate edema present about knee; pitting edema lower leg.            General     ROS    Assessment/Plan:    Patient is a 56 year old female with left side knee complaints.    Patient has the following significant findings with corresponding treatment plan.                Diagnosis 1:  Knee pain with s&sx consistent with OA changes, possible meniscal injury  Decreased ROM/flexibility - manual therapy and therapeutic exercise  Decreased strength - therapeutic exercise and therapeutic activities  Impaired muscle performance - neuro re-education  Decreased function - therapeutic activities    Therapy Evaluation Codes:   1) History comprised  of:   Personal factors that impact the plan of care:      None.    Comorbidity factors that impact the plan of care are:      Allergy to aspirin .     Medications impacting care: None.  2) Examination of Body Systems comprised of:   Body structures and functions that impact the plan of care:      Knee.   Activity limitations that impact the plan of care are:      Squatting/kneeling and Stairs.  3) Clinical presentation characteristics are:   Stable/Uncomplicated.  4) Decision-Making    Low complexity using standardized patient assessment instrument and/or measureable assessment of functional outcome.  Cumulative Therapy Evaluation is: Low complexity.    Previous and current functional limitations:  (See Goal Flow Sheet for this information)    Short term and Long term goals: (See Goal Flow Sheet for this information)     Communication ability:  Patient appears to be able to clearly communicate and understand verbal and written communication and follow directions correctly.  Treatment Explanation - The following has been discussed with the patient:   RX ordered/plan of care  Anticipated outcomes  Possible risks and side effects  This patient would benefit from PT intervention to resume normal activities.   Rehab potential is good.    Frequency:  1 X week, once daily  Duration:  for 8 weeks  Discharge Plan:  Achieve all LTG.  Independent in home treatment program.  Reach maximal therapeutic benefit.    Please refer to the daily flowsheet for treatment today, total treatment time and time spent performing 1:1 timed codes.        alert

## 2024-03-19 NOTE — SWALLOW BEDSIDE ASSESSMENT ADULT - SWALLOW EVAL: PROGNOSIS
2) The pt is alert, interactive and pleasant. Her receptive language abilities were preserved and she was able to verbalize during communicative probes without evidence of a primary speech-language pathology. Pt is communicatively competent and reportedly at speech-language baseline.

## 2024-03-19 NOTE — ED ADULT NURSE NOTE - CHIEF COMPLAINT QUOTE
ambulatory to triage, complains of waking up with episode of left side facial numbness and left sided ear pain that has since resolved. speech clear. no facial droop. ms strength 5/5 upper and lower extremities bilaterally.  bgm 104 at triage.

## 2024-03-19 NOTE — CONSULT NOTE ADULT - SUBJECTIVE AND OBJECTIVE BOX
cc: stroke      HPI:  69 y/o F with PMH HTN, HLD, hypothyroidism, GERD, b/l carotid artery stenosis s/p left CEA, presents with left facial numbness since waking up at 4 AM.  Patient states she went to bed in her usual state of health at about 10 PM last night without any of the symptoms.  Upon awaking at 4 AM she waited a while before contacting a friend who urged her to come to the emergency department.  Upon arrival here she states that the majority of her symptoms have resolved and also reported some left ear pain.  While examining her patient stated that her symptoms were returning and she was feeling increased numbness of her left face.  NIHSS -1. Code stroke called. patient denies any CP, palpitations, dyspnea, n/v/d, abd pain, syncope, paralysis of extremities; +le edema swelling chronic     Patient was assessed by Tele stroke; At this time patient refusing MRI despite offering Ativan as she is claustrophobic; She understands the risks.      Family Hx mother  in her 70s from Liver disease, father  at 60 unknown cause  (19 Mar 2024 10:42)      PAST MEDICAL & SURGICAL HISTORY:  Carotid stenosis, left      Nonintractable episodic headache, unspecified headache type      Deviated septum      Acute recurrent sinusitis, unspecified location      Essential hypertension      Hyperlipidemia, unspecified hyperlipidemia type      Heart murmur      Mitral valve insufficiency, unspecified etiology      Peripheral edema  bilateral      Hiatal hernia with GERD      Irritable bowel syndrome with both constipation and diarrhea      Gall stones  gall bladder removed      Fatty liver disease, nonalcoholic      Urinary tract infection with hematuria, site unspecified  frequent.  Presently on antibiotics      Joint pain of lower limb  bilateral      Hypothyroidism, unspecified type      Morbid obesity due to excess calories      Anxiety and depression      Fracture dislocation of wrist joint      S/P cholecystectomy  1991       delivery delivered  1989      H/O arthroscopy of shoulder  Left. "Many years ago"      H/O colonoscopy            FAMILY HISTORY:  Family history of hepatic cirrhosis (Mother)  mother    Family history of heart disease (Mother)  mother    Family history of diabetes mellitus (Mother)  mother        Social Hx:      MEDICATIONS  (STANDING):  aspirin  chewable 81 milliGRAM(s) Oral daily  atorvastatin 80 milliGRAM(s) Oral at bedtime  heparin   Injectable 5000 Unit(s) SubCutaneous every 12 hours  levothyroxine 50 MICROGram(s) Oral daily  losartan 100 milliGRAM(s) Oral daily  pantoprazole    Tablet 40 milliGRAM(s) Oral before breakfast       Allergies  No Known Allergies    Intolerances  cefadroxil (Diarrhea)      ROS: Pertinent positives in HPI, all other ROS were reviewed and are negative.      Vital Signs Last 24 Hrs  T(C): 36.4 (19 Mar 2024 14:25), Max: 36.6 (19 Mar 2024 06:37)  T(F): 97.5 (19 Mar 2024 14:25), Max: 97.8 (19 Mar 2024 06:37)  HR: 56 (19 Mar 2024 14:) (56 - 66)  BP: 173/54 (19 Mar 2024 14:25) (137/71 - 173/54)  BP(mean): 85 (19 Mar 2024 06:52) (77 - 85)  RR: 17 (19 Mar 2024 14:25) (16 - 19)  SpO2: 100% (19 Mar 2024 14:25) (99% - 100%)    Parameters below as of 19 Mar 2024 12:14  Patient On (Oxygen Delivery Method): room air            Constitutional: awake and alert.  HEENT: PERRLA, EOMI,   Neck: Supple.  Respiratory: Breath sounds are clear bilaterally  Cardiovascular: S1 and S2, regular / irregular rhythm  Gastrointestinal: soft, nontender  Extremities:  no edema  Vascular: Caritid Bruit - no  Musculoskeletal: no joint swelling/tenderness, no abnormal movements  Skin: No rashes    Neurological exam:  HF: A x O x 3. Appropriately interactive, normal affect. Speech fluent, No Aphasia or paraphasic errors. Naming /repetition intact   CN: ASHLEY, EOMI, VFF, facial sensation normal, no NLFD, tongue midline, Palate moves equally, SCM equal bilaterally  Motor: No pronator drift, Strength 5/5 in all 4 ext, normal bulk and tone, no tremor, rigidity or bradykinesia.    Sens: Intact to light touch / PP/ VS/ JS    Reflexes: Symmetric and normal . BJ 2+, BR 2+, KJ 2+, AJ 2+, downgoing toes b/l  Coord:  No FNFA, dysmetria, DEREK intact   Gait/Balance: Normal/Cannot test    NIHSS:          Labs:       140  |  107  |  23  ----------------------------<  113<H>  3.9   |  28  |  0.85    Ca    10.2<H>      19 Mar 2024 06:21    TPro  7.7  /  Alb  3.2<L>  /  TBili  0.4  /  DBili  x   /  AST  33  /  ALT  43  /  AlkPhos  253<H>                                13.0   8.66  )-----------( 316      ( 19 Mar 2024 06:21 )             40.3       Radiology:  < from: CT Angio Neck Stroke Protocol w/ IV Cont (24 @ 06:31) >    IMPRESSION:  CT ANGIOGRAPHY NECK: Redemonstrated dense atherosclerotic calcification   at the right carotid bifurcation with mild/moderate stenosis of the   proximal right internal carotid artery. Status post left carotid   endarterectomy.    CT ANGIOGRAPHY BRAIN:  1.  No evidence of a major vessel occlusion, flow-limiting stenosis or   aneurysm about the [place].  2.  No evidence of dural venous sinus thrombosis or an arteriovenous   malformation    CT PERFUSION: No perfusion defect using threshold values of Tmax >6   seconds and CBF <30%.         cc: stroke      HPI:  67 y/o F with PMH HTN, HLD, hypothyroidism, GERD, b/l carotid artery stenosis s/p left CEA, presents with left facial numbness since waking up at 4 AM.  It was associated with headache and L ear pain.  All sx's lasted an hour.  There is no h/o HA's or migraines.  She denies dysarthria, diplopia, facial droop, focal weakness or any other numbness.  She has been under some stress lately with moving.  Code stroke called in the ED,  NIHSS 1.  Tele stroke was called.  She was not a candidate for IV thrombolytics because she was OOTW, and there was no debilitating disease.  CTH/CTA/CTP was neg for acute bleed, LVO, ischemia.  Patient is adamantly refusing to have MRI even if offered general anesthesia.      PAST MEDICAL & SURGICAL HISTORY:  Carotid stenosis, left      Nonintractable episodic headache, unspecified headache type      Deviated septum      Acute recurrent sinusitis, unspecified location      Essential hypertension      Hyperlipidemia, unspecified hyperlipidemia type      Heart murmur      Mitral valve insufficiency, unspecified etiology      Peripheral edema  bilateral      Hiatal hernia with GERD      Irritable bowel syndrome with both constipation and diarrhea      Gall stones  gall bladder removed      Fatty liver disease, nonalcoholic      Urinary tract infection with hematuria, site unspecified  frequent.  Presently on antibiotics      Joint pain of lower limb  bilateral      Hypothyroidism, unspecified type      Morbid obesity due to excess calories      Anxiety and depression      Fracture dislocation of wrist joint      S/P cholecystectomy  1991       delivery delivered  1989      H/O arthroscopy of shoulder  Left. "Many years ago"      H/O colonoscopy            FAMILY HISTORY:  Family history of hepatic cirrhosis (Mother)  mother        Social Hx:  Quit tobacco 35 yrs ago, denies ETOH/illicit drug use    MEDICATIONS  (STANDING):  aspirin  chewable 81 milliGRAM(s) Oral daily  atorvastatin 80 milliGRAM(s) Oral at bedtime  heparin   Injectable 5000 Unit(s) SubCutaneous every 12 hours  levothyroxine 50 MICROGram(s) Oral daily  losartan 100 milliGRAM(s) Oral daily  pantoprazole    Tablet 40 milliGRAM(s) Oral before breakfast       Allergies  No Known Allergies    Intolerances  cefadroxil (Diarrhea)      ROS: Pertinent positives in HPI, all other ROS were reviewed and are negative.      Vital Signs Last 24 Hrs  T(C): 36.4 (19 Mar 2024 14:25), Max: 36.6 (19 Mar 2024 06:37)  T(F): 97.5 (19 Mar 2024 14:25), Max: 97.8 (19 Mar 2024 06:37)  HR: 56 (19 Mar 2024 14:25) (56 - 66)  BP: 173/54 (19 Mar 2024 14:25) (137/71 - 173/54)  BP(mean): 85 (19 Mar 2024 06:52) (77 - 85)  RR: 17 (19 Mar 2024 14:25) (16 - 19)  SpO2: 100% (19 Mar 2024 14:25) (99% - 100%)        Constitutional: awake, NAD, obese  HEAD: Normocephalic  Neck: Supple.  Extremities:  +b/l LE edema  Musculoskeletal: no abnormal movements  Skin: No rashes, +vascular changes to LE skin b/l    Neurological exam:  HF: A x O x 3. Appropriately interactive, normal affect. Speech fluent, No Aphasia or paraphasic errors. Naming /repetition intact   CN: ASHLEY, EOMI, VFF, facial sensation normal, no NLFD, tongue midline, Palate moves equally, SCM equal bilaterally  Motor: No pronator drift, Strength 5/5 in all 4 ext, normal bulk and tone   Sens: Intact to light touch   Reflexes: Symmetric and normal, downgoing toes b/l  Coord:  No dysmetria, DEREK intact   Gait/Balance: Normal    NIHSS: 0        Labs:       140  |  107  |  23  ----------------------------<  113<H>  3.9   |  28  |  0.85    Ca    10.2<H>      19 Mar 2024 06:21    TPro  7.7  /  Alb  3.2<L>  /  TBili  0.4  /  DBili  x   /  AST  33  /  ALT  43  /  AlkPhos  253<H>                                13.0   8.66  )-----------( 316      ( 19 Mar 2024 06:21 )             40.3       Radiology:  < from: CT Angio Neck Stroke Protocol w/ IV Cont (24 @ 06:31) >    IMPRESSION:  CT ANGIOGRAPHY NECK: Redemonstrated dense atherosclerotic calcification   at the right carotid bifurcation with mild/moderate stenosis of the   proximal right internal carotid artery. Status post left carotid   endarterectomy.    CT ANGIOGRAPHY BRAIN:  1.  No evidence of a major vessel occlusion, flow-limiting stenosis or   aneurysm about the [place].  2.  No evidence of dural venous sinus thrombosis or an arteriovenous   malformation    CT PERFUSION: No perfusion defect using threshold values of Tmax >6   seconds and CBF <30%.         cc: stroke      HPI:  69 y/o F with PMH HTN, HLD, hypothyroidism, b/l carotid artery stenosis s/p left CEA, presented with left facial numbness since waking up at 4 AM. Pt reports it was associated with headache and L ear pain, all sx's lasted an hour, no prior h/o HA's or migraines.  She denies dysarthria, diplopia, facial droop, focal weakness or any other numbness.  She has been under some stress lately with moving.      In ED, Code stroke called,  NIHSS 1,Tele stroke consulted, was not a candidate for IV thrombolytics because she was OOTW, and there was no appreciable deficits. CTH/CTA/CTP was neg for acute bleed, LVO, ischemia. Patient is adamantly refusing to have MRI even if offered general anesthesia.      PAST MEDICAL & SURGICAL HISTORY:  Carotid stenosis, left  Nonintractable episodic headache, unspecified headache type  Deviated septum  Acute recurrent sinusitis, unspecified location  Essential hypertension  Hyperlipidemia, unspecified hyperlipidemia type  Heart murmur  Mitral valve insufficiency, unspecified etiology  Peripheral edema  Hiatal hernia with GERD  Irritable bowel syndrome with both constipation and diarrhea  Fatty liver disease, nonalcoholic  Urinary tract infection with hematuria,   Joint pain of lower limb bilateral  Hypothyroidism, unspecified type  Morbid obesity due to excess calories  Anxiety and depression  Fracture dislocation of wrist joint  S/P cholecystectomy      FAMILY HISTORY:  Family history of hepatic cirrhosis (Mother)      Social Hx:  Quit tobacco 35 yrs ago, denies ETOH/illicit drug use      MEDICATIONS  (STANDING):  aspirin  chewable 81 milliGRAM(s) Oral daily  atorvastatin 80 milliGRAM(s) Oral at bedtime  heparin   Injectable 5000 Unit(s) SubCutaneous every 12 hours  levothyroxine 50 MICROGram(s) Oral daily  losartan 100 milliGRAM(s) Oral daily  pantoprazole    Tablet 40 milliGRAM(s) Oral before breakfast       Allergies  No Known Allergies    Intolerances  cefadroxil (Diarrhea)      ROS: Pertinent positives in HPI, all other ROS were reviewed and are negative.      Vital Signs Last 24 Hrs  T(C): 36.4 (19 Mar 2024 14:25), Max: 36.6 (19 Mar 2024 06:37)  T(F): 97.5 (19 Mar 2024 14:25), Max: 97.8 (19 Mar 2024 06:37)  HR: 56 (19 Mar 2024 14:25) (56 - 66)  BP: 173/54 (19 Mar 2024 14:25) (137/71 - 173/54)  BP(mean): 85 (19 Mar 2024 06:52) (77 - 85)  RR: 17 (19 Mar 2024 14:25) (16 - 19)  SpO2: 100% (19 Mar 2024 14:25) (99% - 100%)        Constitutional: awake, NAD, obese  HEAD: Normocephalic  Neck: Supple.  Extremities:  +b/l LE edema  Musculoskeletal: no abnormal movements  Skin: No rashes, +vascular changes to LE skin b/l    Neurological exam:  HF: A x O x 3. Appropriately interactive, normal affect. Speech fluent, No Aphasia or paraphasic errors. Naming /repetition intact   CN: ASHLEY, EOMI, VFF, facial sensation normal, no NLFD, tongue midline, Palate moves equally, SCM equal bilaterally  Motor: No pronator drift, Strength 5/5 in all 4 ext, normal bulk and tone   Sens: Intact to light touch   Reflexes: Symmetric and normal, downgoing toes b/l  Coord:  No dysmetria, DEREK intact   Gait/Balance: Normal    NIHSS: 0        Labs:   03-19    140  |  107  |  23  ----------------------------<  113<H>  3.9   |  28  |  0.85    Ca    10.2<H>      19 Mar 2024 06:21    TPro  7.7  /  Alb  3.2<L>  /  TBili  0.4  /  DBili  x   /  AST  33  /  ALT  43  /  AlkPhos  253<H>  03-19                              13.0   8.66  )-----------( 316      ( 19 Mar 2024 06:21 )             40.3       Radiology:  < from: CT Angio Neck Stroke Protocol w/ IV Cont (03.19.24 @ 06:31) >    IMPRESSION:  CT ANGIOGRAPHY NECK: Re demonstrated dense atherosclerotic calcification   at the right carotid bifurcation with mild/moderate stenosis of the   proximal right internal carotid artery. Status post left carotid   endarterectomy.    CT ANGIOGRAPHY BRAIN:  1.  No evidence of a major vessel occlusion, flow-limiting stenosis or   aneurysm about the [place].  2.  No evidence of dural venous sinus thrombosis or an arteriovenous   malformation    CT PERFUSION: No perfusion defect using threshold values of Tmax >6   seconds and CBF <30%.         cc: stroke      HPI:  69 y/o F with PMH HTN, HLD, hypothyroidism, b/l carotid artery stenosis s/p left CEA, presented with left facial numbness since waking up at 4 AM. Pt reports it was associated with L ear pain, all sx's lasted an hour, no prior h/o HA's or migraines.  She denies dysarthria, diplopia, facial droop, focal weakness or any other numbness.  She has been under some stress lately with moving.      In ED, Code stroke called,  NIHSS 1,Tele stroke consulted, was not a candidate for IV thrombolytics because she was OOTW, and there was no appreciable deficits. CTH/CTA/CTP was neg for acute bleed, LVO, ischemia. Patient is adamantly refusing to have MRI even if offered general anesthesia.      PAST MEDICAL & SURGICAL HISTORY:  Carotid stenosis, left  Nonintractable episodic headache, unspecified headache type  Deviated septum  Acute recurrent sinusitis, unspecified location  Essential hypertension  Hyperlipidemia, unspecified hyperlipidemia type  Heart murmur  Mitral valve insufficiency, unspecified etiology  Peripheral edema  Hiatal hernia with GERD  Irritable bowel syndrome with both constipation and diarrhea  Fatty liver disease, nonalcoholic  Urinary tract infection with hematuria,   Joint pain of lower limb bilateral  Hypothyroidism, unspecified type  Morbid obesity due to excess calories  Anxiety and depression  Fracture dislocation of wrist joint  S/P cholecystectomy      FAMILY HISTORY:  Family history of hepatic cirrhosis (Mother)      Social Hx:  Quit tobacco 35 yrs ago, denies ETOH/illicit drug use      MEDICATIONS  (STANDING):  aspirin  chewable 81 milliGRAM(s) Oral daily  atorvastatin 80 milliGRAM(s) Oral at bedtime  heparin   Injectable 5000 Unit(s) SubCutaneous every 12 hours  levothyroxine 50 MICROGram(s) Oral daily  losartan 100 milliGRAM(s) Oral daily  pantoprazole    Tablet 40 milliGRAM(s) Oral before breakfast       Allergies  No Known Allergies    Intolerances  cefadroxil (Diarrhea)      ROS: Pertinent positives in HPI, all other ROS were reviewed and are negative.      Vital Signs Last 24 Hrs  T(C): 36.4 (19 Mar 2024 14:25), Max: 36.6 (19 Mar 2024 06:37)  T(F): 97.5 (19 Mar 2024 14:25), Max: 97.8 (19 Mar 2024 06:37)  HR: 56 (19 Mar 2024 14:25) (56 - 66)  BP: 173/54 (19 Mar 2024 14:25) (137/71 - 173/54)  BP(mean): 85 (19 Mar 2024 06:52) (77 - 85)  RR: 17 (19 Mar 2024 14:25) (16 - 19)  SpO2: 100% (19 Mar 2024 14:25) (99% - 100%)        Constitutional: awake, NAD, obese  HEAD: Normocephalic  Neck: Supple.  Extremities:  +b/l LE edema  Musculoskeletal: no abnormal movements  Skin: No rashes, +vascular changes to LE skin b/l    Neurological exam:  HF: A x O x 3. Appropriately interactive, normal affect. Speech fluent, No Aphasia or paraphasic errors. Naming /repetition intact   CN: ASHLEY, EOMI, VFF, facial sensation normal, no NLFD, tongue midline, Palate moves equally, SCM equal bilaterally  Motor: No pronator drift, Strength 5/5 in all 4 ext, normal bulk and tone   Sens: Intact to light touch   Reflexes: Symmetric and normal, downgoing toes b/l  Coord:  No dysmetria, DEREK intact   Gait/Balance: Normal    NIHSS: 0        Labs:   03-19    140  |  107  |  23  ----------------------------<  113<H>  3.9   |  28  |  0.85    Ca    10.2<H>      19 Mar 2024 06:21    TPro  7.7  /  Alb  3.2<L>  /  TBili  0.4  /  DBili  x   /  AST  33  /  ALT  43  /  AlkPhos  253<H>  03-19                              13.0   8.66  )-----------( 316      ( 19 Mar 2024 06:21 )             40.3       Radiology:  < from: CT Angio Neck Stroke Protocol w/ IV Cont (03.19.24 @ 06:31) >    IMPRESSION:  CT ANGIOGRAPHY NECK: Re demonstrated dense atherosclerotic calcification   at the right carotid bifurcation with mild/moderate stenosis of the   proximal right internal carotid artery. Status post left carotid   endarterectomy.    CT ANGIOGRAPHY BRAIN:  1.  No evidence of a major vessel occlusion, flow-limiting stenosis or   aneurysm about the [place] of [place].  2.  No evidence of dural venous sinus thrombosis or an arteriovenous   malformation    CT PERFUSION: No perfusion defect using threshold values of Tmax >6   seconds and CBF <30%.

## 2024-03-19 NOTE — CONSULT NOTE ADULT - ASSESSMENT
-ASA/PLAVIX  -Atorvastatin  -DVT prophylaxis  -Dysphagia screen  -Speech and swallow eval  -PT eval/ rehab eval    Mangement d/w Pt / family /     Total Critical Care Time spent:   69 y/o F with PMH HTN, HLD, hypothyroidism, GERD, b/l carotid artery stenosis s/p left CEA, presents with left facial numbness since waking up at 4 AM.  It was associated with headache and L ear pain.  All sx's lasted an hour.  There is no h/o HA's or migraines.  She denies dysarthria, diplopia, facial droop, focal weakness or any other numbness.  She has been under some stress lately with moving.  Code stroke called in the ED,  NIHSS 1.  Tele stroke was called.  She was not a candidate for IV thrombolytics because she was OOTW, and there was no debilitating disease.  CTH/CTA/CTP was neg for acute bleed, LVO, ischemia.  Patient is adamantly refusing to have MRI even if offered general anesthesia.        #L facial numbness with HA and L ear pain lasting an hour-could be migraine with aura but cannot r/o TIA.  Doubt stroke as sx's are resolved.      Recommendations  -Monitor on tele  -Continue ASA 81mg QD, Atorvastatin 80mg qHS (goal LDL should be <70)   -Add Plavix 75mg QD x 21 days then stop  -MRI brain recommended but patient refusing  -DVT prophylaxis  -TTE with bubble study  -F/U with Neurology outpatient  -Check LDL, A1c    D/W Dr. Moreno       69 y/o F with PMH HTN, HLD, hypothyroidism, b/l carotid artery stenosis s/p left CEA, presented to ED with C/O left facial numbness since waking up at 4 AM, associated with headache and L ear pain, all resolved in an hour, no prior h/o HA's or migraines.  She denies dysarthria, diplopia, facial droop, focal weakness or any other numbness, has been under some stress lately with moving.      In ED, Code stroke called,  NIHSS 1,Tele stroke consulted, was not a candidate for IV thrombolytics, OOTW, no appreciable deficits. CTH/CTA/CTP was neg for acute bleed, LVO, ischemia.      # L facial numbness, HA and L ear pain, resolved in an hour;  migraine with aura but cannot r/o TIA..      Recommendations  -Monitor on tele  -Continue ASA 81mg QD, Atorvastatin 80mg qHS (goal LDL should be <70)   -Add Plavix 75mg QD x 21 days then stop  -MRI brain recommended but patient refusing  -DVT prophylaxis  -TTE with bubble study  -F/U with Neurology outpatient  -Check LDL, A1c    D/W Dr. Moreno       67 y/o F with PMH HTN, HLD, hypothyroidism, b/l carotid artery stenosis s/p left CEA, presented to ED with C/O left facial numbness since waking up at 4 AM, associated with L ear pain, all resolved in an hour, no prior h/o HA's or migraines.  She denies dysarthria, diplopia, facial droop, focal weakness or any other numbness, has been under some stress lately with moving.      In ED, Code stroke called,  NIHSS 1,Tele stroke consulted, was not a candidate for IV thrombolytics, OOTW, no appreciable deficits. CTH/CTA/CTP was neg for acute bleed, LVO, ischemia.      # L facial numbness and L ear pain, resolved in an hour;  possibility of migraine / variant with aura,  cannot r/o TIA..      Recommendations  -Monitor on tele  -Continue ASA 81mg QD, Atorvastatin 80mg qHS (goal LDL should be <70)   -Add Plavix 75mg QD x 21 days then stop  -MRI brain recommended but patient refusing  -DVT prophylaxis  -TTE with bubble study  -F/U with Neurology outpatient  -Check LDL, A1c    D/W Dr. Moreno

## 2024-03-19 NOTE — SWALLOW BEDSIDE ASSESSMENT ADULT - SWALLOW EVAL: CRITERIA FOR SKILLED INTERVENTION MET
DO NOT FEEL THAT ACUTE SPEECH PATHOLOGY FOLLOW UP WOULD CHANGE CLINICAL MANAGEMENT/OUTCOME IN HOSPITAL SETTING. PT'S SPEECH-LANGUAGE AND OROPHARYNGEAL SWALLOWING INTEGRITY ARE FUNCTIONAL/AT BASELINE/ARE MAXIMIZED. GIVEN ABOVE, THIS SERVICE WILL NOT ACTIVELY FOLLOW. RECONSULT PRN SHOULD STATUS CHANGE AND CONDITION WARRANT.

## 2024-03-19 NOTE — ED ADULT NURSE NOTE - OBJECTIVE STATEMENT
Pt arrived to ED with c/o waking up at 4am with left side facial numbness and left sided ear pain that has since resolved. Speech clear, no facial droop, strength 5/5 upper and lower extremities bilaterally, no drift in arms, sensation intact bilaterally, and PERRLA. Pt AOx4 denies chest pain, sob, headache, vision changes, fevers, or n/v/d. Pt states this has happened to her other times but it normally goes away quicker then it did this time.

## 2024-03-19 NOTE — ED PROVIDER NOTE - CLINICAL SUMMARY MEDICAL DECISION MAKING FREE TEXT BOX
68-year-old female with left facial numbness with waxing and waning symptoms worsening here during my evaluation.  Code stroke called.  NIH stroke scale–1.  Telestroke attending contacted for consult.  Patient out of the time window for TNK and also with low NIH stroke scale with non-debilitating symptoms.  No TNK indicated at this time.  Discussed with telestroke attending, Dr. Toussaint who agrees.  Does recommend further evaluation with MRI and admission for stroke workup.  Patient also known to have upcoming carotid endarterectomy planned for middle of April.  Recommended to potentially have this sooner if possible.   CT head negative.   full dose Aspirin given.  Patient admitted for further workup.  Signout given to Hospitalist Dr. Benavidez.

## 2024-03-19 NOTE — CONSULT NOTE ADULT - ASSESSMENT
69 y/o F with PMH HTN, HLD, hypothyroidism, GERD, b/l carotid artery stenosis s/p left CEA, presents with left facial numbness since waking up at 4 AM.  Patient states she went to bed in her usual state of health at about 10 PM last night without any of the symptoms.  Upon awaking at 4 AM she waited a while before contacting a friend who urged her to come to the emergency department.  Upon arrival here she states that the majority of her symptoms have resolved and also reported some left ear pain.  While examining her patient stated that her symptoms were returning and she was feeling increased numbness of her left face.  NIHSS -1. Code stroke called. Patient denies any CP, palpitations, dyspnea, n/v/d, abd pain, syncope, paralysis of extremities; +le edema swelling chronic     #Possible TIA v CVA  #Right carotid mild/moderate stenosis - Scheduled for Right CEA in April  #Hypertension  #Hyperlipidemia    - Monitor on tele, so far no event  - Continue aspirin, statin  - Started on Plavix per Neurology for TIA x21 days  - TTE reviewed, no bubble study performed. LVEF normal.  - Refused MRI  - C/w losartan, HCTZ held - I think for permissive HTN    Will follow

## 2024-03-19 NOTE — CONSULT NOTE ADULT - NS ATTEND AMEND GEN_ALL_CORE FT
Patient seen and examined, history reviewed with patient.  Patient reports that the numbness was intense, very frightening, was followed by left ear pain, no associated symptoms or real headache.    Possibility of trigeminal neuralgia variant versus cephalgia, given history of bilateral carotid disease, TIA is less likely but cannot be excluded    – Agree with above plan  - Will also obtain ESR    As the patient does not want MRI we can defer it

## 2024-03-19 NOTE — ED ADULT TRIAGE NOTE - HEIGHT IN FEET
5 Klisyri Pregnancy And Lactation Text: It is unknown if this medication can harm a developing fetus or if it is excreted in breast milk.

## 2024-03-19 NOTE — PHYSICAL THERAPY INITIAL EVALUATION ADULT - GENERAL OBSERVATIONS, REHAB EVAL
pt rec'd supine in bed on 3N, HM, pleasant/cooperative, obese, chr appearing BLE edema (looks like lymphedema). speech clear.

## 2024-03-19 NOTE — CHART NOTE - NSCHARTNOTEFT_GEN_A_CORE
TELESTROKE NOTE        Discussion with: Dr. Arriaga ED provider  HISTORY OF PRESENT ILLNESS:  67 yo F w/ hx of HTN and prior CEA of L carotid, went to bed around 10pm 3/18/2024 woke up at 4am today, w/ L face numbness. Never went away although improved. This has happened several times in the past and is scheduled for R CEA in April, but since it persisted longer, came in today.       NIHSS 1 for face numbness  TPA/TNK Out of window for TNK  Imaging CT CTA CTP personally reviewed, CTH unremarkable, CTA w/ b/l carotid stenosis of mod-severe degree. perfusion without significant abnormalities.     Labs:  CAPILLARY BLOOD GLUCOSE      POCT Blood Glucose.: 104 mg/dL (19 Mar 2024 05:51)        NEUROLOGIC EXAMINATION deferred – interprofessional audio discussion only         No TNK given oow, no thrombectomy given no acute complete large vessel occlusion     Recommend permissive bp to allow perfusion, Admit to [facility] for stroke evaluation and evaluation for carotid intervention.

## 2024-03-19 NOTE — CONSULT NOTE ADULT - SUBJECTIVE AND OBJECTIVE BOX
CHIEF COMPLAINT: Patient is a 68y old  Female who presents with a chief complaint of Left sided numbness (19 Mar 2024 14:43)    FROM H&P: 69 y/o F with PMH HTN, HLD, hypothyroidism, GERD, b/l carotid artery stenosis s/p left CEA, presents with left facial numbness since waking up at 4 AM.  Patient states she went to bed in her usual state of health at about 10 PM last night without any of the symptoms.  Upon awaking at 4 AM she waited a while before contacting a friend who urged her to come to the emergency department.  Upon arrival here she states that the majority of her symptoms have resolved and also reported some left ear pain.  While examining her patient stated that her symptoms were returning and she was feeling increased numbness of her left face.  NIHSS -1. Code stroke called. patient denies any CP, palpitations, dyspnea, n/v/d, abd pain, syncope, paralysis of extremities; +le edema swelling chronic     Patient was assessed by Tele stroke; At this time patient refusing MRI despite offering Ativan as she is claustrophobic; She understands the risks.      Family Hx mother  in her 70s from Liver disease, father  at 60 unknown cause  (19 Mar 2024 10:42)          MEDICATIONS  (STANDING):  aspirin  chewable 81 milliGRAM(s) Oral daily  atorvastatin 80 milliGRAM(s) Oral at bedtime  heparin   Injectable 5000 Unit(s) SubCutaneous every 12 hours  levothyroxine 50 MICROGram(s) Oral daily  losartan 100 milliGRAM(s) Oral daily  pantoprazole    Tablet 40 milliGRAM(s) Oral before breakfast    MEDICATIONS  (PRN):  acetaminophen     Tablet .. 650 milliGRAM(s) Oral every 6 hours PRN Temp greater or equal to 38C (100.4F), Mild Pain (1 - 3)          PHYSICAL EXAM:  Vital Signs Last 24 Hrs  T(C): 36.4 (19 Mar 2024 14:25), Max: 36.6 (19 Mar 2024 06:37)  T(F): 97.5 (19 Mar 2024 14:25), Max: 97.8 (19 Mar 2024 06:37)  HR: 56 (19 Mar 2024 14:25) (56 - 66)  BP: 173/54 (19 Mar 2024 14:25) (137/71 - 173/54)  BP(mean): 85 (19 Mar 2024 06:52) (77 - 85)  RR: 17 (19 Mar 2024 14:25) (16 - 19)  SpO2: 100% (19 Mar 2024 14:25) (99% - 100%)    Parameters below as of 19 Mar 2024 12:14  Patient On (Oxygen Delivery Method): room air        Constitutional: NAD, awake and alert  HEENT: PERR, EOMI, Normal Hearing, MMM  Neck: Soft and supple, No LAD, No JVD  Respiratory: Breath sounds are clear bilaterally, No wheezing, rales or rhonchi  Cardiovascular: S1 and S2, regular rate and rhythm, no Murmurs, gallops or rubs  Gastrointestinal: Bowel Sounds present, soft, nontender, nondistended, no guarding, no rebound  Extremities: No peripheral edema  Vascular: 2+ peripheral pulses  Neurological: A/O x 3, no focal deficits  Musculoskeletal: 5/5 strength b/l upper and lower extremities  Skin: No rashes    =======================================    INTERPRETATION OF TELEMETRY:    ECG:    ========================================    LABS:                        13.0   8.66  )-----------( 316      ( 19 Mar 2024 06:21 )             40.3     03-19    140  |  107  |  23  ----------------------------<  113<H>  3.9   |  28  |  0.85    Ca    10.2<H>      19 Mar 2024 06:21    TPro  7.7  /  Alb  3.2<L>  /  TBili  0.4  /  DBili  x   /  AST  33  /  ALT  43  /  AlkPhos  253<H>  03-19        PT/INR - ( 19 Mar 2024 07:46 )   PT: 11.0 sec;   INR: 0.97 ratio         PTT - ( 19 Mar 2024 07:46 )  PTT:32.4 sec  Urinalysis Basic - ( 19 Mar 2024 06:21 )    Color: x / Appearance: x / SG: x / pH: x  Gluc: 113 mg/dL / Ketone: x  / Bili: x / Urobili: x   Blood: x / Protein: x / Nitrite: x   Leuk Esterase: x / RBC: x / WBC x   Sq Epi: x / Non Sq Epi: x / Bacteria: x      I&O's Summary    BNP      RADIOLOGY & ADDITIONAL STUDIES: CHIEF COMPLAINT: Patient is a 68y old  Female who presents with a chief complaint of Left sided numbness (19 Mar 2024 14:43)    FROM H&P: 67 y/o F with PMH HTN, HLD, hypothyroidism, GERD, b/l carotid artery stenosis s/p left CEA, presents with left facial numbness since waking up at 4 AM.  Patient states she went to bed in her usual state of health at about 10 PM last night without any of the symptoms.  Upon awaking at 4 AM she waited a while before contacting a friend who urged her to come to the emergency department.  Upon arrival here she states that the majority of her symptoms have resolved and also reported some left ear pain.  While examining her patient stated that her symptoms were returning and she was feeling increased numbness of her left face.  NIHSS -1. Code stroke called. Patient denies any CP, palpitations, dyspnea, n/v/d, abd pain, syncope, paralysis of extremities; +le edema swelling chronic   Patient was assessed by Tele stroke; At this time patient refusing MRI despite offering Ativan as she is claustrophobic; She understands the risks.    Family Hx mother  in her 70s from Liver disease, father  at 60 unknown cause  (19 Mar 2024 10:42)    Cardiology consulted for possible TIA        MEDICATIONS  (STANDING):  aspirin  chewable 81 milliGRAM(s) Oral daily  atorvastatin 80 milliGRAM(s) Oral at bedtime  heparin   Injectable 5000 Unit(s) SubCutaneous every 12 hours  levothyroxine 50 MICROGram(s) Oral daily  losartan 100 milliGRAM(s) Oral daily  pantoprazole    Tablet 40 milliGRAM(s) Oral before breakfast    MEDICATIONS  (PRN):  acetaminophen     Tablet .. 650 milliGRAM(s) Oral every 6 hours PRN Temp greater or equal to 38C (100.4F), Mild Pain (1 - 3)    HOME MEDICATIONS:  aspirin 81 mg oral tablet: 1 tab(s) orally once a day (19 Mar 2024 09:38)  dexlansoprazole 60 mg oral delayed release capsule: 1 cap(s) orally once a day (19 Mar 2024 09:37)  levothyroxine 50 mcg (0.05 mg) oral tablet: 1 tab(s) orally once a day (19 Mar 2024 09:39)  losartan-hydroCHLOROthiazide 100 mg-12.5 mg oral tablet: 1 tab(s) orally once a day (19 Mar 2024 09:39)  rosuvastatin 40 mg oral tablet: 1 tab(s) orally once a day (19 Mar 2024 09:39)    PHYSICAL EXAM:  Vital Signs Last 24 Hrs  T(C): 36.4 (19 Mar 2024 14:25), Max: 36.6 (19 Mar 2024 06:37)  T(F): 97.5 (19 Mar 2024 14:25), Max: 97.8 (19 Mar 2024 06:37)  HR: 56 (19 Mar 2024 14:25) (56 - 66)  BP: 173/54 (19 Mar 2024 14:25) (137/71 - 173/54)  BP(mean): 85 (19 Mar 2024 06:52) (77 - 85)  RR: 17 (19 Mar 2024 14:25) (16 - 19)  SpO2: 100% (19 Mar 2024 14:25) (99% - 100%)    Parameters below as of 19 Mar 2024 12:14  Patient On (Oxygen Delivery Method): room air    Constitutional: NAD, awake and alert  HEENT: PERR, EOMI, Normal Hearing, MMM  Neck: Soft and supple, No LAD, No JVD  Respiratory: Breath sounds are clear bilaterally, No wheezing, rales or rhonchi  Cardiovascular: S1 and S2, regular rate and rhythm, no Murmurs, gallops or rubs  Gastrointestinal: Bowel Sounds present, soft, nontender, nondistended, no guarding, no rebound  Extremities: No peripheral edema  Vascular: 2+ peripheral pulses  Neurological: A/O x 3, no focal deficits  Musculoskeletal: 5/5 strength b/l upper and lower extremities  Skin: No rashes    =======================================    INTERPRETATION OF TELEMETRY:    ECG:< from: 12 Lead ECG (24 @ 06:46) >  Diagnosis Line Normal sinus rhythm  Moderate voltage criteria for LVH, may be normal variant ( R in aVL , Samy product )  Borderline ECG  When compared with ECG of 2024 11:25,  No significant change was found    ========================================    LABS:                        13.0   8.66  )-----------( 316      ( 19 Mar 2024 06:21 )             40.3         140  |  107  |  23  ----------------------------<  113<H>  3.9   |  28  |  0.85    Ca    10.2<H>      19 Mar 2024 06:21    TPro  7.7  /  Alb  3.2<L>  /  TBili  0.4  /  DBili  x   /  AST  33  /  ALT  43  /  AlkPhos  253<H>      PT/INR - ( 19 Mar 2024 07:46 )   PT: 11.0 sec;   INR: 0.97 ratio       PTT - ( 19 Mar 2024 07:46 )  PTT:32.4 sec    RADIOLOGY & ADDITIONAL STUDIES:    < from: CT Angio Neck Stroke Protocol w/ IV Cont (24 @ 06:31) >  IMPRESSION:  CT ANGIOGRAPHY NECK: Redemonstrated dense atherosclerotic calcification   at the right carotid bifurcation with mild/moderate stenosis of the   proximal right internal carotid artery. Status post left carotid   endarterectomy.    CT ANGIOGRAPHY BRAIN:  1.  No evidence of a major vessel occlusion, flow-limiting stenosis or   aneurysm about the [place].  2.  No evidence of dural venous sinus thrombosis or an arteriovenous   malformation    CT PERFUSION: No perfusion defect using threshold values of Tmax >6   seconds and CBF <30%.   CHIEF COMPLAINT: Patient is a 68y old  Female who presents with a chief complaint of Left sided numbness (19 Mar 2024 14:43)    FROM H&P: 67 y/o F with PMH HTN, HLD, hypothyroidism, GERD, b/l carotid artery stenosis s/p left CEA, presents with left facial numbness since waking up at 4 AM.  Patient states she went to bed in her usual state of health at about 10 PM last night without any of the symptoms.  Upon awaking at 4 AM she waited a while before contacting a friend who urged her to come to the emergency department.  Upon arrival here she states that the majority of her symptoms have resolved and also reported some left ear pain.  While examining her patient stated that her symptoms were returning and she was feeling increased numbness of her left face.  NIHSS -1. Code stroke called. Patient denies any CP, palpitations, dyspnea, n/v/d, abd pain, syncope, paralysis of extremities; +le edema swelling chronic   Patient was assessed by Tele stroke; At this time patient refusing MRI despite offering Ativan as she is claustrophobic; She understands the risks.    Family Hx mother  in her 70s from Liver disease, father  at 60 unknown cause  (19 Mar 2024 10:42)    Cardiology consulted for possible TIA  She is feeling well, no CP or SOB    MEDICATIONS  (STANDING):  aspirin  chewable 81 milliGRAM(s) Oral daily  atorvastatin 80 milliGRAM(s) Oral at bedtime  heparin   Injectable 5000 Unit(s) SubCutaneous every 12 hours  levothyroxine 50 MICROGram(s) Oral daily  losartan 100 milliGRAM(s) Oral daily  pantoprazole    Tablet 40 milliGRAM(s) Oral before breakfast    MEDICATIONS  (PRN):  acetaminophen     Tablet .. 650 milliGRAM(s) Oral every 6 hours PRN Temp greater or equal to 38C (100.4F), Mild Pain (1 - 3)    HOME MEDICATIONS:  aspirin 81 mg oral tablet: 1 tab(s) orally once a day (19 Mar 2024 09:38)  dexlansoprazole 60 mg oral delayed release capsule: 1 cap(s) orally once a day (19 Mar 2024 09:37)  levothyroxine 50 mcg (0.05 mg) oral tablet: 1 tab(s) orally once a day (19 Mar 2024 09:39)  losartan-hydroCHLOROthiazide 100 mg-12.5 mg oral tablet: 1 tab(s) orally once a day (19 Mar 2024 09:39)  rosuvastatin 40 mg oral tablet: 1 tab(s) orally once a day (19 Mar 2024 09:39)    PHYSICAL EXAM:  Vital Signs Last 24 Hrs  T(C): 36.4 (19 Mar 2024 14:25), Max: 36.6 (19 Mar 2024 06:37)  T(F): 97.5 (19 Mar 2024 14:25), Max: 97.8 (19 Mar 2024 06:37)  HR: 56 (19 Mar 2024 14:25) (56 - 66)  BP: 173/54 (19 Mar 2024 14:25) (137/71 - 173/54)  BP(mean): 85 (19 Mar 2024 06:52) (77 - 85)  RR: 17 (19 Mar 2024 14:25) (16 - 19)  SpO2: 100% (19 Mar 2024 14:25) (99% - 100%)    Parameters below as of 19 Mar 2024 12:14  Patient On (Oxygen Delivery Method): room air    Constitutional: NAD, awake and alert  HEENT: PERR, EOMI, Normal Hearing, MMM  Neck: Soft and supple, No LAD, No JVD  Respiratory: Breath sounds are clear bilaterally, No wheezing, rales or rhonchi  Cardiovascular: S1 and S2, regular rate and rhythm, no Murmurs, gallops or rubs  Gastrointestinal: Bowel Sounds present, soft, nontender, nondistended, no guarding, no rebound  Extremities: No peripheral edema  Vascular: 2+ peripheral pulses  Neurological: A/O x 3, no focal deficits  Musculoskeletal: 5/5 strength b/l upper and lower extremities  Skin: No rashes    =======================================    INTERPRETATION OF TELEMETRY:  SR    ECG:< from: 12 Lead ECG (24 @ 06:46) >  Diagnosis Line Normal sinus rhythm  Moderate voltage criteria for LVH, may be normal variant ( R in aVL , Samy product )  Borderline ECG  When compared with ECG of 2024 11:25,  No significant change was found    ========================================    LABS:                        13.0   8.66  )-----------( 316      ( 19 Mar 2024 06:21 )             40.3         140  |  107  |  23  ----------------------------<  113<H>  3.9   |  28  |  0.85    Ca    10.2<H>      19 Mar 2024 06:21    TPro  7.7  /  Alb  3.2<L>  /  TBili  0.4  /  DBili  x   /  AST  33  /  ALT  43  /  AlkPhos  253<H>      PT/INR - ( 19 Mar 2024 07:46 )   PT: 11.0 sec;   INR: 0.97 ratio       PTT - ( 19 Mar 2024 07:46 )  PTT:32.4 sec    CARDIAC TESTING:    < from: TTE Echo Complete w/o Contrast w/ Doppler (24 @ 10:16) >     The left ventricle is normal in size, wall thickness, wall motion and   contractility.   Estimated left ventricular ejection fraction is 60-65 %.   The aortic valve is well visualized, appears mildly calcified. Valve   opening seems to be normal.   The mitral valve leaflets appear calcified.   Mild mitral annular calcification is present.   Trace mitral regurgitation is present.   Normal appearing tricuspid valve structure.   Trace to mild tricuspid valve regurgitation is present.   Normal appearing pulmonic valve structure.   Trace pulmonic valvular regurgitation is present.    < end of copied text >      RADIOLOGY & ADDITIONAL STUDIES:    < from: CT Angio Neck Stroke Protocol w/ IV Cont (24 @ 06:31) >  IMPRESSION:  CT ANGIOGRAPHY NECK: Redemonstrated dense atherosclerotic calcification   at the right carotid bifurcation with mild/moderate stenosis of the   proximal right internal carotid artery. Status post left carotid   endarterectomy.    CT ANGIOGRAPHY BRAIN:  1.  No evidence of a major vessel occlusion, flow-limiting stenosis or   aneurysm about the [place].  2.  No evidence of dural venous sinus thrombosis or an arteriovenous   malformation    CT PERFUSION: No perfusion defect using threshold values of Tmax >6   seconds and CBF <30%.

## 2024-03-19 NOTE — SWALLOW BEDSIDE ASSESSMENT ADULT - SWALLOW EVAL: DIAGNOSIS
1) The pt demonstrates Oropharyngeal Swallowing integrity which subjectively appeared to be within functional parameters for age. Bolus formation/transfer were mechanically functional for age w/o focality, swallow triggered in an acceptable time frame for age and laryngeal lift on palpation during swallowing trials was felt to be functional for age as well. NO behavioral aspiration signs exhibited. No change in O2 sats noted. Odynophagia denied. Oropharyngeal swallowing integrity is felt to be stable for age.

## 2024-03-19 NOTE — ED ADULT NURSE NOTE - NSFALLUNIVINTERV_ED_ALL_ED
Bed/Stretcher in lowest position, wheels locked, appropriate side rails in place/Call bell, personal items and telephone in reach/Instruct patient to call for assistance before getting out of bed/chair/stretcher/Non-slip footwear applied when patient is off stretcher/[place] to call system/Physically safe environment - no spills, clutter or unnecessary equipment/Purposeful proactive rounding/Room/bathroom lighting operational, light cord in reach

## 2024-03-19 NOTE — PHYSICAL THERAPY INITIAL EVALUATION ADULT - ACTIVE RANGE OF MOTION EXAMINATION, REHAB EVAL
except B hip/knee flex ltd by body habitus, jeans/bilateral upper extremity Active ROM was WFL (within functional limits)/bilateral  lower extremity Active ROM was WFL (within functional limits)

## 2024-03-19 NOTE — ED ADULT TRIAGE NOTE - CHIEF COMPLAINT QUOTE
ambulatory to triage, complains of waking up with episode of left side facial numbness and left sided ear pain that has since resolved. speech clear. no facial droop. ms strength 5/5 upper and lower extremities bilaterally. ambulatory to triage, complains of waking up with episode of left side facial numbness and left sided ear pain that has since resolved. speech clear. no facial droop. ms strength 5/5 upper and lower extremities bilaterally.  bgm 104 at triage.

## 2024-03-19 NOTE — H&P ADULT - ASSESSMENT
69 yo F w/ hx of HTN and prior CEA of L carotid, went to bed around 10pm 3/18/2024 woke up at 4am today, w/ L face numbness. Never went away although improved. This has happened several times in the past and is scheduled for R CEA in April, but since it persisted longer, came in today.     #Left Sided Facial Numbness 2ndry to TIA  #Right carotid mild/moderate stenosis - Scheduled for Right CEA in April  -Admit to tele   -NIHSS 1 for face numbness  -CTH unremarkable, CTA w/ b/l carotid stenosis of mod-severe degree. perfusion without significant abnormalities.   -Neuro tele consult appreciated   -continue permissive bp to allow perfusion, continue losartan hold HCTZ  -Neuro consult  -cardio consult  -Vascular consult  -Neuro checks  -increase statin   -start ASA    -Patient refusing MRI  -FU TTE, EKG     #History of HTN, HLD, hypothyroidism, GERD, b/l carotid artery stenosis, basal cell carcinoma of the skin  - c/w home medications;     #DVT ppx:   -Hep SQ

## 2024-03-19 NOTE — SWALLOW BEDSIDE ASSESSMENT ADULT - COMMENTS
The patient was admitted to  with acute onset of left facial numbness that resolved. CTH negative for acute stroke. Though, imaging is remarkable for right carotid stenosis for which she is scheduled to undergo a right CEA. She has a selected known history of carotid artery stenosis s/p left CEA, HLD, HTN, hypothyroidism, HLD, IBS, peripheral edema, mitral insufficiency, and obesity. See below for additional prior medical information.

## 2024-03-19 NOTE — PATIENT PROFILE ADULT - FALL HARM RISK - ATTEMPT OOB
UPON ENTERING PATIENTS ROOM TO TURN HIM HE SHOWED SIGNS OF RESPIRATORY DISTRESS: HEAVY RISE AND FALL OF CHEST AND  MOUTH BREATHING. PATIENT ALSO EXPERIENCING DIAPHORESIS. TEMPERATURE 102.8, GIVEN 500MG OF ACETAMINOPHEN AS PRESCRIBED PRN FOR FEVER. BS CHECK 164. RN SUPE   No

## 2024-03-19 NOTE — ED ADULT NURSE REASSESSMENT NOTE - NS ED NURSE REASSESS COMMENT FT1
Assumed care of pt from Daniel KEITA at 07:00, pt is anxious and states she has a headache at this time, NSR on CM, pt observed ambulating to bathroom with steady gait.

## 2024-03-19 NOTE — H&P ADULT - HISTORY OF PRESENT ILLNESS
69 y/o F with PMH HTN, HLD, hypothyroidism, GERD, b/l carotid artery stenosis s/p left CEA, presents with left facial numbness since waking up at 4 AM.  Patient states she went to bed in her usual state of health at about 10 PM last night without any of the symptoms.  Upon awaking at 4 AM she waited a while before contacting a friend who urged her to come to the emergency department.  Upon arrival here she states that the majority of her symptoms have resolved and also reported some left ear pain.  While examining her patient stated that her symptoms were returning and she was feeling increased numbness of her left face.  NIHSS -1. Code stroke called. patient denies any CP, palpitations, dyspnea, n/v/d, abd pain, syncope, paralysis of extremities; +le edema swelling chronic     Patient was assessed by Tele stroke; At this time patient refusing MRI despite offering Ativan as she is claustrophobic; She understands the risks.      Family Hx mother  in her 70s from Liver disease, father  at 60 unknown cause

## 2024-03-19 NOTE — H&P ADULT - NSHPPHYSICALEXAM_GEN_ALL_CORE
PHYSICAL EXAM:  Constitutional: NAD, awake and alert +obese  Neurological: AAO x 3, no focal deficits  HEENT: PERRLA, EOMI, MMM  Neck: Soft and supple, No LAD, No JVD  Respiratory: Breath sounds are clear bilaterally, No wheezing, rales or rhonchi  Cardiovascular: S1 and S2, regular rate and rhythm; no Murmurs, gallops or rubs  Gastrointestinal: Bowel Sounds present, soft, nontender, nondistended, no guarding, no rebound tenderness  Back: No CVA tenderness   Extremities: 2+ Pitting peripheral edema  Vascular: 2+ peripheral pulses  Musculoskeletal: 5/5 strength b/l upper and lower extremities  Skin: No rashes  Breast: Deferred  Rectal: Deferred

## 2024-03-19 NOTE — SWALLOW BEDSIDE ASSESSMENT ADULT - SLP GENERAL OBSERVATIONS
The pt is alert, interactive and pleasant. Her receptive language abilities were preserved and she was able to verbalize during communicative probes without evidence of a primary speech-language pathology. Pt is communicatively competent and reportedly at speech-language baseline.

## 2024-03-19 NOTE — SWALLOW BEDSIDE ASSESSMENT ADULT - NS SPL SWALLOW CLINIC TRIAL FT
The patient demonstrated Oropharyngeal Swallowing integrity which subjectively appeared to be within functional parameters for age. Bolus formation/transfer were mechanically functional for age w/o focality, swallow triggered in an acceptable time frame for age and laryngeal lift on palpation during swallowing trials was felt to be functional for age as well. NO behavioral aspiration signs exhibited. No change in O2 sats noted. Odynophagia denied. Oropharyngeal swallowing integrity is felt to be stable for age.

## 2024-03-19 NOTE — ED PROVIDER NOTE - PHYSICAL EXAMINATION
***GEN - NAD; well appearing; A+O x3 ***HEAD - NC/AT ***EYES/NOSE - PERRL, EOMI, mucous membranes moist, no discharge ***THROAT: Oral cavity and pharynx normal. No inflammation, swelling, exudate, or lesions.  ***NECK: Neck supple, non-tender   ***PULMONARY - CTA b/l, symmetric breath sounds. ***CARDIAC -s1s2, RRR, no M,G,R  ***ABDOMEN - +BS, ND, NT, soft, no guarding, no rebound, no masses   ***BACK - no CVA tenderness, Normal  spine ***EXTREMITIES - symmetric pulses, 2+ dp, capillary refill < 2 seconds ***SKIN - no rash or bruising   ***NEUROLOGIC - alert, CN 2-12 intact, reflexes nl, sensation nl, coordination nl, finger to nose nl, romberg negative, motor 5/5 RUE/LUE/RLE/LLE/EHL/Plantar flexion, no pronator drift, gait nl. NIHSS-1

## 2024-03-19 NOTE — ED PROVIDER NOTE - OBJECTIVE STATEMENT
68-year-old female with history of hypothyroidism, hypertension and hyperlipidemia presents with left facial numbness since waking up at 4 AM.  Patient states she went to bed in her usual state of health at about 10 PM last night without any of the symptoms.  Upon awaking at 4 AM she waited a while before contacting a friend who urged her to come to the emergency department.  Upon arrival here she states that the majority of her symptoms have resolved and also reported some left ear pain.  While examining her patient stated that her symptoms were returning and she was feeling increased numbness of her left face.  NIHSS -1. Code stroke called.

## 2024-03-19 NOTE — H&P ADULT - NSHPLABSRESULTS_GEN_ALL_CORE
Lab Results:  CBC  CBC Full  -  ( 19 Mar 2024 06:21 )  WBC Count : 8.66 K/uL  RBC Count : 4.29 M/uL  Hemoglobin : 13.0 g/dL  Hematocrit : 40.3 %  Platelet Count - Automated : 316 K/uL  Mean Cell Volume : 93.9 fl  Mean Cell Hemoglobin : 30.3 pg  Mean Cell Hemoglobin Concentration : 32.3 gm/dL  Auto Neutrophil # : 6.20 K/uL  Auto Lymphocyte # : 1.98 K/uL  Auto Monocyte # : 0.35 K/uL  Auto Eosinophil # : 0.06 K/uL  Auto Basophil # : 0.03 K/uL  Auto Neutrophil % : 71.6 %  Auto Lymphocyte % : 22.9 %  Auto Monocyte % : 4.0 %  Auto Eosinophil % : 0.7 %  Auto Basophil % : 0.3 %    .		Differential:	[] Automated		[] Manual  Chemistry                        13.0   8.66  )-----------( 316      ( 19 Mar 2024 06:21 )             40.3     03-19    140  |  107  |  23  ----------------------------<  113<H>  3.9   |  28  |  0.85    Ca    10.2<H>      19 Mar 2024 06:21    TPro  7.7  /  Alb  3.2<L>  /  TBili  0.4  /  DBili  x   /  AST  33  /  ALT  43  /  AlkPhos  253<H>  03-19    LIVER FUNCTIONS - ( 19 Mar 2024 06:21 )  Alb: 3.2 g/dL / Pro: 7.7 gm/dL / ALK PHOS: 253 U/L / ALT: 43 U/L / AST: 33 U/L / GGT: x           PT/INR - ( 19 Mar 2024 07:46 )   PT: 11.0 sec;   INR: 0.97 ratio         PTT - ( 19 Mar 2024 07:46 )  PTT:32.4 sec  Urinalysis Basic - ( 19 Mar 2024 06:21 )    Color: x / Appearance: x / SG: x / pH: x  Gluc: 113 mg/dL / Ketone: x  / Bili: x / Urobili: x   Blood: x / Protein: x / Nitrite: x   Leuk Esterase: x / RBC: x / WBC x   Sq Epi: x / Non Sq Epi: x / Bacteria: x      RADIOLOGY RESULTS:  EKG NSR     < from: CT Angio Neck Stroke Protocol w/ IV Cont (03.19.24 @ 06:31) >      IMPRESSION:  CT ANGIOGRAPHY NECK: Redemonstrated dense atherosclerotic calcification   at the right carotid bifurcation with mild/moderate stenosis of the   proximal right internal carotid artery. Status post left carotid   endarterectomy.    CT ANGIOGRAPHY BRAIN:  1.  No evidence of a major vessel occlusion, flow-limiting stenosis or   aneurysm about the [place].  2.  No evidence of dural venous sinus thrombosis or an arteriovenous   malformation    CT PERFUSION: No perfusion defect using threshold values of Tmax >6   seconds and CBF <30%.    < end of copied text >    < from: CT Brain Stroke Protocol (03.19.24 @ 06:19) >      IMPRESSION:  No acute intracranial hemorrhage or mass effect.    Findings discussed with Dr. Arriaga by  via teams call at 6:26 AM   on 3/19/2024 with read back verification.    < end of copied text >

## 2024-03-19 NOTE — PHYSICAL THERAPY INITIAL EVALUATION ADULT - PERTINENT HX OF CURRENT PROBLEM, REHAB EVAL
presents with left facial numbness since waking up at 4 AM.  It was associated with headache and L ear pain.  All sx's lasted an hour. CTH/CTA/CTP neg. Pt refusing MRI.

## 2024-03-19 NOTE — ED ADULT NURSE REASSESSMENT NOTE - NS ED NURSE REASSESS COMMENT FT1
Pt received A+Ox4. VSS. Pt denies facial numbness or any other S+S stroke. Ambulated to BR without difficulty. Pt instructed in plan of care. RSR 50-70 on monitor. Monitored closely. Call bell in reach.

## 2024-03-20 ENCOUNTER — TRANSCRIPTION ENCOUNTER (OUTPATIENT)
Age: 69
End: 2024-03-20

## 2024-03-20 LAB
A1C WITH ESTIMATED AVERAGE GLUCOSE RESULT: 5.5 % — SIGNIFICANT CHANGE UP (ref 4–5.6)
ANION GAP SERPL CALC-SCNC: 5 MMOL/L — SIGNIFICANT CHANGE UP (ref 5–17)
BUN SERPL-MCNC: 25 MG/DL — HIGH (ref 7–23)
CALCIUM SERPL-MCNC: 9.7 MG/DL — SIGNIFICANT CHANGE UP (ref 8.5–10.1)
CHLORIDE SERPL-SCNC: 107 MMOL/L — SIGNIFICANT CHANGE UP (ref 96–108)
CHOLEST SERPL-MCNC: 212 MG/DL — HIGH
CO2 SERPL-SCNC: 27 MMOL/L — SIGNIFICANT CHANGE UP (ref 22–31)
CREAT SERPL-MCNC: 0.8 MG/DL — SIGNIFICANT CHANGE UP (ref 0.5–1.3)
CRP SERPL HS-MCNC: 8.17 MG/L — HIGH (ref 0–3)
EGFR: 81 ML/MIN/1.73M2 — SIGNIFICANT CHANGE UP
ERYTHROCYTE [SEDIMENTATION RATE] IN BLOOD: 82 MM/HR — HIGH (ref 0–20)
ESTIMATED AVERAGE GLUCOSE: 111 MG/DL — SIGNIFICANT CHANGE UP (ref 68–114)
GLUCOSE SERPL-MCNC: 109 MG/DL — HIGH (ref 70–99)
HCT VFR BLD CALC: 37 % — SIGNIFICANT CHANGE UP (ref 34.5–45)
HDLC SERPL-MCNC: 99 MG/DL — SIGNIFICANT CHANGE UP
HGB BLD-MCNC: 12 G/DL — SIGNIFICANT CHANGE UP (ref 11.5–15.5)
LIPID PNL WITH DIRECT LDL SERPL: 105 MG/DL — HIGH
MAGNESIUM SERPL-MCNC: 2.1 MG/DL — SIGNIFICANT CHANGE UP (ref 1.6–2.6)
MCHC RBC-ENTMCNC: 30.6 PG — SIGNIFICANT CHANGE UP (ref 27–34)
MCHC RBC-ENTMCNC: 32.4 GM/DL — SIGNIFICANT CHANGE UP (ref 32–36)
MCV RBC AUTO: 94.4 FL — SIGNIFICANT CHANGE UP (ref 80–100)
NON HDL CHOLESTEROL: 113 MG/DL — SIGNIFICANT CHANGE UP
PHOSPHATE SERPL-MCNC: 3 MG/DL — SIGNIFICANT CHANGE UP (ref 2.5–4.5)
PLATELET # BLD AUTO: 307 K/UL — SIGNIFICANT CHANGE UP (ref 150–400)
POTASSIUM SERPL-MCNC: 4.2 MMOL/L — SIGNIFICANT CHANGE UP (ref 3.5–5.3)
POTASSIUM SERPL-SCNC: 4.2 MMOL/L — SIGNIFICANT CHANGE UP (ref 3.5–5.3)
RBC # BLD: 3.92 M/UL — SIGNIFICANT CHANGE UP (ref 3.8–5.2)
RBC # FLD: 14.8 % — HIGH (ref 10.3–14.5)
SODIUM SERPL-SCNC: 139 MMOL/L — SIGNIFICANT CHANGE UP (ref 135–145)
TRIGL SERPL-MCNC: 43 MG/DL — SIGNIFICANT CHANGE UP
WBC # BLD: 8.89 K/UL — SIGNIFICANT CHANGE UP (ref 3.8–10.5)
WBC # FLD AUTO: 8.89 K/UL — SIGNIFICANT CHANGE UP (ref 3.8–10.5)

## 2024-03-20 PROCEDURE — 99232 SBSQ HOSP IP/OBS MODERATE 35: CPT

## 2024-03-20 PROCEDURE — 93880 EXTRACRANIAL BILAT STUDY: CPT | Mod: 26

## 2024-03-20 PROCEDURE — 99233 SBSQ HOSP IP/OBS HIGH 50: CPT

## 2024-03-20 PROCEDURE — 99222 1ST HOSP IP/OBS MODERATE 55: CPT

## 2024-03-20 PROCEDURE — 99222 1ST HOSP IP/OBS MODERATE 55: CPT | Mod: GC

## 2024-03-20 PROCEDURE — 93010 ELECTROCARDIOGRAM REPORT: CPT

## 2024-03-20 RX ADMIN — HEPARIN SODIUM 5000 UNIT(S): 5000 INJECTION INTRAVENOUS; SUBCUTANEOUS at 21:07

## 2024-03-20 RX ADMIN — Medication 50 MICROGRAM(S): at 06:24

## 2024-03-20 RX ADMIN — PANTOPRAZOLE SODIUM 40 MILLIGRAM(S): 20 TABLET, DELAYED RELEASE ORAL at 06:24

## 2024-03-20 RX ADMIN — Medication 81 MILLIGRAM(S): at 11:22

## 2024-03-20 RX ADMIN — HEPARIN SODIUM 5000 UNIT(S): 5000 INJECTION INTRAVENOUS; SUBCUTANEOUS at 11:23

## 2024-03-20 RX ADMIN — LOSARTAN POTASSIUM 100 MILLIGRAM(S): 100 TABLET, FILM COATED ORAL at 11:22

## 2024-03-20 RX ADMIN — ATORVASTATIN CALCIUM 80 MILLIGRAM(S): 80 TABLET, FILM COATED ORAL at 21:07

## 2024-03-20 NOTE — DISCHARGE NOTE PROVIDER - NSDCFUADDAPPT_GEN_ALL_CORE_FT
Rheumatology appointment within 2 weeks of discharge, at [address] [place] [address].  Office number to schedule is [phone]    Kailyn Barrow MD  [facility]- Rheumatology at [place]  [phone] Rheumatology appointment 3/28/24 at 1:40 PM  Kailyn Barrow MD  [address] [place] [address].  [facility]- Rheumatology at [place]  [phone]

## 2024-03-20 NOTE — PROGRESS NOTE ADULT - ASSESSMENT
67 y/o F with PMH HTN, HLD, hypothyroidism, GERD, b/l carotid artery stenosis s/p left CEA, presents with left facial numbness since waking up at 4 AM.  Patient states she went to bed in her usual state of health at about 10 PM last night without any of the symptoms.  Upon awaking at 4 AM she waited a while before contacting a friend who urged her to come to the emergency department.  Upon arrival here she states that the majority of her symptoms have resolved and also reported some left ear pain.  While examining her patient stated that her symptoms were returning and she was feeling increased numbness of her left face.  NIHSS -1. Code stroke called. Patient denies any CP, palpitations, dyspnea, n/v/d, abd pain, syncope, paralysis of extremities; +le edema swelling chronic     #Possible TIA v CVA  #Right carotid mild/moderate stenosis - Scheduled for Right CEA in April  #Hypertension  #Hyperlipidemia    - Monitored on tele, no events  - Continue aspirin, statin  - Started on Plavix per Neurology for TIA x21 days  - TTE reviewed, no bubble study performed. LVEF normal.  - Refused MRI  - Resume BP meds      DC planning  Will sign off, call w/ questions  Case d/w Dr. Heard

## 2024-03-20 NOTE — CONSULT NOTE ADULT - ASSESSMENT
*** INCOMPLETE NOTE ***    Recommend IV solumedrol 60 mg x 1 STAT dose, followed by PO prednisone 60 mg daily afterwards.  Vascular surgery consult appreciated- would recommend temporal artery biopsy given patient's age, new unilateral headache, and elevated ESR to 80's.    I also recommend checking the following blood tests to evaluate elevated ESR:  SPEP and immunofixation  Sjogrens antibodies (given neurologic symptoms)  ANCA   FRANCHESKA   dsDNA, C3, and C4      *** INCOMPLETE NOTE ***      I also recommend checking the following blood tests to evaluate elevated ESR:  SPEP and immunofixation  Sjogrens antibodies (given neurologic symptoms)  ANCA   FRANCHESKA   dsDNA, C3, and C4      *** INCOMPLETE NOTE ***  69yo F w/ HTN, HLD, hypothyroidism, GERD, carotid stenosis s/p L. CEA presented on 3/19/24 w/ left facial numbness, ear pain. Patient is seen by Dr. Love in office was planned for Right CEA at April. Patient currently has no complaints and the episodes of symptom lasted about 1 hour.  Rheumatology consulted for elevated ESR and question of whether there is concern for GCA.    At this time the patient denies any headaches, vision changes, scalp tenderness, jaw claudication, or fevers. She has intact bilateral temporal artery pulses and no scalp tenderness on exam. She has no signs or symptoms of PMR as well- denies any significant joint tenderness (has chronic knee pains managed with knee injections per orthopedics but aside from that no worsening of pain). No shoulder or hip girdle pain.  There is very little concern for GCA at this time.  As such, I do not recommend high dose steroids; patient also does not want to proceed with temporal artery biopsy.    She may go for US temporal artery as outpatient (please order upon discharge- patient can get done at a [facility] Radiology location).   While she remains inpatient, I also recommend checking the following blood tests to evaluate elevated ESR:  SPEP and immunofixation (to evaluate for hematologic malignancy which can also cause ESR to be elevated- although patient denies weight loss or night sweats, reports being up to date on all cancer screening)  Sjogrens antibodies (given neurologic symptoms)  ANCA   FRANCHESKA   dsDNA, C3, and C4    Patient may follow up with me as outpatient within 2 weeks of discharge, at [address] [place] [address].  Office number to schedule is [phone]    Kailyn Barrow MD  [facility]- Rheumatology at [place]  [phone]

## 2024-03-20 NOTE — DISCHARGE NOTE PROVIDER - NSDCFUSCHEDAPPT_GEN_ALL_CORE_FT
[facility]  HNT PreAdmits  Scheduled Appointment: 04/16/2024    [facility]  [facility]  VASCULAR [place] [address]  Scheduled Appointment: 04/25/2024    [facility]  HNT PreAdmits  Scheduled Appointment: 04/25/2024     [facility]  RHEUM [address]  Scheduled Appointment: 03/28/2024    [facility]  HNT PreAdmits  Scheduled Appointment: 04/16/2024    Feliberto Love  [facility]  VASCULAR [place] [address]  Scheduled Appointment: 04/25/2024    [facility]  HNT PreAdmits  Scheduled Appointment: 04/25/2024

## 2024-03-20 NOTE — DISCHARGE NOTE PROVIDER - PROVIDER TOKENS
PROVIDER:[TOKEN:[[id number]],SCHEDULEDAPPT:[04/16/2024]] PROVIDER:[TOKEN:[[id number]],SCHEDULEDAPPT:[04/16/2024]],PROVIDER:[TOKEN:[[id number]],FOLLOWUP:[2 weeks]] PROVIDER:[TOKEN:[[id number]],SCHEDULEDAPPT:[04/16/2024]],PROVIDER:[TOKEN:[[id number]],SCHEDULEDAPPT:[03/28/2024],SCHEDULEDAPPTTIME:[01:40 AM]]

## 2024-03-20 NOTE — DISCHARGE NOTE PROVIDER - CARE PROVIDER_API CALL
Feliberto Love  Vascular Surgery  [address] [facility], [address]  [place] [address]  Phone: [phone]  Fax: [phone]  Scheduled Appointment: 04/16/2024   Feliberto Love  Vascular Surgery  [address] [facility], [address]  [place] [address]  Phone: [phone]  Fax: [phone]  Scheduled Appointment: 04/16/2024    Kailyn Barrow  Rheumatology  [address] [place] [address]  Phone: [phone]  Fax: [phone]  Follow Up Time: 2 weeks   Feliberto Love  Vascular Surgery  [address] [facility] [place]  [place] [address]  Phone: [phone]  Fax: [phone]  Scheduled Appointment: 04/16/2024    Kailyn Barrow  [address] [address]  Phone: [phone]  Fax: [phone]  Scheduled Appointment: 03/28/2024 01:40 AM

## 2024-03-20 NOTE — DISCHARGE NOTE PROVIDER - NSDCCPCAREPLAN_GEN_ALL_CORE_FT
PRINCIPAL DISCHARGE DIAGNOSIS  Diagnosis: Facial numbness  Assessment and Plan of Treatment: You were admitted to the hospital for a stroke workup. You refused getting a MRI brain while in the hospital. Please continue youe Aspirin and Crestor for stroke prevention. Please follow up with your Vascular Team on your scheduled appointment for your planned Carotid endarterectomy (CEA) surgery.      SECONDARY DISCHARGE DIAGNOSES  Diagnosis: Elevated erythrocyte sedimentation rate  Assessment and Plan of Treatment: An erythrocyte sedimentation rate (ESR) is a blood test that that can show if you have inflammation in your body. Please follow up with Rheumatology within 2 weeks of discharge for further workup. In addition, during your follow up with Rheumatology, Dr. Barrow will obtain a temporal artery ultrasound.     PRINCIPAL DISCHARGE DIAGNOSIS  Diagnosis: Facial numbness  Assessment and Plan of Treatment: You were admitted to the hospital for a stroke workup. You refused getting a MRI brain while in the hospital. Please continue youe Aspirin and Crestor for stroke prevention. Please follow up with your Vascular Team on your scheduled appointment for your planned Carotid endarterectomy (CEA) surgery.      SECONDARY DISCHARGE DIAGNOSES  Diagnosis: Elevated erythrocyte sedimentation rate  Assessment and Plan of Treatment: An erythrocyte sedimentation rate (ESR) is a blood test that that can show if you have inflammation in your body. Please follow up with Rheumatology on 3/28/24 at 1:40 PM for further workup. In addition, during your follow up with Rheumatology, Dr. Barrow will order a temporal artery ultrasound.

## 2024-03-20 NOTE — PROGRESS NOTE ADULT - ASSESSMENT
69 y/o F with PMH HTN, HLD, hypothyroidism, b/l carotid artery stenosis s/p left CEA, presented to ED with C/O left facial numbness since waking up at 4 AM, associated with L ear pain, all resolved in an hour, no prior h/o HA's or migraines.  She denies dysarthria, diplopia, facial droop, focal weakness or any other numbness, has been under some stress lately with moving.      In ED, Code stroke called,  NIHSS 1,Tele stroke consulted, was not a candidate for IV thrombolytics, OOTW, no appreciable deficits. CTH/CTA/CTP was neg for acute bleed, LVO, ischemia.    # Fatigue, left scalp dysesthesias and 1 episode of severe left TMJ/ear pain in addition to ESR 66: 82 raises possibility of GCA    # L facial numbness and L ear pain, resolved in an hour;  unlikely TIA/stroke..    #Moderate to severe right carotid stenosis, history of left CEA      - Have recommended obtaining CRP, ESR,   - input from rheumatology,   - TA biopsy may be considered before starting steroids  - Continue ASA 81mg QD, Atorvastatin 20 mgs   - May continue Plavix for now in view of severe right carotid stenosis  – Follow-up with vascular surgery    Above D/W pt and Dr. Howe

## 2024-03-20 NOTE — DISCHARGE NOTE PROVIDER - NSDCMRMEDTOKEN_GEN_ALL_CORE_FT
aspirin 81 mg oral tablet: 1 tab(s) orally once a day  dexlansoprazole 60 mg oral delayed release capsule: 1 cap(s) orally once a day  levothyroxine 50 mcg (0.05 mg) oral tablet: 1 tab(s) orally once a day  losartan-hydroCHLOROthiazide 100 mg-12.5 mg oral tablet: 1 tab(s) orally once a day  rosuvastatin 40 mg oral tablet: 1 tab(s) orally once a day

## 2024-03-20 NOTE — CONSULT NOTE ADULT - SUBJECTIVE AND OBJECTIVE BOX
HPI:    67yo F w/ HTN, HLD, hypothyroidism, GERD, carotid stenosis s/p L. CEA presents w/ left facial numbness, ear pain yesterday. Patient is seen by Dr. Love in office was planned for Right CEA at April. Patient currently has no complaints and the episodes of symptom lasted about 1 hour. Denies fever/chills, shortness of breath, chest pain. VS reviewed  Upon the patient history, unlikely TIA and more associated with possible temporal arteritis given ESR elevation      PAST MEDICAL & SURGICAL HISTORY:  Carotid stenosis, left      Nonintractable episodic headache, unspecified headache type      Deviated septum      Acute recurrent sinusitis, unspecified location      Essential hypertension      Hyperlipidemia, unspecified hyperlipidemia type      Heart murmur      Mitral valve insufficiency, unspecified etiology      Peripheral edema  bilateral      Hiatal hernia with GERD      Irritable bowel syndrome with both constipation and diarrhea      Gall stones  gall bladder removed      Fatty liver disease, nonalcoholic      Urinary tract infection with hematuria, site unspecified  frequent.  Presently on antibiotics      Joint pain of lower limb  bilateral      Hypothyroidism, unspecified type      Morbid obesity due to excess calories      Anxiety and depression      Fracture dislocation of wrist joint      S/P cholecystectomy  1991       delivery delivered  1989      H/O arthroscopy of shoulder  Left. "Many years ago"      H/O colonoscopy            MEDICATIONS  (STANDING):  aspirin  chewable 81 milliGRAM(s) Oral daily  atorvastatin 80 milliGRAM(s) Oral at bedtime  heparin   Injectable 5000 Unit(s) SubCutaneous every 12 hours  hydrochlorothiazide 12.5 milliGRAM(s) Oral daily  levothyroxine 50 MICROGram(s) Oral daily  losartan 100 milliGRAM(s) Oral daily  pantoprazole    Tablet 40 milliGRAM(s) Oral before breakfast    MEDICATIONS  (PRN):  acetaminophen     Tablet .. 650 milliGRAM(s) Oral every 6 hours PRN Temp greater or equal to 38C (100.4F), Mild Pain (1 - 3)  calcium carbonate    500 mG (Tums) Chewable 1 Tablet(s) Chew every 6 hours PRN Heartburn      Allergies    No Known Allergies    Intolerances    cefadroxil (Diarrhea)      SOCIAL HISTORY:    FAMILY HISTORY:  Family history of hepatic cirrhosis (Mother)  mother    Family history of heart disease (Mother)  mother    Family history of diabetes mellitus (Mother)  mother            Physical Exam:  GENERAL: NAD, AOx3, well developed  HEAD: Atraumatic, normocephalic  EYES: EOMI, PERRLA, conjunctiva and sclera clear  ENT: moist mucous membrane  NECK: supple, No JVD, midline trachea  CHEST/LUNG: unlabored respirations  Heart: S1, S2 normal  ABDOMEN: soft, nondistended, nontender  EXTREMITIES: +2 peripheral pulses, brisk cap refill. no clubbing, cyanosis or edema  NERVOUS SYSTEM: AOx3, speech clear, no neuro-deficits  MSK: full ROM, no deformities  SKIN: warm to touch, no rash or lesions        Vital Signs Last 24 Hrs  T(C): 36.7 (20 Mar 2024 12:51), Max: 36.7 (20 Mar 2024 12:51)  T(F): 98.1 (20 Mar 2024 12:51), Max: 98.1 (20 Mar 2024 12:51)  HR: 51 (20 Mar 2024 12:51) (51 - 71)  BP: 145/69 (20 Mar 2024 12:51) (125/59 - 176/56)  BP(mean): 87 (20 Mar 2024 11:35) (87 - 87)  RR: 18 (20 Mar 2024 12:51) (17 - 19)  SpO2: 99% (20 Mar 2024 12:51) (98% - 100%)    Parameters below as of 20 Mar 2024 12:51  Patient On (Oxygen Delivery Method): room air        I&O's Summary          LABS:                        12.0   8.89  )-----------( 307      ( 20 Mar 2024 07:50 )             37.0     03-20    139  |  107  |  25<H>  ----------------------------<  109<H>  4.2   |  27  |  0.80    Ca    9.7      20 Mar 2024 07:50  Phos  3.0     03-20  Mg     2.1     03-20    TPro  7.7  /  Alb  3.2<L>  /  TBili  0.4  /  DBili  x   /  AST  33  /  ALT  43  /  AlkPhos  253<H>  03-19    PT/INR - ( 19 Mar 2024 07:46 )   PT: 11.0 sec;   INR: 0.97 ratio         PTT - ( 19 Mar 2024 07:46 )  PTT:32.4 sec  Urinalysis Basic - ( 20 Mar 2024 07:50 )    Color: x / Appearance: x / SG: x / pH: x  Gluc: 109 mg/dL / Ketone: x  / Bili: x / Urobili: x   Blood: x / Protein: x / Nitrite: x   Leuk Esterase: x / RBC: x / WBC x   Sq Epi: x / Non Sq Epi: x / Bacteria: x      CAPILLARY BLOOD GLUCOSE        LIVER FUNCTIONS - ( 19 Mar 2024 06:21 )  Alb: 3.2 g/dL / Pro: 7.7 gm/dL / ALK PHOS: 253 U/L / ALT: 43 U/L / AST: 33 U/L / GGT: x             Cultures:      RADIOLOGY & ADDITIONAL STUDIES:

## 2024-03-20 NOTE — CONSULT NOTE ADULT - SUBJECTIVE AND OBJECTIVE BOX
DENISE [facility]  [id number]    HISTORY OF PRESENT ILLNESS:  69yo F w/ HTN, HLD, hypothyroidism, GERD, carotid stenosis s/p L. CEA presented on 3/19/24 w/ left facial numbness, ear pain. Patient is seen by Dr. Love in office was planned for Right CEA at April. Patient currently has no complaints and the episodes of symptom lasted about 1 hour.  Rheumatology consulted due to elevated ESR in 80's.     PAST MEDICAL & SURGICAL HISTORY:  Carotid stenosis, left      Nonintractable episodic headache, unspecified headache type      Deviated septum      Acute recurrent sinusitis, unspecified location      Essential hypertension      Hyperlipidemia, unspecified hyperlipidemia type      Heart murmur      Mitral valve insufficiency, unspecified etiology      Peripheral edema  bilateral      Hiatal hernia with GERD      Irritable bowel syndrome with both constipation and diarrhea      Gall stones  gall bladder removed      Fatty liver disease, nonalcoholic      Urinary tract infection with hematuria, site unspecified  frequent.  Presently on antibiotics      Joint pain of lower limb  bilateral      Hypothyroidism, unspecified type      Morbid obesity due to excess calories      Anxiety and depression      Fracture dislocation of wrist joint      S/P cholecystectomy  1991       delivery delivered  1989      H/O arthroscopy of shoulder  Left. "Many years ago"      H/O colonoscopy            Review of Systems:  Gen:  No fevers/chills, weight loss  HEENT: No blurry vision, no difficulty swallowing  CVS: No chest pain/palpitations  Resp: No SOB/wheezing  GI: No N/V/C/D/abdominal pain  MSK:  Skin: No new rashes  Neuro: No headaches    MEDICATIONS  (STANDING):  aspirin  chewable 81 milliGRAM(s) Oral daily  atorvastatin 80 milliGRAM(s) Oral at bedtime  heparin   Injectable 5000 Unit(s) SubCutaneous every 12 hours  hydrochlorothiazide 12.5 milliGRAM(s) Oral daily  levothyroxine 50 MICROGram(s) Oral daily  losartan 100 milliGRAM(s) Oral daily  pantoprazole    Tablet 40 milliGRAM(s) Oral before breakfast    MEDICATIONS  (PRN):  acetaminophen     Tablet .. 650 milliGRAM(s) Oral every 6 hours PRN Temp greater or equal to 38C (100.4F), Mild Pain (1 - 3)  calcium carbonate    500 mG (Tums) Chewable 1 Tablet(s) Chew every 6 hours PRN Heartburn      Allergies    No Known Allergies    Intolerances    cefadroxil (Diarrhea)      PERTINENT MEDICATION HISTORY:    SOCIAL HISTORY:  OCCUPATION:  TRAVEL HISTORY:    FAMILY HISTORY:  Family history of hepatic cirrhosis (Mother)  mother    Family history of heart disease (Mother)  mother    Family history of diabetes mellitus (Mother)  mother        Vital Signs Last 24 Hrs  T(C): 36.9 (20 Mar 2024 15:29), Max: 36.9 (20 Mar 2024 15:29)  T(F): 98.5 (20 Mar 2024 15:29), Max: 98.5 (20 Mar 2024 15:29)  HR: 58 (20 Mar 2024 15:29) (51 - 71)  BP: 115/44 (20 Mar 2024 15:29) (115/44 - 176/56)  BP(mean): 87 (20 Mar 2024 11:35) (87 - 87)  RR: 18 (20 Mar 2024 15:29) (18 - 19)  SpO2: 99% (20 Mar 2024 15:29) (98% - 100%)    Parameters below as of 20 Mar 2024 15:29  Patient On (Oxygen Delivery Method): room air        Physical Exam:  General: No apparent distress  HEENT: EOMI, MMM  CVS: +S1/S2, RRR, no murmurs/rubs/gallops  Resp: CTA b/l. No crackles/wheezing  GI: Soft, NT/ND +BS  MSK:  Shoulders: wnl  Elbows: wnl  Wrists: wnl  MCPs: wnl  PIPs: wnl  DIPs: wnl   Hips: wnl  Knees: wnl   Ankle: wnl  Neuro: AAOx3  Skin: no visible rashes    LABS:                        12.0   8.89  )-----------( 307      ( 20 Mar 2024 07:50 )             37.0     03-20    139  |  107  |  25<H>  ----------------------------<  109<H>  4.2   |  27  |  0.80    Ca    9.7      20 Mar 2024 07:50  Phos  3.0     03-20  Mg     2.1     03-20    TPro  7.7  /  Alb  3.2<L>  /  TBili  0.4  /  DBili  x   /  AST  33  /  ALT  43  /  AlkPhos  253<H>  03-19    PT/INR - ( 19 Mar 2024 07:46 )   PT: 11.0 sec;   INR: 0.97 ratio         PTT - ( 19 Mar 2024 07:46 )  PTT:32.4 sec  Urinalysis Basic - ( 20 Mar 2024 07:50 )    Color: x / Appearance: x / SG: x / pH: x  Gluc: 109 mg/dL / Ketone: x  / Bili: x / Urobili: x   Blood: x / Protein: x / Nitrite: x   Leuk Esterase: x / RBC: x / WBC x   Sq Epi: x / Non Sq Epi: x / Bacteria: x        RADIOLOGY & ADDITIONAL STUDIES: [facility]  [id number]    HISTORY OF PRESENT ILLNESS:  67yo F w/ HTN, HLD, hypothyroidism, GERD, carotid stenosis s/p L. CEA presented on 3/19/24 w/ left facial numbness, ear pain. Patient is seen by Dr. Love in office was planned for Right CEA at April. Patient currently has no complaints and the episodes of symptom lasted about 1 hour.  Rheumatology consulted due to elevated ESR in 80's.     Patient currently denies any headache, vision changes, scalp tenderness, fevers, infectious symptoms aside from a clear postnasal drip (denies cough).   Denies PMR symptoms or any other significant joint pain.   Denies abdominal pain or UTI symptoms.    PAST MEDICAL & SURGICAL HISTORY:  Carotid stenosis, left      Nonintractable episodic headache, unspecified headache type      Deviated septum      Acute recurrent sinusitis, unspecified location      Essential hypertension      Hyperlipidemia, unspecified hyperlipidemia type      Heart murmur      Mitral valve insufficiency, unspecified etiology      Peripheral edema  bilateral      Hiatal hernia with GERD      Irritable bowel syndrome with both constipation and diarrhea      Gall stones  gall bladder removed      Fatty liver disease, nonalcoholic      Urinary tract infection with hematuria, site unspecified  frequent.  Presently on antibiotics      Joint pain of lower limb  bilateral      Hypothyroidism, unspecified type      Morbid obesity due to excess calories      Anxiety and depression      Fracture dislocation of wrist joint      S/P cholecystectomy  1991       delivery delivered  1989      H/O arthroscopy of shoulder  Left. "Many years ago"      H/O colonoscopy            Review of Systems:  Gen:  No fevers/chills, weight loss  HEENT: No blurry vision, no difficulty swallowing  CVS: No chest pain/palpitations  Resp: No SOB/wheezing  GI: No N/V/C/D/abdominal pain  MSK: No joint pains   Skin: No new rashes  Neuro: No headaches    MEDICATIONS  (STANDING):  aspirin  chewable 81 milliGRAM(s) Oral daily  atorvastatin 80 milliGRAM(s) Oral at bedtime  heparin   Injectable 5000 Unit(s) SubCutaneous every 12 hours  hydrochlorothiazide 12.5 milliGRAM(s) Oral daily  levothyroxine 50 MICROGram(s) Oral daily  losartan 100 milliGRAM(s) Oral daily  pantoprazole    Tablet 40 milliGRAM(s) Oral before breakfast    MEDICATIONS  (PRN):  acetaminophen     Tablet .. 650 milliGRAM(s) Oral every 6 hours PRN Temp greater or equal to 38C (100.4F), Mild Pain (1 - 3)  calcium carbonate    500 mG (Tums) Chewable 1 Tablet(s) Chew every 6 hours PRN Heartburn      Allergies    No Known Allergies    Intolerances    cefadroxil (Diarrhea)      PERTINENT MEDICATION HISTORY:    SOCIAL HISTORY:  OCCUPATION:  TRAVEL HISTORY:    FAMILY HISTORY:  Family history of hepatic cirrhosis (Mother)  mother    Family history of heart disease (Mother)  mother    Family history of diabetes mellitus (Mother)  mother        Vital Signs Last 24 Hrs  T(C): 36.9 (20 Mar 2024 15:29), Max: 36.9 (20 Mar 2024 15:29)  T(F): 98.5 (20 Mar 2024 15:29), Max: 98.5 (20 Mar 2024 15:29)  HR: 58 (20 Mar 2024 15:) (51 - 71)  BP: 115/44 (20 Mar 2024 15:29) (115/44 - 176/56)  BP(mean): 87 (20 Mar 2024 11:35) (87 - 87)  RR: 18 (20 Mar 2024 15:) (18 - 19)  SpO2: 99% (20 Mar 2024 15:29) (98% - 100%)    Parameters below as of 20 Mar 2024 15:29  Patient On (Oxygen Delivery Method): room air        Physical Exam:  General: No apparent distress  HEENT: EOMI, MMM  CVS: intact b/l temporal artery pulses; no scalp tenderness  Resp: CTA b/l. No crackles/wheezing  GI: Soft, NT/ND +BS  MSK: no joint tenderness or synovitis   Neuro: AAOx3  Skin: no visible rashes    LABS:                        12.0   8.89  )-----------( 307      ( 20 Mar 2024 07:50 )             37.0     03-20    139  |  107  |  25<H>  ----------------------------<  109<H>  4.2   |  27  |  0.80    Ca    9.7      20 Mar 2024 07:50  Phos  3.0     03-20  Mg     2.1     -20    TPro  7.7  /  Alb  3.2<L>  /  TBili  0.4  /  DBili  x   /  AST  33  /  ALT  43  /  AlkPhos  253<H>  03-19    PT/INR - ( 19 Mar 2024 07:46 )   PT: 11.0 sec;   INR: 0.97 ratio         PTT - ( 19 Mar 2024 07:46 )  PTT:32.4 sec  Urinalysis Basic - ( 20 Mar 2024 07:50 )    Color: x / Appearance: x / SG: x / pH: x  Gluc: 109 mg/dL / Ketone: x  / Bili: x / Urobili: x   Blood: x / Protein: x / Nitrite: x   Leuk Esterase: x / RBC: x / WBC x   Sq Epi: x / Non Sq Epi: x / Bacteria: x        RADIOLOGY & ADDITIONAL STUDIES:

## 2024-03-20 NOTE — DISCHARGE NOTE PROVIDER - HOSPITAL COURSE
incomplete note  incomplete note  incomplete note    Admission HPI: 69 y/o F with PMH HTN, HLD, hypothyroidism, GERD, b/l carotid artery stenosis s/p left CEA, presents with left facial numbness since waking up at 4 AM.  Patient states she went to bed in her usual state of health at about 10 PM last night without any of the symptoms.  Upon awaking at 4 AM she waited a while before contacting a friend who urged her to come to the emergency department.  Upon arrival here she states that the majority of her symptoms have resolved and also reported some left ear pain.  While examining her patient stated that her symptoms were returning and she was feeling increased numbness of her left face.  NIHSS -1. Code stroke called. patient denies any CP, palpitations, dyspnea, n/v/d, abd pain, syncope, paralysis of extremities; +le edema swelling chronic   Patient was assessed by Tele stroke; At this time patient refusing MRI despite offering Ativan as she is claustrophobic; She understands the risks.      3/20: Patient seen and examined. Stable. Plan for d/c with outpatient Neurology follow up    Hospital course (by problem):   #Left Sided Facial Numbness 2ndry to TIA  #Right carotid mild/moderate stenosis - Scheduled for Right CEA in April  -Admitted to tele   -NIHSS 1 for face numbness on admission, now 0  -CTH unremarkable, CTA w/ b/l carotid stenosis of mod-severe degree. perfusion without significant abnormalities.   -Initially held BP medications for permissive bp to allow perfusion, resume on d/c  -Neuro consult appreciated  -cardio consult appreciated  -Scheduled for Right CEA in April with Vascular  -increase statin   -start ASA   -Plavix x21 days  -Patient refusing MRI  -TTE: normal EF    #History of HTN, HLD, hypothyroidism, GERD, b/l carotid artery stenosis, basal cell carcinoma of the skin  - c/w home medications    Patient was discharged to: (home/[facility]/acute rehab/hospice, etc, and with what services – home health PT/RN? Home O2?)    New medications:   Changes to old medications:  Medications that were stopped:    Items to follow up as outpatient:    Physical exam at the time of discharge:       Admission HPI: 69 y/o F with PMH HTN, HLD, hypothyroidism, GERD, b/l carotid artery stenosis s/p left CEA, presents with left facial numbness since waking up at 4 AM.  Patient states she went to bed in her usual state of health at about 10 PM last night without any of the symptoms.  Upon awaking at 4 AM she waited a while before contacting a friend who urged her to come to the emergency department.  Upon arrival here she states that the majority of her symptoms have resolved and also reported some left ear pain.  While examining her patient stated that her symptoms were returning and she was feeling increased numbness of her left face.  NIHSS -1. Code stroke called. patient denies any CP, palpitations, dyspnea, n/v/d, abd pain, syncope, paralysis of extremities; +le edema swelling chronic   Patient was assessed by Tele stroke; At this time patient refusing MRI despite offering Ativan as she is claustrophobic; She understands the risks.      3/21: Patient seen and examined. No further headaches or facial numbness. Seen by Rheumatology yesterday due to elevated ESR and headache, concerning for GCA. Rheumatology has low concern and does not recommend tx with steroids - recommends outpatient temporal artery ultrasound.  Patient is stable for discharge at this time.     Hospital course (by problem):   #Left Sided Facial Numbness 2ndry to TIA  #Right carotid mild/moderate stenosis - Scheduled for Right CEA in April  -Admitted to tele   -NIHSS 1 for face numbness on admission, now 0  -CTH unremarkable, CTA w/ b/l carotid stenosis of mod-severe degree. perfusion without significant abnormalities.   -Initially held BP medications for permissive bp to allow perfusion, resume at this time   -Neuro consult appreciated  -cardio consult appreciated  -increase statin   -start ASA   -Discussed need of Plavix with Neuro; not necessary on d/c. Patient also hesitant about taking it   -Patient refusing MRI  -TTE: normal EF  -Scheduled for Right CEA in April with Vascular    #Elevated ESR  -Given vague headache yesterday and elevated ESR, Neurology team recommended Rheumatology eval for concern of GCA  -Discussed with Rheumatology; Low concern for GCA at this time given lack of symptoms (headache resolved shortly after receiving BP medications this morning)   -Labs ordered by Rheum for further workup of elevated ESR (Rheum will follow up results with pt in office). Also recommending outpatient temporal artery ultrasound    #History of HTN, HLD, hypothyroidism, GERD, b/l carotid artery stenosis, basal cell carcinoma of the skin  - c/w home medications    Patient was discharged to: Home    Physical exam at the time of discharge:  LOS: 2d    VITALS:   T(C): 36.6 (03-21-24 @ 08:31), Max: 36.9 (03-20-24 @ 15:29)  HR: 50 (03-21-24 @ 08:31) (50 - 63)  BP: 127/53 (03-21-24 @ 08:31) (115/44 - 140/48)  RR: 17 (03-21-24 @ 08:31) (17 - 18)  SpO2: 98% (03-21-24 @ 08:31) (96% - 99%)    PHYSICAL EXAM:  General: No acute distress  HEENT: NC/AT; PERRL, anicteric sclera; MMM  Neck: Supple  Cardiovascular: +S1/S2, RRR, no murmurs, rubs, gallops  Respiratory: CTA B/L; no W/R/R  Gastrointestinal: Obese, soft, NT/ND; +BSx4  Extremities: WWP; chronic bilateral LE edema  Vascular: 2+ radial, DP/PT pulses B/L  Neurological: AAOx3; no focal deficits           Admission HPI: 67 y/o F with PMH HTN, HLD, hypothyroidism, GERD, b/l carotid artery stenosis s/p left CEA, presents with left facial numbness since waking up at 4 AM.  Patient states she went to bed in her usual state of health at about 10 PM last night without any of the symptoms.  Upon awaking at 4 AM she waited a while before contacting a friend who urged her to come to the emergency department.  Upon arrival here she states that the majority of her symptoms have resolved and also reported some left ear pain.  While examining her patient stated that her symptoms were returning and she was feeling increased numbness of her left face.  NIHSS -1. Code stroke called. patient denies any CP, palpitations, dyspnea, n/v/d, abd pain, syncope, paralysis of extremities; +le edema swelling chronic   Patient was assessed by Tele stroke; At this time patient refusing MRI despite offering Ativan as she is claustrophobic; She understands the risks.      3/21: Patient seen and examined. No further headaches or facial numbness. Seen by Rheumatology yesterday due to elevated ESR and headache, concerning for GCA. Rheumatology has low concern and does not recommend tx with steroids - recommends outpatient temporal artery ultrasound.  Patient is stable for discharge at this time.     Hospital course (by problem):   #Left Sided Facial Numbness 2ndry to TIA  #Right carotid mild/moderate stenosis - Scheduled for Right CEA in April  -Admitted to tele   -NIHSS 1 for face numbness on admission, now 0  -CTH unremarkable, CTA w/ b/l carotid stenosis of mod-severe degree. perfusion without significant abnormalities.   -Initially held BP medications for permissive bp to allow perfusion, resume at this time   -Neuro consult appreciated  -cardio consult appreciated  -continue ASA/statin  -Discussed need of Plavix with Neuro; not necessary on d/c. Patient also hesitant about taking it   -Patient refusing MRI  -TTE: normal EF  -Scheduled for Right CEA in April with Vascular    #Elevated ESR  -Given vague headache yesterday and elevated ESR, Neurology team recommended Rheumatology eval for concern of GCA  -Discussed with Rheumatology; Low concern for GCA at this time given lack of symptoms (headache resolved shortly after receiving BP medications this morning)   -Labs ordered by Rheum for further workup of elevated ESR (Rheum will follow up results with pt in office). Also recommending outpatient temporal artery ultrasound    #History of HTN, HLD, hypothyroidism, GERD, b/l carotid artery stenosis, basal cell carcinoma of the skin  - c/w home medications    Patient was discharged to: Home    Physical exam at the time of discharge:  LOS: 2d    VITALS:   T(C): 36.6 (03-21-24 @ 08:31), Max: 36.9 (03-20-24 @ 15:29)  HR: 50 (03-21-24 @ 08:31) (50 - 63)  BP: 127/53 (03-21-24 @ 08:31) (115/44 - 140/48)  RR: 17 (03-21-24 @ 08:31) (17 - 18)  SpO2: 98% (03-21-24 @ 08:31) (96% - 99%)    PHYSICAL EXAM:  General: No acute distress  HEENT: NC/AT; PERRL, anicteric sclera; MMM  Neck: Supple  Cardiovascular: +S1/S2, RRR, no murmurs, rubs, gallops  Respiratory: CTA B/L; no W/R/R  Gastrointestinal: Obese, soft, NT/ND; +BSx4  Extremities: WWP; chronic bilateral LE edema  Vascular: 2+ radial, DP/PT pulses B/L  Neurological: AAOx3; no focal deficits

## 2024-03-20 NOTE — PROGRESS NOTE ADULT - ASSESSMENT
#Left Sided Facial Numbness 2ndry to TIA  #Right carotid mild/moderate stenosis - Scheduled for Right CEA in April  -Admitted to [facility]   -NIHSS 1 for face numbness on admission, now 0  -CTH unremarkable, CTA w/ b/l carotid stenosis of mod-severe degree. perfusion without significant abnormalities.   -Initially held BP medications for permissive bp to allow perfusion, resume at this time   -Neuro consult appreciated  -cardio consult appreciated  -Scheduled for Right CEA in April with Vascular  -increase statin   -start ASA   -Plavix x21 days  -Patient refusing MRI  -TTE: normal EF    #Elevated ESR  -Given vague headache this mornign and elevated ESR, Neurology team recommended Rheumatology eval for concern of GCA  -Discussed with Rheumatology; Low concern for GCA at this time given lack of symptoms (headache resolved shortly after receiving BP medications this morning)   -Labs ordered by Rheum for further workup of elevated ESR. Also recommending outpatient temporal artery bx. Will follow full note for discharge plan for follow up    #History of HTN, HLD, hypothyroidism, GERD, b/l carotid artery stenosis, basal cell carcinoma of the skin  - c/w home medications    DISPO: Plan for discharge home tomorrow if symptoms stable  #Left Sided Facial Numbness 2ndry to TIA  #Right carotid mild/moderate stenosis - Scheduled for Right CEA in April  -Admitted to [facility]   -NIHSS 1 for face numbness on admission, now 0  -CTH unremarkable, CTA w/ b/l carotid stenosis of mod-severe degree. perfusion without significant abnormalities.   -Initially held BP medications for permissive bp to allow perfusion, resume at this time   -Neuro consult appreciated  -cardio consult appreciated  -Scheduled for Right CEA in April with Vascular  -increase statin   -start ASA   -Plavix x21 days  -Patient refusing MRI  -TTE: normal EF    #Elevated ESR  -Given vague headache this morning and elevated ESR, Neurology team recommended Rheumatology eval for concern of GCA  -Discussed with Rheumatology; Low concern for GCA at this time given lack of symptoms (headache resolved shortly after receiving BP medications this morning)   -Labs ordered by Rheum for further workup of elevated ESR. Also recommending outpatient temporal artery ultrasound. Will follow full note for discharge plan for follow up    #History of HTN, HLD, hypothyroidism, GERD, b/l carotid artery stenosis, basal cell carcinoma of the skin  - c/w home medications    DISPO: Plan for discharge home tomorrow if symptoms stable

## 2024-03-20 NOTE — CONSULT NOTE ADULT - ATTENDING COMMENTS
seen and examined left facial numbness and ear pain with known right ICA severe >80% stenosis unlikely that this is a TIA and symptomatic stenosis more likely alternative diagnosis    discussed patient with Dr. Mederos regard differential; possibly temporal arteritis will follow if a temp artery biopsy is needed will plan for procedure

## 2024-03-20 NOTE — CONSULT NOTE ADULT - ASSESSMENT
69yo F presents w/ left facial numbness and ear pain. given carotid stenosis is on right side and ESR elevation, unlikely TIA from carotid stenosis. Possible temporal arteritis    Recommendation  - carotid duplex (ordered)  - trend ESR in AM lab  - continue asa and statin  - vascular surgery will follow for possible temporal artery biopsy  - continue medical management per primary team    Plan discussed with vascular surgery attending, Dr. Love 67yo F presents w/ left facial numbness and ear pain. given carotid stenosis is on right side and ESR elevation, unlikely TIA from carotid stenosis. Possible temporal arteritis    Recommendation  - carotid duplex (ordered)  - trend ESR in AM lab  - continue asa and statin  - vascular surgery will follow for if needed temporal artery biopsy  - continue medical management per primary team    Plan discussed with vascular surgery attending, Dr. Love

## 2024-03-20 NOTE — DISCHARGE NOTE PROVIDER - NSDCCAREPROVSEEN_GEN_ALL_CORE_FT
Mary Alice, Janessa Castillo, iGta Felix, Deepthi Barrow, Kailyn Howe, Alison Moreno, Víctor Love, Feliberto Jones, Elijah Toussaint, Angie Brand, Jody Duffy

## 2024-03-20 NOTE — DISCHARGE NOTE PROVIDER - CARE PROVIDERS DIRECT ADDRESSES
,DirectAddress_Unknown ,DirectAddress_Unknown,[email or web address]

## 2024-03-21 ENCOUNTER — TRANSCRIPTION ENCOUNTER (OUTPATIENT)
Age: 69
End: 2024-03-21

## 2024-03-21 VITALS
TEMPERATURE: 98 F | SYSTOLIC BLOOD PRESSURE: 127 MMHG | RESPIRATION RATE: 17 BRPM | HEART RATE: 50 BPM | DIASTOLIC BLOOD PRESSURE: 53 MMHG | OXYGEN SATURATION: 98 %

## 2024-03-21 LAB
C3 SERPL-MCNC: 149 MG/DL — SIGNIFICANT CHANGE UP (ref 81–157)
C4 SERPL-MCNC: 29 MG/DL — SIGNIFICANT CHANGE UP (ref 13–39)
ERYTHROCYTE [SEDIMENTATION RATE] IN BLOOD: 63 MM/HR — HIGH (ref 0–20)
MPO AB + PR3 PNL SER: SIGNIFICANT CHANGE UP
PROT SERPL-MCNC: 6.3 G/DL — SIGNIFICANT CHANGE UP (ref 6–8.3)

## 2024-03-21 PROCEDURE — 99239 HOSP IP/OBS DSCHRG MGMT >30: CPT

## 2024-03-21 PROCEDURE — 93010 ELECTROCARDIOGRAM REPORT: CPT

## 2024-03-21 RX ADMIN — HEPARIN SODIUM 5000 UNIT(S): 5000 INJECTION INTRAVENOUS; SUBCUTANEOUS at 10:01

## 2024-03-21 RX ADMIN — LOSARTAN POTASSIUM 100 MILLIGRAM(S): 100 TABLET, FILM COATED ORAL at 10:01

## 2024-03-21 RX ADMIN — PANTOPRAZOLE SODIUM 40 MILLIGRAM(S): 20 TABLET, DELAYED RELEASE ORAL at 06:21

## 2024-03-21 RX ADMIN — Medication 50 MICROGRAM(S): at 06:21

## 2024-03-21 RX ADMIN — Medication 81 MILLIGRAM(S): at 10:01

## 2024-03-21 NOTE — PROGRESS NOTE ADULT - SUBJECTIVE AND OBJECTIVE BOX
CHIEF COMPLAINT: Patient is a 68y old  Female who presents with a chief complaint of Left sided numbness (19 Mar 2024 14:43)    FROM H&P: 69 y/o F with PMH HTN, HLD, hypothyroidism, GERD, b/l carotid artery stenosis s/p left CEA, presents with left facial numbness since waking up at 4 AM.  Patient states she went to bed in her usual state of health at about 10 PM last night without any of the symptoms.  Upon awaking at 4 AM she waited a while before contacting a friend who urged her to come to the emergency department.  Upon arrival here she states that the majority of her symptoms have resolved and also reported some left ear pain.  While examining her patient stated that her symptoms were returning and she was feeling increased numbness of her left face.  NIHSS -1. Code stroke called. Patient denies any CP, palpitations, dyspnea, n/v/d, abd pain, syncope, paralysis of extremities; +le edema swelling chronic   Patient was assessed by Tele stroke; At this time patient refusing MRI despite offering Ativan as she is claustrophobic; She understands the risks.    Family Hx mother  in her 70s from Liver disease, father  at 60 unknown cause  (19 Mar 2024 10:42)    Cardiology consulted for possible TIA  She is feeling well, no CP or SOB    3/20. Feeling well. No overnight events.    MEDICATIONS  (STANDING):  aspirin  chewable 81 milliGRAM(s) Oral daily  atorvastatin 80 milliGRAM(s) Oral at bedtime  heparin   Injectable 5000 Unit(s) SubCutaneous every 12 hours  levothyroxine 50 MICROGram(s) Oral daily  losartan 100 milliGRAM(s) Oral daily  pantoprazole    Tablet 40 milliGRAM(s) Oral before breakfast    MEDICATIONS  (PRN):  acetaminophen     Tablet .. 650 milliGRAM(s) Oral every 6 hours PRN Temp greater or equal to 38C (100.4F), Mild Pain (1 - 3)  calcium carbonate    500 mG (Tums) Chewable 1 Tablet(s) Chew every 6 hours PRN Heartburn      HOME MEDICATIONS:  aspirin 81 mg oral tablet: 1 tab(s) orally once a day (19 Mar 2024 09:38)  dexlansoprazole 60 mg oral delayed release capsule: 1 cap(s) orally once a day (19 Mar 2024 09:37)  levothyroxine 50 mcg (0.05 mg) oral tablet: 1 tab(s) orally once a day (19 Mar 2024 09:39)  losartan-hydroCHLOROthiazide 100 mg-12.5 mg oral tablet: 1 tab(s) orally once a day (19 Mar 2024 09:39)  rosuvastatin 40 mg oral tablet: 1 tab(s) orally once a day (19 Mar 2024 09:39)    PHYSICAL EXAM:  T(C): 36.6 (20 Mar 2024 08:53), Max: 36.6 (20 Mar 2024 08:53)  T(F): 97.9 (20 Mar 2024 08:53), Max: 97.9 (20 Mar 2024 08:53)  HR: 54 (20 Mar 2024 08:53) (54 - 71)  BP: 153/65 (20 Mar 2024 08:53) (125/59 - 173/54)  RR: 18 (20 Mar 2024 08:53) (17 - 19)  SpO2: 100% (20 Mar 2024 08:53) (98% - 100%)    Parameters below as of 20 Mar 2024 08:53  Patient On (Oxygen Delivery Method): room air    Constitutional: NAD, awake and alert  HEENT: PERR, EOMI, Normal Hearing, MMM  Neck: Soft and supple, No LAD, No JVD  Respiratory: Breath sounds are clear bilaterally, No wheezing, rales or rhonchi  Cardiovascular: S1 and S2, regular rate and rhythm, no Murmurs, gallops or rubs  Gastrointestinal: Bowel Sounds present, soft, nontender, nondistended, no guarding, no rebound  Extremities: No peripheral edema  Vascular: 2+ peripheral pulses  Neurological: A/O x 3, no focal deficits  Musculoskeletal: 5/5 strength b/l upper and lower extremities  Skin: No rashes    =======================================    INTERPRETATION OF TELEMETRY:  SR    ECG:< from: 12 Lead ECG (24 @ 06:46) >  Diagnosis Line Normal sinus rhythm  Moderate voltage criteria for LVH, may be normal variant ( R in aVL , [place] product )  Borderline ECG  When compared with ECG of 2024 11:25,  No significant change was found    ========================================    LABS: All Labs Reviewed:                        12.0   8.89  )-----------( 307      ( 20 Mar 2024 07:50 )             37.0     03-20    139  |  107  |  25<H>  ----------------------------<  109<H>  4.2   |  27  |  0.80    Ca    9.7      20 Mar 2024 07:50  Phos  3.0     03-20  Mg     2.1     -20    TPro  7.7  /  Alb  3.2<L>  /  TBili  0.4  /  DBili  x   /  AST  33  /  ALT  43  /  AlkPhos  253<H>  03-19    PT/INR - ( 19 Mar 2024 07:46 )   PT: 11.0 sec;   INR: 0.97 ratio       PTT - ( 19 Mar 2024 07:46 )  PTT:32.4 sec    Troponin: 10.25 ng/L    CARDIAC TESTING:    < from: TTE Echo Complete w/o Contrast w/ Doppler (24 @ 10:16) >     The left ventricle is normal in size, wall thickness, wall motion and   contractility.   Estimated left ventricular ejection fraction is 60-65 %.   The aortic valve is well visualized, appears mildly calcified. Valve   opening seems to be normal.   The mitral valve leaflets appear calcified.   Mild mitral annular calcification is present.   Trace mitral regurgitation is present.   Normal appearing tricuspid valve structure.   Trace to mild tricuspid valve regurgitation is present.   Normal appearing pulmonic valve structure.   Trace pulmonic valvular regurgitation is present.    < end of copied text >      RADIOLOGY & ADDITIONAL STUDIES:    < from: CT Angio Neck Stroke Protocol w/ IV Cont (24 @ 06:31) >  IMPRESSION:  CT ANGIOGRAPHY NECK: Redemonstrated dense atherosclerotic calcification   at the right carotid bifurcation with mild/moderate stenosis of the   proximal right internal carotid artery. Status post left carotid   endarterectomy.    CT ANGIOGRAPHY BRAIN:  1.  No evidence of a major vessel occlusion, flow-limiting stenosis or   aneurysm about the [place] of [place].  2.  No evidence of dural venous sinus thrombosis or an arteriovenous   malformation    CT PERFUSION: No perfusion defect using threshold values of Tmax >6   seconds and CBF <30%.  
Patient sitting in bed, complains of feeling fatigued and has left-sided headache.  Facial numbness has resolved no more ear pain.    ESR 66, repeat 82    ROS: As above, other ROS negative    MEDICATIONS  (STANDING):  aspirin  chewable 81 milliGRAM(s) Oral daily  atorvastatin 80 milliGRAM(s) Oral at bedtime  heparin   Injectable 5000 Unit(s) SubCutaneous every 12 hours  hydrochlorothiazide 12.5 milliGRAM(s) Oral daily  levothyroxine 50 MICROGram(s) Oral daily  losartan 100 milliGRAM(s) Oral daily  pantoprazole    Tablet 40 milliGRAM(s) Oral before breakfast      Vital Signs Last 24 Hrs  T(C): 36.7 (20 Mar 2024 12:51), Max: 36.7 (20 Mar 2024 12:51)  T(F): 98.1 (20 Mar 2024 12:51), Max: 98.1 (20 Mar 2024 12:51)  HR: 51 (20 Mar 2024 12:51) (51 - 71)  BP: 145/69 (20 Mar 2024 12:51) (125/59 - 176/56)  BP(mean): 87 (20 Mar 2024 11:35) (87 - 87)  RR: 18 (20 Mar 2024 12:51) (17 - 19)  SpO2: 99% (20 Mar 2024 12:51) (98% - 100%)    Parameters below as of 20 Mar 2024 12:51  Patient On (Oxygen Delivery Method): room air    Gen exam:  No TA tenderness    Neurological exam:  HF: A x O x 3. Appropriately interactive, normal affect. Speech fluent, No Aphasia or paraphasic errors. Naming /repetition intact   CN: ASHLEY, EOMI, VFF, facial sensation normal, no NLFD, tongue midline, Palate moves equally, SCM equal bilaterally  Motor: No pronator drift, Strength 5/5 in all 4 ext, normal bulk and tone   Sens: Intact to light touch   Reflexes: Symmetric and normal, downgoing toes b/l  Coord:  No dysmetria, DEREK intact   Gait/Balance: Normal    Labs:     Sedimentation Rate, Erythrocyte: 82 mm/hr (03.20.24 @ 11:15)              Sedimentation Rate, Erythrocyte: 66 mm/hr (03.19.24 @ 20:17)             12.0   8.89  )-----------( 307      ( 20 Mar 2024 07:50 )             37.0     03-20    139  |  107  |  25<H>  ----------------------------<  109<H>  4.2   |  27  |  0.80    Ca    9.7      20 Mar 2024 07:50  Phos  3.0     03-20  Mg     2.1     03-20    TPro  7.7  /  Alb  3.2<L>  /  TBili  0.4  /  DBili  x   /  AST  33  /  ALT  43  /  AlkPhos  253<H>  03-19 03-20 Chol 212<H> LDL -- HDL 99 Trig 43    Radiology report:   from: CT Angio Neck Stroke Protocol w/ IV Cont (03.19.24 @ 06:31) >    IMPRESSION:  CT ANGIOGRAPHY NECK: Re demonstrated dense atherosclerotic calcification   at the right carotid bifurcation with mild/moderate stenosis of the   proximal right internal carotid artery. Status post left carotid   endarterectomy.    CT ANGIOGRAPHY BRAIN:  1.  No evidence of a major vessel occlusion, flow-limiting stenosis or   aneurysm about the [place] of [place].  2.  No evidence of dural venous sinus thrombosis or an arteriovenous   malformation    CT PERFUSION: No perfusion defect using threshold values of Tmax >6   seconds and CBF <30%.      
HOSPITALIST PROGRESS NOTE    Admission HPI: 69 y/o F with PMH HTN, HLD, hypothyroidism, GERD, b/l carotid artery stenosis s/p left CEA, presents with left facial numbness since waking up at 4 AM.  Patient states she went to bed in her usual state of health at about 10 PM last night without any of the symptoms.  Upon awaking at 4 AM she waited a while before contacting a friend who urged her to come to the emergency department.  Upon arrival here she states that the majority of her symptoms have resolved and also reported some left ear pain.  While examining her patient stated that her symptoms were returning and she was feeling increased numbness of her left face.  NIHSS -1. Code stroke called. patient denies any CP, palpitations, dyspnea, n/v/d, abd pain, syncope, paralysis of extremities; +le edema swelling chronic   Patient was assessed by Tele stroke; At this time patient refusing MRI despite offering Ativan as she is claustrophobic; She understands the risks.      3/20: Patient seen and examined. Headache this morning which resolved after receiving BP medication. Neurology recommending Rheum consult due to elevated ESR and headache, concerning for GCA. Rheumatology evaluated patient this evening and has low concern and does not recommend tx with steroids - recommends outpatient temporal artery ultrasound.     ROS: All 10 systems reviewed and found to be negative with the exception of what has been described above.     VITAL SIGNS:  Vital Signs Last 24 Hrs  T(C): 36.9 (20 Mar 2024 15:29), Max: 36.9 (20 Mar 2024 15:29)  T(F): 98.5 (20 Mar 2024 15:29), Max: 98.5 (20 Mar 2024 15:29)  HR: 58 (20 Mar 2024 15:29) (51 - 71)  BP: 115/44 (20 Mar 2024 15:29) (115/44 - 176/56)  BP(mean): 87 (20 Mar 2024 11:35) (87 - 87)  RR: 18 (20 Mar 2024 15:29) (18 - 19)  SpO2: 99% (20 Mar 2024 15:29) (98% - 100%)    Parameters below as of 20 Mar 2024 15:29  Patient On (Oxygen Delivery Method): room air    PHYSICAL EXAM:  General: No acute distress  HEENT: NC/AT; PERRL, anicteric sclera; MMM  Neck: Supple  Cardiovascular: +S1/S2, RRR, no murmurs, rubs, gallops  Respiratory: CTA B/L; no W/R/R  Gastrointestinal: soft, NT/ND; +BSx4  Extremities: WWP; no edema, clubbing or cyanosis  Vascular: 2+ radial, DP/PT pulses B/L  Neurological: AAOx3; no focal deficits    MEDICATIONS:  MEDICATIONS  (STANDING):  aspirin  chewable 81 milliGRAM(s) Oral daily  atorvastatin 80 milliGRAM(s) Oral at bedtime  heparin   Injectable 5000 Unit(s) SubCutaneous every 12 hours  hydrochlorothiazide 12.5 milliGRAM(s) Oral daily  levothyroxine 50 MICROGram(s) Oral daily  losartan 100 milliGRAM(s) Oral daily  pantoprazole    Tablet 40 milliGRAM(s) Oral before breakfast    MEDICATIONS  (PRN):  acetaminophen     Tablet .. 650 milliGRAM(s) Oral every 6 hours PRN Temp greater or equal to 38C (100.4F), Mild Pain (1 - 3)  calcium carbonate    500 mG (Tums) Chewable 1 Tablet(s) Chew every 6 hours PRN Heartburn      ALLERGIES:  Allergies    No Known Allergies    Intolerances    cefadroxil (Diarrhea)      LABS:                        12.0   8.89  )-----------( 307      ( 20 Mar 2024 07:50 )             37.0     03-20    139  |  107  |  25<H>  ----------------------------<  109<H>  4.2   |  27  |  0.80    Ca    9.7      20 Mar 2024 07:50  Phos  3.0     03-20  Mg     2.1     03-20    TPro  7.7  /  Alb  3.2<L>  /  TBili  0.4  /  DBili  x   /  AST  33  /  ALT  43  /  AlkPhos  253<H>  03-19    PT/INR - ( 19 Mar 2024 07:46 )   PT: 11.0 sec;   INR: 0.97 ratio         PTT - ( 19 Mar 2024 07:46 )  PTT:32.4 sec  Urinalysis Basic - ( 20 Mar 2024 07:50 )    Color: x / Appearance: x / SG: x / pH: x  Gluc: 109 mg/dL / Ketone: x  / Bili: x / Urobili: x   Blood: x / Protein: x / Nitrite: x   Leuk Esterase: x / RBC: x / WBC x   Sq Epi: x / Non Sq Epi: x / Bacteria: x      CAPILLARY BLOOD GLUCOSE      POCT Blood Glucose.: 104 mg/dL (19 Mar 2024 05:51)        RADIOLOGY & ADDITIONAL TESTS: Reviewed.
Pt seen at bedside, resting comfortable  Pt denies pain, nausea, vomiting, fever, chills, denies any headaches, vision changes, scalp tenderness, jaw claudication, or fevers        Physical Exam:  GENERAL: NAD, AOx3, well developed  HEAD: Atraumatic, normocephalic, non tender in the temporal area  EYES: EOMI, PERRLA, conjunctiva and sclera clear  ENT: moist mucous membrane  NECK: supple, No JVD, midline trachea  CHEST/LUNG: unlabored respirations  Heart: S1, S2 normal  ABDOMEN: soft, nondistended, nontender  EXTREMITIES: +2 peripheral pulses, brisk cap refill. no clubbing, cyanosis or edema  NERVOUS SYSTEM: AOx3, speech clear, no neuro-deficits  MSK: full ROM, no deformities  SKIN: warm to touch, no rash or lesions      Vitals:  T(C): 36.3 ( @ 20:47), Max: 36.9 ( @ 15:29)  HR: 63 ( @ 20:47) (51 - 63)  BP: 140/48 ( @ 20:47) (115/44 - 176/56)  RR: 18 ( @ 20:47) (18 - 18)  SpO2: 96% ( @ 20:47) (96% - 100%)      03 @ 07:50                    12.0  CBC: 8.89>)-------(<307                     37.0                 139 | 107 | 25    CMP:  ----------------------< 109               4.2 | 27 | 0.80                      Ca:9.7  Phos:3.0  M.1               -|      |-        LFTs:  ------|-|-----             -|      |-      Current Inpatient Medications:  acetaminophen     Tablet .. 650 milliGRAM(s) Oral every 6 hours PRN  aspirin  chewable 81 milliGRAM(s) Oral daily  atorvastatin 80 milliGRAM(s) Oral at bedtime  calcium carbonate    500 mG (Tums) Chewable 1 Tablet(s) Chew every 6 hours PRN  heparin   Injectable 5000 Unit(s) SubCutaneous every 12 hours  hydrochlorothiazide 12.5 milliGRAM(s) Oral daily  levothyroxine 50 MICROGram(s) Oral daily  losartan 100 milliGRAM(s) Oral daily  pantoprazole    Tablet 40 milliGRAM(s) Oral before breakfast

## 2024-03-21 NOTE — DISCHARGE NOTE NURSING/CASE MANAGEMENT/SOCIAL WORK - NSDCPEFALRISK_GEN_ALL_CORE
Okay Yoana I ordered the vascular screen.  I still need Jazmin to write down in detail her family history regarding various aneurysms if she can.  We need to know which arteries were involved in which of her family members had the aneurysms.  I still think it would be a good idea at some point that she is sit down with the vascular surgeon to discuss risk to her and to her kids and her siblings regarding this.  Lets start with a vascular screen but if it is negative it still does not answer our question regarding the risk to family members.  
Sent message in my chart    
Yoana, This patient was referred to Vascular Surgeon and when I called the office to schedule appt. The lady at the office suggested that the patient go and have the Vascular screening at the hospital since we don't even know if the patient has a anneurysm or not and go from there.  Can you order the Vascular screening please.        Thank you,  Mami   
For information on Fall & Injury Prevention, visit: [email or web address] OR  [email or web address] OR  https://www.cdc.gov/steadi/patient.html

## 2024-03-21 NOTE — DISCHARGE NOTE NURSING/CASE MANAGEMENT/SOCIAL WORK - NSDCFUADDAPPT_GEN_ALL_CORE_FT
CARDIOLOGY FOLLOW UP APPOINTMENT: 4/5/24 @2PM at [facility] with RAHEEM Wheat CARDIOLOGY FOLLOW UP APPOINTMENT: 4/5/24 @2PM at [facility] with RAHEEM Wheat    Follow Up Appointment: Rheumatology		Dr. Barrow  3/28/2024	@1:40pm  [facility] [place] [address]	[phone]

## 2024-03-21 NOTE — PROGRESS NOTE ADULT - ASSESSMENT
67yo F presents w/ left facial numbness and ear pain. Given carotid stenosis is on right side and ESR elevation, unlikely TIA from carotid stenosis. Possible temporal arteritis    Recommendation  - carotid duplex (ordered)  - trend ESR in AM lab  - continue asa and statin  - vascular surgery will follow for if needed temporal artery biopsy  - Rheumatology reccs appreciated  - continue medical management per primary team    Plan discussed with vascular surgery attending, Dr. Love 69yo F presents w/ left facial numbness and ear pain. Given carotid stenosis is on right side and ESR elevation, unlikely TIA from carotid stenosis. Possible temporal arteritis    Carotid duplex:  Calcified plaque at the origin of the right internal carotid   artery creating a hemodynamically significant stenosis. The degree of   luminal narrowing is estimated at 70-99% by diameter.    Calcified plaque in the left internal carotid artery status post   endarterectomy. No evidence of residual or recurrent stenosis.    Recommendation    - trend ESR in AM lab  - continue asa and statin  - vascular surgery will follow for if needed temporal artery biopsy  - Rheumatology reccs appreciated  - continue medical management per primary team    Plan discussed with vascular surgery attending, Dr. Love

## 2024-03-21 NOTE — DISCHARGE NOTE NURSING/CASE MANAGEMENT/SOCIAL WORK - PATIENT PORTAL LINK FT
You can access the FollowMyHealth Patient Portal offered by [facility] by registering at the following website: [email or web address]. By joining [facility]’s FollowMyHealth portal, you will also be able to view your health information using other applications (apps) compatible with our system.

## 2024-03-22 LAB
ANTI-RIBONUCLEAR PROTEIN: <0.2 AI — SIGNIFICANT CHANGE UP
DSDNA AB FLD-ACNC: <0.2 AI — SIGNIFICANT CHANGE UP
ENA SM AB FLD QL: <0.2 AI — SIGNIFICANT CHANGE UP
ENA SS-A AB FLD IA-ACNC: <0.2 AI — SIGNIFICANT CHANGE UP

## 2024-03-23 LAB
% ALBUMIN: 52.3 % — SIGNIFICANT CHANGE UP
% ALPHA 1: 5.1 % — SIGNIFICANT CHANGE UP
% ALPHA 2: 12.9 % — SIGNIFICANT CHANGE UP
% BETA: 13.8 % — SIGNIFICANT CHANGE UP
% GAMMA: 15.9 % — SIGNIFICANT CHANGE UP
% M SPIKE: SIGNIFICANT CHANGE UP
ALBUMIN SERPL ELPH-MCNC: 3.3 G/DL — LOW (ref 3.6–5.5)
ALBUMIN/GLOB SERPL ELPH: 1.1 RATIO — SIGNIFICANT CHANGE UP
ALPHA1 GLOB SERPL ELPH-MCNC: 0.3 G/DL — SIGNIFICANT CHANGE UP (ref 0.1–0.4)
ALPHA2 GLOB SERPL ELPH-MCNC: 0.8 G/DL — SIGNIFICANT CHANGE UP (ref 0.5–1)
AUTO DIFF PNL BLD: ABNORMAL
B-GLOBULIN SERPL ELPH-MCNC: 0.9 G/DL — SIGNIFICANT CHANGE UP (ref 0.5–1)
C-ANCA SER-ACNC: NEGATIVE — SIGNIFICANT CHANGE UP
GAMMA GLOBULIN: 1 G/DL — SIGNIFICANT CHANGE UP (ref 0.6–1.6)
INTERPRETATION SERPL IFE-IMP: SIGNIFICANT CHANGE UP
M-SPIKE: SIGNIFICANT CHANGE UP (ref 0–0)
P-ANCA SER-ACNC: NEGATIVE — SIGNIFICANT CHANGE UP
PROT PATTERN SERPL ELPH-IMP: SIGNIFICANT CHANGE UP
PROT SERPL-MCNC: 6.3 G/DL — SIGNIFICANT CHANGE UP (ref 6–8.3)

## 2024-03-25 LAB
ANA PAT FLD IF-IMP: ABNORMAL
ANA TITR SER: ABNORMAL

## 2024-03-27 LAB — DSDNA AB SER-ACNC: <12 IU/ML — SIGNIFICANT CHANGE UP

## 2024-03-28 ENCOUNTER — APPOINTMENT (OUTPATIENT)
Dept: RHEUMATOLOGY | Facility: CLINIC | Age: 69
End: 2024-03-28
Payer: MEDICARE

## 2024-03-28 VITALS
HEART RATE: 70 BPM | BODY MASS INDEX: 40.18 KG/M2 | SYSTOLIC BLOOD PRESSURE: 118 MMHG | HEIGHT: 66 IN | WEIGHT: 250 LBS | DIASTOLIC BLOOD PRESSURE: 72 MMHG | OXYGEN SATURATION: 98 % | TEMPERATURE: 97.6 F

## 2024-03-28 DIAGNOSIS — K21.9 GASTRO-ESOPHAGEAL REFLUX DISEASE WITHOUT ESOPHAGITIS: ICD-10-CM

## 2024-03-28 DIAGNOSIS — F40.240 CLAUSTROPHOBIA: ICD-10-CM

## 2024-03-28 DIAGNOSIS — G43.109 MIGRAINE WITH AURA, NOT INTRACTABLE, WITHOUT STATUS MIGRAINOSUS: ICD-10-CM

## 2024-03-28 DIAGNOSIS — R29.701 NIHSS SCORE 1: ICD-10-CM

## 2024-03-28 DIAGNOSIS — H92.02 OTALGIA, LEFT EAR: ICD-10-CM

## 2024-03-28 DIAGNOSIS — R70.0 ELEVATED ERYTHROCYTE SEDIMENTATION RATE: ICD-10-CM

## 2024-03-28 DIAGNOSIS — Z85.828 PERSONAL HISTORY OF OTHER MALIGNANT NEOPLASM OF SKIN: ICD-10-CM

## 2024-03-28 DIAGNOSIS — Z53.29 PROCEDURE AND TREATMENT NOT CARRIED OUT BECAUSE OF PATIENT'S DECISION FOR OTHER REASONS: ICD-10-CM

## 2024-03-28 DIAGNOSIS — I10 ESSENTIAL (PRIMARY) HYPERTENSION: ICD-10-CM

## 2024-03-28 DIAGNOSIS — G45.9 TRANSIENT CEREBRAL ISCHEMIC ATTACK, UNSPECIFIED: ICD-10-CM

## 2024-03-28 DIAGNOSIS — E03.9 HYPOTHYROIDISM, UNSPECIFIED: ICD-10-CM

## 2024-03-28 DIAGNOSIS — E66.01 MORBID (SEVERE) OBESITY DUE TO EXCESS CALORIES: ICD-10-CM

## 2024-03-28 DIAGNOSIS — R77.8 OTHER SPECIFIED ABNORMALITIES OF PLASMA PROTEINS: ICD-10-CM

## 2024-03-28 DIAGNOSIS — E78.5 HYPERLIPIDEMIA, UNSPECIFIED: ICD-10-CM

## 2024-03-28 DIAGNOSIS — R20.0 ANESTHESIA OF SKIN: ICD-10-CM

## 2024-03-28 PROCEDURE — 36415 COLL VENOUS BLD VENIPUNCTURE: CPT

## 2024-03-28 PROCEDURE — G2211 COMPLEX E/M VISIT ADD ON: CPT

## 2024-03-28 PROCEDURE — 99214 OFFICE O/P EST MOD 30 MIN: CPT

## 2024-03-28 NOTE — REVIEW OF SYSTEMS
[Recent Weight Loss (___ Lbs)] : no recent weight loss [Eyesight Problems] : no eyesight problems [Negative] : Heme/Lymph

## 2024-03-28 NOTE — HISTORY OF PRESENT ILLNESS
[FreeTextEntry1] : 69yo F following up after a recent hospital stay at [facility]. She has a PMH of HTN, HLD, hypothyroidism, GERD, carotid stenosis s/p L. CEA. She presented on 3/19/24 to the hospital w/ left facial numbness, ear pain. Rheumatology was called for acute headache- Patient currently has no complaints and the episodes of symptom lasted about 1 hour. Rheumatology consulted due to elevated ESR in 80's.   Patient at the time of inpatient rheumatology consult, denied any headache, vision changes, scalp tenderness, fevers, infectious symptoms aside from a clear postnasal drip (denies cough).  Denied PMR symptoms or any other significant joint pain.  Denied abdominal pain or UTI symptoms.  There was a low suspicion of GCA from rheumatology standpoint based on lack of symptoms, lack of scalp tenderness and intact temporal artery pulses, so patient was discharged home subsequently.   At this followup visit, patient denies any acute headache, vision change, temporal scalp tenderness, or jaw claudication. She denies weight loss or night sweats. States she has been up to date on cancer screening. Notably her SPEP from hospitalization last week shows weak gamma migrating paraprotein. Her last ESR prior to discharge was in the 60's. No infectious symptoms at this time.  [Weight Loss] : no weight loss [Arthralgias] : no arthralgias [Visual Changes] : no visual changes [Eye Pain] : no eye pain [Eye Redness] : no eye redness

## 2024-03-28 NOTE — DATA REVIEWED
[FreeTextEntry1] : SPEP done at [facility] 3/20/24- weak gamma migrating paraprotein identified.

## 2024-03-28 NOTE — PHYSICAL EXAM
[General Appearance - In No Acute Distress] : in no acute distress [General Appearance - Alert] : alert [Sclera] : the sclera and conjunctiva were normal [Extraocular Movements] : extraocular movements were intact [Outer Ear] : the ears and nose were normal in appearance [Exaggerated Use Of Accessory Muscles For Inspiration] : no accessory muscle use [Edema] : there was no peripheral edema [Musculoskeletal - Swelling] : no joint swelling seen [Skin Color & Pigmentation] : normal skin color and pigmentation [] : no rash [Skin Lesions] : no skin lesions [No Focal Deficits] : no focal deficits [FreeTextEntry1] : no scalp tenderness [Oriented To Time, Place, And Person] : oriented to person, place, and time [Affect] : the affect was normal [Mood] : the mood was normal

## 2024-03-28 NOTE — ASSESSMENT
[FreeTextEntry1] : 69yo F w/ HTN, HLD, hypothyroidism, GERD, carotid stenosis s/p L. CEA here for post-hospital followup. She presented to hospital on 3/19/24 w/ left facial numbness, ear pain. Patient is seen by Dr. Love in office was planned for Right CEA at April. Also had transient headache while inpatient and elevated ESR prompting rheumatology consultation. At time of rheumatology evaluation inpatient, patient's headache and other symptoms resolved.   There was very little concern for GCA while inpatient as patient denied any headaches, vision changes, scalp tenderness, jaw claudication, or fevers. She had intact bilateral temporal artery pulses and no scalp tenderness on exam. She had no signs or symptoms of PMR as well- denied any significant joint tenderness (has chronic knee pains managed with knee injections per orthopedics but aside from that no worsening of pain). No shoulder or hip girdle pain. Symptoms have not changed as outpatient- still there is very little concern for GCA now. Regardless due to elevated ESR I have ordered temporal artery ultrasounds.  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 Will repeat ESR and CBC at this time.  Will discuss results of labs and temporal artery US with patient once they are available.

## 2024-04-01 ENCOUNTER — NON-APPOINTMENT (OUTPATIENT)
Age: 69
End: 2024-04-01

## 2024-04-01 LAB
BASOPHILS # BLD AUTO: 0.05 K/UL
BASOPHILS NFR BLD AUTO: 0.4 %
EOSINOPHIL # BLD AUTO: 0.11 K/UL
EOSINOPHIL NFR BLD AUTO: 0.9 %
ERYTHROCYTE [SEDIMENTATION RATE] IN BLOOD BY WESTERGREN METHOD: 99 MM/HR
HCT VFR BLD CALC: 41.2 %
HGB BLD-MCNC: 13.1 G/DL
IMM GRANULOCYTES NFR BLD AUTO: 0.3 %
LYMPHOCYTES # BLD AUTO: 2.84 K/UL
LYMPHOCYTES NFR BLD AUTO: 22.9 %
MAN DIFF?: NORMAL
MCHC RBC-ENTMCNC: 30 PG
MCHC RBC-ENTMCNC: 31.8 GM/DL
MCV RBC AUTO: 94.3 FL
MONOCYTES # BLD AUTO: 0.58 K/UL
MONOCYTES NFR BLD AUTO: 4.7 %
NEUTROPHILS # BLD AUTO: 8.8 K/UL
NEUTROPHILS NFR BLD AUTO: 70.8 %
PLATELET # BLD AUTO: 336 K/UL
RBC # BLD: 4.37 M/UL
RBC # FLD: 15.3 %
WBC # FLD AUTO: 12.42 K/UL

## 2024-04-05 ENCOUNTER — APPOINTMENT (OUTPATIENT)
Dept: ULTRASOUND IMAGING | Facility: CLINIC | Age: 69
End: 2024-04-05

## 2024-04-05 ENCOUNTER — APPOINTMENT (OUTPATIENT)
Dept: VASCULAR SURGERY | Facility: CLINIC | Age: 69
End: 2024-04-05
Payer: MEDICARE

## 2024-04-05 PROCEDURE — 93880 EXTRACRANIAL BILAT STUDY: CPT

## 2024-04-07 ENCOUNTER — EMERGENCY (EMERGENCY)
Facility: HOSPITAL | Age: 69
LOS: 0 days | Discharge: HOME | End: 2024-04-07
Attending: EMERGENCY MEDICINE
Payer: MEDICARE

## 2024-04-07 VITALS
HEART RATE: 50 BPM | TEMPERATURE: 98 F | OXYGEN SATURATION: 100 % | SYSTOLIC BLOOD PRESSURE: 147 MMHG | RESPIRATION RATE: 18 BRPM | DIASTOLIC BLOOD PRESSURE: 52 MMHG

## 2024-04-07 VITALS — HEIGHT: 66 IN | WEIGHT: 220.02 LBS

## 2024-04-07 DIAGNOSIS — I87.8 OTHER SPECIFIED DISORDERS OF VEINS: ICD-10-CM

## 2024-04-07 DIAGNOSIS — I25.10 ATHEROSCLEROTIC HEART DISEASE OF NATIVE CORONARY ARTERY WITHOUT ANGINA PECTORIS: ICD-10-CM

## 2024-04-07 DIAGNOSIS — K21.9 GASTRO-ESOPHAGEAL REFLUX DISEASE WITHOUT ESOPHAGITIS: ICD-10-CM

## 2024-04-07 DIAGNOSIS — I10 ESSENTIAL (PRIMARY) HYPERTENSION: ICD-10-CM

## 2024-04-07 DIAGNOSIS — Z90.49 ACQUIRED ABSENCE OF OTHER SPECIFIED PARTS OF DIGESTIVE TRACT: Chronic | ICD-10-CM

## 2024-04-07 DIAGNOSIS — S62.109A FRACTURE OF UNSPECIFIED CARPAL BONE, UNSPECIFIED WRIST, INITIAL ENCOUNTER FOR CLOSED FRACTURE: Chronic | ICD-10-CM

## 2024-04-07 DIAGNOSIS — L30.9 DERMATITIS, UNSPECIFIED: ICD-10-CM

## 2024-04-07 DIAGNOSIS — Z98.890 OTHER SPECIFIED POSTPROCEDURAL STATES: Chronic | ICD-10-CM

## 2024-04-07 DIAGNOSIS — L53.8 OTHER SPECIFIED ERYTHEMATOUS CONDITIONS: ICD-10-CM

## 2024-04-07 DIAGNOSIS — M79.89 OTHER SPECIFIED SOFT TISSUE DISORDERS: ICD-10-CM

## 2024-04-07 DIAGNOSIS — M79.604 PAIN IN RIGHT LEG: ICD-10-CM

## 2024-04-07 DIAGNOSIS — E03.9 HYPOTHYROIDISM, UNSPECIFIED: ICD-10-CM

## 2024-04-07 DIAGNOSIS — E78.5 HYPERLIPIDEMIA, UNSPECIFIED: ICD-10-CM

## 2024-04-07 PROCEDURE — 99284 EMERGENCY DEPT VISIT MOD MDM: CPT

## 2024-04-07 PROCEDURE — 99282 EMERGENCY DEPT VISIT SF MDM: CPT

## 2024-04-07 NOTE — ED PROVIDER NOTE - NSFOLLOWUPINSTRUCTIONS_ED_ALL_ED_FT
No Follow-up with your regular physician  Return with any persistent/worsening symptoms.  Wear support stockings to reduce swelling/redness

## 2024-04-07 NOTE — ED PROVIDER NOTE - PATIENT PORTAL LINK FT
You can access the FollowMyHealth Patient Portal offered by [facility] by registering at the following website: [email or web address]. By joining [facility]’s FollowMyHealth portal, you will also be able to view your health information using other applications (apps) compatible with our system.

## 2024-04-07 NOTE — ED ADULT TRIAGE NOTE - CHIEF COMPLAINT QUOTE
pt presents to ED due to complaints of left leg edema with redness and pain since yesterday worsening this AM

## 2024-04-07 NOTE — ED ADULT NURSE NOTE - DISCHARGE DATE/TIME
Patient:   PB CHILD            MR[place]: [facility] [place][id number]            FIN: [id number]              [employer])   07-Apr-2024 10:00

## 2024-04-07 NOTE — ED PROVIDER NOTE - MUSCULOSKELETAL, MLM
Spine appears normal, range of motion is not limited, Chronic venous stasis changes, no abnormal bleeding

## 2024-04-07 NOTE — ED PROVIDER NOTE - OBJECTIVE STATEMENT
69 yo femle with a PMH of HTN, HLD, hypothyroidism, GERD, b/l carotid artery stenosis s/p left CEA presents to the ED c/o b/l LE redness, pain, and swelling since yesterday. Pt noticed worsening edema since yesterday, slightly better today but assoc w/ redness and discomfort, called her vascular Dr. Carrasquillo and advised to come to ed for eval. Denies fever, chill, CP, SOB, or recent falls/injuries. Pt notes swelling is much improved w/ compression socks. No other complaints at this time. PCP: Dr. Abraham. Vascular: Dr. Carrasquillo

## 2024-04-07 NOTE — ED ADULT NURSE NOTE - OBJECTIVE STATEMENT
Pt A&O x3 ambulatory to the ed complain of left lower leg redness and swelling. Upon assessment pt had on compression socks on. MD Krueger present when assessing pt legs. Pt confirmed the redness decreased and did not complain of pain. The legs were not hot to touch, edema present, healing wound present to right ankle-pt states "I have had it for years.",  chronic discoloration present in b/l legs. PMH peripheral edema. Pt states she took medications previously for cellulitis on the Right leg, no longer on antibiotics. Due to the chronic presentation of the legs the pt was educated to wear her compressions socks daily. No labs drawn or sent as per MD Krueger's orders. Pt ambulatory with steady gait without assistance.

## 2024-04-11 ENCOUNTER — APPOINTMENT (OUTPATIENT)
Dept: SURGERY | Facility: CLINIC | Age: 69
End: 2024-04-11
Payer: MEDICARE

## 2024-04-11 VITALS — TEMPERATURE: 97.6 F

## 2024-04-11 PROCEDURE — 99212 OFFICE O/P EST SF 10 MIN: CPT

## 2024-04-11 RX ORDER — ASPIRIN 81 MG
81 TABLET, DELAYED RELEASE (ENTERIC COATED) ORAL
Refills: 0 | Status: ACTIVE | COMMUNITY

## 2024-04-11 RX ORDER — OMEPRAZOLE 20 MG/1
20 CAPSULE, DELAYED RELEASE ORAL
Refills: 0 | Status: ACTIVE | COMMUNITY

## 2024-04-11 RX ORDER — LOSARTAN POTASSIUM 100 MG/1
TABLET, FILM COATED ORAL
Refills: 0 | Status: ACTIVE | COMMUNITY

## 2024-04-11 NOTE — PHYSICAL EXAM
[Normal Breath Sounds] : Normal breath sounds [Normal Heart Sounds] : normal heart sounds [Calm] : calm [de-identified] : obese [de-identified] : LLE - skin baseline

## 2024-04-13 ENCOUNTER — EMERGENCY (EMERGENCY)
Facility: HOSPITAL | Age: 69
LOS: 0 days | Discharge: HOME | End: 2024-04-13
Attending: EMERGENCY MEDICINE
Payer: MEDICARE

## 2024-04-13 VITALS
DIASTOLIC BLOOD PRESSURE: 82 MMHG | HEART RATE: 73 BPM | SYSTOLIC BLOOD PRESSURE: 175 MMHG | OXYGEN SATURATION: 100 % | TEMPERATURE: 98 F | RESPIRATION RATE: 19 BRPM

## 2024-04-13 VITALS
OXYGEN SATURATION: 100 % | RESPIRATION RATE: 18 BRPM | TEMPERATURE: 98 F | SYSTOLIC BLOOD PRESSURE: 152 MMHG | HEART RATE: 73 BPM | DIASTOLIC BLOOD PRESSURE: 58 MMHG

## 2024-04-13 DIAGNOSIS — I73.9 PERIPHERAL VASCULAR DISEASE, UNSPECIFIED: ICD-10-CM

## 2024-04-13 DIAGNOSIS — I10 ESSENTIAL (PRIMARY) HYPERTENSION: ICD-10-CM

## 2024-04-13 DIAGNOSIS — S62.109A FRACTURE OF UNSPECIFIED CARPAL BONE, UNSPECIFIED WRIST, INITIAL ENCOUNTER FOR CLOSED FRACTURE: Chronic | ICD-10-CM

## 2024-04-13 DIAGNOSIS — R10.9 UNSPECIFIED ABDOMINAL PAIN: ICD-10-CM

## 2024-04-13 DIAGNOSIS — Z98.890 OTHER SPECIFIED POSTPROCEDURAL STATES: Chronic | ICD-10-CM

## 2024-04-13 DIAGNOSIS — N39.0 URINARY TRACT INFECTION, SITE NOT SPECIFIED: ICD-10-CM

## 2024-04-13 DIAGNOSIS — Z90.49 ACQUIRED ABSENCE OF OTHER SPECIFIED PARTS OF DIGESTIVE TRACT: Chronic | ICD-10-CM

## 2024-04-13 LAB
ALBUMIN SERPL ELPH-MCNC: 3.3 G/DL — SIGNIFICANT CHANGE UP (ref 3.3–5)
ALP SERPL-CCNC: 215 U/L — HIGH (ref 40–120)
ALT FLD-CCNC: 54 U/L — SIGNIFICANT CHANGE UP (ref 12–78)
ANION GAP SERPL CALC-SCNC: 4 MMOL/L — LOW (ref 5–17)
APPEARANCE UR: CLEAR — SIGNIFICANT CHANGE UP
AST SERPL-CCNC: 45 U/L — HIGH (ref 15–37)
BACTERIA # UR AUTO: ABNORMAL /HPF
BASOPHILS # BLD AUTO: 0.04 K/UL — SIGNIFICANT CHANGE UP (ref 0–0.2)
BASOPHILS NFR BLD AUTO: 0.4 % — SIGNIFICANT CHANGE UP (ref 0–2)
BILIRUB SERPL-MCNC: 0.4 MG/DL — SIGNIFICANT CHANGE UP (ref 0.2–1.2)
BILIRUB UR-MCNC: NEGATIVE — SIGNIFICANT CHANGE UP
BUN SERPL-MCNC: 28 MG/DL — HIGH (ref 7–23)
CALCIUM SERPL-MCNC: 10.1 MG/DL — SIGNIFICANT CHANGE UP (ref 8.5–10.1)
CAST: 3 /LPF — SIGNIFICANT CHANGE UP (ref 0–4)
CHLORIDE SERPL-SCNC: 103 MMOL/L — SIGNIFICANT CHANGE UP (ref 96–108)
CO2 SERPL-SCNC: 31 MMOL/L — SIGNIFICANT CHANGE UP (ref 22–31)
COLOR SPEC: YELLOW — SIGNIFICANT CHANGE UP
CREAT SERPL-MCNC: 1.01 MG/DL — SIGNIFICANT CHANGE UP (ref 0.5–1.3)
DIFF PNL FLD: ABNORMAL
EGFR: 61 ML/MIN/1.73M2 — SIGNIFICANT CHANGE UP
EOSINOPHIL # BLD AUTO: 0.12 K/UL — SIGNIFICANT CHANGE UP (ref 0–0.5)
EOSINOPHIL NFR BLD AUTO: 1.1 % — SIGNIFICANT CHANGE UP (ref 0–6)
GLUCOSE SERPL-MCNC: 103 MG/DL — HIGH (ref 70–99)
GLUCOSE UR QL: NEGATIVE MG/DL — SIGNIFICANT CHANGE UP
HCT VFR BLD CALC: 41.5 % — SIGNIFICANT CHANGE UP (ref 34.5–45)
HGB BLD-MCNC: 13.3 G/DL — SIGNIFICANT CHANGE UP (ref 11.5–15.5)
IMM GRANULOCYTES NFR BLD AUTO: 0.3 % — SIGNIFICANT CHANGE UP (ref 0–0.9)
KETONES UR-MCNC: NEGATIVE MG/DL — SIGNIFICANT CHANGE UP
LEUKOCYTE ESTERASE UR-ACNC: ABNORMAL
LIDOCAIN IGE QN: 26 U/L — SIGNIFICANT CHANGE UP (ref 13–75)
LYMPHOCYTES # BLD AUTO: 2.48 K/UL — SIGNIFICANT CHANGE UP (ref 1–3.3)
LYMPHOCYTES # BLD AUTO: 23.5 % — SIGNIFICANT CHANGE UP (ref 13–44)
MCHC RBC-ENTMCNC: 30.3 PG — SIGNIFICANT CHANGE UP (ref 27–34)
MCHC RBC-ENTMCNC: 32 GM/DL — SIGNIFICANT CHANGE UP (ref 32–36)
MCV RBC AUTO: 94.5 FL — SIGNIFICANT CHANGE UP (ref 80–100)
MONOCYTES # BLD AUTO: 0.47 K/UL — SIGNIFICANT CHANGE UP (ref 0–0.9)
MONOCYTES NFR BLD AUTO: 4.5 % — SIGNIFICANT CHANGE UP (ref 2–14)
NEUTROPHILS # BLD AUTO: 7.42 K/UL — HIGH (ref 1.8–7.4)
NEUTROPHILS NFR BLD AUTO: 70.2 % — SIGNIFICANT CHANGE UP (ref 43–77)
NITRITE UR-MCNC: NEGATIVE — SIGNIFICANT CHANGE UP
PH UR: 5 — SIGNIFICANT CHANGE UP (ref 5–8)
PLATELET # BLD AUTO: 260 K/UL — SIGNIFICANT CHANGE UP (ref 150–400)
POTASSIUM SERPL-MCNC: 3.8 MMOL/L — SIGNIFICANT CHANGE UP (ref 3.5–5.3)
POTASSIUM SERPL-SCNC: 3.8 MMOL/L — SIGNIFICANT CHANGE UP (ref 3.5–5.3)
PROT SERPL-MCNC: 7.6 GM/DL — SIGNIFICANT CHANGE UP (ref 6–8.3)
PROT UR-MCNC: SIGNIFICANT CHANGE UP MG/DL
RBC # BLD: 4.39 M/UL — SIGNIFICANT CHANGE UP (ref 3.8–5.2)
RBC # FLD: 14.8 % — HIGH (ref 10.3–14.5)
RBC CASTS # UR COMP ASSIST: 11 /HPF — HIGH (ref 0–4)
SODIUM SERPL-SCNC: 138 MMOL/L — SIGNIFICANT CHANGE UP (ref 135–145)
SP GR SPEC: >1.03 — HIGH (ref 1–1.03)
SQUAMOUS # UR AUTO: 6 /HPF — HIGH (ref 0–5)
UROBILINOGEN FLD QL: 0.2 MG/DL — SIGNIFICANT CHANGE UP (ref 0.2–1)
WBC # BLD: 10.56 K/UL — HIGH (ref 3.8–10.5)
WBC # FLD AUTO: 10.56 K/UL — HIGH (ref 3.8–10.5)
WBC UR QL: 20 /HPF — HIGH (ref 0–5)

## 2024-04-13 PROCEDURE — 74177 CT ABD & PELVIS W/CONTRAST: CPT | Mod: MC

## 2024-04-13 PROCEDURE — 87086 URINE CULTURE/COLONY COUNT: CPT

## 2024-04-13 PROCEDURE — 36415 COLL VENOUS BLD VENIPUNCTURE: CPT

## 2024-04-13 PROCEDURE — 85025 COMPLETE CBC W/AUTO DIFF WBC: CPT

## 2024-04-13 PROCEDURE — 74177 CT ABD & PELVIS W/CONTRAST: CPT | Mod: 26,MC

## 2024-04-13 PROCEDURE — 81001 URINALYSIS AUTO W/SCOPE: CPT

## 2024-04-13 PROCEDURE — 83690 ASSAY OF LIPASE: CPT

## 2024-04-13 PROCEDURE — 80053 COMPREHEN METABOLIC PANEL: CPT

## 2024-04-13 PROCEDURE — 99285 EMERGENCY DEPT VISIT HI MDM: CPT | Mod: FS

## 2024-04-13 PROCEDURE — 99284 EMERGENCY DEPT VISIT MOD MDM: CPT | Mod: 25

## 2024-04-13 RX ORDER — IBUPROFEN 200 MG
600 TABLET ORAL ONCE
Refills: 0 | Status: COMPLETED | OUTPATIENT
Start: 2024-04-13 | End: 2024-04-13

## 2024-04-13 RX ORDER — MORPHINE SULFATE 50 MG/1
2 CAPSULE, EXTENDED RELEASE ORAL ONCE
Refills: 0 | Status: DISCONTINUED | OUTPATIENT
Start: 2024-04-13 | End: 2024-04-13

## 2024-04-13 RX ORDER — SODIUM CHLORIDE 9 MG/ML
500 INJECTION INTRAMUSCULAR; INTRAVENOUS; SUBCUTANEOUS ONCE
Refills: 0 | Status: COMPLETED | OUTPATIENT
Start: 2024-04-13 | End: 2024-04-13

## 2024-04-13 RX ORDER — ONDANSETRON 8 MG/1
4 TABLET, FILM COATED ORAL ONCE
Refills: 0 | Status: DISCONTINUED | OUTPATIENT
Start: 2024-04-13 | End: 2024-04-13

## 2024-04-13 RX ADMIN — Medication 1 TABLET(S): at 21:31

## 2024-04-13 RX ADMIN — SODIUM CHLORIDE 500 MILLILITER(S): 9 INJECTION INTRAMUSCULAR; INTRAVENOUS; SUBCUTANEOUS at 20:39

## 2024-04-13 RX ADMIN — Medication 600 MILLIGRAM(S): at 20:37

## 2024-04-13 NOTE — ED ADULT NURSE NOTE - DOES PATIENT HAVE ADVANCE DIRECTIVE
Impression: Other secondary cataract, bilateral: H26.493. Plan: Opacified capsule not affecting vision. No indication for treatment. Return if decreased vision. Pt advised. Yes

## 2024-04-13 NOTE — ED STATDOCS - ATTENDING APP SHARED VISIT CONTRIBUTION OF CARE
February 19, 2022    Shellie Clark  [address] [facility] [address]    To Whom It May Concern:    This is to certify Shellie Clark was evaluated on 02/19/22 and is unable to return to work.      Shellie Clark should self-isolate until her results are available.  If positive, please see below recommendations.  If she tests negative, she may return to work 2/23/2022, sooner if feeling significantly better.  ?  CDC guidelines for return to work are as follows:  · At least 24 hours have passed since fever resolution without use of fever reducing medication and   · Symptoms have improved and  · At least 5 days have passed since symptoms first appeared  · A mask should be worn when around others for the 5 days after return to work    **The loss of taste and smell may persist for weeks or months after recovery and do not need to delay the end of isolation.     Per CDC recommendations, employers should not require a COVID-19 test result or a healthcare provider’s note for employees who are sick to validate their illness, qualify for sick leave, or to return to work.    The Coronavirus is a rapidly evolving situation and recommendations are changing regularly to prevent spread of the disease and further loss of life.    Thank you for your understanding during these unusual times.     Electronically signed by:     Jenny Myers MD                 [address] [facility] [address]     ED Attending Contribution to Care: SHAUN FIELD MD,  performed the initial face to face bedside interview with this patient regarding history of present illness, review of symptoms and relevant past medical, social and family history.  I completed an independent physical examination.  I was the initial provider who evaluated this patient. I have signed out the follow up of any pending tests (i.e. labs, radiological studies) to the TERRIE.  I have communicated the patient’s plan of care and disposition with the TERRIE.  The history, relevant review of systems, past medical and surgical history, medical decision making, and physical examination was documented by the scribe in my presence and I attest to the accuracy of the documentation.

## 2024-04-13 NOTE — ED STATDOCS - SKIN, MLM
no tactile warmth, no rash, venostasis changes b/l forelegs no tactile warmth, no rash, venous stasis changes b/l forelegs

## 2024-04-13 NOTE — ED STATDOCS - PATIENT PORTAL LINK FT
You can access the FollowMyHealth Patient Portal offered by [facility] by registering at the following website: [email or web address]. By joining [employer]’s FollowMyHealth portal, you will also be able to view your health information using other applications (apps) compatible with our system.

## 2024-04-13 NOTE — ED ADULT NURSE NOTE - NSFALLRISKINTERV_ED_ALL_ED
Assistance OOB with selected safe patient handling equipment if applicable/Assistance with ambulation/Communicate fall risk and risk factors to all staff, patient, and family/Monitor gait and stability/Provide visual cue: yellow wristband, yellow gown, etc/Reinforce activity limits and safety measures with patient and family/Call bell, personal items and telephone in reach/Instruct patient to call for assistance before getting out of bed/chair/stretcher/Non-slip footwear applied when patient is off stretcher/[place] to call system/Physically safe environment - no spills, clutter or unnecessary equipment/Purposeful Proactive Rounding/Room/bathroom lighting operational, light cord in reach

## 2024-04-13 NOTE — ED STATDOCS - PROGRESS NOTE DETAILS
69 yo female presenting to ER with complaints of LLQ pain, started today. Patient denies n/v/d, states normal BM today, no dysuria.  Exam: +tenderness LLQ, worse with movement, no CVAT  Plan: CT abd/pelvis, labs, fluids, PRN pain control, urine Pt is well appearing walking with steady gait, stable for discharge and follow up without fail with medical doctor. I had a detailed discussion with the patient and/or guardian regarding the historical points, exam findings, and any diagnostic results supporting the discharge diagnosis. Pt educated on care and need for follow up. Strict return instructions and red flag signs and symptoms discussed with patient. Questions answered. Pt shows understanding of discharge information and agrees to follow.

## 2024-04-13 NOTE — ED STATDOCS - CLINICAL SUMMARY MEDICAL DECISION MAKING FREE TEXT BOX
69 y/o female history HTN, PVD ambulatory to ED c/o LLQ abdominal pain, gradual onset during lunch at noon, gradually worsening waxing/waning pattern, +LLQ tenderness aggravated by movement. High clinical concern for diverticulitis more so than urine related issue and gyn.   Plan: labs including UA with culture, lipase, IV fluid, CT A/P, PRN morphine, zofran, observe, reassess. 69 y/o female history HTN, PVD ambulatory to ED c/o LLQ abdominal pain, gradual onset during lunch at noon, gradually worsening waxing/waning pattern, +LLQ tenderness aggravated by movement. High clinical concern for diverticulitis more so than urine related issue and gyn.   Plan: labs including UA with culture, lipase, IV fluid, CT A/P, PRN morphine, zofran, observe, reassess.    See Progress Note for ED NP review of labs, pt reassessment & disposition.

## 2024-04-13 NOTE — ED STATDOCS - OBJECTIVE STATEMENT
67 y/o female with PMHx of HTN, PVD with OP right endarterectomy of carotid in 2 weeks, on baby aspirin presents to the ED c/o waxing and waning sharp LLQ pain since 12PM while having lunch. States she had lunch with her friends, began having LLQ pain. Went to show with friends, stood up and pain worsened. Pain aggravated by movement. Had BM earlier with no improvement to pain. Denies fever, dysuria, vaginal pain. Denies recent antibiotic use. NKDA. 67 y/o female with PMHx of HTN, PVD with outpt right carotid endarterectomy scheduled to be done in 2 weeks, on baby aspirin, presents ambulatory to the ED c/o waxing and waning sharp LLQ pain since 12PM today while having lunch. States she had lunch with her friends, began having LLQ pain. Went to show with friends, stood up and pain worsened. Pain aggravated by movement. Had BM earlier with no improvement to pain. Denies fever, dysuria, vaginal pain. Denies recent antibiotic use. NKDA.

## 2024-04-13 NOTE — ED STATDOCS - NEUROLOGICAL, MLM
sensation is normal and strength is normal. sensation is normal and strength is normal.  CNs intact, normal speech

## 2024-04-13 NOTE — ED ADULT NURSE NOTE - OBJECTIVE STATEMENT
Left Lower quadrant pain sharp increases with activity started at 1500 today, no nausea, vomiting, diarrhea, fever.

## 2024-04-13 NOTE — ED STATDOCS - CONSTITUTIONAL, MLM
overweight white female, mild discomfort due to abdominal pain, no respiratory discomfort, normal... overweight white female, mild discomfort due to abdominal pain, no respiratory discomfort, nontoxic

## 2024-04-13 NOTE — ED STATDOCS - CPE ED CARDIAC NORM
Urine dip was positive for infection today and sent for a culture,   Call in two to three days for urine culture results    Don't wear contacts when taking AZO OTC or pyridium perscription due to side effects  Increased fluids  Take meds as directed     Empty bladder often  Follow up with pcp  If symptoms get worse, go to ER for further evaluation 
normal...

## 2024-04-13 NOTE — ED ADULT TRIAGE NOTE - CHIEF COMPLAINT QUOTE
Pt presented to the ER with c/o LLQ pain. Pt stated that she felt it around 12 when she was eating. Pt reported that the pain worsened throughout the day.

## 2024-04-13 NOTE — ED STATDOCS - GASTROINTESTINAL, MLM
BS hypoactive, normal pitch, +LLQ tenderness BS hypoactive, normal pitch, +LLQ tenderness w/o G/R nor mass

## 2024-04-15 LAB
CULTURE RESULTS: SIGNIFICANT CHANGE UP
SPECIMEN SOURCE: SIGNIFICANT CHANGE UP

## 2024-04-16 ENCOUNTER — OUTPATIENT (OUTPATIENT)
Dept: OUTPATIENT SERVICES | Facility: HOSPITAL | Age: 69
LOS: 1 days | End: 2024-04-16
Payer: MEDICARE

## 2024-04-16 VITALS
HEIGHT: 65 IN | HEART RATE: 78 BPM | DIASTOLIC BLOOD PRESSURE: 78 MMHG | RESPIRATION RATE: 16 BRPM | SYSTOLIC BLOOD PRESSURE: 130 MMHG | OXYGEN SATURATION: 100 % | WEIGHT: 270.51 LBS | TEMPERATURE: 98 F

## 2024-04-16 DIAGNOSIS — Z90.49 ACQUIRED ABSENCE OF OTHER SPECIFIED PARTS OF DIGESTIVE TRACT: Chronic | ICD-10-CM

## 2024-04-16 DIAGNOSIS — Z98.890 OTHER SPECIFIED POSTPROCEDURAL STATES: Chronic | ICD-10-CM

## 2024-04-16 DIAGNOSIS — S62.109A FRACTURE OF UNSPECIFIED CARPAL BONE, UNSPECIFIED WRIST, INITIAL ENCOUNTER FOR CLOSED FRACTURE: Chronic | ICD-10-CM

## 2024-04-16 DIAGNOSIS — Z86.79 PERSONAL HISTORY OF OTHER DISEASES OF THE CIRCULATORY SYSTEM: ICD-10-CM

## 2024-04-16 DIAGNOSIS — Z01.818 ENCOUNTER FOR OTHER PREPROCEDURAL EXAMINATION: ICD-10-CM

## 2024-04-16 DIAGNOSIS — I65.21 OCCLUSION AND STENOSIS OF RIGHT CAROTID ARTERY: ICD-10-CM

## 2024-04-16 LAB
A1C WITH ESTIMATED AVERAGE GLUCOSE RESULT: 5.6 % — SIGNIFICANT CHANGE UP (ref 4–5.6)
ALBUMIN SERPL ELPH-MCNC: 3.1 G/DL — LOW (ref 3.3–5)
ALP SERPL-CCNC: 199 U/L — HIGH (ref 40–120)
ALT FLD-CCNC: 51 U/L — SIGNIFICANT CHANGE UP (ref 12–78)
ANION GAP SERPL CALC-SCNC: 4 MMOL/L — LOW (ref 5–17)
APTT BLD: 31.6 SEC — SIGNIFICANT CHANGE UP (ref 24.5–35.6)
AST SERPL-CCNC: 39 U/L — HIGH (ref 15–37)
BASOPHILS # BLD AUTO: 0.05 K/UL — SIGNIFICANT CHANGE UP (ref 0–0.2)
BASOPHILS NFR BLD AUTO: 0.6 % — SIGNIFICANT CHANGE UP (ref 0–2)
BILIRUB SERPL-MCNC: 0.4 MG/DL — SIGNIFICANT CHANGE UP (ref 0.2–1.2)
BLD GP AB SCN SERPL QL: SIGNIFICANT CHANGE UP
BUN SERPL-MCNC: 22 MG/DL — SIGNIFICANT CHANGE UP (ref 7–23)
CALCIUM SERPL-MCNC: 10.2 MG/DL — HIGH (ref 8.5–10.1)
CHLORIDE SERPL-SCNC: 109 MMOL/L — HIGH (ref 96–108)
CO2 SERPL-SCNC: 29 MMOL/L — SIGNIFICANT CHANGE UP (ref 22–31)
CREAT SERPL-MCNC: 1.15 MG/DL — SIGNIFICANT CHANGE UP (ref 0.5–1.3)
EGFR: 52 ML/MIN/1.73M2 — LOW
EOSINOPHIL # BLD AUTO: 0.15 K/UL — SIGNIFICANT CHANGE UP (ref 0–0.5)
EOSINOPHIL NFR BLD AUTO: 1.7 % — SIGNIFICANT CHANGE UP (ref 0–6)
ESTIMATED AVERAGE GLUCOSE: 114 MG/DL — SIGNIFICANT CHANGE UP (ref 68–114)
GLUCOSE SERPL-MCNC: 95 MG/DL — SIGNIFICANT CHANGE UP (ref 70–99)
HCT VFR BLD CALC: 38.8 % — SIGNIFICANT CHANGE UP (ref 34.5–45)
HGB BLD-MCNC: 12.2 G/DL — SIGNIFICANT CHANGE UP (ref 11.5–15.5)
IMM GRANULOCYTES NFR BLD AUTO: 0.6 % — SIGNIFICANT CHANGE UP (ref 0–0.9)
INR BLD: 1.02 RATIO — SIGNIFICANT CHANGE UP (ref 0.85–1.18)
LYMPHOCYTES # BLD AUTO: 1.92 K/UL — SIGNIFICANT CHANGE UP (ref 1–3.3)
LYMPHOCYTES # BLD AUTO: 21.9 % — SIGNIFICANT CHANGE UP (ref 13–44)
MCHC RBC-ENTMCNC: 29.9 PG — SIGNIFICANT CHANGE UP (ref 27–34)
MCHC RBC-ENTMCNC: 31.4 GM/DL — LOW (ref 32–36)
MCV RBC AUTO: 95.1 FL — SIGNIFICANT CHANGE UP (ref 80–100)
MONOCYTES # BLD AUTO: 0.39 K/UL — SIGNIFICANT CHANGE UP (ref 0–0.9)
MONOCYTES NFR BLD AUTO: 4.4 % — SIGNIFICANT CHANGE UP (ref 2–14)
NEUTROPHILS # BLD AUTO: 6.22 K/UL — SIGNIFICANT CHANGE UP (ref 1.8–7.4)
NEUTROPHILS NFR BLD AUTO: 70.8 % — SIGNIFICANT CHANGE UP (ref 43–77)
PLATELET # BLD AUTO: 244 K/UL — SIGNIFICANT CHANGE UP (ref 150–400)
POTASSIUM SERPL-MCNC: 4 MMOL/L — SIGNIFICANT CHANGE UP (ref 3.5–5.3)
POTASSIUM SERPL-SCNC: 4 MMOL/L — SIGNIFICANT CHANGE UP (ref 3.5–5.3)
PROT SERPL-MCNC: 7.3 GM/DL — SIGNIFICANT CHANGE UP (ref 6–8.3)
PROTHROM AB SERPL-ACNC: 11.5 SEC — SIGNIFICANT CHANGE UP (ref 9.5–13)
RBC # BLD: 4.08 M/UL — SIGNIFICANT CHANGE UP (ref 3.8–5.2)
RBC # FLD: 15.2 % — HIGH (ref 10.3–14.5)
SODIUM SERPL-SCNC: 142 MMOL/L — SIGNIFICANT CHANGE UP (ref 135–145)
WBC # BLD: 8.78 K/UL — SIGNIFICANT CHANGE UP (ref 3.8–10.5)
WBC # FLD AUTO: 8.78 K/UL — SIGNIFICANT CHANGE UP (ref 3.8–10.5)

## 2024-04-16 PROCEDURE — 80053 COMPREHEN METABOLIC PANEL: CPT

## 2024-04-16 PROCEDURE — 86850 RBC ANTIBODY SCREEN: CPT

## 2024-04-16 PROCEDURE — 85025 COMPLETE CBC W/AUTO DIFF WBC: CPT

## 2024-04-16 PROCEDURE — 85730 THROMBOPLASTIN TIME PARTIAL: CPT

## 2024-04-16 PROCEDURE — 85610 PROTHROMBIN TIME: CPT

## 2024-04-16 PROCEDURE — 36415 COLL VENOUS BLD VENIPUNCTURE: CPT

## 2024-04-16 PROCEDURE — 87641 MR-STAPH DNA AMP PROBE: CPT

## 2024-04-16 PROCEDURE — 83036 HEMOGLOBIN GLYCOSYLATED A1C: CPT

## 2024-04-16 PROCEDURE — 86901 BLOOD TYPING SEROLOGIC RH(D): CPT

## 2024-04-16 PROCEDURE — 87640 STAPH A DNA AMP PROBE: CPT

## 2024-04-16 PROCEDURE — 86900 BLOOD TYPING SEROLOGIC ABO: CPT

## 2024-04-16 PROCEDURE — 99214 OFFICE O/P EST MOD 30 MIN: CPT | Mod: 25

## 2024-04-16 RX ORDER — DEXLANSOPRAZOLE 30 MG/1
1 CAPSULE, DELAYED RELEASE ORAL
Refills: 0 | DISCHARGE

## 2024-04-16 NOTE — H&P PST ADULT - NSANTHOSAYNRD_GEN_A_CORE
No. ROSETTA screening performed.  STOP BANG Legend: 0-2 = LOW Risk; 3-4 = INTERMEDIATE Risk; 5-8 = HIGH Risk

## 2024-04-16 NOTE — H&P PST ADULT - ASSESSMENT
67 y/o female with PMHx of HTN, PVD, HLD, hypothyroidism, GERD, s/p ER visit for acute UTI on 24  presents to PST for scheduled right carotid endarterectomy on 24.     CAPRINI SCORE    AGE RELATED RISK FACTORS                                                       MOBILITY RELATED FACTORS  [ ] Age 41-60 years                                            (1 Point)                  [ ] Bed rest                                                        (1 Point)  [ x] Age: 61-74 years                                           (2 Points)                [ ] Plaster cast                                                   (2 Points)  [ ] Age= 75 years                                              (3 Points)                 [ ] Bed bound for more than 72 hours                   (2 Points)    DISEASE RELATED RISK FACTORS                                               GENDER SPECIFIC FACTORS  [x ] Edema in the lower extremities                       (1 Point)                  [ ] Pregnancy                                                     (1 Point)  [ ] Varicose veins                                               (1 Point)                  [ ] Post-partum < 6 weeks                                   (1 Point)             [x ] BMI > 25 Kg/m2                                            (1 Point)                  [ ] Hormonal therapy  or oral contraception            (1 Point)                 [ ] Sepsis (in the previous month)                        (1 Point)                  [ ] History of pregnancy complications  [ ] Pneumonia or serious lung disease                                               [ ] Unexplained or recurrent                       (1 Point)           (in the previous month)                               (1 Point)  [ ] Abnormal pulmonary function test                     (1 Point)                 SURGERY RELATED RISK FACTORS  [ ] Acute myocardial infarction                              (1 Point)                 [ ]  Section                                            (1 Point)  [ ] Congestive heart failure (in the previous month)  (1 Point)                 [ ] Minor surgery                                                 (1 Point)   [ ] Inflammatory bowel disease                             (1 Point)                 [ ] Arthroscopic surgery                                        (2 Points)  [ ] Central venous access                                    (2 Points)                [ x] General surgery lasting more than 45 minutes   (2 Points)       [ ] Stroke (in the previous month)                          (5 Points)               [ ] Elective arthroplasty                                        (5 Points)            [ ] malignancy  present or previous                        (2 points)                                                                                                                                 HEMATOLOGY RELATED FACTORS                                                 TRAUMA RELATED RISK FACTORS  [ ] Prior episodes of VTE                                     (3 Points)                 [ ] Fracture of the hip, pelvis, or leg                       (5 Points)  [ ] Positive family history for VTE                         (3 Points)                 [ ] Acute spinal cord injury (in the previous month)  (5 Points)  [ ] Prothrombin 17364 A                                      (3 Points)                 [ ] Paralysis  (less than 1 month)                          (5 Points)  [ ] Factor V Leiden                                             (3 Points)                 [ ] Multiple Trauma within 1 month                         (5 Points)  [ ] Lupus anticoagulants                                     (3 Points)                                                           [ ] Anticardiolipin antibodies                                (3 Points)                                                       [ ] High homocysteine in the blood                      (3 Points)                                             [ ] Other congenital or acquired thrombophilia       (3 Points)                                                [ ] Heparin induced thrombocytopenia                  (3 Points)                                          Total Score [    6     ]  The Caprini score indicates that this patient is at high risk for a VTE event (score 6 or greater). Surgical patients in this group will benefit from both pharmacologic prophylaxis and intermittent compression devices.  The surgical team will determine the balance between VTE risk and bleeding risk, and other clinical considerations

## 2024-04-16 NOTE — H&P PST ADULT - HISTORY OF PRESENT ILLNESS
67 y/o female with PMHx of HTN, PVD, HLD, hypothyroidism, GERD, s/p ER visit for acute UTI on 4/13/24  presents to PST for scheduled right carotid endarterectomy on 4/25/24. Patient reports during  ER visit for left sided numbness 2/2024 carotid u.s done and 75% blockage noted, referred to vascular surgeon and advised to have procedure.

## 2024-04-16 NOTE — H&P PST ADULT - MUSCULOSKELETAL
[phone] decreased ROM/strength 5/5 bilateral upper extremities/strength 5/5 bilateral lower extremities/abnormal gait details…

## 2024-04-16 NOTE — H&P PST ADULT - NSICDXPASTMEDICALHX_GEN_ALL_CORE_FT
PAST MEDICAL HISTORY:  Anxiety     Carotid stenosis, left     Deviated septum     Essential hypertension     Fatty liver disease, nonalcoholic     Gall stones gall bladder removed    H/O carotid stenosis     Heart murmur     Hiatal hernia with GERD     Hyperlipidemia, unspecified hyperlipidemia type     Hypothyroidism, unspecified type     Irritable bowel syndrome with both constipation and diarrhea     Joint pain of lower limb bilateral    Mitral valve insufficiency, unspecified etiology     Morbid obesity due to excess calories     Peripheral edema bilateral    Urinary tract infection with hematuria, site unspecified frequent.  Presently on antibiotics

## 2024-04-16 NOTE — H&P PST ADULT - PROBLEM SELECTOR PLAN 1
Pre op, mupirocin and chlorhexidine instructions given and explained.  Avoid NSAIDs and OTC supplements.   Patient verbalized understanding  medical consult requested by surgeon  no medication on the DOS typically taken in the afternoon   acute uti - medication scheduled for completion tomorrow   ROSTETA precautions. Pt >3 criteria on STOP-Bang questionnaire.  OR booking notified.

## 2024-04-17 DIAGNOSIS — I65.21 OCCLUSION AND STENOSIS OF RIGHT CAROTID ARTERY: ICD-10-CM

## 2024-04-17 DIAGNOSIS — Z01.818 ENCOUNTER FOR OTHER PREPROCEDURAL EXAMINATION: ICD-10-CM

## 2024-04-17 LAB
MRSA PCR RESULT.: SIGNIFICANT CHANGE UP
S AUREUS DNA NOSE QL NAA+PROBE: SIGNIFICANT CHANGE UP

## 2024-04-19 ENCOUNTER — INPATIENT (INPATIENT)
Facility: HOSPITAL | Age: 69
LOS: 6 days | Discharge: HOME | DRG: 93 | End: 2024-04-26
Attending: INTERNAL MEDICINE | Admitting: INTERNAL MEDICINE
Payer: MEDICARE

## 2024-04-19 VITALS — HEIGHT: 65 IN | WEIGHT: 272.27 LBS

## 2024-04-19 DIAGNOSIS — R20.0 ANESTHESIA OF SKIN: ICD-10-CM

## 2024-04-19 DIAGNOSIS — S62.109A FRACTURE OF UNSPECIFIED CARPAL BONE, UNSPECIFIED WRIST, INITIAL ENCOUNTER FOR CLOSED FRACTURE: Chronic | ICD-10-CM

## 2024-04-19 DIAGNOSIS — Z90.49 ACQUIRED ABSENCE OF OTHER SPECIFIED PARTS OF DIGESTIVE TRACT: Chronic | ICD-10-CM

## 2024-04-19 DIAGNOSIS — E78.00 PURE HYPERCHOLESTEROLEMIA, UNSPECIFIED: ICD-10-CM

## 2024-04-19 DIAGNOSIS — Z79.82 LONG TERM (CURRENT) USE OF ASPIRIN: ICD-10-CM

## 2024-04-19 DIAGNOSIS — K76.0 FATTY (CHANGE OF) LIVER, NOT ELSEWHERE CLASSIFIED: ICD-10-CM

## 2024-04-19 DIAGNOSIS — D89.2 HYPERGAMMAGLOBULINEMIA, UNSPECIFIED: ICD-10-CM

## 2024-04-19 DIAGNOSIS — K44.9 DIAPHRAGMATIC HERNIA WITHOUT OBSTRUCTION OR GANGRENE: ICD-10-CM

## 2024-04-19 DIAGNOSIS — E03.9 HYPOTHYROIDISM, UNSPECIFIED: ICD-10-CM

## 2024-04-19 DIAGNOSIS — I65.21 OCCLUSION AND STENOSIS OF RIGHT CAROTID ARTERY: ICD-10-CM

## 2024-04-19 DIAGNOSIS — Z98.890 OTHER SPECIFIED POSTPROCEDURAL STATES: Chronic | ICD-10-CM

## 2024-04-19 DIAGNOSIS — L03.116 CELLULITIS OF LEFT LOWER LIMB: ICD-10-CM

## 2024-04-19 DIAGNOSIS — E86.0 DEHYDRATION: ICD-10-CM

## 2024-04-19 DIAGNOSIS — F41.8 OTHER SPECIFIED ANXIETY DISORDERS: ICD-10-CM

## 2024-04-19 DIAGNOSIS — F40.240 CLAUSTROPHOBIA: ICD-10-CM

## 2024-04-19 DIAGNOSIS — Z79.890 HORMONE REPLACEMENT THERAPY: ICD-10-CM

## 2024-04-19 DIAGNOSIS — E66.01 MORBID (SEVERE) OBESITY DUE TO EXCESS CALORIES: ICD-10-CM

## 2024-04-19 DIAGNOSIS — I10 ESSENTIAL (PRIMARY) HYPERTENSION: ICD-10-CM

## 2024-04-19 DIAGNOSIS — K58.2 MIXED IRRITABLE BOWEL SYNDROME: ICD-10-CM

## 2024-04-19 DIAGNOSIS — Z87.891 PERSONAL HISTORY OF NICOTINE DEPENDENCE: ICD-10-CM

## 2024-04-19 DIAGNOSIS — L03.115 CELLULITIS OF RIGHT LOWER LIMB: ICD-10-CM

## 2024-04-19 DIAGNOSIS — R01.1 CARDIAC MURMUR, UNSPECIFIED: ICD-10-CM

## 2024-04-19 DIAGNOSIS — K21.9 GASTRO-ESOPHAGEAL REFLUX DISEASE WITHOUT ESOPHAGITIS: ICD-10-CM

## 2024-04-19 DIAGNOSIS — I89.0 LYMPHEDEMA, NOT ELSEWHERE CLASSIFIED: ICD-10-CM

## 2024-04-19 PROBLEM — Z86.79 PERSONAL HISTORY OF OTHER DISEASES OF THE CIRCULATORY SYSTEM: Chronic | Status: ACTIVE | Noted: 2024-04-16

## 2024-04-19 PROBLEM — F41.9 ANXIETY DISORDER, UNSPECIFIED: Chronic | Status: ACTIVE | Noted: 2024-04-16

## 2024-04-19 LAB
ALBUMIN SERPL ELPH-MCNC: 3.3 G/DL — SIGNIFICANT CHANGE UP (ref 3.3–5)
ALP SERPL-CCNC: 190 U/L — HIGH (ref 40–120)
ALT FLD-CCNC: 38 U/L — SIGNIFICANT CHANGE UP (ref 12–78)
ANION GAP SERPL CALC-SCNC: 7 MMOL/L — SIGNIFICANT CHANGE UP (ref 5–17)
APPEARANCE UR: CLEAR — SIGNIFICANT CHANGE UP
APTT BLD: 31.3 SEC — SIGNIFICANT CHANGE UP (ref 24.5–35.6)
AST SERPL-CCNC: 31 U/L — SIGNIFICANT CHANGE UP (ref 15–37)
BACTERIA # UR AUTO: ABNORMAL /HPF
BASOPHILS # BLD AUTO: 0.06 K/UL — SIGNIFICANT CHANGE UP (ref 0–0.2)
BASOPHILS NFR BLD AUTO: 0.8 % — SIGNIFICANT CHANGE UP (ref 0–2)
BILIRUB SERPL-MCNC: 0.4 MG/DL — SIGNIFICANT CHANGE UP (ref 0.2–1.2)
BILIRUB UR-MCNC: NEGATIVE — SIGNIFICANT CHANGE UP
BLD GP AB SCN SERPL QL: SIGNIFICANT CHANGE UP
BUN SERPL-MCNC: 23 MG/DL — SIGNIFICANT CHANGE UP (ref 7–23)
CALCIUM SERPL-MCNC: 9.8 MG/DL — SIGNIFICANT CHANGE UP (ref 8.5–10.1)
CAST: 0 /LPF — SIGNIFICANT CHANGE UP (ref 0–4)
CHLORIDE SERPL-SCNC: 107 MMOL/L — SIGNIFICANT CHANGE UP (ref 96–108)
CO2 SERPL-SCNC: 26 MMOL/L — SIGNIFICANT CHANGE UP (ref 22–31)
COLOR SPEC: YELLOW — SIGNIFICANT CHANGE UP
CREAT SERPL-MCNC: 1.08 MG/DL — SIGNIFICANT CHANGE UP (ref 0.5–1.3)
DIFF PNL FLD: NEGATIVE — SIGNIFICANT CHANGE UP
EGFR: 56 ML/MIN/1.73M2 — LOW
EOSINOPHIL # BLD AUTO: 0.17 K/UL — SIGNIFICANT CHANGE UP (ref 0–0.5)
EOSINOPHIL NFR BLD AUTO: 2.2 % — SIGNIFICANT CHANGE UP (ref 0–6)
GLUCOSE SERPL-MCNC: 105 MG/DL — HIGH (ref 70–99)
GLUCOSE UR QL: NEGATIVE MG/DL — SIGNIFICANT CHANGE UP
HCT VFR BLD CALC: 38.4 % — SIGNIFICANT CHANGE UP (ref 34.5–45)
HGB BLD-MCNC: 12.2 G/DL — SIGNIFICANT CHANGE UP (ref 11.5–15.5)
IMM GRANULOCYTES NFR BLD AUTO: 0.5 % — SIGNIFICANT CHANGE UP (ref 0–0.9)
INR BLD: 0.97 RATIO — SIGNIFICANT CHANGE UP (ref 0.85–1.18)
KETONES UR-MCNC: NEGATIVE MG/DL — SIGNIFICANT CHANGE UP
LEUKOCYTE ESTERASE UR-ACNC: ABNORMAL
LYMPHOCYTES # BLD AUTO: 2.01 K/UL — SIGNIFICANT CHANGE UP (ref 1–3.3)
LYMPHOCYTES # BLD AUTO: 26.3 % — SIGNIFICANT CHANGE UP (ref 13–44)
MCHC RBC-ENTMCNC: 30.2 PG — SIGNIFICANT CHANGE UP (ref 27–34)
MCHC RBC-ENTMCNC: 31.8 GM/DL — LOW (ref 32–36)
MCV RBC AUTO: 95 FL — SIGNIFICANT CHANGE UP (ref 80–100)
MONOCYTES # BLD AUTO: 0.36 K/UL — SIGNIFICANT CHANGE UP (ref 0–0.9)
MONOCYTES NFR BLD AUTO: 4.7 % — SIGNIFICANT CHANGE UP (ref 2–14)
NEUTROPHILS # BLD AUTO: 5.01 K/UL — SIGNIFICANT CHANGE UP (ref 1.8–7.4)
NEUTROPHILS NFR BLD AUTO: 65.5 % — SIGNIFICANT CHANGE UP (ref 43–77)
NITRITE UR-MCNC: NEGATIVE — SIGNIFICANT CHANGE UP
PH UR: 7.5 — SIGNIFICANT CHANGE UP (ref 5–8)
PLATELET # BLD AUTO: 268 K/UL — SIGNIFICANT CHANGE UP (ref 150–400)
POTASSIUM SERPL-MCNC: 3.8 MMOL/L — SIGNIFICANT CHANGE UP (ref 3.5–5.3)
POTASSIUM SERPL-SCNC: 3.8 MMOL/L — SIGNIFICANT CHANGE UP (ref 3.5–5.3)
PROT SERPL-MCNC: 7.4 GM/DL — SIGNIFICANT CHANGE UP (ref 6–8.3)
PROT UR-MCNC: NEGATIVE MG/DL — SIGNIFICANT CHANGE UP
PROTHROM AB SERPL-ACNC: 11 SEC — SIGNIFICANT CHANGE UP (ref 9.5–13)
RBC # BLD: 4.04 M/UL — SIGNIFICANT CHANGE UP (ref 3.8–5.2)
RBC # FLD: 15.1 % — HIGH (ref 10.3–14.5)
RBC CASTS # UR COMP ASSIST: 3 /HPF — SIGNIFICANT CHANGE UP (ref 0–4)
SODIUM SERPL-SCNC: 140 MMOL/L — SIGNIFICANT CHANGE UP (ref 135–145)
SP GR SPEC: 1.01 — SIGNIFICANT CHANGE UP (ref 1–1.03)
SQUAMOUS # UR AUTO: 0 /HPF — SIGNIFICANT CHANGE UP (ref 0–5)
TROPONIN I, HIGH SENSITIVITY RESULT: 8.2 NG/L — SIGNIFICANT CHANGE UP
UROBILINOGEN FLD QL: 0.2 MG/DL — SIGNIFICANT CHANGE UP (ref 0.2–1)
WBC # BLD: 7.65 K/UL — SIGNIFICANT CHANGE UP (ref 3.8–10.5)
WBC # FLD AUTO: 7.65 K/UL — SIGNIFICANT CHANGE UP (ref 3.8–10.5)
WBC UR QL: 0 /HPF — SIGNIFICANT CHANGE UP (ref 0–5)

## 2024-04-19 PROCEDURE — 85025 COMPLETE CBC W/AUTO DIFF WBC: CPT

## 2024-04-19 PROCEDURE — 99221 1ST HOSP IP/OBS SF/LOW 40: CPT

## 2024-04-19 PROCEDURE — 82784 ASSAY IGA/IGD/IGG/IGM EACH: CPT

## 2024-04-19 PROCEDURE — 85652 RBC SED RATE AUTOMATED: CPT

## 2024-04-19 PROCEDURE — 70450 CT HEAD/BRAIN W/O DYE: CPT | Mod: MC

## 2024-04-19 PROCEDURE — 99285 EMERGENCY DEPT VISIT HI MDM: CPT

## 2024-04-19 PROCEDURE — 71045 X-RAY EXAM CHEST 1 VIEW: CPT | Mod: 26

## 2024-04-19 PROCEDURE — 83735 ASSAY OF MAGNESIUM: CPT

## 2024-04-19 PROCEDURE — 86140 C-REACTIVE PROTEIN: CPT

## 2024-04-19 PROCEDURE — 70496 CT ANGIOGRAPHY HEAD: CPT | Mod: 26,MC

## 2024-04-19 PROCEDURE — 80048 BASIC METABOLIC PNL TOTAL CA: CPT

## 2024-04-19 PROCEDURE — 70498 CT ANGIOGRAPHY NECK: CPT | Mod: 26,MC

## 2024-04-19 PROCEDURE — 99223 1ST HOSP IP/OBS HIGH 75: CPT

## 2024-04-19 PROCEDURE — 97116 GAIT TRAINING THERAPY: CPT | Mod: GP

## 2024-04-19 PROCEDURE — 0042T: CPT | Mod: MC

## 2024-04-19 PROCEDURE — 85730 THROMBOPLASTIN TIME PARTIAL: CPT

## 2024-04-19 PROCEDURE — 97161 PT EVAL LOW COMPLEX 20 MIN: CPT | Mod: GP

## 2024-04-19 PROCEDURE — 70450 CT HEAD/BRAIN W/O DYE: CPT | Mod: 26,MC,XU

## 2024-04-19 PROCEDURE — 88305 TISSUE EXAM BY PATHOLOGIST: CPT

## 2024-04-19 PROCEDURE — 93010 ELECTROCARDIOGRAM REPORT: CPT

## 2024-04-19 PROCEDURE — 36415 COLL VENOUS BLD VENIPUNCTURE: CPT

## 2024-04-19 PROCEDURE — 80061 LIPID PANEL: CPT

## 2024-04-19 PROCEDURE — 84100 ASSAY OF PHOSPHORUS: CPT

## 2024-04-19 PROCEDURE — 85610 PROTHROMBIN TIME: CPT

## 2024-04-19 RX ORDER — LEVOTHYROXINE SODIUM 125 MCG
1 TABLET ORAL
Refills: 0 | DISCHARGE

## 2024-04-19 RX ORDER — ASPIRIN/CALCIUM CARB/MAGNESIUM 324 MG
324 TABLET ORAL ONCE
Refills: 0 | Status: COMPLETED | OUTPATIENT
Start: 2024-04-19 | End: 2024-04-19

## 2024-04-19 RX ORDER — ATORVASTATIN CALCIUM 80 MG/1
80 TABLET, FILM COATED ORAL AT BEDTIME
Refills: 0 | Status: DISCONTINUED | OUTPATIENT
Start: 2024-04-19 | End: 2024-04-26

## 2024-04-19 RX ORDER — ACETAMINOPHEN 500 MG
650 TABLET ORAL EVERY 6 HOURS
Refills: 0 | Status: DISCONTINUED | OUTPATIENT
Start: 2024-04-19 | End: 2024-04-26

## 2024-04-19 RX ORDER — ASPIRIN/CALCIUM CARB/MAGNESIUM 324 MG
1 TABLET ORAL
Refills: 0 | DISCHARGE

## 2024-04-19 RX ORDER — LEVOTHYROXINE SODIUM 125 MCG
50 TABLET ORAL DAILY
Refills: 0 | Status: DISCONTINUED | OUTPATIENT
Start: 2024-04-19 | End: 2024-04-26

## 2024-04-19 RX ORDER — ONDANSETRON 8 MG/1
4 TABLET, FILM COATED ORAL EVERY 6 HOURS
Refills: 0 | Status: DISCONTINUED | OUTPATIENT
Start: 2024-04-19 | End: 2024-04-26

## 2024-04-19 RX ORDER — LOSARTAN POTASSIUM 100 MG/1
100 TABLET, FILM COATED ORAL DAILY
Refills: 0 | Status: DISCONTINUED | OUTPATIENT
Start: 2024-04-19 | End: 2024-04-26

## 2024-04-19 RX ORDER — ASPIRIN/CALCIUM CARB/MAGNESIUM 324 MG
81 TABLET ORAL DAILY
Refills: 0 | Status: DISCONTINUED | OUTPATIENT
Start: 2024-04-19 | End: 2024-04-26

## 2024-04-19 RX ORDER — CLOPIDOGREL BISULFATE 75 MG/1
300 TABLET, FILM COATED ORAL ONCE
Refills: 0 | Status: COMPLETED | OUTPATIENT
Start: 2024-04-19 | End: 2024-04-19

## 2024-04-19 RX ORDER — OMEPRAZOLE 10 MG/1
1 CAPSULE, DELAYED RELEASE ORAL
Refills: 0 | DISCHARGE

## 2024-04-19 RX ORDER — ENOXAPARIN SODIUM 100 MG/ML
40 INJECTION SUBCUTANEOUS EVERY 24 HOURS
Refills: 0 | Status: DISCONTINUED | OUTPATIENT
Start: 2024-04-19 | End: 2024-04-26

## 2024-04-19 RX ORDER — PANTOPRAZOLE SODIUM 20 MG/1
40 TABLET, DELAYED RELEASE ORAL
Refills: 0 | Status: DISCONTINUED | OUTPATIENT
Start: 2024-04-19 | End: 2024-04-26

## 2024-04-19 RX ADMIN — ENOXAPARIN SODIUM 40 MILLIGRAM(S): 100 INJECTION SUBCUTANEOUS at 13:09

## 2024-04-19 RX ADMIN — Medication 50 MICROGRAM(S): at 13:09

## 2024-04-19 RX ADMIN — Medication 324 MILLIGRAM(S): at 08:08

## 2024-04-19 RX ADMIN — CLOPIDOGREL BISULFATE 300 MILLIGRAM(S): 75 TABLET, FILM COATED ORAL at 13:09

## 2024-04-19 RX ADMIN — ATORVASTATIN CALCIUM 80 MILLIGRAM(S): 80 TABLET, FILM COATED ORAL at 21:32

## 2024-04-19 RX ADMIN — PANTOPRAZOLE SODIUM 40 MILLIGRAM(S): 20 TABLET, DELAYED RELEASE ORAL at 13:10

## 2024-04-19 NOTE — ED PROVIDER NOTE - ED STEMI HIDDEN
Name: Inga Stewart   MRN: [id number]  : 1948      Assessment:  Hponatremia: Na 121-122 on admission.  Suspect  SIADH(also was on zoloft); POLYDPISIC + recent JANESSA contributed  JANESSA:could be henodynamics,Bactrim effects;now has urinary retention; COE 21  RLE wound: s/p surgical debridement and wound vac dressing 21   Hypokalemia: mild-> better s/p supplementation  Afib  AMS- resolved. Seems to be oriented x 3 on my eval    Pt got Tolvaptan x 3. Na down to 126 again today. Has liquids and ice chips at bedside; denies excessive intake. Entresto and Bumex has been on hold. Since Na keeps dropping off of Tolvaptan, will need try combo of NACL tabs + BUMEX; JANESSA has resolved     Plan/Recommendations:  Tighten FR to 1.2 L/day  Start SALT TABS 2 grams BID  Start Bumex 1 mg every day + pral kcl 20 meq QD  AM labs  ct to hold entresto  Keep coe  Encourage increase solute intake  If BP remains low nl, can cut back Hydralazine  Ct to hold Zoloft    Subjective:  Resting. Feels OK. \"I know why my sodium is low.  I have figured out\"    ROS:   No nausea, no vomiting  No chest pain, no shortness of breath    Exam:  Visit Vitals  BP (!) 110/59 (BP 1 Location: Right arm, BP Patient Position: At rest)   Pulse 61   Temp 98.1 °F (36.7 °C)   Resp 19   Ht 5' 7\" (1.702 m)   Wt 104.6 kg (230 lb 9.6 oz)   SpO2 96%   BMI 36.12 kg/m²     NAD  No icterus, mmm  Clear  RRR  RLE wrapped  AOx3    Current Facility-Administered Medications   Medication Dose Route Frequency Last Admin    sodium chloride tablet 2 g  2 g Oral BID WITH MEALS      furosemide (LASIX) tablet 20 mg  20 mg Oral DAILY      rivaroxaban (XARELTO) tablet 20 mg  20 mg Oral DAILY WITH BREAKFAST 20 mg at 21 0715    hydrALAZINE (APRESOLINE) tablet 50 mg  50 mg Oral TID 50 mg at 21 0821    OLANZapine acute cholecystitis (ZyPREXA) 2.5 mg in sterile water (preservative free) 0.5 mL injection  2.5 mg IntraMUSCular Q8H PRN 2.5 mg at 11/21/21 1928    fluticasone propionate (FLONASE) 50 mcg/actuation nasal spray 2 Spray  2 Spray Both Nostrils DAILY 2 [place] at 11/25/21 1040    hydrocortisone (ALA-MILTON) 1 % lotion   Topical BID PRN Given at 11/28/21 1011    sodium chloride (NS) flush 5-40 mL  5-40 mL IntraVENous Q8H 10 mL at 11/28/21 1825    lidocaine (PF) (XYLOCAINE) 10 mg/mL (1 %) injection 0.1 mL  0.1 mL SubCUTAneous PRN      HYDROmorphone (DILAUDID) injection 1 mg  1 mg IntraVENous Q2H PRN 1 mg at 11/29/21 3566    methyl salicylate-menthol (BENGAY) 15-10 % cream   Topical TID Given at 11/29/21 4579    diazePAM (VALIUM) tablet 2 mg  2 mg Oral QHS PRN 2 mg at 11/28/21 2328    levothyroxine (SYNTHROID) tablet 175 mcg  175 mcg Oral  mcg at 11/29/21 0715    metoprolol tartrate (LOPRESSOR) tablet 50 mg  50 mg Oral BID 50 mg at 11/29/21 0821    [Held by provider] sertraline (ZOLOFT) tablet 50 mg  50 mg Oral DAILY 50 mg at 11/19/21 0840    sodium chloride (NS) flush 5-40 mL  5-40 mL IntraVENous PRN      acetaminophen (TYLENOL) tablet 650 mg  650 mg Oral Q4H  mg at 11/29/21 0831    hydrALAZINE (APRESOLINE) 20 mg/mL injection 10 mg  10 mg IntraVENous Q6H PRN 10 mg at 11/19/21 1550       Labs/Data:    Lab Results   Component Value Date/Time    WBC 7.3 11/29/2021 03:23 AM    HGB (POC) 15.1 01/28/2020 09:43 AM    HGB 10.6 (L) 11/29/2021 03:23 AM    HCT (POC) 44.5 01/28/2020 09:43 AM    HCT 31.4 (L) 11/29/2021 03:23 AM    PLATELET 359 40/74/2627 03:23 AM    MCV 96.3 11/29/2021 03:23 AM       Lab Results   Component Value Date/Time    Sodium 126 (L) 11/29/2021 03:23 AM    Potassium 3.8 11/29/2021 03:23 AM    Chloride 89 (L) 11/29/2021 03:23 AM    CO2 30 11/29/2021 03:23 AM    Anion gap 7 11/29/2021 03:23 AM    Glucose 92 11/29/2021 03:23 AM    BUN 28 (H) 11/29/2021 03:23 AM    Creatinine 0.94 11/29/2021 03:23 AM BUN/Creatinine ratio 30 (H) 11/29/2021 03:23 AM    GFR est AA >60 11/29/2021 03:23 AM    GFR est non-AA 59 (L) 11/29/2021 03:23 AM    Calcium 9.1 11/29/2021 03:23 AM       Wt Readings from Last 3 Encounters:   11/28/21 104.6 kg (230 lb 9.6 oz)   10/14/21 89.8 kg (198 lb)   09/10/21 86.2 kg (190 lb)         Intake/Output Summary (Last 24 hours) at 11/29/2021 1420  Last data filed at 11/29/2021 0650  Gross per 24 hour   Intake 640 ml   Output 620 ml   Net 20 ml       Patient seen and examined. Chart reviewed. Labs, data and other pertinent notes reviewed in last 24 hrs.     PMH/SH/FH reviewed and unchanged compared to H&P    Discussed with pt      Yojana Castro MD acute cholecystitis hide

## 2024-04-19 NOTE — SWALLOW BEDSIDE ASSESSMENT ADULT - COMMENTS
The patient was admitted to  with acute onset of left facial numbness that resolved. Similar presentation occurred last month. CTH negative for acute stroke. Though, pt with known right carotid stenosis for which she is scheduled to undergo a right CEA. She has a selected known history of carotid artery stenosis s/p left CEA, HLD, HTN, hypothyroidism, HLD, IBS, peripheral edema, mitral insufficiency, and obesity. See below for additional prior medical information.

## 2024-04-19 NOTE — ED PROVIDER NOTE - CLINICAL SUMMARY MEDICAL DECISION MAKING FREE TEXT BOX
69-year-old patient presents emerged department with left lower facial numbness.  Plan check labs, CT scan, neuroconsult. 69-year-old patient presents emerged department with left lower facial numbness.  Plan check labs, CT scan, neuroconsult.  Pt with mild stroke severity and non-disabling stroke symptoms and not TPA candidate.

## 2024-04-19 NOTE — ED ADULT NURSE NOTE - OBJECTIVE STATEMENT
Pt presented to the ER with c/o left face numbness. Pt stated that she woke up around 0630  and was petting her cat when she developed this facial numbness. Pt is also c/o a bad taste in her mouth. Pt is able to ambulate without any complications. Pt denies any dizziness, CP, or N/V at this time.

## 2024-04-19 NOTE — CONSULT NOTE ADULT - TIME BILLING
Bedside and Verbal shift change report given to Dacia Rivero RN (oncoming nurse) by Ignacia Crocker RN (offgoing nurse). Report included the following information SBAR, Kardex, [facility] and Cardiac Rhythm Sinus Kaye Romero. 0730: Patient without c/o pain. Encouraged patient to turn and reposition herself while in bed. Encouraged lying on her sides of she cannot lay prone. 0830: Precedex weaned off. Patient emotional. Crying. Encouraged patient. 1000: Patient resting quietly in bed. No complaints. 1200: Patient without complaints. 1500: Patient without complaints. Smiling. On High-flow. Sips of clears Ok per Dr. Speedy Ames. 1700: Patient resting. Listening to music. No complaints. Bedside and Verbal shift change report given to Wing Morrison RN (oncoming nurse) by Dacia Rivero RN (offgoing nurse). Report included the following information SBAR, Kardex, [facility] and Cardiac Rhythm NSR/Sinus Kaye Romero. carotid stenosis

## 2024-04-19 NOTE — ED ADULT NURSE NOTE - CHIEF COMPLAINT QUOTE
Pt presents to the ED c/o left-sided facial numbness. Pt states "when I woke up I was completely fine, I was petting my cat and my face went numb. It feels like my left ear is dead." Denies dizziness, feeling off balance, vision changes, or weakness. No facial droop or slurred speech noted. Pt scheduled to have right carotid endarterectomy on Thursday with Dr. Love. PMH of HTN, HLD, hypothyroid, and GERD. Pt takes a baby Aspirin daily, but states "to be honest I havent taken it in a while." . Dr. Mckeon evaluated in triage. Code stroke called at 7:16am, pt taken directly to CT.

## 2024-04-19 NOTE — ED PROVIDER NOTE - PROGRESS NOTE DETAILS
ED Attending Dr. Quispe, radiology called with report noncon ct neg and ct perfusion possible stroke brain stem.  Transfer cent called for telestroke. ED Attending Dr. Quispe, radiology called with report noncon ct neg and ct perfusion possible stroke brain stem.  Transfer cent called for telestroke.  d/w Dr. Cortez and agree given no TPA at this time and pt can be admitted at . ED Attending Dr. Quispe, surgery paged for vascular consulted for neurology question to either move up carotid surgery and if can plavix load pt. ED Attending Dr. Quispe, Surgery eval and pt can be started on plavix and give plavix load.  Plavix 300 mg ordered.

## 2024-04-19 NOTE — H&P ADULT - ASSESSMENT
69 yo F w/ hx of HTN and prior CEA of L carotid, went to bed around 10pm 3/18/2024 woke up at 4am today, w/ L face numbness. Never went away although improved. This has happened several times in the past and is scheduled for R CEA in April, but since it persisted longer, came in today.     #Left Sided Facial Numbness 2ndry to TIA now resolving  #Right carotid mild/moderate stenosis - Scheduled for Right CEA next week  add Plavix, neuro checks Q4H  Neurology and vascular consult  high dose statin      #History of HTN, HLD, hypothyroidism, GERD, b/l carotid artery stenosis, basal cell carcinoma of the skin  - c/w home medications;

## 2024-04-19 NOTE — ED ADULT NURSE NOTE - NSFALLRISKINTERV_ED_ALL_ED
Assistance OOB with selected safe patient handling equipment if applicable/Assistance with ambulation/Communicate fall risk and risk factors to all staff, patient, and family/Monitor gait and stability/Provide visual cue: yellow wristband, yellow gown, etc/Reinforce activity limits and safety measures with patient and family/Call bell, personal items and telephone in reach/Instruct patient to call for assistance before getting out of bed/chair/stretcher/Non-slip footwear applied when patient is off stretcher/[place] to call system/Physically safe environment - no spills, clutter or unnecessary equipment/Purposeful Proactive Rounding/Room/bathroom lighting operational, light cord in reach

## 2024-04-19 NOTE — ED PROVIDER NOTE - PHYSICAL EXAMINATION
Constitutional: NAD AAOx3  Eyes: EOMI, pupils equal  Head: Normocephalic atraumatic  Mouth: no airway obstruction  Cardiac: regular rate   Resp: Lungs CTAB  GI: Abd s/nt/nd  Neuro: CN 2-12 intact, except for subjective numbness left lower face. 5/5 strength through out, Negative Romberg. Rapid alternating movements intact. Finger to nose intact   Skin: No rashes

## 2024-04-19 NOTE — H&P ADULT - HISTORY OF PRESENT ILLNESS
67 y/o F with PMH HTN, HLD, hypothyroidism, GERD, b/l carotid artery stenosis s/p left CEA, presents with left facial numbness symptoms are improving she refused MRI last time she was here and refused again even id sedation was offered. She is scheduled for CEA next week; only on ASA at home plan to add Plavix, consult Neurology and vascular; use max dose statin.

## 2024-04-19 NOTE — CONSULT NOTE ADULT - SUBJECTIVE AND OBJECTIVE BOX
69 year old woman with HTN, HLd, bilateral carotid stenosis, hypothyroid, lower extremity lymphedema, presenting to  ED with new onset facial numbness on the L.     Patient was at home this AM, woke up at about 0500 in usual state of health.  Around 0600, she was sitting down with her cat, she suddenly felt numbness in the l side of her face.  No other neurological complaints, no headache, visual changes, speech or language difficulty.  No weakness in the arms or legs.  The face continued to be numb at the time I saw her this AM.  She denies any new medical complaints as well.  No recent chest pain, shortness of breath, abdominal pain, fevers, chills, weight loss, poor PO intake.  No diarrhea, dysuria, or constipation.      Of note, she has been evaluated by vascular surgery as an outpatient, and was scheduled to have a R CEA next Thursday.      PMhx:   HTN: takes losartan  HLD: takes daily rosuvastatin 40mg  Carotid stenosis: takes daily ASA 81mg and planning for CEA  Hypothyroid: daily synthroid    SHx:   Lives alone. .  Has an adult son.    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    Remote tobacco use, from teenage to about age 32.  1ppd.   Denies ETOH and drugs.     On exam:   GEN: NAD. Middle aged woman, sitting up in bed, NAD  CV: RRR, S1, S2, carotid bruits apreciated bilaterally  PULM: CTAb  GI: soft, non tender  EXTREm; bilateraly lower extremity edema, with venous stasis skin changes.   GI: non tender  HEENt; no nuchal rigidity  MUSK: no tenderness.   NEURO:   AWake, alert, oriented to month, date, year, hospital, president, normal naming, repetition, and fluency.  Able to spell own name forward and reverse, calculates q's in 1.75 briskly  Intact insight, and reasoning.   Pupils 3-2mm, symmetric, full VF's, normal EOM, conjugate gaze, no nystagmus, no facial weakness, facial pinprick sensation is symmetric, tongue midline, palate symmetric.   MOTOR: normal bulk and tone, no drift, 5/5 , deltoids, biceps, and tricps.  5/5 hip flexors, and knee extensors.   SENSORY: intact symmetrically to light touch, and pinprick.   COORDINATION: normal FNF  REFLEXES 1+ symmetric BB, Br, patellar.     CTH images reviewed: no hemorrhage.  No large territorial infarct.   CTA H&N images reviewed: high grade stenosis, bilateral cervical carotid arteries.  No large vessel occlusion.

## 2024-04-19 NOTE — ED PROVIDER NOTE - OBJECTIVE STATEMENT
61-year-old patient with past medical history for high blood pressure, herbal bowel syndrome, high cholesterol, carotid stenosis presents emergency department for left facial numbness.  Patient states she woke up around 5:30 am.   patient then started to pet cat and noticed that her left face was numb.  Patient came to the emergency department for evaluation.  Patient denies any weakness of her arms or legs, no slurred speech, no headache, no nausea vomiting diarrhea, no rash.  Patient is due for procedure for her right carotid artery to be "cleaned out" by Dr. Buchanan next week.

## 2024-04-19 NOTE — ED ADULT TRIAGE NOTE - WEIGHT IN KG
Anxious about pending Covid results & not having visitors, Ativan given x 1, states chest discomfort w/ anxiety , NSR on tele, Dr. Velia Richardson paged for add'l Ativan,see IV prn Ativan orders, ok  to transfer to medical floor if next results are negative, update 123.5 No

## 2024-04-19 NOTE — CONSULT NOTE ADULT - SUBJECTIVE AND OBJECTIVE BOX
VASCULAR SURGERY CONSULT NOTE  --------------------------------------------------------------------------------------------    Patient is a 69y old  Female who presents with a chief complaint of left facial numbness    HPI:    68 yo F with PMH of HTN, HLD, bilateral carotid stenosis, h/o L CEA, planned for R CEA 24, hypothyroid, lower extremity lymphedema, presenting to  ED with new onset L facial numbness, Vascular surgery consulted as pt is planned for R CEA next week. Pt was seen at [facility], reports she woke up at about 0500 in usual state of health.  Around 0600, she was sitting down with her cat, she suddenly felt numbness in the L side of her face.  No other neurological complaints, no headache, visual changes, speech or language difficulty.  No weakness in the arms or legs.  Pt reports the facial numbness has improved at time of my evaluation.  Denies fever or chills, n/v/d/c, denies chest pain, shortness of breath, abdominal pain. Pt was not taking her ASA is last few days as she forgets.       ROS: 10-system review is otherwise negative except HPI above.      PAST MEDICAL & SURGICAL HISTORY:  Carotid stenosis, left      Deviated septum      Essential hypertension      Hyperlipidemia, unspecified hyperlipidemia type      Heart murmur      Mitral valve insufficiency, unspecified etiology      Peripheral edema  bilateral      Hiatal hernia with GERD      Irritable bowel syndrome with both constipation and diarrhea      Gall stones  gall bladder removed      Fatty liver disease, nonalcoholic      Urinary tract infection with hematuria, site unspecified  frequent.  Presently on antibiotics      Joint pain of lower limb  bilateral      Hypothyroidism, unspecified type      Morbid obesity due to excess calories      H/O carotid stenosis      Anxiety      Fracture dislocation of wrist joint      S/P cholecystectomy  1991       delivery delivered  1989      H/O arthroscopy of shoulder  Left. "Many years ago"      H/O colonoscopy          FAMILY HISTORY:  Family history of hepatic cirrhosis (Mother)  mother    Family history of heart disease (Mother)  mother    Family history of diabetes mellitus (Mother)  mother      [] Family history not pertinent as reviewed with the patient and family    SOCIAL HISTORY:      ALLERGIES: No Known Allergies  cefadroxil (Diarrhea)      HOME MEDICATIONS:  see EMR    CURRENT MEDICATIONS  MEDICATIONS (STANDING): aspirin  chewable 81 milliGRAM(s) Oral daily  atorvastatin 80 milliGRAM(s) Oral at bedtime  clopidogrel Tablet 300 milliGRAM(s) Oral once  enoxaparin Injectable 40 milliGRAM(s) SubCutaneous every 24 hours  hydrochlorothiazide 12.5 milliGRAM(s) Oral daily  levothyroxine 50 MICROGram(s) Oral daily  losartan 100 milliGRAM(s) Oral daily  pantoprazole    Tablet 40 milliGRAM(s) Oral before breakfast    MEDICATIONS (PRN):acetaminophen     Tablet .. 650 milliGRAM(s) Oral every 6 hours PRN Mild Pain (1 - 3)  ondansetron Injectable 4 milliGRAM(s) IV Push every 6 hours PRN Nausea and/or Vomiting    --------------------------------------------------------------------------------------------    Vitals:   T(C): 36.4 (24 @ 11:22), Max: 36.6 (24 @ 07:52)  HR: 62 (24 @ 11:22) (55 - 62)  BP: 142/61 (24 @ 11:22) (142/61 - 154/61)  RR: 12 (24 @ 11:22) (12 - 16)  SpO2: 99% (24 @ 11:22) (99% - 100%)  CAPILLARY BLOOD GLUCOSE      POCT Blood Glucose.: 102 mg/dL (2024 07:17)    CAPILLARY BLOOD GLUCOSE      POCT Blood Glucose.: 102 mg/dL (2024 07:17)      Height (cm): 165.1 ( 07:19)  Weight (kg): 123.5 ( 07:19)  BMI (kg/m2): 45.3 (:19)  BSA (m2): 2.26 (04-19 @ 07:19)    PHYSICAL EXAM:   GEN: NAD   HEENT: NC/AT, PERRLA, supple  CV: RRR, S1, S2, carotid bruits apreciated bilaterally  Extremity; bilateral lower extremity edema, with venous stasis skin changes.   NEURO: CN 2-12 grossly intact, AO x3, MOTOR: normal bulk and tone, no drift, 5/5 , deltoids, biceps, and tricps.  5/5 hip flexors, and knee extensors. SENSORY: intact symmetrically to light touch, and pinprick.      --------------------------------------------------------------------------------------------    LABS  CBC ( 07:29)                              12.2                           7.65    )----------------(  268        65.5  % Neutrophils, 26.3  % Lymphocytes, ANC: 5.01                                38.4      BMP ( @ 07:29)             140     |  107     |  23    		Ca++ --      Ca 9.8                ---------------------------------( 105<H>		Mg --                 3.8     |  26      |  1.08  			Ph --        LFTs ( @ 07:29)      TPro 7.4 / Alb 3.3 / TBili 0.4 / DBili -- / AST 31 / ALT 38 / AlkPhos 190<H>    Coags ( @ 07:29)  aPTT 31.3 / INR 0.97 / PT 11.0          --------------------------------------------------------------------------------------------    MICROBIOLOGY  Urinalysis ( @ 10:20):     Color: Yellow / Appearance: Clear / S.007 / pH: 7.5 / Gluc: Negative / Ketones: Negative / Bili: Negative / Urobili: 0.2 / Protein :Negative / Nitrites: Negative / Leuk.Est: Trace<!> / RBC: 3 / WBC: 0 / Sq Epi:  / Non Sq Epi: 0 / Bacteria Occasional<!>       -> Clean Catch None Culture ( @ 20:44)     NG    NG    <10,000 CFU/mL Normal Urogenital Vikki      --------------------------------------------------------------------------------------------    IMAGING      < from: CT Brain Stroke Protocol (24 @ 07:30) >  No evidence of acute intracranial hemorrhage, midline shift or CT   evidence of acute territorial infarct.    If the patient's symptoms persist, consider short interval follow-up head   CT or brain MRI if there are no MRI contraindications.    The findings were discussed with DR. STONE on 2024 7:39 AM.  Hospital policies for call back including read back policies were   followed. The verbal communication call back supplements this written   report.    < end of copied text >  < from: CT Brain Perfusion Maps Stroke (24 @ 07:40) >  CT ANGIOGRAPHY NECK: Redemonstrated dense atherosclerotic calcification   at the right carotid bifurcation with moderate to severe stenosis of the   proximal right internal carotid artery. Status post left carotid   endarterectomy.    CT ANGIOGRAPHY BRAIN:  1.  No evidence of a major vessel occlusion, flow-limiting stenosis or   aneurysm about the [place].  2.  No evidence of dural venous sinus thrombosis or an arteriovenous   malformation    CT PERFUSION: Nonspecific area of ischemia involving the pola and left   brachium pontis. MRI suggested for further evaluation.    < end of copied text >

## 2024-04-19 NOTE — PATIENT PROFILE ADULT - FALL HARM RISK - UNIVERSAL INTERVENTIONS
Bed in lowest position, wheels locked, appropriate side rails in place/Call bell, personal items and telephone in reach/Instruct patient to call for assistance before getting out of bed or chair/Non-slip footwear when patient is out of bed/[place] to call system/Physically safe environment - no spills, clutter or unnecessary equipment/Purposeful Proactive Rounding/Room/bathroom lighting operational, light cord in reach

## 2024-04-19 NOTE — H&P ADULT - NSHPLABSRESULTS_GEN_ALL_CORE
12.2   7.65  )-----------( 268      ( 19 Apr 2024 07:29 )             38.4   04-19    140  |  107  |  23  ----------------------------<  105<H>  3.8   |  26  |  1.08    Ca    9.8      19 Apr 2024 07:29    TPro  7.4  /  Alb  3.3  /  TBili  0.4  /  DBili  x   /  AST  31  /  ALT  38  /  AlkPhos  190<H>  04-19      < from: CT Angio Neck Stroke Protocol w/ IV Cont (04.19.24 @ 07:41) >    CT PERFUSION: Nonspecific area of ischemia involving the pola and left   brachium pontis. MRI suggested for further evaluation.      ekg NSR

## 2024-04-19 NOTE — ED ADULT TRIAGE NOTE - CHIEF COMPLAINT QUOTE
Pt presents to the ED c/o left-sided facial numbness. Pt stats "when I woke up I was completely fine, I was petting my cat and my face went numb. It feels like my left ear is dead." Denies dizziness, feeling off balance, vision changes, or weakness. No facial droop or slurred speech noted. Pt scheduled to have right carotid endarterectomy on Thursday with Dr. Love. PMH of HTN, HLD, hypothyroid, and GERD. Pt takes a baby Aspirin daily, but states "to be honest I havent taken it in a while." . Dr. Mckeon evaluated in triage. Code stroke called at 7:16am, pt taken directly to CT. Pt presents to the ED c/o left-sided facial numbness. Pt states "when I woke up I was completely fine, I was petting my cat and my face went numb. It feels like my left ear is dead." Denies dizziness, feeling off balance, vision changes, or weakness. No facial droop or slurred speech noted. Pt scheduled to have right carotid endarterectomy on Thursday with Dr. Love. PMH of HTN, HLD, hypothyroid, and GERD. Pt takes a baby Aspirin daily, but states "to be honest I havent taken it in a while." . Dr. Mckeon evaluated in triage. Code stroke called at 7:16am, pt taken directly to CT.

## 2024-04-20 LAB
ANION GAP SERPL CALC-SCNC: 5 MMOL/L — SIGNIFICANT CHANGE UP (ref 5–17)
BUN SERPL-MCNC: 23 MG/DL — SIGNIFICANT CHANGE UP (ref 7–23)
CALCIUM SERPL-MCNC: 9.7 MG/DL — SIGNIFICANT CHANGE UP (ref 8.5–10.1)
CHLORIDE SERPL-SCNC: 105 MMOL/L — SIGNIFICANT CHANGE UP (ref 96–108)
CHOLEST SERPL-MCNC: 179 MG/DL — SIGNIFICANT CHANGE UP
CO2 SERPL-SCNC: 29 MMOL/L — SIGNIFICANT CHANGE UP (ref 22–31)
CREAT SERPL-MCNC: 1.06 MG/DL — SIGNIFICANT CHANGE UP (ref 0.5–1.3)
EGFR: 57 ML/MIN/1.73M2 — LOW
ERYTHROCYTE [SEDIMENTATION RATE] IN BLOOD: 74 MM/HR — HIGH (ref 0–20)
GLUCOSE SERPL-MCNC: 97 MG/DL — SIGNIFICANT CHANGE UP (ref 70–99)
HDLC SERPL-MCNC: 85 MG/DL — SIGNIFICANT CHANGE UP
LIPID PNL WITH DIRECT LDL SERPL: 85 MG/DL — SIGNIFICANT CHANGE UP
NON HDL CHOLESTEROL: 94 MG/DL — SIGNIFICANT CHANGE UP
POTASSIUM SERPL-MCNC: 4.2 MMOL/L — SIGNIFICANT CHANGE UP (ref 3.5–5.3)
POTASSIUM SERPL-SCNC: 4.2 MMOL/L — SIGNIFICANT CHANGE UP (ref 3.5–5.3)
SODIUM SERPL-SCNC: 139 MMOL/L — SIGNIFICANT CHANGE UP (ref 135–145)
TRIGL SERPL-MCNC: 43 MG/DL — SIGNIFICANT CHANGE UP

## 2024-04-20 PROCEDURE — 99232 SBSQ HOSP IP/OBS MODERATE 35: CPT

## 2024-04-20 PROCEDURE — 99233 SBSQ HOSP IP/OBS HIGH 50: CPT

## 2024-04-20 RX ORDER — CLOPIDOGREL BISULFATE 75 MG/1
75 TABLET, FILM COATED ORAL DAILY
Refills: 0 | Status: DISCONTINUED | OUTPATIENT
Start: 2024-04-20 | End: 2024-04-26

## 2024-04-20 RX ADMIN — Medication 81 MILLIGRAM(S): at 09:50

## 2024-04-20 RX ADMIN — ATORVASTATIN CALCIUM 80 MILLIGRAM(S): 80 TABLET, FILM COATED ORAL at 21:06

## 2024-04-20 RX ADMIN — LOSARTAN POTASSIUM 100 MILLIGRAM(S): 100 TABLET, FILM COATED ORAL at 09:50

## 2024-04-20 RX ADMIN — Medication 50 MICROGRAM(S): at 05:48

## 2024-04-20 RX ADMIN — CLOPIDOGREL BISULFATE 75 MILLIGRAM(S): 75 TABLET, FILM COATED ORAL at 14:11

## 2024-04-20 RX ADMIN — Medication 100 MILLIGRAM(S): at 21:06

## 2024-04-20 RX ADMIN — Medication 100 MILLIGRAM(S): at 14:10

## 2024-04-20 RX ADMIN — ENOXAPARIN SODIUM 40 MILLIGRAM(S): 100 INJECTION SUBCUTANEOUS at 14:10

## 2024-04-20 RX ADMIN — PANTOPRAZOLE SODIUM 40 MILLIGRAM(S): 20 TABLET, DELAYED RELEASE ORAL at 05:48

## 2024-04-20 NOTE — PROGRESS NOTE ADULT - ASSESSMENT
69 year old F with HTN, HLD, carotid stenosis, hypothyroidism, presenting with L facial numbness, no findings on neurological exam.  Her NIHSS is 0, CTH does not show any acute process, and the CTA H&N shows bilateral high grade stenosis of the common carotids  and at the bifurcation/bulb.      # Possible TIA / acute ischemic stroke, similar event in 3/2024. Possible etiology is artery to artery emboli from the carotid.  Patient is declining MRI brain, concern for claustrophobia, continuing to decline despite repeated reassurances and offer of sedation.      # Bilateral high-grade stenosis of carotid bifurcation and bulbs, status post left CEA    - Agree with plan for R CEA, repeat CTH in later today or in a.m., was not ordered this morning, as pt is refusing MRI   - continue ASA 81mg daily, and rosuvastatin 40mg  - if permissible from vascular standpoint, add plavix load of 300 mg x 1 then 75 mg thereafter. If plavix will delay carotid revascularization, OK to hold off on addition of plavix  -permissive HTN    # Suspision for GCA - although no classic sx, but had left facial numbness with left ear pain in 3/2024, now with left facial numbness, prior ESR 82– 63 now 74     - Vascular surgery to consider TA biopsy; on prior admission 3/2024  rheumatology did not recommend high-dose steroids however recommended temporal artery ultrasound and TA biopsy, it is not clear if patient ever made an appointment for TA US or biopsy    Above plan D/W pt and Dr. Cage

## 2024-04-20 NOTE — PROGRESS NOTE ADULT - ASSESSMENT
70 yo woman with HTN, HLD, B/L carotid stenosis s/p L CEA 3/19/2024 and planned for R CEA 4/25, also hypothyroidism, B/L LE lymphedema, presented with L facial numbness. Symptom started 6AM 4/19, came to ED thereafter and symptom had resolved. NIHSS = 0. CT head w/ no intracranial mass effect, acute hemorrhage, midline shift or acute transcortical infarct is seen. Likely chronic ischemic microvascular disease. No extra-axial fluid collection. Parenchymal volume commensurate with patient age. No hydrocephalus. Mastoids clear. Normal calvarium. CT brain stroke protocol with nonspecific area of ischemia involving the pola and left brachium pontis. MRI suggested but patient declining MRI, planned for interval repeat CT head. CTA head with no evidence of a major vessel occlusion, flow-limiting stenosis or aneurysm about the [place] of [place]. No evidence of dural venous sinus thrombosis or an arteriovenous malformation. CTA neck redemonstrated dense atherosclerotic calcification at the right carotid bifurcation with moderate to severe stenosis of the proximal right internal carotid artery. Status post left carotid endarterectomy. Placed on tele and admitted to [address] [place] [address].     L facial numbness  Appreciate input from Neurology. CT head did not show any acute process. CTA H/N did show bilateral high grade stenosis of the carotids both common and at the bifurcation/bulb. CT brain perfusion with nonspecific area of ischemia involving the pola and left brachium pontis. MRI suggested but patient declining MRI, to have repeat CT head tomorrow to assess for interval change/stroke. Of note, patient with similar presentation in March 2024 when she had L facial numbness and ear pain. Had elevated ESR 82 at the time. ESR remains elevated, 74 this admission.  - Aspirin, Plavix, high intensity statin  - Monitor on tele  - Rpt CT head tomorrow  - F/u with Neurology    Carotid stenosis, high grade  Planned for CEA on Thursday, may need to re-assess timing depending on current hospitalization course.   - Continue antiplatelet therapy and statin  - F/u with Vascular Surgery     Elevated ESR  Persisting since last admission in March. Other labs then included FRANCHESKA 1:160 speckled. Double strand DNA negative. C3 149 C4 29. Indeterminate atypical ANCA. SSA and SSB negative. Denies any headaches, vision changes, scalp tenderness, jaw claudication, or fevers. She has intact bilateral temporal artery pulses and no scalp tenderness on exam. Concern for GCA remains somewhat low and would not start steroids without doing temporal artery biopsy first, if patient is interested in pursuing. It does not appear as though she has received outpatient temporal artery US that was planned since her March admission. Unclear whether that study can be obtained as inpatient  - F/u with Vascular Surgery re possible temporal artery biopsy    Weak gamma-migrating paraproteinemia  As noted on SPEP 3/2024. M spike unable to quantify. MAO with weak IgG Kappa band. Patient may have an MGUS  - Requested free K/L light chains, immunoglobulins panel       70 yo woman with HTN, HLD, B/L carotid stenosis s/p L CEA 3/19/2024 and planned for R CEA 4/25, also hypothyroidism, B/L LE lymphedema, presented with L facial numbness. Symptom started 6AM 4/19, came to ED thereafter and symptom had resolved. NIHSS = 0. CT head w/ no intracranial mass effect, acute hemorrhage, midline shift or acute transcortical infarct is seen. Likely chronic ischemic microvascular disease. No extra-axial fluid collection. Parenchymal volume commensurate with patient age. No hydrocephalus. Mastoids clear. Normal calvarium. CT brain stroke protocol with nonspecific area of ischemia involving the pola and left brachium pontis. MRI suggested but patient declining MRI, planned for interval repeat CT head. CTA head with no evidence of a major vessel occlusion, flow-limiting stenosis or aneurysm about the [place] of [place]. No evidence of dural venous sinus thrombosis or an arteriovenous malformation. CTA neck redemonstrated dense atherosclerotic calcification at the right carotid bifurcation with moderate to severe stenosis of the proximal right internal carotid artery. Status post left carotid endarterectomy. Placed on tele and admitted to [address] [place] [address].     L facial numbness  Appreciate input from Neurology. CT head did not show any acute process. CTA H/N did show bilateral high grade stenosis of the carotids both common and at the bifurcation/bulb. CT brain perfusion with nonspecific area of ischemia involving the pola and left brachium pontis. MRI suggested but patient declining MRI, to have repeat CT head tomorrow to assess for interval change/stroke. Of note, patient with similar presentation in March 2024 when she had L facial numbness and ear pain. Had elevated ESR 82 at the time. ESR remains elevated, 74 this admission.  - Aspirin, Plavix, high intensity statin  - Monitor on tele  - Rpt CT head tomorrow  - F/u with Neurology    Carotid stenosis, high grade  Planned for CEA on Thursday, may need to re-assess timing depending on current hospitalization course.   - Continue antiplatelet therapy and statin  - F/u with Vascular Surgery     Elevated ESR  Persisting since last admission in March. Other labs then included FRANCHESKA 1:160 speckled. Double strand DNA negative. C3 149 C4 29. Indeterminate atypical ANCA. SSA and SSB negative. Denies any headaches, vision changes, scalp tenderness, jaw claudication, or fevers. She has intact bilateral temporal artery pulses and no scalp tenderness on exam. Concern for GCA remains somewhat low and would not start steroids without doing temporal artery biopsy first, if patient is interested in pursuing. It does not appear as though she has received outpatient temporal artery US that was planned since her March admission. Unclear whether that study can be obtained as inpatient  - F/u with Vascular Surgery re possible temporal artery biopsy    Weak gamma-migrating paraproteinemia  As noted on SPEP 3/2024. M spike unable to quantify. MAO with weak IgG Kappa band. 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 Started on doxycycline.  - Continue doxycycline, day 1

## 2024-04-20 NOTE — PROGRESS NOTE ADULT - ASSESSMENT
68 yo F presents with possible acute ischemic stroke.      P:  ASA, Plavix, Statin  f/u Neurology reccs, repeat CTH in 24 hrs  pls optimize pt for R CEA prior to DC, R CEA as an outpt.   rest of plan as per primary team

## 2024-04-21 LAB
ANION GAP SERPL CALC-SCNC: 3 MMOL/L — LOW (ref 5–17)
BASOPHILS # BLD AUTO: 0.04 K/UL — SIGNIFICANT CHANGE UP (ref 0–0.2)
BASOPHILS NFR BLD AUTO: 0.5 % — SIGNIFICANT CHANGE UP (ref 0–2)
BUN SERPL-MCNC: 27 MG/DL — HIGH (ref 7–23)
CALCIUM SERPL-MCNC: 9.5 MG/DL — SIGNIFICANT CHANGE UP (ref 8.5–10.1)
CHLORIDE SERPL-SCNC: 104 MMOL/L — SIGNIFICANT CHANGE UP (ref 96–108)
CO2 SERPL-SCNC: 28 MMOL/L — SIGNIFICANT CHANGE UP (ref 22–31)
CREAT SERPL-MCNC: 0.98 MG/DL — SIGNIFICANT CHANGE UP (ref 0.5–1.3)
CRP SERPL-MCNC: 8 MG/L — HIGH
EGFR: 63 ML/MIN/1.73M2 — SIGNIFICANT CHANGE UP
EOSINOPHIL # BLD AUTO: 0.15 K/UL — SIGNIFICANT CHANGE UP (ref 0–0.5)
EOSINOPHIL NFR BLD AUTO: 1.9 % — SIGNIFICANT CHANGE UP (ref 0–6)
GLUCOSE SERPL-MCNC: 98 MG/DL — SIGNIFICANT CHANGE UP (ref 70–99)
HCT VFR BLD CALC: 37.1 % — SIGNIFICANT CHANGE UP (ref 34.5–45)
HGB BLD-MCNC: 12.2 G/DL — SIGNIFICANT CHANGE UP (ref 11.5–15.5)
IMM GRANULOCYTES NFR BLD AUTO: 0.9 % — SIGNIFICANT CHANGE UP (ref 0–0.9)
LYMPHOCYTES # BLD AUTO: 2.5 K/UL — SIGNIFICANT CHANGE UP (ref 1–3.3)
LYMPHOCYTES # BLD AUTO: 32.4 % — SIGNIFICANT CHANGE UP (ref 13–44)
MCHC RBC-ENTMCNC: 31 PG — SIGNIFICANT CHANGE UP (ref 27–34)
MCHC RBC-ENTMCNC: 32.9 GM/DL — SIGNIFICANT CHANGE UP (ref 32–36)
MCV RBC AUTO: 94.4 FL — SIGNIFICANT CHANGE UP (ref 80–100)
MONOCYTES # BLD AUTO: 0.38 K/UL — SIGNIFICANT CHANGE UP (ref 0–0.9)
MONOCYTES NFR BLD AUTO: 4.9 % — SIGNIFICANT CHANGE UP (ref 2–14)
NEUTROPHILS # BLD AUTO: 4.57 K/UL — SIGNIFICANT CHANGE UP (ref 1.8–7.4)
NEUTROPHILS NFR BLD AUTO: 59.4 % — SIGNIFICANT CHANGE UP (ref 43–77)
PLATELET # BLD AUTO: 271 K/UL — SIGNIFICANT CHANGE UP (ref 150–400)
POTASSIUM SERPL-MCNC: 4 MMOL/L — SIGNIFICANT CHANGE UP (ref 3.5–5.3)
POTASSIUM SERPL-SCNC: 4 MMOL/L — SIGNIFICANT CHANGE UP (ref 3.5–5.3)
RBC # BLD: 3.93 M/UL — SIGNIFICANT CHANGE UP (ref 3.8–5.2)
RBC # FLD: 15.1 % — HIGH (ref 10.3–14.5)
SODIUM SERPL-SCNC: 135 MMOL/L — SIGNIFICANT CHANGE UP (ref 135–145)
WBC # BLD: 7.71 K/UL — SIGNIFICANT CHANGE UP (ref 3.8–10.5)
WBC # FLD AUTO: 7.71 K/UL — SIGNIFICANT CHANGE UP (ref 3.8–10.5)

## 2024-04-21 PROCEDURE — 70450 CT HEAD/BRAIN W/O DYE: CPT | Mod: 26

## 2024-04-21 PROCEDURE — 99233 SBSQ HOSP IP/OBS HIGH 50: CPT

## 2024-04-21 PROCEDURE — 99232 SBSQ HOSP IP/OBS MODERATE 35: CPT

## 2024-04-21 RX ADMIN — Medication 50 MICROGRAM(S): at 06:15

## 2024-04-21 RX ADMIN — CLOPIDOGREL BISULFATE 75 MILLIGRAM(S): 75 TABLET, FILM COATED ORAL at 10:21

## 2024-04-21 RX ADMIN — ENOXAPARIN SODIUM 40 MILLIGRAM(S): 100 INJECTION SUBCUTANEOUS at 15:30

## 2024-04-21 RX ADMIN — ATORVASTATIN CALCIUM 80 MILLIGRAM(S): 80 TABLET, FILM COATED ORAL at 21:09

## 2024-04-21 RX ADMIN — LOSARTAN POTASSIUM 100 MILLIGRAM(S): 100 TABLET, FILM COATED ORAL at 10:21

## 2024-04-21 RX ADMIN — PANTOPRAZOLE SODIUM 40 MILLIGRAM(S): 20 TABLET, DELAYED RELEASE ORAL at 06:14

## 2024-04-21 RX ADMIN — Medication 81 MILLIGRAM(S): at 10:21

## 2024-04-21 RX ADMIN — Medication 100 MILLIGRAM(S): at 10:21

## 2024-04-21 RX ADMIN — Medication 100 MILLIGRAM(S): at 21:09

## 2024-04-21 NOTE — PROGRESS NOTE ADULT - ASSESSMENT
69 year old F with HTN, HLD, carotid stenosis, hypothyroidism, presenting with L facial numbness, no findings on neurological exam.  Her NIHSS is 0, CTH does not show any acute process, and the CTA H&N shows bilateral high grade stenosis of the common carotids  and at the bifurcation/bulb.      # Possible TIA / acute ischemic stroke, similar event in 3/2024. Possible etiology is artery to artery emboli from the carotid.  Patient is declining MRI brain, concern for claustrophobia, continuing to decline despite repeated reassurances and offer of sedation.      # Bilateral high-grade stenosis of carotid bifurcation and bulbs, status post left CEA    # Suspision for GCA - although no classic sx, but had left facial numbness with left ear pain in 3/2024, now with left facial numbness, prior ESR 82– 63 now 74     - Agree with plan for R CEA, repeat CTH today shows minimal chronic MVIC without acute transcortical infarction or hemorrhage.  - Patient seen by vascular surgery, is scheduled for right CEA 4/25, they will f/u for inflammatory markers - pt apparently has TA usg as OP  - continue ASA 81mg daily, and rosuvastatin 40 mg, hold plavix as per protocol before CEA  - permissive HTN      Above plan D/W pt and Dr. Cage    Call neuro if needed henceforth

## 2024-04-21 NOTE — PROGRESS NOTE ADULT - ASSESSMENT
70 yo F presents with possible acute ischemic stroke.  neuro exam inatct    P:  c/w ASA, Plavix, Statin  f/u Neurology reccs,  pls optimize pt for R CEA prior to DC, R CEA as an outpt. if pt stays in pt>will considerate operative plan 4/25  Pt still with elevated inflammatory markers, will discuss other possible pathologies might contribute to stroke symptoms   rest of plan as per primary team

## 2024-04-21 NOTE — PROGRESS NOTE ADULT - ASSESSMENT
70 yo woman with HTN, HLD, B/L carotid stenosis s/p L CEA 3/19/2024 and planned for R CEA 4/25, also hypothyroidism, B/L LE lymphedema, presented with L facial numbness. Symptom started 6AM 4/19, came to ED thereafter and symptom had resolved. NIHSS = 0. CT head w/ no intracranial mass effect, acute hemorrhage, midline shift or acute transcortical infarct is seen. Likely chronic ischemic microvascular disease. No extra-axial fluid collection. Parenchymal volume commensurate with patient age. No hydrocephalus. Mastoids clear. Normal calvarium. CT brain stroke protocol with nonspecific area of ischemia involving the pola and left brachium pontis. MRI suggested but patient declining MRI, planned for interval repeat CT head. CTA head with no evidence of a major vessel occlusion, flow-limiting stenosis or aneurysm about the [place] of [place]. No evidence of dural venous sinus thrombosis or an arteriovenous malformation. CTA neck redemonstrated dense atherosclerotic calcification at the right carotid bifurcation with moderate to severe stenosis of the proximal right internal carotid artery. Status post left carotid endarterectomy. Placed on tele and admitted to [address] [place] [address].     L facial numbness  Appreciate input from Neurology. CT head did not show any acute process. CTA H/N did show bilateral high grade stenosis of the carotids both common and at the bifurcation/bulb. CT brain perfusion with nonspecific area of ischemia involving the pola and left brachium pontis. MRI suggested but patient declining MRI due to claustrophobia. Had repeat CT head today, no interval change, no stroke seen. Of note, patient with similar presentation in March 2024 when she had L facial numbness and ear pain. Had elevated ESR 82 at the time. ESR remains elevated, 74 this admission. See below.   - Continue aspirin, Plavix, high intensity statin    Carotid stenosis, high grade  Planned for CEA on Thursday, can be done this admission or after discharge with return to hospital Thursday.   - Continue antiplatelet therapy and statin  - F/u with Vascular Surgery re whether temporal artery biopsy will be recommended, added to procedure plan, if patient agreeable     Elevated ESR  Persisting since last admission in March. Other labs then included FRANCHESKA 1:160 speckled. Double strand DNA negative. C3 149 C4 29. Indeterminate atypical ANCA. SSA and SSB negative. Denies any headaches, vision changes, scalp tenderness, jaw claudication, or fevers. She has intact bilateral temporal artery pulses and no scalp tenderness on exam. Concern for GCA remains somewhat low and would not start steroids without doing temporal artery biopsy first, if patient is interested in pursuing. She does report having completed an outpatient temporal artery ultrasound, reportedly normal.   - F/u with Vascular Surgery re possible temporal artery biopsy    Weak gamma-migrating paraproteinemia  As noted on SPEP 3/2024. M spike unable to quantify. MAO with weak IgG Kappa band. 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 Started on doxycycline 4/20. Unclear whether statis skin changes or some mild superimposed cellulitis. Patient reports mild improvement since starting doxycycline yesterday but also could be from leg elevation. She expressed wishes to remain on antibiotics for her legs and be monitored into tomorrow.   - Continue doxycycline, day 2

## 2024-04-22 LAB
IGA FLD-MCNC: 280 MG/DL — SIGNIFICANT CHANGE UP (ref 84–499)
IGG FLD-MCNC: 836 MG/DL — SIGNIFICANT CHANGE UP (ref 610–1660)
IGM SERPL-MCNC: 82 MG/DL — SIGNIFICANT CHANGE UP (ref 35–242)
KAPPA LC SER QL IFE: 2.13 MG/DL — HIGH (ref 0.33–1.94)
KAPPA/LAMBDA FREE LIGHT CHAIN RATIO, SERUM: 1.81 RATIO — HIGH (ref 0.26–1.65)
LAMBDA LC SER QL IFE: 1.18 MG/DL — SIGNIFICANT CHANGE UP (ref 0.57–2.63)

## 2024-04-22 PROCEDURE — 99232 SBSQ HOSP IP/OBS MODERATE 35: CPT

## 2024-04-22 RX ADMIN — Medication 81 MILLIGRAM(S): at 09:07

## 2024-04-22 RX ADMIN — Medication 50 MICROGRAM(S): at 06:09

## 2024-04-22 RX ADMIN — ATORVASTATIN CALCIUM 80 MILLIGRAM(S): 80 TABLET, FILM COATED ORAL at 21:29

## 2024-04-22 RX ADMIN — Medication 100 MILLIGRAM(S): at 21:29

## 2024-04-22 RX ADMIN — Medication 650 MILLIGRAM(S): at 18:18

## 2024-04-22 RX ADMIN — PANTOPRAZOLE SODIUM 40 MILLIGRAM(S): 20 TABLET, DELAYED RELEASE ORAL at 06:09

## 2024-04-22 RX ADMIN — ENOXAPARIN SODIUM 40 MILLIGRAM(S): 100 INJECTION SUBCUTANEOUS at 14:06

## 2024-04-22 RX ADMIN — CLOPIDOGREL BISULFATE 75 MILLIGRAM(S): 75 TABLET, FILM COATED ORAL at 09:07

## 2024-04-22 RX ADMIN — LOSARTAN POTASSIUM 100 MILLIGRAM(S): 100 TABLET, FILM COATED ORAL at 09:07

## 2024-04-22 RX ADMIN — Medication 30 MILLILITER(S): at 20:41

## 2024-04-22 RX ADMIN — Medication 100 MILLIGRAM(S): at 09:07

## 2024-04-22 NOTE — PROGRESS NOTE ADULT - ASSESSMENT
70 yo F presents with possible acute ischemic stroke.  neuro exam intact  elevated ESR      P:  c/w ASA, Plavix, Statin  f/u Neurology reccs  pt may need temporal artery bx to r/o giant cell arteritis given elevated inflammatory markers  pt may benefit from CEA outpatient  rest of plan as per primary team    Plan discussed with Dr. Heard

## 2024-04-22 NOTE — PROGRESS NOTE ADULT - ASSESSMENT
68 yo woman with HTN, HLD, B/L carotid stenosis s/p L CEA 3/19/2024 and planned for R CEA 4/25, also hypothyroidism, B/L LE lymphedema, presented with L facial numbness. Symptom started 6AM 4/19, came to ED thereafter and symptom had resolved. NIHSS = 0. CT head w/ no intracranial mass effect, acute hemorrhage, midline shift or acute transcortical infarct is seen. Likely chronic ischemic microvascular disease. No extra-axial fluid collection. Parenchymal volume commensurate with patient age. No hydrocephalus. Mastoids clear. Normal calvarium. CT brain stroke protocol with nonspecific area of ischemia involving the pola and left brachium pontis. MRI suggested but patient declining MRI, planned for interval repeat CT head. CTA head with no evidence of a major vessel occlusion, flow-limiting stenosis or aneurysm about the [place] of [place]. No evidence of dural venous sinus thrombosis or an arteriovenous malformation. CTA neck redemonstrated dense atherosclerotic calcification at the right carotid bifurcation with moderate to severe stenosis of the proximal right internal carotid artery. Status post left carotid endarterectomy. Placed on tele and admitted to [address] [place] [address].     L facial numbness  Appreciate input from Neurology. CT head did not show any acute process. CTA H/N did show bilateral high grade stenosis of the carotids both common and at the bifurcation/bulb. CT brain perfusion with nonspecific area of ischemia involving the pola and left brachium pontis. MRI suggested but patient declining MRI due to claustrophobia. Had repeat CT head today, no interval change, no stroke seen. Of note, patient with similar presentation in March 2024 when she had L facial numbness and ear pain. Had elevated ESR 82 at the time. ESR remains elevated, 74 this admission. See below.   - Continue aspirin, Plavix, high intensity statin    Elevated ESR  Persisting since last admission in March. Other labs then included FRANCHESKA 1:160 speckled. Double strand DNA negative. C3 149 C4 29. Indeterminate atypical ANCA. SSA and SSB negative. Denies any headaches, vision changes, scalp tenderness, jaw claudication, or fevers. She has intact bilateral temporal artery pulses and no scalp tenderness on exam. Concern for GCA remains somewhat low and would not start steroids without doing temporal artery biopsy first, if patient is interested in pursuing. She does report having completed an outpatient temporal artery ultrasound, reportedly normal.   - Per Vascular Sx, plan for TA biopsy on Thursday     Carotid stenosis, high grade  Planned for CEA on Thursday, can be done this admission or after discharge with return to hospital Thursday.   - Continue antiplatelet therapy and statin    Weak gamma-migrating paraproteinemia  As noted on SPEP 3/2024. M spike unable to quantify. MAO with weak IgG Kappa band. 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 Started on doxycycline 4/20. Unclear whether statis skin changes or some mild superimposed cellulitis. Patient reports mild improvement since starting doxycycline yesterday but also could be from leg elevation. She expressed wishes to remain on antibiotics for her legs and be monitored into tomorrow.   - Continue doxycycline, day 3    DISPO: Tentative plan for TA biopsy with Vascular on Thursday.

## 2024-04-23 LAB — ERYTHROCYTE [SEDIMENTATION RATE] IN BLOOD: 72 MM/HR — HIGH (ref 0–20)

## 2024-04-23 PROCEDURE — 99232 SBSQ HOSP IP/OBS MODERATE 35: CPT | Mod: GC

## 2024-04-23 PROCEDURE — 99232 SBSQ HOSP IP/OBS MODERATE 35: CPT

## 2024-04-23 RX ADMIN — Medication 50 MICROGRAM(S): at 05:39

## 2024-04-23 RX ADMIN — ATORVASTATIN CALCIUM 80 MILLIGRAM(S): 80 TABLET, FILM COATED ORAL at 21:07

## 2024-04-23 RX ADMIN — Medication 100 MILLIGRAM(S): at 21:07

## 2024-04-23 RX ADMIN — Medication 81 MILLIGRAM(S): at 11:03

## 2024-04-23 RX ADMIN — PANTOPRAZOLE SODIUM 40 MILLIGRAM(S): 20 TABLET, DELAYED RELEASE ORAL at 05:39

## 2024-04-23 RX ADMIN — Medication 100 MILLIGRAM(S): at 11:03

## 2024-04-23 RX ADMIN — LOSARTAN POTASSIUM 100 MILLIGRAM(S): 100 TABLET, FILM COATED ORAL at 11:03

## 2024-04-23 RX ADMIN — ENOXAPARIN SODIUM 40 MILLIGRAM(S): 100 INJECTION SUBCUTANEOUS at 14:57

## 2024-04-23 RX ADMIN — CLOPIDOGREL BISULFATE 75 MILLIGRAM(S): 75 TABLET, FILM COATED ORAL at 11:03

## 2024-04-23 NOTE — PROGRESS NOTE ADULT - ATTENDING COMMENTS
inflammatory markers still elevated  will plan for temporal artery biopsy   CEA to be rescheduled
inflammatory markers elevated, will await repeat CT

## 2024-04-23 NOTE — PROGRESS NOTE ADULT - ASSESSMENT
70 yo woman with HTN, HLD, B/L carotid stenosis s/p L CEA 3/19/2024 and planned for R CEA 4/25, also hypothyroidism, B/L LE lymphedema, presented with L facial numbness. Symptom started 6AM 4/19, came to ED thereafter and symptom had resolved. NIHSS = 0. CT head w/ no intracranial mass effect, acute hemorrhage, midline shift or acute transcortical infarct is seen. Likely chronic ischemic microvascular disease. No extra-axial fluid collection. Parenchymal volume commensurate with patient age. No hydrocephalus. Mastoids clear. Normal calvarium. CT brain stroke protocol with nonspecific area of ischemia involving the pola and left brachium pontis. MRI suggested but patient declining MRI, planned for interval repeat CT head. CTA head with no evidence of a major vessel occlusion, flow-limiting stenosis or aneurysm about the [place] of [place]. No evidence of dural venous sinus thrombosis or an arteriovenous malformation. CTA neck redemonstrated dense atherosclerotic calcification at the right carotid bifurcation with moderate to severe stenosis of the proximal right internal carotid artery. Status post left carotid endarterectomy. Placed on tele and admitted to [address] [place] [address].     L facial numbness  Appreciate input from Neurology. CT head did not show any acute process. CTA H/N did show bilateral high grade stenosis of the carotids both common and at the bifurcation/bulb. CT brain perfusion with nonspecific area of ischemia involving the pola and left brachium pontis. MRI suggested but patient declining MRI due to claustrophobia. Had repeat CT head today, no interval change, no stroke seen. Of note, patient with similar presentation in March 2024 when she had L facial numbness and ear pain. Had elevated ESR 82 at the time. ESR remains elevated, 74 this admission. See below.   - Continue aspirin, Plavix, high intensity statin    Elevated ESR  Persisting since last admission in March. Other labs then included FRANCHESKA 1:160 speckled. Double strand DNA negative. C3 149 C4 29. Indeterminate atypical ANCA. SSA and SSB negative. Denies any headaches, vision changes, scalp tenderness, jaw claudication, or fevers. She has intact bilateral temporal artery pulses and no scalp tenderness on exam. Concern for GCA remains somewhat low and would not start steroids without doing temporal artery biopsy first, if patient is interested in pursuing. She does report having completed an outpatient temporal artery ultrasound, reportedly normal.   - Per Vascular Sx, plan for TA biopsy on Thursday     Carotid stenosis, high grade  Planned for CEA on Thursday, can be done this admission or after discharge with return to hospital Thursday.   - Continue antiplatelet therapy and statin    Weak gamma-migrating paraproteinemia  As noted on SPEP 3/2024. M spike unable to quantify. MAO with weak IgG Kappa band. 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 Started on doxycycline 4/20. Unclear whether statis skin changes or some mild superimposed cellulitis. Patient reports mild improvement since starting doxycycline yesterday but also could be from leg elevation. She expressed wishes to remain on antibiotics for her legs and be monitored into tomorrow.   - Continue doxycycline, day 4    DISPO: Plan for TA biopsy with Vascular on Thursday.

## 2024-04-23 NOTE — PHYSICAL THERAPY INITIAL EVALUATION ADULT - MODALITIES TREATMENT COMMENTS
no facial paresthesia reported. pt left sitting in BS chair, CBIR, friends visiting, instr to not get up w/o assist. nsg notified.

## 2024-04-23 NOTE — PHYSICAL THERAPY INITIAL EVALUATION ADULT - GAIT DEVIATIONS NOTED, PT EVAL
c/o feeling stiff, little unsteady-using HW rail and where no rail CG was performed- rec. SAC next session

## 2024-04-23 NOTE — PROGRESS NOTE ADULT - ASSESSMENT
70 yo F presents with possible acute ischemic stroke.  neuro exam intact  elevated ESR      P:  c/w ASA, Plavix, Statin  f/u Neurology reccs  pt will need temporal artery bx to r/o giant cell arteritis given elevated inflammatory markers  pt may benefit from CEA outpatient  rest of plan as per primary team    Plan discussed with Dr. Heard

## 2024-04-23 NOTE — PHYSICAL THERAPY INITIAL EVALUATION ADULT - PERTINENT HX OF CURRENT PROBLEM, REHAB EVAL
presents with left facial numbness . CTH/RCTH neg . ac findings. p/w elev ESR. CTA H&N shows bilateral high grade stenosis of the common carotids  and at the bifurcation/bulb. plan is for R CEA and TA bx.   patient was admitted here in 3/2024, ESR was 82, was seen by neurology, vascular surgery and rheumatology, rheumatology did not recommend high-dose steroids however recommended temporal artery ultrasound and TA biopsy.

## 2024-04-24 PROCEDURE — 99233 SBSQ HOSP IP/OBS HIGH 50: CPT

## 2024-04-24 RX ADMIN — LOSARTAN POTASSIUM 100 MILLIGRAM(S): 100 TABLET, FILM COATED ORAL at 09:12

## 2024-04-24 RX ADMIN — ATORVASTATIN CALCIUM 80 MILLIGRAM(S): 80 TABLET, FILM COATED ORAL at 21:48

## 2024-04-24 RX ADMIN — PANTOPRAZOLE SODIUM 40 MILLIGRAM(S): 20 TABLET, DELAYED RELEASE ORAL at 05:55

## 2024-04-24 RX ADMIN — CLOPIDOGREL BISULFATE 75 MILLIGRAM(S): 75 TABLET, FILM COATED ORAL at 09:12

## 2024-04-24 RX ADMIN — Medication 50 MICROGRAM(S): at 05:55

## 2024-04-24 RX ADMIN — ENOXAPARIN SODIUM 40 MILLIGRAM(S): 100 INJECTION SUBCUTANEOUS at 12:33

## 2024-04-24 RX ADMIN — Medication 100 MILLIGRAM(S): at 21:49

## 2024-04-24 RX ADMIN — Medication 30 MILLILITER(S): at 23:48

## 2024-04-24 RX ADMIN — Medication 100 MILLIGRAM(S): at 09:12

## 2024-04-24 RX ADMIN — Medication 81 MILLIGRAM(S): at 09:12

## 2024-04-24 NOTE — PROGRESS NOTE ADULT - ASSESSMENT
68 yo woman with HTN, HLD, B/L carotid stenosis s/p L CEA 3/19/2024 and planned for R CEA 4/25, also hypothyroidism, B/L LE lymphedema, presented with L facial numbness. Symptom started 6AM 4/19, came to ED thereafter and symptom had resolved. NIHSS = 0. CT head w/ no intracranial mass effect, acute hemorrhage, midline shift or acute transcortical infarct is seen. Likely chronic ischemic microvascular disease. No extra-axial fluid collection. Parenchymal volume commensurate with patient age. No hydrocephalus. Mastoids clear. Normal calvarium. CT brain stroke protocol with nonspecific area of ischemia involving the pola and left brachium pontis. MRI suggested but patient declining MRI, planned for interval repeat CT head. CTA head with no evidence of a major vessel occlusion, flow-limiting stenosis or aneurysm about the [place]. No evidence of dural venous sinus thrombosis or an arteriovenous malformation. CTA neck redemonstrated dense atherosclerotic calcification at the right carotid bifurcation with moderate to severe stenosis of the proximal right internal carotid artery. Status post left carotid endarterectomy. Placed on tele and admitted to [address] [place] [address].     L facial numbness  Appreciate input from Neurology. CT head did not show any acute process. CTA H/N did show bilateral high grade stenosis of the carotids both common and at the bifurcation/bulb. CT brain perfusion with nonspecific area of ischemia involving the pola and left brachium pontis. MRI suggested but patient declining MRI due to claustrophobia. Had repeat CT head today, no interval change, no stroke seen. Of note, patient with similar presentation in March 2024 when she had L facial numbness and ear pain. Had elevated ESR 82 at the time. ESR remains elevated, 74 this admission. See below.   - Continue aspirin, Plavix, high intensity statin    Elevated ESR  Persisting since last admission in March. Other labs then included FRANCHESKA 1:160 speckled. Double strand DNA negative. C3 149 C4 29. Indeterminate atypical ANCA. SSA and SSB negative. Denies any headaches, vision changes, scalp tenderness, jaw claudication, or fevers. She has intact bilateral temporal artery pulses and no scalp tenderness on exam. Concern for GCA remains somewhat low and would not start steroids without doing temporal artery biopsy first, if patient is interested in pursuing. She does report having completed an outpatient temporal artery ultrasound, reportedly normal.   - Per Vascular Sx, plan for TA biopsy on Thursday     Carotid stenosis, high grade  Planned for CEA on Thursday, can be done this admission or after discharge with return to hospital Thursday.   - Continue antiplatelet therapy and statin    Weak gamma-migrating paraproteinemia  As noted on SPEP 3/2024. M spike unable to quantify. MAO with weak IgG Kappa band. 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 Started on doxycycline 4/20. Unclear whether statis skin changes or some mild superimposed cellulitis. Patient reports mild improvement since starting doxycycline yesterday but also could be from leg elevation. She expressed wishes to remain on antibiotics for her legs and be monitored into tomorrow.   - Continue doxycycline, day 5    DISPO: Plan for TA biopsy with Vascular on Thursday.

## 2024-04-24 NOTE — PROGRESS NOTE ADULT - ASSESSMENT
70 yo F presents with possible acute ischemic stroke.  neuro exam intact  elevated ESR      P:  c/w ASA, Plavix, Statin  f/u Neurology reccs  pt will need temporal artery bx to r/o giant cell arteritis given elevated inflammatory markers, plan for thursday  pt may benefit from CEA outpatient  rest of plan as per primary team    Plan discussed with Dr. Heard

## 2024-04-25 ENCOUNTER — APPOINTMENT (OUTPATIENT)
Dept: VASCULAR SURGERY | Facility: HOSPITAL | Age: 69
End: 2024-04-25

## 2024-04-25 ENCOUNTER — RESULT REVIEW (OUTPATIENT)
Age: 69
End: 2024-04-25

## 2024-04-25 LAB
ANION GAP SERPL CALC-SCNC: 4 MMOL/L — LOW (ref 5–17)
APTT BLD: 30.1 SEC — SIGNIFICANT CHANGE UP (ref 24.5–35.6)
BASOPHILS # BLD AUTO: 0.05 K/UL — SIGNIFICANT CHANGE UP (ref 0–0.2)
BASOPHILS NFR BLD AUTO: 0.6 % — SIGNIFICANT CHANGE UP (ref 0–2)
BUN SERPL-MCNC: 42 MG/DL — HIGH (ref 7–23)
CALCIUM SERPL-MCNC: 9 MG/DL — SIGNIFICANT CHANGE UP (ref 8.5–10.1)
CHLORIDE SERPL-SCNC: 105 MMOL/L — SIGNIFICANT CHANGE UP (ref 96–108)
CO2 SERPL-SCNC: 27 MMOL/L — SIGNIFICANT CHANGE UP (ref 22–31)
CREAT SERPL-MCNC: 1.21 MG/DL — SIGNIFICANT CHANGE UP (ref 0.5–1.3)
EGFR: 49 ML/MIN/1.73M2 — LOW
EOSINOPHIL # BLD AUTO: 0.17 K/UL — SIGNIFICANT CHANGE UP (ref 0–0.5)
EOSINOPHIL NFR BLD AUTO: 2 % — SIGNIFICANT CHANGE UP (ref 0–6)
GLUCOSE SERPL-MCNC: 105 MG/DL — HIGH (ref 70–99)
HCT VFR BLD CALC: 35.5 % — SIGNIFICANT CHANGE UP (ref 34.5–45)
HGB BLD-MCNC: 11.7 G/DL — SIGNIFICANT CHANGE UP (ref 11.5–15.5)
IMM GRANULOCYTES NFR BLD AUTO: 1 % — HIGH (ref 0–0.9)
INR BLD: 0.97 RATIO — SIGNIFICANT CHANGE UP (ref 0.85–1.18)
LYMPHOCYTES # BLD AUTO: 2.29 K/UL — SIGNIFICANT CHANGE UP (ref 1–3.3)
LYMPHOCYTES # BLD AUTO: 26.3 % — SIGNIFICANT CHANGE UP (ref 13–44)
MAGNESIUM SERPL-MCNC: 1.9 MG/DL — SIGNIFICANT CHANGE UP (ref 1.6–2.6)
MCHC RBC-ENTMCNC: 30.5 PG — SIGNIFICANT CHANGE UP (ref 27–34)
MCHC RBC-ENTMCNC: 33 GM/DL — SIGNIFICANT CHANGE UP (ref 32–36)
MCV RBC AUTO: 92.7 FL — SIGNIFICANT CHANGE UP (ref 80–100)
MONOCYTES # BLD AUTO: 0.52 K/UL — SIGNIFICANT CHANGE UP (ref 0–0.9)
MONOCYTES NFR BLD AUTO: 6 % — SIGNIFICANT CHANGE UP (ref 2–14)
NEUTROPHILS # BLD AUTO: 5.59 K/UL — SIGNIFICANT CHANGE UP (ref 1.8–7.4)
NEUTROPHILS NFR BLD AUTO: 64.1 % — SIGNIFICANT CHANGE UP (ref 43–77)
PHOSPHATE SERPL-MCNC: 3.4 MG/DL — SIGNIFICANT CHANGE UP (ref 2.5–4.5)
PLATELET # BLD AUTO: 283 K/UL — SIGNIFICANT CHANGE UP (ref 150–400)
POTASSIUM SERPL-MCNC: 4.2 MMOL/L — SIGNIFICANT CHANGE UP (ref 3.5–5.3)
POTASSIUM SERPL-SCNC: 4.2 MMOL/L — SIGNIFICANT CHANGE UP (ref 3.5–5.3)
PROTHROM AB SERPL-ACNC: 11 SEC — SIGNIFICANT CHANGE UP (ref 9.5–13)
RBC # BLD: 3.83 M/UL — SIGNIFICANT CHANGE UP (ref 3.8–5.2)
RBC # FLD: 15.3 % — HIGH (ref 10.3–14.5)
SODIUM SERPL-SCNC: 136 MMOL/L — SIGNIFICANT CHANGE UP (ref 135–145)
WBC # BLD: 8.71 K/UL — SIGNIFICANT CHANGE UP (ref 3.8–10.5)
WBC # FLD AUTO: 8.71 K/UL — SIGNIFICANT CHANGE UP (ref 3.8–10.5)

## 2024-04-25 PROCEDURE — 99233 SBSQ HOSP IP/OBS HIGH 50: CPT

## 2024-04-25 PROCEDURE — 88305 TISSUE EXAM BY PATHOLOGIST: CPT | Mod: 26

## 2024-04-25 PROCEDURE — 37609 LIGATION/BX TEMPORAL ARTERY: CPT | Mod: LT

## 2024-04-25 RX ORDER — SODIUM CHLORIDE 9 MG/ML
1000 INJECTION, SOLUTION INTRAVENOUS
Refills: 0 | Status: DISCONTINUED | OUTPATIENT
Start: 2024-04-25 | End: 2024-04-25

## 2024-04-25 RX ORDER — SODIUM CHLORIDE 9 MG/ML
1000 INJECTION INTRAMUSCULAR; INTRAVENOUS; SUBCUTANEOUS
Refills: 0 | Status: DISCONTINUED | OUTPATIENT
Start: 2024-04-25 | End: 2024-04-25

## 2024-04-25 RX ORDER — HYDROMORPHONE HYDROCHLORIDE 2 MG/ML
0.25 INJECTION INTRAMUSCULAR; INTRAVENOUS; SUBCUTANEOUS
Refills: 0 | Status: DISCONTINUED | OUTPATIENT
Start: 2024-04-25 | End: 2024-04-25

## 2024-04-25 RX ORDER — ONDANSETRON 8 MG/1
4 TABLET, FILM COATED ORAL ONCE
Refills: 0 | Status: DISCONTINUED | OUTPATIENT
Start: 2024-04-25 | End: 2024-04-25

## 2024-04-25 RX ADMIN — SODIUM CHLORIDE 50 MILLILITER(S): 9 INJECTION INTRAMUSCULAR; INTRAVENOUS; SUBCUTANEOUS at 09:27

## 2024-04-25 RX ADMIN — CLOPIDOGREL BISULFATE 75 MILLIGRAM(S): 75 TABLET, FILM COATED ORAL at 09:28

## 2024-04-25 RX ADMIN — Medication 100 MILLIGRAM(S): at 09:28

## 2024-04-25 RX ADMIN — Medication 81 MILLIGRAM(S): at 09:28

## 2024-04-25 RX ADMIN — Medication 650 MILLIGRAM(S): at 22:02

## 2024-04-25 RX ADMIN — Medication 100 MILLIGRAM(S): at 21:55

## 2024-04-25 RX ADMIN — LOSARTAN POTASSIUM 100 MILLIGRAM(S): 100 TABLET, FILM COATED ORAL at 09:28

## 2024-04-25 RX ADMIN — Medication 650 MILLIGRAM(S): at 14:23

## 2024-04-25 RX ADMIN — Medication 30 MILLILITER(S): at 22:02

## 2024-04-25 RX ADMIN — ATORVASTATIN CALCIUM 80 MILLIGRAM(S): 80 TABLET, FILM COATED ORAL at 21:54

## 2024-04-25 RX ADMIN — ENOXAPARIN SODIUM 40 MILLIGRAM(S): 100 INJECTION SUBCUTANEOUS at 12:34

## 2024-04-25 NOTE — BRIEF OPERATIVE NOTE - NSICDXBRIEFPOSTOP_GEN_ALL_CORE_FT
POST-OP DIAGNOSIS:  Temporal giant cell arteritis 25-Apr-2024 08:45:40 Bx to r/o giant cell arteritis Efrain Montero

## 2024-04-25 NOTE — PROGRESS NOTE ADULT - ASSESSMENT
68 yo woman with HTN, HLD, B/L carotid stenosis s/p L CEA 3/19/2024 and planned for R CEA 4/25, also hypothyroidism, B/L LE lymphedema, presented with L facial numbness. Symptom started 6AM 4/19, came to ED thereafter and symptom had resolved. NIHSS = 0. CT head w/ no intracranial mass effect, acute hemorrhage, midline shift or acute transcortical infarct is seen. Likely chronic ischemic microvascular disease. No extra-axial fluid collection. Parenchymal volume commensurate with patient age. No hydrocephalus. Mastoids clear. Normal calvarium. CT brain stroke protocol with nonspecific area of ischemia involving the pola and left brachium pontis. MRI suggested but patient declining MRI, planned for interval repeat CT head. CTA head with no evidence of a major vessel occlusion, flow-limiting stenosis or aneurysm about the [place] of [place]. No evidence of dural venous sinus thrombosis or an arteriovenous malformation. CTA neck redemonstrated dense atherosclerotic calcification at the right carotid bifurcation with moderate to severe stenosis of the proximal right internal carotid artery. Status post left carotid endarterectomy. Placed on tele and admitted to [address] [place] [address].     L facial numbness  Appreciate input from Neurology. CT head did not show any acute process. CTA H/N did show bilateral high grade stenosis of the carotids both common and at the bifurcation/bulb. CT brain perfusion with nonspecific area of ischemia involving the pola and left brachium pontis. MRI suggested but patient declining MRI due to claustrophobia. Had repeat CT head today, no interval change, no stroke seen. Of note, patient with similar presentation in March 2024 when she had L facial numbness and ear pain. Had elevated ESR 82 at the time. ESR remains elevated, 74 this admission. See below.   - Continue aspirin, Plavix, high intensity statin    Elevated ESR  Persisting since last admission in March. Other labs then included FRAN[place]KA 1:160 speckled. Double strand DNA negative. C3 149 C4 29. Indeterminate atypical ANCA. SSA and SSB negative. Denies any headaches, vision changes, scalp tenderness, jaw claudication, or fevers. She has intact bilateral temporal artery pulses and no scalp tenderness on exam. Concern for GCA remains somewhat low and would not start steroids without doing temporal artery biopsy first, if patient is interested in pursuing. She does report having completed an outpatient temporal artery ultrasound, reportedly normal.   - Per Vascular Sx, s/p Left TA biopsy on 4/25/24    Carotid stenosis, high grade  Planned for CEA on Thursday, can be done this admission or after discharge with return to hospital Thursday.   - Continue antiplatelet therapy and statin    Weak gamma-migrating paraproteinemia  As noted on SPEP 3/2024. M spike unable to quantify. MAO with weak IgG Kappa band. 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 Started on doxycycline 4/20. Unclear whether statis skin changes or some mild superimposed cellulitis. Patient reports mild improvement since starting doxycycline yesterday but also could be from leg elevation. She expressed wishes to remain on antibiotics for her legs and be monitored into tomorrow.   - Continue doxycycline, day 6    Dehydration + elevated BUN:  d/c HCTZ; iv fluids; BMP in am    DISPO: d/c home 4/26

## 2024-04-25 NOTE — BRIEF OPERATIVE NOTE - NSICDXBRIEFPREOP_GEN_ALL_CORE_FT
PRE-OP DIAGNOSIS:  Temporal giant cell arteritis 25-Apr-2024 08:45:26 Bx to r/o giant cell arteritis Efrain Montero

## 2024-04-26 ENCOUNTER — TRANSCRIPTION ENCOUNTER (OUTPATIENT)
Age: 69
End: 2024-04-26

## 2024-04-26 VITALS
SYSTOLIC BLOOD PRESSURE: 110 MMHG | RESPIRATION RATE: 18 BRPM | TEMPERATURE: 98 F | DIASTOLIC BLOOD PRESSURE: 58 MMHG | OXYGEN SATURATION: 100 % | HEART RATE: 52 BPM

## 2024-04-26 LAB
ANION GAP SERPL CALC-SCNC: 3 MMOL/L — LOW (ref 5–17)
BUN SERPL-MCNC: 48 MG/DL — HIGH (ref 7–23)
CALCIUM SERPL-MCNC: 9.3 MG/DL — SIGNIFICANT CHANGE UP (ref 8.5–10.1)
CHLORIDE SERPL-SCNC: 106 MMOL/L — SIGNIFICANT CHANGE UP (ref 96–108)
CO2 SERPL-SCNC: 27 MMOL/L — SIGNIFICANT CHANGE UP (ref 22–31)
CREAT SERPL-MCNC: 1.25 MG/DL — SIGNIFICANT CHANGE UP (ref 0.5–1.3)
EGFR: 47 ML/MIN/1.73M2 — LOW
GLUCOSE SERPL-MCNC: 109 MG/DL — HIGH (ref 70–99)
POTASSIUM SERPL-MCNC: 4.1 MMOL/L — SIGNIFICANT CHANGE UP (ref 3.5–5.3)
POTASSIUM SERPL-SCNC: 4.1 MMOL/L — SIGNIFICANT CHANGE UP (ref 3.5–5.3)
SODIUM SERPL-SCNC: 136 MMOL/L — SIGNIFICANT CHANGE UP (ref 135–145)
SURGICAL PATHOLOGY STUDY: SIGNIFICANT CHANGE UP

## 2024-04-26 PROCEDURE — 99239 HOSP IP/OBS DSCHRG MGMT >30: CPT

## 2024-04-26 RX ORDER — ATORVASTATIN CALCIUM 80 MG/1
1 TABLET, FILM COATED ORAL
Qty: 30 | Refills: 0
Start: 2024-04-26 | End: 2024-05-25

## 2024-04-26 RX ORDER — ROSUVASTATIN CALCIUM 5 MG/1
1 TABLET ORAL
Refills: 0 | DISCHARGE

## 2024-04-26 RX ORDER — CLOPIDOGREL BISULFATE 75 MG/1
1 TABLET, FILM COATED ORAL
Qty: 30 | Refills: 0
Start: 2024-04-26 | End: 2024-05-25

## 2024-04-26 RX ORDER — LOSARTAN POTASSIUM 100 MG/1
1 TABLET, FILM COATED ORAL
Qty: 30 | Refills: 0
Start: 2024-04-26 | End: 2024-05-25

## 2024-04-26 RX ORDER — LOSARTAN/HYDROCHLOROTHIAZIDE 100MG-25MG
1 TABLET ORAL
Refills: 0 | DISCHARGE

## 2024-04-26 RX ADMIN — CLOPIDOGREL BISULFATE 75 MILLIGRAM(S): 75 TABLET, FILM COATED ORAL at 09:07

## 2024-04-26 RX ADMIN — Medication 50 MICROGRAM(S): at 06:15

## 2024-04-26 RX ADMIN — Medication 100 MILLIGRAM(S): at 09:07

## 2024-04-26 RX ADMIN — PANTOPRAZOLE SODIUM 40 MILLIGRAM(S): 20 TABLET, DELAYED RELEASE ORAL at 06:15

## 2024-04-26 RX ADMIN — Medication 81 MILLIGRAM(S): at 09:07

## 2024-04-26 RX ADMIN — LOSARTAN POTASSIUM 100 MILLIGRAM(S): 100 TABLET, FILM COATED ORAL at 09:07

## 2024-04-26 NOTE — DISCHARGE NOTE PROVIDER - NSDCMRMEDTOKEN_GEN_ALL_CORE_FT
aspirin 81 mg oral tablet: 1 tab(s) orally once a day  atorvastatin 80 mg oral tablet: 1 tab(s) orally once a day (at bedtime)  clopidogrel 75 mg oral tablet: 1 tab(s) orally once a day  levothyroxine 50 mcg (0.05 mg) oral tablet: 1 tab(s) orally once a day  losartan 100 mg oral tablet: 1 tab(s) orally once a day  omeprazole 40 mg oral delayed release capsule: 1 cap(s) orally once a day

## 2024-04-26 NOTE — DISCHARGE NOTE NURSING/CASE MANAGEMENT/SOCIAL WORK - PATIENT PORTAL LINK FT
You can access the FollowMyHealth Patient Portal offered by [facility] by registering at the following website: [email or web address]. By joining [employer]’s FollowMyHealth portal, you will also be able to view your health information using other applications (apps) compatible with our system.

## 2024-04-26 NOTE — DISCHARGE NOTE PROVIDER - PROVIDER TOKENS
PROVIDER:[TOKEN:[[id number]]],PROVIDER:[TOKEN:[[id number]]],PROVIDER:[TOKEN:[[id number]]]

## 2024-04-26 NOTE — PROGRESS NOTE ADULT - REASON FOR ADMISSION
Left sided facial numbness
Left sided facial numbness
Left sided numbness

## 2024-04-26 NOTE — DISCHARGE NOTE PROVIDER - CARE PROVIDER_API CALL
Feliberto Love  Vascular Surgery  [address] [place] [address]  Phone: [phone]  Fax: [phone]  Follow Up Time:     Víctor Moreno  Neurology  [address] [facility], [address]  [place] [address]  Phone: [phone]  Fax: [phone]  Follow Up Time:     Chris Abraham  Internal Medicine  [address] [facility], [address]  [place] [address]  Phone: [phone]  Fax: [phone]  Follow Up Time:

## 2024-04-26 NOTE — PROGRESS NOTE ADULT - SUBJECTIVE AND OBJECTIVE BOX
Patient sitting in bed, has no complaints except swelling and redness in the feet up to mid shins, reports the discomfort is worse.  She has noted resolution of L facial numbness, denies headache visual blurring or jaw claudication.    Patient seen by vascular surgery, is scheduled for right CEA 4/25    ESR this morning 74    Of note, patient was admitted here in 3/2024, ESR was 82, was seen by neurology, vascular surgery and rheumatology, rheumatology did not recommend high-dose steroids however recommended temporal artery ultrasound and TA biopsy, it is not clear if patient ever made an appointment for TA US or biopsy      ROS: LE swelling -  edema, other ROS negative    MEDICATIONS  (STANDING):  aspirin  chewable 81 milliGRAM(s) Oral daily  atorvastatin 80 milliGRAM(s) Oral at bedtime  clopidogrel Tablet 75 milliGRAM(s) Oral daily  doxycycline monohydrate Capsule 100 milliGRAM(s) Oral every 12 hours  enoxaparin Injectable 40 milliGRAM(s) SubCutaneous every 24 hours  hydrochlorothiazide 12.5 milliGRAM(s) Oral daily  levothyroxine 50 MICROGram(s) Oral daily  losartan 100 milliGRAM(s) Oral daily  pantoprazole    Tablet 40 milliGRAM(s) Oral before breakfast      Vital Signs Last 24 Hrs  T(C): 36.4 (20 Apr 2024 15:18), Max: 36.8 (20 Apr 2024 06:23)  T(F): 97.6 (20 Apr 2024 15:18), Max: 98.2 (20 Apr 2024 06:23)  HR: 59 (20 Apr 2024 15:18) (57 - 62)  BP: 115/50 (20 Apr 2024 15:18) (115/50 - 155/63)  BP(mean): --  RR: 18 (20 Apr 2024 15:18) (18 - 18)  SpO2: 100% (20 Apr 2024 15:18) (98% - 100%)    Parameters below as of 20 Apr 2024 15:18  Patient On (Oxygen Delivery Method): room air       GEN EXAM: NAD,, sitting up in bed, no temporal artery tenderness.  CV: carotid bruits appreciated bilaterally  EXTREm; bilateraly lower extremity chronic edema with venous stasis + skin changes.   HEENt; no nuchal rigidity    NEURO:   HF: Awake, alert, oriented to month, date, year, hospital, president, No aphasia or dysarthria   CN: Pupils 3-2mm, symmetric, full VF's, normal EOM, conjugate gaze, no nystagmus, no facial weakness, facial pinprick sensation is symmetric, tongue midline, palate symmetric.   MOTOR: normal bulk and tone, no drift, 5/5 , deltoids, biceps, and tricps.  5-/5 hip flexors, and knee extensors.   SENSORY: intact symmetrically to light touch, and pinprick.   COORDINATION: normal FNF  REFLEXES 1+ symmetric BB, Br, patellar - trace, AJ absent.       Labs:    Sedimentation Rate, Erythrocyte: 74 mm/hr (04.20.24 @ 13:34)             Sedimentation Rate, Erythrocyte: 63 mm/hr (03.21.24 @ 08:06)   Sedimentation Rate, Erythrocyte: 82 mm/hr (03.20.24 @ 11:15)               12.2   7.65  )-----------( 268      ( 19 Apr 2024 07:29 )             38.4     04-20    139  |  105  |  23  ----------------------------<  97  4.2   |  29  |  1.06    Ca    9.7      20 Apr 2024 08:05    TPro  7.4  /  Alb  3.3  /  TBili  0.4  /  DBili  x   /  AST  31  /  ALT  38  /  AlkPhos  190<H>  04-19      04-20 Chol 179 LDL -- HDL 85 Trig 43    Radiology report:  -< from: CT Angio Neck Stroke Protocol w/ IV Cont (04.19.24 @ 07:41) >  IMPRESSION:  CT ANGIOGRAPHY NECK: Redemonstrated dense atherosclerotic calcification   at the right carotid bifurcation with moderate to severe stenosis of the   proximal right internal carotid artery. Status post left carotid   endarterectomy.    CT ANGIOGRAPHY BRAIN:  1.  No evidence of a major vessel occlusion, flow-limiting stenosis or   aneurysm about the [place].  2.  No evidence of dural venous sinus thrombosis or an arteriovenous   malformation    CT PERFUSION: Nonspecific area of ischemia involving the pola and left   brachium pontis. MRI suggested for further evaluation.      
Pt. seen and examined at bedside this morning. No complaints at this time.   neuro intact, no headache, no dizziness/nausea    ROS negative except as above.    Physical Exam:  General: AOx3, Well developed, NAD  HEENT: NC/AT, normal pinnae and tragi  Chest: Normal respiratory effort, equal chest rise  Heart: RRR  Abdomen: Soft, NTND  Neuro/Psych: No localized deficits. Normal speech, normal tone, normal affect  Skin: Normal, warm, no rashes, no lesions noted  Extremities: Warm, well perfused, no edema    Vital Signs Last 24 Hrs  T(C): 36.7 (23 Apr 2024 22:45), Max: 36.8 (23 Apr 2024 15:42)  T(F): 98.1 (23 Apr 2024 22:45), Max: 98.2 (23 Apr 2024 15:42)  HR: 62 (23 Apr 2024 22:45) (48 - 66)  BP: 104/59 (23 Apr 2024 22:45) (102/41 - 111/54)  BP(mean): --  RR: 17 (23 Apr 2024 22:45) (17 - 17)  SpO2: 97% (23 Apr 2024 22:45) (97% - 100%)    Parameters below as of 23 Apr 2024 22:45  Patient On (Oxygen Delivery Method): room air      MEDICATIONS  (STANDING):  aspirin  chewable 81 milliGRAM(s) Oral daily  atorvastatin 80 milliGRAM(s) Oral at bedtime  clopidogrel Tablet 75 milliGRAM(s) Oral daily  doxycycline monohydrate Capsule 100 milliGRAM(s) Oral every 12 hours  enoxaparin Injectable 40 milliGRAM(s) SubCutaneous every 24 hours  hydrochlorothiazide 12.5 milliGRAM(s) Oral daily  levothyroxine 50 MICROGram(s) Oral daily  losartan 100 milliGRAM(s) Oral daily  pantoprazole    Tablet 40 milliGRAM(s) Oral before breakfast    MEDICATIONS  (PRN):  acetaminophen     Tablet .. 650 milliGRAM(s) Oral every 6 hours PRN Mild Pain (1 - 3)  aluminum hydroxide/magnesium hydroxide/simethicone Suspension 30 milliLiter(s) Oral every 6 hours PRN Dyspepsia  ondansetron Injectable 4 milliGRAM(s) IV Push every 6 hours PRN Nausea and/or Vomiting            I&O's Summary    
Chief Complaint: Left sided facial numbness    Interval Hx: Patient seen and examined this AM. Facial numbness has not recurred. She asymptomatic at this point aside for skin erythema over the lower shins B/L with associated warmth but no induration, no tenderness. Unclear whether statis skin changes or some mild superimposed cellulitis. Patient reports mild improvement since starting doxycycline yesterday but also could be from leg elevation. No new complaints. Rpt CT head was performed this AM, as patient wished to defer MR imaging. Rpt CT head showed minimal chronic microvascular changes without evidence of an acute infarction or hemorrhage. Patient reports having completed an outpatient temporal artery ultrasound that she states was negative. Discussed with patient the possibility of a temporal artery biopsy with the planned carotid endarterectomy and she wished to think about it further. Also, she wanted to continue on antibiotics for her legs and be monitored into tomorrow.     ROS: Multi system review is comprehensively negative x 10 systems except as above.     Vitals:  T(F): 98.7 (21 Apr 2024 17:07), Max: 98.7 (21 Apr 2024 17:07)  HR: 58 (21 Apr 2024 17:07) (54 - 61)  BP: 107/39 (21 Apr 2024 17:07) (107/39 - 153/57)  RR: 18 (21 Apr 2024 17:07) (16 - 18)  SpO2: 98% (21 Apr 2024 17:07) (96% - 100%) on room air    Exam:  Gen: Comfortable  HEENT: NCAT PERRL EOMI MMM clear oropharynx  Neck: Supple, no JVD, no LAD  CVS: s1 s2 normal, RRR  Chest: Normal resp effort, lungs CTA B/L  Abd: +BS, soft, non tender, non distended  Ext: B/L LEs with mild edema that spares the feet, erythema to lower shins B/L, warm to touch, non tender, no induration  Skin: Warm, dry, LE skin exam as above  Mood: Calm, pleasant  Neuro: Awake, alert, oriented to month, date, year, hospital, President. No aphasia. No dysarthria. Pupils 3-2mm, symmetric, full VF's, normal EOM, conjugate gaze, no nystagmus, no facial weakness, facial pinprick sensation is symmetric, tongue midline, palate symmetric. Normal bulk and tone, no drift, strength 5/5 , deltoids, biceps, and triceps, strength 5-/5 hip flexors, and knee extensors, sensation intact symmetrically to light touch and pinprick, normal FNF    Labs:                     12.2   7.71  )---------( 271                 37.1       135  |  104  |  27  ----------------------<  98  4.0   |   28   |  0.98    Ca    9.5         TPro  7.4  /  Alb  3.3  /  TBili  0.4  /  DBili  x   /  AST  31  /  ALT  38  /  AlkPhos  190    PT: 11.0 sec;   INR: 0.97 ratio  PTT: 31.3 sec    ESR 74   CRP 8     Troponin 8   LDL 85    UA 4/19: Yellow, negative, N-, trace LE, WBC 0, RBC 3, no occ bacteria    Prior visit testing:  A1c 5.6 (4/16)  ESR 63 (3/21)  MAO weak IgG Kappa band (3/20)  SPEP weak gamma migrating paraprotein, M spike unable to quantify  C3 149 C4 29 (3/20)  FRANCHESKA 1:160 speckled,  dsDNA neg, Shah Ab neg, antiRNP Ab neg, indeterminate atypical ANCA, c and p ANCA neg, SSA and SSB neg (3/20)  ESR 82 (3/20)    Imaging:  CT head WO 4/21: Minimal chronic microvascular changes without evidence of an acute transcortical infarction or hemorrhage.    CXR 4/19: Shallow inspiration crowds the chest. Heart magnified by technique. There may be a minimal right base infiltrate new since February 17 this year.    CTA neck W/ IV 4/19: Redemonstrated dense atherosclerotic calcification at the right carotid bifurcation with moderate to severe stenosis of the proximal right internal carotid artery. Status post left carotid endarterectomy.    CTA head W/ IV 4/19: No evidence of a major vessel occlusion, flow-limiting stenosis or aneurysm about the [place]. No evidence of dural venous sinus thrombosis or an arteriovenous   malformation    CT brain stroke protocol 4/19: Nonspecific area of ischemia involving the pola and left brachium pontis. MRI suggested.    CT head 4/19: No intracranial mass effect, acute hemorrhage, midline shift or acute transcortical infarct is seen. There are periventricular white matter hypodensities that are nonspecific in nature but may reflect chronic ischemic microvascular disease. No extra-axial fluid collection is present. Parenchymal volume is commensurate with patient age. No hydrocephalus. Mastoids clear. Normal calvarium.    Cardiac Testing:  EKG 4/16: Rate 62.  Normal sinus rhythm. Voltage criteria for left ventricular hypertrophy. Abnormal ECG    Prior visit testing  TTE 3/2024: The left ventricle is normal in size, wall thickness, wall motion and contractility. Estimated left ventricular ejection fraction is 60-65 %. The aortic valve is well visualized, appears mildly calcified. Valve opening seems to be normal. The mitral valve leaflets appear calcified. Mild mitral annular calcification is present. Trace mitral regurgitation is present. Normal appearing tricuspid valve structure. Trace to mild tricuspid valve regurgitation is present. Normal appearing pulmonic valve structure. Trace pulmonic valvular regurgitation is present.    Meds:  MEDICATIONS  (STANDING):  aspirin  chewable 81 milliGRAM(s) Oral daily  atorvastatin 80 milliGRAM(s) Oral at bedtime  clopidogrel Tablet 75 milliGRAM(s) Oral daily  doxycycline monohydrate Capsule 100 milliGRAM(s) Oral every 12 hours  enoxaparin Injectable 40 milliGRAM(s) SubCutaneous every 24 hours  hydrochlorothiazide 12.5 milliGRAM(s) Oral daily  levothyroxine 50 MICROGram(s) Oral daily  losartan 100 milliGRAM(s) Oral daily  pantoprazole    Tablet 40 milliGRAM(s) Oral before breakfast    MEDICATIONS  (PRN):  acetaminophen     Tablet .. 650 milliGRAM(s) Oral every 6 hours PRN Mild Pain (1 - 3)  ondansetron Injectable 4 milliGRAM(s) IV Push every 6 hours PRN Nausea and/or Vomiting    
HOSPITALIST PROGRESS NOTE    Chief Complaint: Left sided facial numbness    4/21 Interval Hx: Patient seen and examined this AM. Facial numbness has not recurred. She asymptomatic at this point aside for skin erythema over the lower shins B/L with associated warmth but no induration, no tenderness. Unclear whether statis skin changes or some mild superimposed cellulitis. Patient reports mild improvement since starting doxycycline yesterday but also could be from leg elevation. No new complaints. Rpt CT head was performed this AM, as patient wished to defer MR imaging. Rpt CT head showed minimal chronic microvascular changes without evidence of an acute infarction or hemorrhage. Patient reports having completed an outpatient temporal artery ultrasound that she states was negative. Discussed with patient the possibility of a temporal artery biopsy with the planned carotid endarterectomy and she wished to think about it further. Also, she wanted to continue on antibiotics for her legs and be monitored into tomorrow.     4/22: Patient seen and examined. Denies any facial numbness/vision changes/headache. Patient feels uncomfortable going home until CEA. She lives alone and afraid she will have a TIA/CVA. Discussed plan with Vascular - Dr. Love plans to do a temporal artery bx on Thursday.     4/23: Patient seen and examined at bedside. Agreeable to Vascular Team's plan for temporal artery bx on Thursday. No complaints. Stable.     ROS: All 10 systems reviewed and found to be negative with the exception of what has been described above.     VITAL SIGNS:  Vital Signs Last 24 Hrs  T(C): 36.8 (23 Apr 2024 15:42), Max: 36.8 (23 Apr 2024 15:42)  T(F): 98.2 (23 Apr 2024 15:42), Max: 98.2 (23 Apr 2024 15:42)  HR: 59 (23 Apr 2024 15:42) (48 - 72)  BP: 102/41 (23 Apr 2024 15:42) (102/41 - 131/52)  BP(mean): --  RR: 17 (23 Apr 2024 15:42) (17 - 18)  SpO2: 100% (23 Apr 2024 15:42) (100% - 100%)    Parameters below as of 23 Apr 2024 15:42  Patient On (Oxygen Delivery Method): room air    PHYSICAL EXAM:  Gen: Comfortable  HEENT: NCAT PERRL EOMI MMM clear oropharynx  Neck: Supple, no JVD, no LAD  CVS: s1 s2 normal, RRR  Chest: Normal resp effort, lungs CTA B/L  Abd: +BS, soft, non tender, non distended  Ext: B/L LEs with mild edema that spares the feet, erythema to lower shins B/L, warm to touch, non tender, no induration  Skin: Warm, dry, LE skin exam as above  Mood: Calm, pleasant  Neuro: Awake, alert, oriented to month, date, year, hospital, President. No aphasia. No dysarthria. Pupils 3-2mm, symmetric, full VF's, normal EOM, conjugate gaze, no nystagmus, no facial weakness, facial pinprick sensation is symmetric, tongue midline, palate symmetric. Normal bulk and tone, no drift, strength 5/5 , deltoids, biceps, and triceps, strength 5-/5 hip flexors, and knee extensors, sensation intact symmetrically to light touch and pinprick, normal FNF    MEDICATIONS:  MEDICATIONS  (STANDING):  aspirin  chewable 81 milliGRAM(s) Oral daily  atorvastatin 80 milliGRAM(s) Oral at bedtime  clopidogrel Tablet 75 milliGRAM(s) Oral daily  doxycycline monohydrate Capsule 100 milliGRAM(s) Oral every 12 hours  enoxaparin Injectable 40 milliGRAM(s) SubCutaneous every 24 hours  hydrochlorothiazide 12.5 milliGRAM(s) Oral daily  levothyroxine 50 MICROGram(s) Oral daily  losartan 100 milliGRAM(s) Oral daily  pantoprazole    Tablet 40 milliGRAM(s) Oral before breakfast    MEDICATIONS  (PRN):  acetaminophen     Tablet .. 650 milliGRAM(s) Oral every 6 hours PRN Mild Pain (1 - 3)  aluminum hydroxide/magnesium hydroxide/simethicone Suspension 30 milliLiter(s) Oral every 6 hours PRN Dyspepsia  ondansetron Injectable 4 milliGRAM(s) IV Push every 6 hours PRN Nausea and/or Vomiting      ALLERGIES:  Allergies    No Known Allergies    Intolerances    cefadroxil (Diarrhea)      LABS:              CAPILLARY BLOOD GLUCOSE            RADIOLOGY & ADDITIONAL TESTS: Reviewed.
HOSPITALIST PROGRESS NOTE    Chief Complaint: Left sided facial numbness    4/21 Interval Hx: Patient seen and examined this AM. Facial numbness has not recurred. She asymptomatic at this point aside for skin erythema over the lower shins B/L with associated warmth but no induration, no tenderness. Unclear whether statis skin changes or some mild superimposed cellulitis. Patient reports mild improvement since starting doxycycline yesterday but also could be from leg elevation. No new complaints. Rpt CT head was performed this AM, as patient wished to defer MR imaging. Rpt CT head showed minimal chronic microvascular changes without evidence of an acute infarction or hemorrhage. Patient reports having completed an outpatient temporal artery ultrasound that she states was negative. Discussed with patient the possibility of a temporal artery biopsy with the planned carotid endarterectomy and she wished to think about it further. Also, she wanted to continue on antibiotics for her legs and be monitored into tomorrow.     4/22: Patient seen and examined. Denies any facial numbness/vision changes/headache. Patient feels uncomfortable going home until CEA. She lives alone and afraid she will have a TIA/CVA. Discussed plan with Vascular - Dr. Love plans to do a temporal artery bx on Thursday.     ROS: All 10 systems reviewed and found to be negative with the exception of what has been described above.     VITAL SIGNS:  Vital Signs Last 24 Hrs  T(C): 36.3 (22 Apr 2024 15:36), Max: 37.1 (21 Apr 2024 17:07)  T(F): 97.4 (22 Apr 2024 15:36), Max: 98.7 (21 Apr 2024 17:07)  HR: 58 (22 Apr 2024 15:36) (52 - 60)  BP: 112/43 (22 Apr 2024 15:36) (103/46 - 138/54)  BP(mean): --  RR: 18 (22 Apr 2024 15:36) (18 - 18)  SpO2: 100% (22 Apr 2024 15:36) (97% - 100%)    Parameters below as of 22 Apr 2024 15:36  Patient On (Oxygen Delivery Method): room air    PHYSICAL EXAM:  Gen: Comfortable  HEENT: NCAT PERRL EOMI MMM clear oropharynx  Neck: Supple, no JVD, no LAD  CVS: s1 s2 normal, RRR  Chest: Normal resp effort, lungs CTA B/L  Abd: +BS, soft, non tender, non distended  Ext: B/L LEs with mild edema that spares the feet, erythema to lower shins B/L, warm to touch, non tender, no induration  Skin: Warm, dry, LE skin exam as above  Mood: Calm, pleasant  Neuro: Awake, alert, oriented to month, date, year, hospital, President. No aphasia. No dysarthria. Pupils 3-2mm, symmetric, full VF's, normal EOM, conjugate gaze, no nystagmus, no facial weakness, facial pinprick sensation is symmetric, tongue midline, palate symmetric. Normal bulk and tone, no drift, strength 5/5 , deltoids, biceps, and triceps, strength 5-/5 hip flexors, and knee extensors, sensation intact symmetrically to light touch and pinprick, normal FNF    MEDICATIONS:  MEDICATIONS  (STANDING):  aspirin  chewable 81 milliGRAM(s) Oral daily  atorvastatin 80 milliGRAM(s) Oral at bedtime  clopidogrel Tablet 75 milliGRAM(s) Oral daily  doxycycline monohydrate Capsule 100 milliGRAM(s) Oral every 12 hours  enoxaparin Injectable 40 milliGRAM(s) SubCutaneous every 24 hours  hydrochlorothiazide 12.5 milliGRAM(s) Oral daily  levothyroxine 50 MICROGram(s) Oral daily  losartan 100 milliGRAM(s) Oral daily  pantoprazole    Tablet 40 milliGRAM(s) Oral before breakfast    MEDICATIONS  (PRN):  acetaminophen     Tablet .. 650 milliGRAM(s) Oral every 6 hours PRN Mild Pain (1 - 3)  ondansetron Injectable 4 milliGRAM(s) IV Push every 6 hours PRN Nausea and/or Vomiting      ALLERGIES:  Allergies    No Known Allergies    Intolerances    cefadroxil (Diarrhea)      LABS:                        12.2   7.71  )-----------( 271      ( 21 Apr 2024 06:20 )             37.1     04-21    135  |  104  |  27<H>  ----------------------------<  98  4.0   |  28  |  0.98    Ca    9.5      21 Apr 2024 06:20        Urinalysis Basic - ( 21 Apr 2024 06:20 )    Color: x / Appearance: x / SG: x / pH: x  Gluc: 98 mg/dL / Ketone: x  / Bili: x / Urobili: x   Blood: x / Protein: x / Nitrite: x   Leuk Esterase: x / RBC: x / WBC x   Sq Epi: x / Non Sq Epi: x / Bacteria: x      CAPILLARY BLOOD GLUCOSE            RADIOLOGY & ADDITIONAL TESTS: Reviewed.
Patient sitting in bed, has no complaints, wants to have right CEA coming weak  Denies L facial numbness, denies headache visual blurring or jaw claudication, ESR 74.    Repeat CT Head shows minimal chronic microvascular changes without evidence of an   acute transcortical infarction or hemorrhage.      Patient seen by vascular surgery, is scheduled for right CEA 4/25, they will f/u for inflammatory markers - pt apparently has TA usg as OP      ROS: As above, other ROS Negative      MEDICATIONS  (STANDING):  aspirin  chewable 81 milliGRAM(s) Oral daily  atorvastatin 80 milliGRAM(s) Oral at bedtime  clopidogrel Tablet 75 milliGRAM(s) Oral daily  doxycycline monohydrate Capsule 100 milliGRAM(s) Oral every 12 hours  enoxaparin Injectable 40 milliGRAM(s) SubCutaneous every 24 hours  hydrochlorothiazide 12.5 milliGRAM(s) Oral daily  levothyroxine 50 MICROGram(s) Oral daily  losartan 100 milliGRAM(s) Oral daily  pantoprazole    Tablet 40 milliGRAM(s) Oral before breakfast      Vital Signs Last 24 Hrs  T(C): 36.4 (21 Apr 2024 08:37), Max: 36.9 (21 Apr 2024 04:00)  T(F): 97.6 (21 Apr 2024 08:37), Max: 98.4 (21 Apr 2024 04:00)  HR: 54 (21 Apr 2024 08:37) (54 - 61)  BP: 153/57 (21 Apr 2024 08:37) (115/50 - 153/57)  BP(mean): 86 (21 Apr 2024 08:37) (86 - 86)  RR: 18 (21 Apr 2024 08:37) (16 - 18)  SpO2: 100% (21 Apr 2024 08:37) (96% - 100%)    Parameters below as of 21 Apr 2024 08:37  Patient On (Oxygen Delivery Method): room air    GEN EXAM: NAD,, sitting up in bed, no temporal artery tenderness.  CV: carotid bruits appreciated bilaterally  Ext: Chronic edema with skin changes feet upto mid-shins    NEURO:   HF: Awake, alert, oriented to month, date, year, hospital, president, No aphasia or dysarthria   CN: Pupils 3-2mm, symmetric, full VF's, normal EOM, conjugate gaze, no nystagmus, no facial weakness, facial pinprick sensation is symmetric, tongue midline, palate symmetric.   MOTOR: normal bulk and tone, no drift, 5/5 , deltoids, biceps, and tricps.  5-/5 hip flexors, and knee extensors.   SENSORY: intact symmetrically to light touch, and pinprick.   COORDINATION: normal FNF  REFLEXES 1+ symmetric BB, Br, patellar - trace, AJ absent.     Labs:    - Sedimentation Rate, Erythrocyte: 74 mm/hr (04.20.24 @ 13:34)            - Sedimentation Rate, Erythrocyte: 63 mm/hr (03.21.24 @ 08:06)   - Sedimentation Rate, Erythrocyte: 82 mm/hr (03.20.24 @ 11:15)                           12.2   7.71  )-----------( 271      ( 21 Apr 2024 06:20 )             37.1     04-21    135  |  104  |  27<H>  ----------------------------<  98  4.0   |  28  |  0.98    Ca    9.5      21 Apr 2024 06:20    04-20 Chol 179 LDL -- HDL 85 Trig 43    Radiology report:  < from: CT Head No Cont (04.21.24 @ 09:05) >  IMPRESSION:  Minimal chronic microvascular changes without evidence of an   acute transcortical infarction or hemorrhage.    -< from: CT Angio Neck Stroke Protocol w/ IV Cont (04.19.24 @ 07:41) >  IMPRESSION:  CT ANGIOGRAPHY NECK: Redemonstrated dense atherosclerotic calcification   at the right carotid bifurcation with moderate to severe stenosis of the   proximal right internal carotid artery. Status post left carotid   endarterectomy.    CT ANGIOGRAPHY BRAIN:  1.  No evidence of a major vessel occlusion, flow-limiting stenosis or   aneurysm about the [place] of [place].  2.  No evidence of dural venous sinus thrombosis or an arteriovenous   malformation    CT PERFUSION: Nonspecific area of ischemia involving the pola and left   brachium pontis. MRI suggested for further evaluation.      
Pt. seen and examined at bedside this morning. No complaints at this time.   neuro intact, no headache, no dizziness/nausea    ROS negative except as above.    Physical Exam:  General: AOx3, Well developed, NAD  HEENT: NC/AT, normal pinnae and tragi  Chest: Normal respiratory effort, equal chest rise  Heart: RRR  Abdomen: Soft, NTND  Neuro/Psych: No localized deficits. Normal speech, normal tone, normal affect  Skin: Normal, warm, no rashes, no lesions noted  Extremities: Warm, well perfused, no edema    Vital Signs Last 24 Hrs  T(C): 37 (25 Apr 2024 05:00), Max: 37 (25 Apr 2024 05:00)  T(F): 98.6 (25 Apr 2024 05:00), Max: 98.6 (25 Apr 2024 05:00)  HR: 57 (25 Apr 2024 05:00) (57 - 61)  BP: 117/51 (25 Apr 2024 05:00) (114/45 - 134/51)  BP(mean): --  RR: 18 (25 Apr 2024 05:00) (18 - 18)  SpO2: 99% (25 Apr 2024 05:00) (99% - 100%)    Parameters below as of 25 Apr 2024 05:00  Patient On (Oxygen Delivery Method): room air      MEDICATIONS  (STANDING):  aspirin  chewable 81 milliGRAM(s) Oral daily  atorvastatin 80 milliGRAM(s) Oral at bedtime  clopidogrel Tablet 75 milliGRAM(s) Oral daily  doxycycline monohydrate Capsule 100 milliGRAM(s) Oral every 12 hours  enoxaparin Injectable 40 milliGRAM(s) SubCutaneous every 24 hours  levothyroxine 50 MICROGram(s) Oral daily  losartan 100 milliGRAM(s) Oral daily  pantoprazole    Tablet 40 milliGRAM(s) Oral before breakfast  sodium chloride 0.9%. 1000 milliLiter(s) (50 mL/Hr) IV Continuous <Continuous>    MEDICATIONS  (PRN):  acetaminophen     Tablet .. 650 milliGRAM(s) Oral every 6 hours PRN Mild Pain (1 - 3)  aluminum hydroxide/magnesium hydroxide/simethicone Suspension 30 milliLiter(s) Oral every 6 hours PRN Dyspepsia  ondansetron Injectable 4 milliGRAM(s) IV Push every 6 hours PRN Nausea and/or Vomiting                          11.7   8.71  )-----------( 283      ( 25 Apr 2024 04:41 )             35.5     04-25    136  |  105  |  42<H>  ----------------------------<  105<H>  4.2   |  27  |  1.21    Ca    9.0      25 Apr 2024 04:41  Phos  3.4     04-25  Mg     1.9     04-25      I&O's Summary        
Pt. seen and examined at bedside this morning. No complaints at this time.   neuro intact, no headache, no dizziness/nausea    ROS negative except as above.    Physical Exam:  General: AOx3, Well developed, NAD  HEENT: NC/AT, normal pinnae and tragi, left temporal surgical site c/d/i  Chest: Normal respiratory effort, equal chest rise  Heart: RRR  Abdomen: Soft, NTND  Neuro/Psych: No localized deficits. Normal speech, normal tone, normal affect  Skin: Normal, warm, no rashes, no lesions noted  Extremities: Warm, well perfused, no edema    Vital Signs Last 24 Hrs  T(C): 36.8 (25 Apr 2024 23:30), Max: 37 (25 Apr 2024 05:00)  T(F): 98.2 (25 Apr 2024 23:30), Max: 98.6 (25 Apr 2024 05:00)  HR: 55 (25 Apr 2024 23:30) (55 - 77)  BP: 105/51 (25 Apr 2024 23:30) (101/51 - 126/55)  BP(mean): --  RR: 18 (25 Apr 2024 15:42) (14 - 18)  SpO2: 99% (25 Apr 2024 23:30) (99% - 100%)    Parameters below as of 25 Apr 2024 15:42  Patient On (Oxygen Delivery Method): room air      MEDICATIONS  (STANDING):  aspirin  chewable 81 milliGRAM(s) Oral daily  atorvastatin 80 milliGRAM(s) Oral at bedtime  clopidogrel Tablet 75 milliGRAM(s) Oral daily  doxycycline monohydrate Capsule 100 milliGRAM(s) Oral every 12 hours  enoxaparin Injectable 40 milliGRAM(s) SubCutaneous every 24 hours  levothyroxine 50 MICROGram(s) Oral daily  losartan 100 milliGRAM(s) Oral daily  pantoprazole    Tablet 40 milliGRAM(s) Oral before breakfast    MEDICATIONS  (PRN):  acetaminophen     Tablet .. 650 milliGRAM(s) Oral every 6 hours PRN Mild Pain (1 - 3)  aluminum hydroxide/magnesium hydroxide/simethicone Suspension 30 milliLiter(s) Oral every 6 hours PRN Dyspepsia  ondansetron Injectable 4 milliGRAM(s) IV Push every 6 hours PRN Nausea and/or Vomiting                          11.7   8.71  )-----------( 283      ( 25 Apr 2024 04:41 )             35.5     04-25    136  |  105  |  42<H>  ----------------------------<  105<H>  4.2   |  27  |  1.21    Ca    9.0      25 Apr 2024 04:41  Phos  3.4     04-25  Mg     1.9     04-25      I&O's Summary    25 Apr 2024 07:01  -  26 Apr 2024 04:20  --------------------------------------------------------  IN: 350 mL / OUT: 0 mL / NET: 350 mL      
Chief Complaint: Left sided facial numbness    Interval Hx: Patient seen and examined this AM. Patient reports that her left facial numbness has resolved. She asymptomatic at this point, aside for skin erythema over the lower shins B/L with associated warmth, no induration, no tenderness. No other complaints.     ROS: Multi system review is comprehensively negative x 10 systems except as above.     Vitals:  T(F): 97.6 (20 Apr 2024 15:18), Max: 98.2 (20 Apr 2024 06:23)  HR: 59 (20 Apr 2024 15:18) (57 - 62)  BP: 115/50 (20 Apr 2024 15:18) (115/50 - 155/63)  RR: 18 (20 Apr 2024 15:18) (18 - 18)  SpO2: 100% (20 Apr 2024 15:18) (98% - 100%) on room air    Exam:  Gen: Comfortable  HEENT: NCAT PERRL EOMI MMM clear oropharynx  Neck: Supple, no JVD, no LAD  CVS: s1 s2 normal, RRR  Chest: Normal resp effort, lungs CTA B/L  Abd: +BS, soft, non tender, non distended  Ext: B/L LEs with mild edema that spares the feet, erythema to lower shins B/L, warm to touch, non tender, no induration  Skin: Warm, dry, LE skin exam as above  Mood: Calm, pleasant  Neuro: A+OX3, no deficits    Labs:                     12.2   7.65  )---------( 268                 38.4       139  |  105  |  23  ----------------------<  97  4.2   |  29  |  1.06    Ca    9.7         TPro  7.4  /  Alb  3.3  /  TBili  0.4  /  DBili  x   /  AST  31  /  ALT  38  /  AlkPhos  190    PT: 11.0 sec;   INR: 0.97 ratio  PTT: 31.3 sec    ESR 74    Troponin 8   LDL 85    UA 4/19: Yellow, negative, N-, trace LE, WBC 0, RBC 3, no occ bacteria    Prior visit testing:  A1c 5.6 (4/16)  ESR 63 (3/21)  MAO weak IgG Kappa band (3/20)  SPEP weak gamma migrating paraprotein, M spike unable to quantify  C3 149 C4 29 (3/20)  FRANCHESKA 1:160 speckled,  dsDNA neg, Shah Ab neg, antiRNP Ab neg, indeterminate atypical ANCA, c and p ANCA neg, SSA and SSB neg (3/20)  ESR 82 (3/20)    Imaging:  CXR 4/19: Shallow inspiration crowds the chest. Heart magnified by technique. There may be a minimal right base infiltrate new since February 17 this year.    CTA neck W/ IV 4/19: Redemonstrated dense atherosclerotic calcification at the right carotid bifurcation with moderate to severe stenosis of the proximal right internal carotid artery. Status post left carotid endarterectomy.    CTA head W/ IV 4/19: No evidence of a major vessel occlusion, flow-limiting stenosis or aneurysm about the [place] of [place]. No evidence of dural venous sinus thrombosis or an arteriovenous   malformation    CT brain stroke protocol 4/19: Nonspecific area of ischemia involving the pola and left brachium pontis. MRI suggested.    CT head 4/19: No intracranial mass effect, acute hemorrhage, midline shift or acute transcortical infarct is seen. There are periventricular white matter hypodensities that are nonspecific in nature but may reflect chronic ischemic microvascular disease. No extra-axial fluid collection is present. Parenchymal volume is commensurate with patient age. No hydrocephalus. Mastoids clear. Normal calvarium.    Cardiac Testing:  EKG 4/16: Rate 62.  Normal sinus rhythm. Voltage criteria for left ventricular hypertrophy. Abnormal ECG    Prior visit testing  TTE 3/2024: The left ventricle is normal in size, wall thickness, wall motion and contractility. Estimated left ventricular ejection fraction is 60-65 %. The aortic valve is well visualized, appears mildly calcified. Valve opening seems to be normal. The mitral valve leaflets appear calcified. Mild mitral annular calcification is present. Trace mitral regurgitation is present. Normal appearing tricuspid valve structure. Trace to mild tricuspid valve regurgitation is present. Normal appearing pulmonic valve structure. Trace pulmonic valvular regurgitation is present.    Meds:  MEDICATIONS  (STANDING):  aspirin  chewable 81 milliGRAM(s) Oral daily  atorvastatin 80 milliGRAM(s) Oral at bedtime  clopidogrel Tablet 75 milliGRAM(s) Oral daily  doxycycline monohydrate Capsule 100 milliGRAM(s) Oral every 12 hours  enoxaparin Injectable 40 milliGRAM(s) SubCutaneous every 24 hours  hydrochlorothiazide 12.5 milliGRAM(s) Oral daily  levothyroxine 50 MICROGram(s) Oral daily  losartan 100 milliGRAM(s) Oral daily  pantoprazole    Tablet 40 milliGRAM(s) Oral before breakfast    MEDICATIONS  (PRN):  acetaminophen     Tablet .. 650 milliGRAM(s) Oral every 6 hours PRN Mild Pain (1 - 3)  ondansetron Injectable 4 milliGRAM(s) IV Push every 6 hours PRN Nausea and/or Vomiting                  
HOSPITALIST PROGRESS NOTE    Chief Complaint: Left sided facial numbness    4/21 Interval Hx: Patient seen and examined this AM. Facial numbness has not recurred. She asymptomatic at this point aside for skin erythema over the lower shins B/L with associated warmth but no induration, no tenderness. Unclear whether statis skin changes or some mild superimposed cellulitis. Patient reports mild improvement since starting doxycycline yesterday but also could be from leg elevation. No new complaints. Rpt CT head was performed this AM, as patient wished to defer MR imaging. Rpt CT head showed minimal chronic microvascular changes without evidence of an acute infarction or hemorrhage. Patient reports having completed an outpatient temporal artery ultrasound that she states was negative. Discussed with patient the possibility of a temporal artery biopsy with the planned carotid endarterectomy and she wished to think about it further. Also, she wanted to continue on antibiotics for her legs and be monitored into tomorrow.     4/22: Patient seen and examined. Denies any facial numbness/vision changes/headache. Patient feels uncomfortable going home until CEA. She lives alone and afraid she will have a TIA/CVA. Discussed plan with Vascular - Dr. Love plans to do a temporal artery bx on Thursday.     4/23: Patient seen and examined at bedside. Agreeable to Vascular Team's plan for temporal artery bx on Thursday. No complaints. Stable.     4/24: no new complaints; no visual disturbance  TA Bx 4/15    ROS: All 10 systems reviewed and found to be negative with the exception of what has been described above.     VITAL SIGNS:    Vital Signs Last 24 Hrs  T(C): 36.8 (24 Apr 2024 09:00), Max: 36.8 (23 Apr 2024 15:42)  T(F): 98.2 (24 Apr 2024 09:00), Max: 98.2 (23 Apr 2024 15:42)  HR: 58 (24 Apr 2024 09:00) (58 - 62)  BP: 134/51 (24 Apr 2024 09:00) (102/41 - 134/51)  BP(mean): --  RR: 18 (24 Apr 2024 09:00) (17 - 18)  SpO2: 100% (24 Apr 2024 09:00) (97% - 100%)    Parameters below as of 24 Apr 2024 09:00  Patient On (Oxygen Delivery Method): room air        PHYSICAL EXAM:  Gen: Comfortable  HEENT: NCAT PERRL EOMI MMM clear oropharynx  Neck: Supple, no JVD, no LAD  CVS: s1 s2 normal, RRR  Chest: Normal resp effort, lungs CTA B/L  Abd: +BS, soft, non tender, non distended  Ext: B/L LEs with mild edema that spares the feet, erythema to lower shins B/L, warm to touch, non tender, no induration  Skin: Warm, dry, LE skin exam as above  Mood: Calm, pleasant  Neuro: Awake, alert, oriented to month, date, year, hospital, President. No aphasia. No dysarthria. Pupils 3-2mm, symmetric, full VF's, normal EOM, conjugate gaze, no nystagmus, no facial weakness, facial pinprick sensation is symmetric, tongue midline, palate symmetric. Normal bulk and tone, no drift, strength 5/5 , deltoids, biceps, and triceps, strength 5-/5 hip flexors, and knee extensors, sensation intact symmetrically to light touch and pinprick, normal FNF    All Labs/EKG/Radiology/Meds reviewed    MEDICATIONS:  MEDICATIONS  (STANDING):  aspirin  chewable 81 milliGRAM(s) Oral daily  atorvastatin 80 milliGRAM(s) Oral at bedtime  clopidogrel Tablet 75 milliGRAM(s) Oral daily  doxycycline monohydrate Capsule 100 milliGRAM(s) Oral every 12 hours  enoxaparin Injectable 40 milliGRAM(s) SubCutaneous every 24 hours  hydrochlorothiazide 12.5 milliGRAM(s) Oral daily  levothyroxine 50 MICROGram(s) Oral daily  losartan 100 milliGRAM(s) Oral daily  pantoprazole    Tablet 40 milliGRAM(s) Oral before breakfast    MEDICATIONS  (PRN):  acetaminophen     Tablet .. 650 milliGRAM(s) Oral every 6 hours PRN Mild Pain (1 - 3)  aluminum hydroxide/magnesium hydroxide/simethicone Suspension 30 milliLiter(s) Oral every 6 hours PRN Dyspepsia  ondansetron Injectable 4 milliGRAM(s) IV Push every 6 hours PRN Nausea and/or Vomiting        RADIOLOGY & ADDITIONAL TESTS: Reviewed.
Pt. seen and examined at bedside this morning. No complaints at this time.     ROS negative except as above.    Physical Exam:  General: AOx3, Well developed, NAD  HEENT: NC/AT, normal pinnae and tragi  Chest: Normal respiratory effort, equal chest rise  Heart: RRR  Abdomen: Soft, NTND  Neuro/Psych: No localized deficits. Normal speech, normal tone, normal affect  Skin: Normal, warm, no rashes, no lesions noted  Extremities: Warm, well perfused, no edema    Vitals:  T(C): 36.4 (04-19 @ 22:13), Max: 36.6 (04-19 @ 07:52)  HR: 62 (04-19 @ 22:13) (55 - 64)  BP: 146/52 (04-19 @ 22:13) (126/50 - 154/61)  RR: 18 (04-19 @ 22:13) (12 - 18)  SpO2: 98% (04-19 @ 22:13) (98% - 100%)      04-19 @ 07:29                    12.2  CBC: 7.65>)-------(<268                     38.4                 140 | 107 | 23    CMP:  ----------------------< 105               3.8 | 26 | 1.08                      Ca:9.8  Phos:-  Mg:-               0.4|      |31        LFTs:  ------|190|-----             -|      |-        
Pt. seen and examined at bedside this morning. No complaints at this time.   neuro intact, no headache, no dizziness/nausea    ROS negative except as above.    Physical Exam:  General: AOx3, Well developed, NAD  HEENT: NC/AT, normal pinnae and tragi  Chest: Normal respiratory effort, equal chest rise  Heart: RRR  Abdomen: Soft, NTND  Neuro/Psych: No localized deficits. Normal speech, normal tone, normal affect  Skin: Normal, warm, no rashes, no lesions noted  Extremities: Warm, well perfused, no edema    Vital Signs Last 24 Hrs  T(C): 36.4 (21 Apr 2024 23:30), Max: 37.1 (21 Apr 2024 17:07)  T(F): 97.5 (21 Apr 2024 23:30), Max: 98.7 (21 Apr 2024 17:07)  HR: 60 (21 Apr 2024 23:30) (54 - 60)  BP: 103/46 (21 Apr 2024 23:30) (103/46 - 153/57)  BP(mean): 86 (21 Apr 2024 08:37) (86 - 86)  RR: 18 (21 Apr 2024 17:07) (17 - 18)  SpO2: 97% (21 Apr 2024 23:30) (97% - 100%)    Parameters below as of 21 Apr 2024 17:07  Patient On (Oxygen Delivery Method): room air      MEDICATIONS  (STANDING):  aspirin  chewable 81 milliGRAM(s) Oral daily  atorvastatin 80 milliGRAM(s) Oral at bedtime  clopidogrel Tablet 75 milliGRAM(s) Oral daily  doxycycline monohydrate Capsule 100 milliGRAM(s) Oral every 12 hours  enoxaparin Injectable 40 milliGRAM(s) SubCutaneous every 24 hours  hydrochlorothiazide 12.5 milliGRAM(s) Oral daily  levothyroxine 50 MICROGram(s) Oral daily  losartan 100 milliGRAM(s) Oral daily  pantoprazole    Tablet 40 milliGRAM(s) Oral before breakfast    MEDICATIONS  (PRN):  acetaminophen     Tablet .. 650 milliGRAM(s) Oral every 6 hours PRN Mild Pain (1 - 3)  ondansetron Injectable 4 milliGRAM(s) IV Push every 6 hours PRN Nausea and/or Vomiting                          12.2   7.71  )-----------( 271      ( 21 Apr 2024 06:20 )             37.1     04-21    135  |  104  |  27<H>  ----------------------------<  98  4.0   |  28  |  0.98    Ca    9.5      21 Apr 2024 06:20      I&O's Summary    
Pt. seen and examined at bedside this morning. No complaints at this time.   neuro intact, no headache, no dizziness/nausea    ROS negative except as above.    Physical Exam:  General: AOx3, Well developed, NAD  HEENT: NC/AT, normal pinnae and tragi  Chest: Normal respiratory effort, equal chest rise  Heart: RRR  Abdomen: Soft, NTND  Neuro/Psych: No localized deficits. Normal speech, normal tone, normal affect  Skin: Normal, warm, no rashes, no lesions noted  Extremities: Warm, well perfused, no edema    Vital Signs Last 24 Hrs  T(C): 36.4 (23 Apr 2024 05:22), Max: 36.7 (22 Apr 2024 23:00)  T(F): 97.5 (23 Apr 2024 05:22), Max: 98.1 (22 Apr 2024 23:00)  HR: 56 (23 Apr 2024 05:22) (52 - 72)  BP: 131/52 (23 Apr 2024 05:22) (112/43 - 138/54)  BP(mean): --  RR: 17 (23 Apr 2024 05:22) (17 - 18)  SpO2: 100% (23 Apr 2024 05:22) (100% - 100%)    Parameters below as of 23 Apr 2024 05:22  Patient On (Oxygen Delivery Method): room air      MEDICATIONS  (STANDING):  aspirin  chewable 81 milliGRAM(s) Oral daily  atorvastatin 80 milliGRAM(s) Oral at bedtime  clopidogrel Tablet 75 milliGRAM(s) Oral daily  doxycycline monohydrate Capsule 100 milliGRAM(s) Oral every 12 hours  enoxaparin Injectable 40 milliGRAM(s) SubCutaneous every 24 hours  hydrochlorothiazide 12.5 milliGRAM(s) Oral daily  levothyroxine 50 MICROGram(s) Oral daily  losartan 100 milliGRAM(s) Oral daily  pantoprazole    Tablet 40 milliGRAM(s) Oral before breakfast    MEDICATIONS  (PRN):  acetaminophen     Tablet .. 650 milliGRAM(s) Oral every 6 hours PRN Mild Pain (1 - 3)  aluminum hydroxide/magnesium hydroxide/simethicone Suspension 30 milliLiter(s) Oral every 6 hours PRN Dyspepsia  ondansetron Injectable 4 milliGRAM(s) IV Push every 6 hours PRN Nausea and/or Vomiting                          12.2   7.71  )-----------( 271      ( 21 Apr 2024 06:20 )             37.1     04-21    135  |  104  |  27<H>  ----------------------------<  98  4.0   |  28  |  0.98    Ca    9.5      21 Apr 2024 06:20      I&O's Summary    
HOSPITALIST PROGRESS NOTE    Chief Complaint: Left sided facial numbness    4/21 Interval Hx: Patient seen and examined this AM. Facial numbness has not recurred. She asymptomatic at this point aside for skin erythema over the lower shins B/L with associated warmth but no induration, no tenderness. Unclear whether statis skin changes or some mild superimposed cellulitis. Patient reports mild improvement since starting doxycycline yesterday but also could be from leg elevation. No new complaints. Rpt CT head was performed this AM, as patient wished to defer MR imaging. Rpt CT head showed minimal chronic microvascular changes without evidence of an acute infarction or hemorrhage. Patient reports having completed an outpatient temporal artery ultrasound that she states was negative. Discussed with patient the possibility of a temporal artery biopsy with the planned carotid endarterectomy and she wished to think about it further. Also, she wanted to continue on antibiotics for her legs and be monitored into tomorrow.     4/22: Patient seen and examined. Denies any facial numbness/vision changes/headache. Patient feels uncomfortable going home until CEA. She lives alone and afraid she will have a TIA/CVA. Discussed plan with Vascular - Dr. Love plans to do a temporal artery bx on Thursday.     4/23: Patient seen and examined at bedside. Agreeable to Vascular Team's plan for temporal artery bx on Thursday. No complaints. Stable.     4/24: no new complaints; no visual disturbance  TA Bx 4/15    4/25: no complaints  s/p Left TA Bx  BUN elevated; d/c HCTZ; iv fluids; BMP in am  Tele; NSR    ROS: All 10 systems reviewed and found to be negative with the exception of what has been described above.       PHYSICAL EXAM:    Daily     Daily     Vital Signs Last 24 Hrs  T(C): 36.1 (25 Apr 2024 08:52), Max: 37 (25 Apr 2024 05:00)  T(F): 97 (25 Apr 2024 08:52), Max: 98.6 (25 Apr 2024 05:00)  HR: 60 (25 Apr 2024 10:52) (57 - 77)  BP: 124/48 (25 Apr 2024 10:52) (101/51 - 126/55)  BP(mean): --  RR: 16 (25 Apr 2024 08:52) (14 - 18)  SpO2: 99% (25 Apr 2024 08:52) (99% - 100%)    Constitutional: Weak and ill appearing  HEENT: Atraumatic, RAMON,   Respiratory: Breath Sounds normal, no rhonchi/wheeze  Cardiovascular: N S1S2;   Gastrointestinal: Abdomen soft, non tender, Bowel Sounds present  Extremities: No edema, peripheral pulses present  Neurological: AAO x 3, no gross focal motor deficits  Skin: Non cellulitic, no rash, ulcers  Lymph Nodes: No lymphadenopathy noted  Back: No CVA tenderness   Musculoskeletal: non tender  Breasts: Deferred  Genitourinary: deferred  Rectal: Deferred    All Labs/EKG/Radiology/Meds reviewed by me                          11.7   8.71  )-----------( 283      ( 25 Apr 2024 04:41 )             35.5       CBC Full  -  ( 25 Apr 2024 04:41 )  WBC Count : 8.71 K/uL  RBC Count : 3.83 M/uL  Hemoglobin : 11.7 g/dL  Hematocrit : 35.5 %  Platelet Count - Automated : 283 K/uL  Mean Cell Volume : 92.7 fl  Mean Cell Hemoglobin : 30.5 pg  Mean Cell Hemoglobin Concentration : 33.0 gm/dL  Auto Neutrophil # : 5.59 K/uL  Auto Lymphocyte # : 2.29 K/uL  Auto Monocyte # : 0.52 K/uL  Auto Eosinophil # : 0.17 K/uL  Auto Basophil # : 0.05 K/uL  Auto Neutrophil % : 64.1 %  Auto Lymphocyte % : 26.3 %  Auto Monocyte % : 6.0 %  Auto Eosinophil % : 2.0 %  Auto Basophil % : 0.6 %      04-25    136  |  105  |  42<H>  ----------------------------<  105<H>  4.2   |  27  |  1.21    Ca    9.0      25 Apr 2024 04:41  Phos  3.4     04-25  Mg     1.9     04-25            PT/INR - ( 25 Apr 2024 04:41 )   PT: 11.0 sec;   INR: 0.97 ratio         PTT - ( 25 Apr 2024 04:41 )  PTT:30.1 sec          Urinalysis Basic - ( 25 Apr 2024 04:41 )    Color: x / Appearance: x / SG: x / pH: x  Gluc: 105 mg/dL / Ketone: x  / Bili: x / Urobili: x   Blood: x / Protein: x / Nitrite: x   Leuk Esterase: x / RBC: x / WBC x   Sq Epi: x / Non Sq Epi: x / Bacteria: x            MEDICATIONS  (STANDING):  aspirin  chewable 81 milliGRAM(s) Oral daily  atorvastatin 80 milliGRAM(s) Oral at bedtime  clopidogrel Tablet 75 milliGRAM(s) Oral daily  doxycycline monohydrate Capsule 100 milliGRAM(s) Oral every 12 hours  enoxaparin Injectable 40 milliGRAM(s) SubCutaneous every 24 hours  levothyroxine 50 MICROGram(s) Oral daily  losartan 100 milliGRAM(s) Oral daily  pantoprazole    Tablet 40 milliGRAM(s) Oral before breakfast  sodium chloride 0.9%. 1000 milliLiter(s) (50 mL/Hr) IV Continuous <Continuous>    MEDICATIONS  (PRN):  acetaminophen     Tablet .. 650 milliGRAM(s) Oral every 6 hours PRN Mild Pain (1 - 3)  aluminum hydroxide/magnesium hydroxide/simethicone Suspension 30 milliLiter(s) Oral every 6 hours PRN Dyspepsia  ondansetron Injectable 4 milliGRAM(s) IV Push every 6 hours PRN Nausea and/or Vomiting    
Pt. seen and examined at bedside this morning. No complaints at this time.   neuro intact, no headache, repeated inflammatory factors elevated     ROS negative except as above.    Physical Exam:  General: AOx3, Well developed, NAD  HEENT: NC/AT, normal pinnae and tragi  Chest: Normal respiratory effort, equal chest rise  Heart: RRR  Abdomen: Soft, NTND  Neuro/Psych: No localized deficits. Normal speech, normal tone, normal affect  Skin: Normal, warm, no rashes, no lesions noted  Extremities: Warm, well perfused, no edema          Vitals:  T(C): 36.8 (04-20 @ 20:00), Max: 36.8 (04-20 @ 06:23)  HR: 60 (04-20 @ 20:00) (57 - 60)  BP: 127/45 (04-20 @ 20:00) (115/50 - 155/63)  RR: 16 (04-20 @ 20:00) (16 - 18)  SpO2: 99% (04-20 @ 20:00) (99% - 100%)        04-20 @ 08:05                    -  CBC: ->)-------(<-                     -                 139 | 105 | 23    CMP:  ----------------------< 97               4.2 | 29 | 1.06                      Ca:9.7  Phos:-  Mg:-               -|      |-        LFTs:  ------|-|-----             -|      |-            Current Inpatient Medications:  acetaminophen     Tablet .. 650 milliGRAM(s) Oral every 6 hours PRN  aspirin  chewable 81 milliGRAM(s) Oral daily  atorvastatin 80 milliGRAM(s) Oral at bedtime  clopidogrel Tablet 75 milliGRAM(s) Oral daily  doxycycline monohydrate Capsule 100 milliGRAM(s) Oral every 12 hours  enoxaparin Injectable 40 milliGRAM(s) SubCutaneous every 24 hours  hydrochlorothiazide 12.5 milliGRAM(s) Oral daily  levothyroxine 50 MICROGram(s) Oral daily  losartan 100 milliGRAM(s) Oral daily  ondansetron Injectable 4 milliGRAM(s) IV Push every 6 hours PRN  pantoprazole    Tablet 40 milliGRAM(s) Oral before breakfast

## 2024-04-26 NOTE — DISCHARGE NOTE PROVIDER - CARE PROVIDERS DIRECT ADDRESSES
,DirectAddress_Unknown,[email or web address],DirectAddress_Unknown

## 2024-04-26 NOTE — DISCHARGE NOTE PROVIDER - HOSPITAL COURSE
PHYSICAL EXAM:    Vital Signs Last 24 Hrs  T(C): 36.7 (26 Apr 2024 08:15), Max: 36.8 (25 Apr 2024 23:30)  T(F): 98 (26 Apr 2024 08:15), Max: 98.2 (25 Apr 2024 23:30)  HR: 52 (26 Apr 2024 08:15) (52 - 57)  BP: 110/58 (26 Apr 2024 08:15) (105/51 - 110/58)  BP(mean): --  RR: 18 (26 Apr 2024 08:15) (18 - 18)  SpO2: 100% (26 Apr 2024 08:15) (99% - 100%)    Constitutional: Well  appearing  HEENT: Atraumatic, RAMON, Normal, No congestion  Respiratory: Breath Sounds normal, no rhonchi/wheeze  Cardiovascular: N S1S2;   Gastrointestinal: Abdomen soft, non tender, Bowel Sounds present  Extremities: No edema, peripheral pulses present  Neurological: AAO x 3, no gross focal motor deficits  Skin: Non cellulitic, no rash, ulcers  Lymph Nodes: No lymphadenopathy noted  Back: No CVA tenderness   Musculoskeletal: non tender  Breasts: Deferred  Genitourinary: deferred  Rectal: Deferred    70 yo woman with HTN, HLD, B/L carotid stenosis s/p L CEA 3/19/2024 and planned for R CEA 4/25, also hypothyroidism, B/L LE lymphedema, presented with L facial numbness. Symptom started 6AM 4/19, came to ED thereafter and symptom had resolved. NIHSS = 0. CT head w/ no intracranial mass effect, acute hemorrhage, midline shift or acute transcortical infarct is seen. Likely chronic ischemic microvascular disease. No extra-axial fluid collection. Parenchymal volume commensurate with patient age. No hydrocephalus. Mastoids clear. Normal calvarium. CT brain stroke protocol with nonspecific area of ischemia involving the pola and left brachium pontis. MRI suggested but patient declining MRI, planned for interval repeat CT head. CTA head with no evidence of a major vessel occlusion, flow-limiting stenosis or aneurysm about the [place]. No evidence of dural venous sinus thrombosis or an arteriovenous malformation. CTA neck redemonstrated dense atherosclerotic calcification at the right carotid bifurcation with moderate to severe stenosis of the proximal right internal carotid artery. Status post left carotid endarterectomy. Placed on tele and admitted to [facility].     L facial numbness  Appreciate input from Neurology. CT head did not show any acute process. CTA H/N did show bilateral high grade stenosis of the carotids both common and at the bifurcation/bulb. CT brain perfusion with nonspecific area of ischemia involving the pola and left brachium pontis. MRI suggested but patient declining MRI due to claustrophobia. Had repeat CT head today, no interval change, no stroke seen. Of note, patient with similar presentation in March 2024 when she had L facial numbness and ear pain. Had elevated ESR 82 at the time. ESR remains elevated, 74 this admission. See below.   - Continue aspirin, Plavix, high intensity statin    Elevated ESR  Persisting since last admission in March. Other labs then included FRANCHESKA 1:160 speckled. Double strand DNA negative. C3 149 C4 29. Indeterminate atypical ANCA. SSA and SSB negative. Denies any headaches, vision changes, scalp tenderness, jaw claudication, or fevers. She has intact bilateral temporal artery pulses and no scalp tenderness on exam. Concern for GCA remains somewhat low and would not start steroids without doing temporal artery biopsy first, if patient is interested in pursuing. She does report having completed an outpatient temporal artery ultrasound, reportedly normal.   - Per Vascular Sx, s/p Left TA biopsy on 4/25/24    Carotid stenosis, high grade  Planned for CEA on Thursday, can be done this admission or after discharge with return to hospital Thursday.   - Continue asa, plavix, statin  f/u with vascular Sx.     Weak gamma-migrating paraproteinemia  As noted on SPEP 3/2024. M spike unable to quantify. MAO with weak IgG Kappa band. 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 Started on doxycycline 4/20. Unclear whether statis skin changes or some mild superimposed cellulitis. Patient reports mild improvement since starting doxycycline yesterday but also could be from leg elevation. She expressed wishes to remain on antibiotics for her legs and be monitored into tomorrow.   - Continue doxycycline, day 6    Dehydration + elevated BUN:  d/c HCTZ; iv fluids;  BUN still elevated; Cr normal   check BMP in 2-3 days with PCP    d/c home    time spent 45 min

## 2024-04-26 NOTE — DISCHARGE NOTE PROVIDER - NSDCFUSCHEDAPPT_GEN_ALL_CORE_FT
Frederick Tracy  [facility]  [facility] [address]  Scheduled Appointment: 07/12/2024

## 2024-04-26 NOTE — DISCHARGE NOTE PROVIDER - NSDCCPCAREPLAN_GEN_ALL_CORE_FT
PRINCIPAL DISCHARGE DIAGNOSIS  Diagnosis: Facial numbness  Assessment and Plan of Treatment: s/p left temporal artery biopsy  f/u with vascular surgery      SECONDARY DISCHARGE DIAGNOSES  Diagnosis: Carotid stenosis  Assessment and Plan of Treatment: f/u with vascular surgery for carotid endarterectomy    Diagnosis: Elevated BUN  Assessment and Plan of Treatment: stopped HCTZ  check BMP in 2-3 days with your PCP

## 2024-04-26 NOTE — PROGRESS NOTE ADULT - ASSESSMENT
68 yo F presents with possible acute ischemic stroke.  neuro exam intact  elevated ESR  s/p left temporal artery biopsy    P:  c/w ASA, Plavix, Statin  f/u Neurology reccs  f/u temporal artery bx  rest of plan as per primary team    Plan discussed with Dr. Love

## 2024-04-26 NOTE — PROGRESS NOTE ADULT - PROVIDER SPECIALTY LIST ADULT
Neurology
Vascular Surgery
Hospitalist
Hospitalist
Neurology
Vascular Surgery
Hospitalist

## 2024-05-01 ENCOUNTER — APPOINTMENT (OUTPATIENT)
Dept: DERMATOLOGY | Facility: CLINIC | Age: 69
End: 2024-05-01
Payer: MEDICARE

## 2024-05-01 DIAGNOSIS — R21 RASH AND OTHER NONSPECIFIC SKIN ERUPTION: ICD-10-CM

## 2024-05-01 PROCEDURE — 99214 OFFICE O/P EST MOD 30 MIN: CPT

## 2024-05-01 RX ORDER — VALACYCLOVIR 1 G/1
1 TABLET, FILM COATED ORAL
Qty: 21 | Refills: 0 | Status: ACTIVE | COMMUNITY
Start: 2024-05-01 | End: 1900-01-01

## 2024-05-01 NOTE — PHYSICAL EXAM
[FreeTextEntry3] : + Grouped vesicles on an erythematous base in a dermatomal distribution;  single patch: Left lateral back

## 2024-05-01 NOTE — ASSESSMENT
[FreeTextEntry1] : Probable zoster;  single patch   Therapeutic options and their risks and benefits; along with multiple diagnostic possibilities were discussed at length; risks and benefits of further study were discussed;  viral PCR sent Valacyclovir 1 G TID x 1 week  call 2d for results

## 2024-05-01 NOTE — HISTORY OF PRESENT ILLNESS
[de-identified] : The patient has been fit in for urgent appointment.  Referred for eval of rash on back, noted 5d ago during recent hospitalization for stroke w/u itchy, painful

## 2024-05-02 ENCOUNTER — APPOINTMENT (OUTPATIENT)
Dept: VASCULAR SURGERY | Facility: CLINIC | Age: 69
End: 2024-05-02

## 2024-05-02 VITALS
OXYGEN SATURATION: 100 % | HEIGHT: 66 IN | WEIGHT: 250 LBS | HEART RATE: 57 BPM | BODY MASS INDEX: 40.18 KG/M2 | SYSTOLIC BLOOD PRESSURE: 173 MMHG | DIASTOLIC BLOOD PRESSURE: 79 MMHG

## 2024-05-02 PROCEDURE — 99214 OFFICE O/P EST MOD 30 MIN: CPT | Mod: 24

## 2024-05-03 LAB
HSV+VZV DNA SPEC QL NAA+PROBE: ABNORMAL
SPECIMEN SOURCE: NORMAL

## 2024-05-08 ENCOUNTER — APPOINTMENT (OUTPATIENT)
Dept: SURGERY | Facility: CLINIC | Age: 69
End: 2024-05-08
Payer: MEDICARE

## 2024-05-08 VITALS — SYSTOLIC BLOOD PRESSURE: 175 MMHG | DIASTOLIC BLOOD PRESSURE: 80 MMHG

## 2024-05-08 VITALS — RESPIRATION RATE: 22 BRPM | HEART RATE: 62 BPM | OXYGEN SATURATION: 98 %

## 2024-05-08 PROCEDURE — 99213 OFFICE O/P EST LOW 20 MIN: CPT

## 2024-05-08 RX ORDER — FUROSEMIDE 20 MG/1
20 TABLET ORAL
Refills: 0 | Status: ACTIVE | COMMUNITY

## 2024-05-08 NOTE — PHYSICAL EXAM
[Normal Breath Sounds] : Normal breath sounds [Normal Heart Sounds] : normal heart sounds [2+] : left 2+ [Calm] : calm [FreeTextEntry1] : mild edema [de-identified] : fungal rash under L breast [de-identified] : soft [de-identified] : shingles L back

## 2024-05-08 NOTE — PLAN
[FreeTextEntry1] : May double up on Lasix for 2 days. F/U as needed. Lotrimin to rash. On Valcyclovir. total time > 22 minutes

## 2024-05-10 ENCOUNTER — APPOINTMENT (OUTPATIENT)
Dept: SURGERY | Facility: CLINIC | Age: 69
End: 2024-05-10
Payer: MEDICARE

## 2024-05-10 PROCEDURE — 99212 OFFICE O/P EST SF 10 MIN: CPT

## 2024-05-10 RX ORDER — CEPHALEXIN 500 MG/1
500 CAPSULE ORAL
Qty: 21 | Refills: 0 | Status: ACTIVE | COMMUNITY
Start: 2024-05-10 | End: 1900-01-01

## 2024-05-10 NOTE — PHYSICAL EXAM
[JVD] : no jugular venous distention  [Normal Breath Sounds] : Normal breath sounds [Normal Heart Sounds] : normal heart sounds [Alert] : alert [Oriented to Person] : oriented to person [Oriented to Place] : oriented to place [Oriented to Time] : oriented to time [Calm] : calm [de-identified] : obese [de-identified] : mild edema and cellulitis - bilat

## 2024-05-16 ENCOUNTER — APPOINTMENT (OUTPATIENT)
Dept: SURGERY | Facility: CLINIC | Age: 69
End: 2024-05-16
Payer: MEDICARE

## 2024-05-16 DIAGNOSIS — L03.90 CELLULITIS, UNSPECIFIED: ICD-10-CM

## 2024-05-16 PROCEDURE — 99212 OFFICE O/P EST SF 10 MIN: CPT

## 2024-05-16 NOTE — PLAN
[FreeTextEntry1] : Better on Keflex. F/U as needed. Dr Buchanan to follow up regarding carotid disease. Total time > 12 minutes

## 2024-05-16 NOTE — PHYSICAL EXAM
[JVD] : no jugular venous distention  [Normal Breath Sounds] : Normal breath sounds [Normal Heart Sounds] : normal heart sounds [Calm] : calm [de-identified] : soft [de-identified] : mild cellulitis - improved

## 2024-05-21 ENCOUNTER — APPOINTMENT (OUTPATIENT)
Dept: VASCULAR SURGERY | Facility: CLINIC | Age: 69
End: 2024-05-21
Payer: MEDICARE

## 2024-05-21 VITALS — DIASTOLIC BLOOD PRESSURE: 75 MMHG | SYSTOLIC BLOOD PRESSURE: 133 MMHG | OXYGEN SATURATION: 97 % | HEART RATE: 70 BPM

## 2024-05-21 PROCEDURE — 99205 OFFICE O/P NEW HI 60 MIN: CPT

## 2024-05-22 ENCOUNTER — APPOINTMENT (OUTPATIENT)
Dept: VASCULAR SURGERY | Facility: CLINIC | Age: 69
End: 2024-05-22
Payer: MEDICARE

## 2024-05-22 VITALS
HEART RATE: 52 BPM | DIASTOLIC BLOOD PRESSURE: 83 MMHG | SYSTOLIC BLOOD PRESSURE: 165 MMHG | RESPIRATION RATE: 18 BRPM | OXYGEN SATURATION: 99 %

## 2024-05-22 PROCEDURE — 99214 OFFICE O/P EST MOD 30 MIN: CPT

## 2024-05-24 NOTE — ASSESSMENT
[FreeTextEntry1] : 69-year-old female with known bilateral carotid artery stenosis right worse than left pending MRI to assess for any previous strokes after that we will plan for an endarterectomy already had discussed the risk benefits and alternatives with the patient. For right lower extremity skin tear recommend dressing with Xeroform 4 x 4 and foam dressing we will follow-up in 1 week to check on wound For venous stasis continue with compression stockings and lymphedema pumps

## 2024-05-24 NOTE — PHYSICAL EXAM
[2+] : left 2+ [de-identified] : well appearing ambulatory [de-identified] : intact crnial nerves [de-identified] : timol [FreeTextEntry1] : RLE: lateral knee small (1.5cm) skin tear to dermis cleaned and dressed

## 2024-05-24 NOTE — HISTORY OF PRESENT ILLNESS
[FreeTextEntry1] : 68 yr F with bilateral carotid stenosis, hld, obesity, prior patient of Dr. Vo's was being followed for cellulitis and bilateral carotid stenosis. She is s/p left carotid endarterectomy in 2017.   Interval History: Today patient presents for possible cellulitis of LLE and for follow up of right carotid stenosis. She states podiatry put her on an antibiotic for cellulitis which is bothering her stomach. Most recent carotid doppler 2/26/24 demonstrated 70-99% right sided stenosis calcified plaque and 50-69% re-stenosis of left sided endarterectomy. She denies any neurological symptoms. She is compliant with antiplatelet and statin. She has never had symptoms of TIA,CVA, amaurosis. Regarding LLE it's improving regards to swelling and pain.   [de-identified] : Ms. Castillo is here for follow-up for both asymptomatic versus possibly symptomatic carotid artery stenosis along with a recently developed skin tear from traumatic injury 1 day prior.  She has been seen several times for bilateral carotid stenosis right worse than left with intermittent episodes of left-sided face numbness with left-sided eye symptoms including vision changes and a left-sided headache.  She previously had a rule out for GCA with a negative temporal artery biopsy patient pathology presentation was discussed and reviewed at carotid conference with the universal decision of continuing with an MRI to find if there was any symptomatic disease in bilateral carotids if no symptomatic disease then I will treat the high-grade stenosis on the right side.  I discussed this with the patient extensively on the phone and she is now agreeable to an open MRI.  Since last visit she has no new neurologic symptoms.  She fell yesterday and hit her right lower extremity on a wheelchair with a small skin tear no redness no drainage no pain at the site.

## 2024-05-28 RX ORDER — FLUTICASONE PROPIONATE 0.05 MG/G
0.01 OINTMENT TOPICAL
Qty: 1 | Refills: 3 | Status: ACTIVE | COMMUNITY
Start: 2021-06-08 | End: 1900-01-01

## 2024-06-05 ENCOUNTER — APPOINTMENT (OUTPATIENT)
Dept: VASCULAR SURGERY | Facility: CLINIC | Age: 69
End: 2024-06-05
Payer: MEDICARE

## 2024-06-05 ENCOUNTER — APPOINTMENT (OUTPATIENT)
Dept: DERMATOLOGY | Facility: CLINIC | Age: 69
End: 2024-06-05
Payer: MEDICARE

## 2024-06-05 VITALS
HEART RATE: 63 BPM | DIASTOLIC BLOOD PRESSURE: 70 MMHG | SYSTOLIC BLOOD PRESSURE: 185 MMHG | RESPIRATION RATE: 18 BRPM | OXYGEN SATURATION: 98 %

## 2024-06-05 DIAGNOSIS — L30.4 ERYTHEMA INTERTRIGO: ICD-10-CM

## 2024-06-05 DIAGNOSIS — S81.801A UNSPECIFIED OPEN WOUND, RIGHT LOWER LEG, INITIAL ENCOUNTER: ICD-10-CM

## 2024-06-05 DIAGNOSIS — I65.21 OCCLUSION AND STENOSIS OF RIGHT CAROTID ARTERY: ICD-10-CM

## 2024-06-05 PROCEDURE — 99213 OFFICE O/P EST LOW 20 MIN: CPT

## 2024-06-05 PROCEDURE — 99214 OFFICE O/P EST MOD 30 MIN: CPT

## 2024-06-05 NOTE — PHYSICAL EXAM
[Ankle Swelling (On Exam)] : present [Ankle Swelling Bilaterally] : bilaterally  [Ankle Swelling On The Right] : mild [de-identified] : well appearing [de-identified] : wnl [FreeTextEntry1] : RLE wound healing well

## 2024-06-05 NOTE — HISTORY OF PRESENT ILLNESS
[FreeTextEntry1] : 68 yr F with bilateral carotid stenosis, hld, obesity, prior patient of Dr. Vo's was being followed for cellulitis and bilateral carotid stenosis. She is s/p left carotid endarterectomy in 2017.  Interval History: Today patient presents for possible cellulitis of LLE and for follow up of right carotid stenosis. She states podiatry put her on an antibiotic for cellulitis which is bothering her stomach. Most recent carotid doppler 2/26/24 demonstrated 70-99% right sided stenosis calcified plaque and 50-69% re-stenosis of left sided endarterectomy. She denies any neurological symptoms. She is compliant with antiplatelet and statin. She has never had symptoms of TIA,CVA, amaurosis. Regarding LLE it's improving regards to swelling and pain.   Interval History: Ms. Castillo is here for follow-up for both asymptomatic versus possibly symptomatic carotid artery stenosis along with a recently developed skin tear from traumatic injury 1 day prior. She has been seen several times for bilateral carotid stenosis right worse than left with intermittent episodes of left-sided face numbness with left-sided eye symptoms including vision changes and a left-sided headache. She previously had a rule out for GCA with a negative temporal artery biopsy patient pathology presentation was discussed and reviewed at carotid conference with the universal decision of continuing with an MRI to find if there was any symptomatic disease in bilateral carotids if no symptomatic disease then I will treat the high-grade stenosis on the right side. I discussed this with the patient extensively on the phone and she is now agreeable to an open MRI. Since last visit she has no new neurologic symptoms [de-identified] : Here for follow-up of right knee traumatic injury doing well.  Denies any swelling pain redness around the injury site denies any fevers or chills secondary to the injury.  She does state that she had a recent episode of COVID and had minimal symptoms just a single fever and fatigue.  She otherwise has no new complaints today.  She has not had her MRI of the brain and is pending for next week.

## 2024-06-05 NOTE — ASSESSMENT
[FreeTextEntry1] : Intertrigo/ICD;   Therapeutic options and their risks and benefits; along with multiple diagnostic possibilities were discussed at length; risks and benefits of further study were discussed;  discussed at length-  overusing topical steroid, now with atrophic changes d/c fluticasone vaseline only;   watch for exacerbations after upcoming carotid procedure;

## 2024-06-05 NOTE — ASSESSMENT
[FreeTextEntry1] : 70yo female multiple issues here for a wound check on her right leg also has worsened swelling in b/l feet but not compliant with compression and not  compliant with lymphedema pump #Caortid stenosis will obtain MRI to assess for recent CVA and assess which carotid is more pertinent to treat; continue antiplatelet  #right leg wound continue q48hr dressing changes and home wound care  #lymphedema again encourgaed pump use leg elevation and comprerssion stockings

## 2024-06-05 NOTE — HISTORY OF PRESENT ILLNESS
[de-identified] : The patient has been fit in for urgent appointment.  c/o rash under left breast;  has had in past; using fluticasone daily;  for over a month;

## 2024-06-06 NOTE — DISCHARGE NOTE PROVIDER - NSDCQMSTAIRS_GEN_ALL_CORE
CHW attempted to reach out to [facility], to help find community resources as needed.     Plan: CHW will  reach out to  [facility] next week  
No

## 2024-06-14 ENCOUNTER — APPOINTMENT (OUTPATIENT)
Dept: SURGERY | Facility: CLINIC | Age: 69
End: 2024-06-14
Payer: MEDICARE

## 2024-06-14 DIAGNOSIS — R60.9 EDEMA, UNSPECIFIED: ICD-10-CM

## 2024-06-14 PROCEDURE — 99212 OFFICE O/P EST SF 10 MIN: CPT

## 2024-06-14 NOTE — PHYSICAL EXAM
[JVD] : no jugular venous distention  [Normal Breath Sounds] : Normal breath sounds [Normal Heart Sounds] : normal heart sounds [Calm] : calm [de-identified] : obese [de-identified] : legs are baseling.

## 2024-06-14 NOTE — HISTORY OF PRESENT ILLNESS
[de-identified] : 68 yo female with color change on LE 2 days ago. Now resolved. No fever. Mild swelling.

## 2024-06-14 NOTE — PLAN
[FreeTextEntry1] : Observation. Consider po abc if changes recur. Pt accepts. Total time > 12 minutes

## 2024-06-19 ENCOUNTER — APPOINTMENT (OUTPATIENT)
Dept: VASCULAR SURGERY | Facility: CLINIC | Age: 69
End: 2024-06-19
Payer: MEDICARE

## 2024-06-19 VITALS
WEIGHT: 260 LBS | BODY MASS INDEX: 41.78 KG/M2 | HEART RATE: 57 BPM | OXYGEN SATURATION: 97 % | DIASTOLIC BLOOD PRESSURE: 82 MMHG | SYSTOLIC BLOOD PRESSURE: 140 MMHG | HEIGHT: 66 IN

## 2024-06-19 DIAGNOSIS — I87.2 VENOUS INSUFFICIENCY (CHRONIC) (PERIPHERAL): ICD-10-CM

## 2024-06-19 DIAGNOSIS — I65.23 OCCLUSION AND STENOSIS OF BILATERAL CAROTID ARTERIES: ICD-10-CM

## 2024-06-19 DIAGNOSIS — I73.9 PERIPHERAL VASCULAR DISEASE, UNSPECIFIED: ICD-10-CM

## 2024-06-19 PROCEDURE — 99214 OFFICE O/P EST MOD 30 MIN: CPT

## 2024-06-19 NOTE — PHYSICAL EXAM
[JVD] : no jugular venous distention  [Normal Breath Sounds] : Normal breath sounds [Normal Rate and Rhythm] : normal rate and rhythm [de-identified] : well appearing [de-identified] : wnl cranial nerves grossly intact [FreeTextEntry1] : baseline b/l leg swelling, traumatic wound improving

## 2024-06-19 NOTE — HISTORY OF PRESENT ILLNESS
[FreeTextEntry1] : 68 yr F with bilateral carotid stenosis, hld, obesity, prior patient of Dr. Vo's was being followed for cellulitis and bilateral carotid stenosis. She is s/p left carotid endarterectomy in 2017.  Interval History: Today patient presents for possible cellulitis of LLE and for follow up of right carotid stenosis. She states podiatry put her on an antibiotic for cellulitis which is bothering her stomach. Most recent carotid doppler 2/26/24 demonstrated 70-99% right sided stenosis calcified plaque and 50-69% re-stenosis of left sided endarterectomy. She denies any neurological symptoms. She is compliant with antiplatelet and statin. She has never had symptoms of TIA,CVA, amaurosis. Regarding LLE it's improving regards to swelling and pain.  Interval History: Ms. Castillo is here for follow-up for both asymptomatic versus possibly symptomatic carotid artery stenosis along with a recently developed skin tear from traumatic injury 1 day prior. She has been seen several times for bilateral carotid stenosis right worse than left with intermittent episodes of left-sided face numbness with left-sided eye symptoms including vision changes and a left-sided headache. She previously had a rule out for GCA with a negative temporal artery biopsy patient pathology presentation was discussed and reviewed at carotid conference with the universal decision of continuing with an MRI to find if there was any symptomatic disease in bilateral carotids if no symptomatic disease then I will treat the high-grade stenosis on the right side. I discussed this with the patient extensively on the phone and she is now agreeable to an open MRI. Since last visit she has no new neurologic symptoms   Interval History: Here for follow-up of right knee traumatic injury doing well. Denies any swelling pain redness around the injury site denies any fevers or chills secondary to the injury. She does state that she had a recent episode of COVID and had minimal symptoms just a single fever and fatigue. She otherwise has no new complaints today. She has not had her MRI of the brain and is pending for next week. [de-identified] : Here for follow up of carotid stenosis and leg swelling. State legs are more swollen but she is not wearing her compression stockings, not using her lymphedema pump (she states it was lost in a recent move) and she is not really elevating her legs.  Regarding the carotid disease she has had no new episodes of facial numbness and no TIA, CVA, vision changes and slurred speech. She was unable to get the MRI due to body habitus.  Of note she has not been compliant with her medical management of carotid stenosis note taking ASA 81mg but does take statin regularly

## 2024-06-19 NOTE — ASSESSMENT
[FreeTextEntry1] : 68yo female multiple issues here for follow up of multiple vascular issues #Caortid stenosis no new symptoms had long discussion with patient that given severity of right ICA stenosis she is a candidate for CEA however she is very anxious and would like to wait; given its asymptomatic disease at this point can repeat duplex in 6 months patient is not compliant with ASA and was educated about BMT for carotid disease especially if we are holding on surgery  #right leg wound continue q48hr dressing changes and home wound care  #lymphedema again encourgaed pump use leg elevation and comprerssion stockings; will reach out to [employer] lymphedema pumps given her pump is lost

## 2024-06-20 ENCOUNTER — EMERGENCY (EMERGENCY)
Facility: HOSPITAL | Age: 69
LOS: 0 days | Discharge: HOME | End: 2024-06-20
Attending: STUDENT IN AN ORGANIZED HEALTH CARE EDUCATION/TRAINING PROGRAM
Payer: MEDICARE

## 2024-06-20 VITALS
OXYGEN SATURATION: 96 % | TEMPERATURE: 97 F | HEART RATE: 70 BPM | SYSTOLIC BLOOD PRESSURE: 148 MMHG | RESPIRATION RATE: 18 BRPM | DIASTOLIC BLOOD PRESSURE: 59 MMHG

## 2024-06-20 VITALS
RESPIRATION RATE: 20 BRPM | HEART RATE: 71 BPM | SYSTOLIC BLOOD PRESSURE: 186 MMHG | DIASTOLIC BLOOD PRESSURE: 59 MMHG | OXYGEN SATURATION: 96 %

## 2024-06-20 DIAGNOSIS — Z79.02 LONG TERM (CURRENT) USE OF ANTITHROMBOTICS/ANTIPLATELETS: ICD-10-CM

## 2024-06-20 DIAGNOSIS — Z90.49 ACQUIRED ABSENCE OF OTHER SPECIFIED PARTS OF DIGESTIVE TRACT: Chronic | ICD-10-CM

## 2024-06-20 DIAGNOSIS — Z98.890 OTHER SPECIFIED POSTPROCEDURAL STATES: Chronic | ICD-10-CM

## 2024-06-20 DIAGNOSIS — H53.8 OTHER VISUAL DISTURBANCES: ICD-10-CM

## 2024-06-20 DIAGNOSIS — E78.5 HYPERLIPIDEMIA, UNSPECIFIED: ICD-10-CM

## 2024-06-20 DIAGNOSIS — R29.700 NIHSS SCORE 0: ICD-10-CM

## 2024-06-20 DIAGNOSIS — Z79.82 LONG TERM (CURRENT) USE OF ASPIRIN: ICD-10-CM

## 2024-06-20 DIAGNOSIS — S62.109A FRACTURE OF UNSPECIFIED CARPAL BONE, UNSPECIFIED WRIST, INITIAL ENCOUNTER FOR CLOSED FRACTURE: Chronic | ICD-10-CM

## 2024-06-20 DIAGNOSIS — I10 ESSENTIAL (PRIMARY) HYPERTENSION: ICD-10-CM

## 2024-06-20 LAB
ALBUMIN SERPL ELPH-MCNC: 3.4 G/DL — SIGNIFICANT CHANGE UP (ref 3.3–5)
ALP SERPL-CCNC: 194 U/L — HIGH (ref 40–120)
ALT FLD-CCNC: 25 U/L — SIGNIFICANT CHANGE UP (ref 12–78)
ANION GAP SERPL CALC-SCNC: 6 MMOL/L — SIGNIFICANT CHANGE UP (ref 5–17)
APPEARANCE UR: CLEAR — SIGNIFICANT CHANGE UP
APTT BLD: 32.3 SEC — SIGNIFICANT CHANGE UP (ref 24.5–35.6)
AST SERPL-CCNC: 23 U/L — SIGNIFICANT CHANGE UP (ref 15–37)
BASOPHILS # BLD AUTO: 0.06 K/UL — SIGNIFICANT CHANGE UP (ref 0–0.2)
BASOPHILS NFR BLD AUTO: 0.5 % — SIGNIFICANT CHANGE UP (ref 0–2)
BILIRUB SERPL-MCNC: 0.7 MG/DL — SIGNIFICANT CHANGE UP (ref 0.2–1.2)
BILIRUB UR-MCNC: NEGATIVE — SIGNIFICANT CHANGE UP
BUN SERPL-MCNC: 23 MG/DL — SIGNIFICANT CHANGE UP (ref 7–23)
CALCIUM SERPL-MCNC: 9.9 MG/DL — SIGNIFICANT CHANGE UP (ref 8.5–10.1)
CHLORIDE SERPL-SCNC: 106 MMOL/L — SIGNIFICANT CHANGE UP (ref 96–108)
CO2 SERPL-SCNC: 27 MMOL/L — SIGNIFICANT CHANGE UP (ref 22–31)
COLOR SPEC: YELLOW — SIGNIFICANT CHANGE UP
CREAT SERPL-MCNC: 0.92 MG/DL — SIGNIFICANT CHANGE UP (ref 0.5–1.3)
DIFF PNL FLD: NEGATIVE — SIGNIFICANT CHANGE UP
EGFR: 68 ML/MIN/1.73M2 — SIGNIFICANT CHANGE UP
EOSINOPHIL # BLD AUTO: 0.18 K/UL — SIGNIFICANT CHANGE UP (ref 0–0.5)
EOSINOPHIL NFR BLD AUTO: 1.6 % — SIGNIFICANT CHANGE UP (ref 0–6)
GLUCOSE SERPL-MCNC: 103 MG/DL — HIGH (ref 70–99)
GLUCOSE UR QL: NEGATIVE MG/DL — SIGNIFICANT CHANGE UP
HCT VFR BLD CALC: 40.6 % — SIGNIFICANT CHANGE UP (ref 34.5–45)
HGB BLD-MCNC: 13.3 G/DL — SIGNIFICANT CHANGE UP (ref 11.5–15.5)
IMM GRANULOCYTES NFR BLD AUTO: 0.5 % — SIGNIFICANT CHANGE UP (ref 0–0.9)
INR BLD: 0.98 RATIO — SIGNIFICANT CHANGE UP (ref 0.85–1.18)
KETONES UR-MCNC: NEGATIVE MG/DL — SIGNIFICANT CHANGE UP
LEUKOCYTE ESTERASE UR-ACNC: NEGATIVE — SIGNIFICANT CHANGE UP
LYMPHOCYTES # BLD AUTO: 2.65 K/UL — SIGNIFICANT CHANGE UP (ref 1–3.3)
LYMPHOCYTES # BLD AUTO: 23.4 % — SIGNIFICANT CHANGE UP (ref 13–44)
MCHC RBC-ENTMCNC: 31 PG — SIGNIFICANT CHANGE UP (ref 27–34)
MCHC RBC-ENTMCNC: 32.8 GM/DL — SIGNIFICANT CHANGE UP (ref 32–36)
MCV RBC AUTO: 94.6 FL — SIGNIFICANT CHANGE UP (ref 80–100)
MONOCYTES # BLD AUTO: 0.56 K/UL — SIGNIFICANT CHANGE UP (ref 0–0.9)
MONOCYTES NFR BLD AUTO: 4.9 % — SIGNIFICANT CHANGE UP (ref 2–14)
NEUTROPHILS # BLD AUTO: 7.81 K/UL — HIGH (ref 1.8–7.4)
NEUTROPHILS NFR BLD AUTO: 69.1 % — SIGNIFICANT CHANGE UP (ref 43–77)
NITRITE UR-MCNC: NEGATIVE — SIGNIFICANT CHANGE UP
PH UR: 7 — SIGNIFICANT CHANGE UP (ref 5–8)
PLATELET # BLD AUTO: 338 K/UL — SIGNIFICANT CHANGE UP (ref 150–400)
POTASSIUM SERPL-MCNC: 3.9 MMOL/L — SIGNIFICANT CHANGE UP (ref 3.5–5.3)
POTASSIUM SERPL-SCNC: 3.9 MMOL/L — SIGNIFICANT CHANGE UP (ref 3.5–5.3)
PROT SERPL-MCNC: 7.8 GM/DL — SIGNIFICANT CHANGE UP (ref 6–8.3)
PROT UR-MCNC: NEGATIVE MG/DL — SIGNIFICANT CHANGE UP
PROTHROM AB SERPL-ACNC: 11.1 SEC — SIGNIFICANT CHANGE UP (ref 9.5–13)
RBC # BLD: 4.29 M/UL — SIGNIFICANT CHANGE UP (ref 3.8–5.2)
RBC # FLD: 15 % — HIGH (ref 10.3–14.5)
SODIUM SERPL-SCNC: 139 MMOL/L — SIGNIFICANT CHANGE UP (ref 135–145)
SP GR SPEC: 1.02 — SIGNIFICANT CHANGE UP (ref 1–1.03)
TROPONIN I, HIGH SENSITIVITY RESULT: 7.83 NG/L — SIGNIFICANT CHANGE UP
UROBILINOGEN FLD QL: 0.2 MG/DL — SIGNIFICANT CHANGE UP (ref 0.2–1)
WBC # BLD: 11.32 K/UL — HIGH (ref 3.8–10.5)
WBC # FLD AUTO: 11.32 K/UL — HIGH (ref 3.8–10.5)

## 2024-06-20 PROCEDURE — 36415 COLL VENOUS BLD VENIPUNCTURE: CPT

## 2024-06-20 PROCEDURE — 99284 EMERGENCY DEPT VISIT MOD MDM: CPT

## 2024-06-20 PROCEDURE — 70498 CT ANGIOGRAPHY NECK: CPT | Mod: 26,MC

## 2024-06-20 PROCEDURE — 85730 THROMBOPLASTIN TIME PARTIAL: CPT

## 2024-06-20 PROCEDURE — 70450 CT HEAD/BRAIN W/O DYE: CPT | Mod: MC,XU

## 2024-06-20 PROCEDURE — 85610 PROTHROMBIN TIME: CPT

## 2024-06-20 PROCEDURE — 85025 COMPLETE CBC W/AUTO DIFF WBC: CPT

## 2024-06-20 PROCEDURE — 70498 CT ANGIOGRAPHY NECK: CPT | Mod: MC

## 2024-06-20 PROCEDURE — 99285 EMERGENCY DEPT VISIT HI MDM: CPT | Mod: 25

## 2024-06-20 PROCEDURE — 36000 PLACE NEEDLE IN VEIN: CPT | Mod: XU

## 2024-06-20 PROCEDURE — 0042T: CPT | Mod: MC

## 2024-06-20 PROCEDURE — 80053 COMPREHEN METABOLIC PANEL: CPT

## 2024-06-20 PROCEDURE — 70450 CT HEAD/BRAIN W/O DYE: CPT | Mod: 26,59,MC

## 2024-06-20 PROCEDURE — 0042T: CPT

## 2024-06-20 PROCEDURE — 99285 EMERGENCY DEPT VISIT HI MDM: CPT

## 2024-06-20 PROCEDURE — 84484 ASSAY OF TROPONIN QUANT: CPT

## 2024-06-20 PROCEDURE — 70496 CT ANGIOGRAPHY HEAD: CPT | Mod: 26,MC

## 2024-06-20 PROCEDURE — 93005 ELECTROCARDIOGRAM TRACING: CPT

## 2024-06-20 PROCEDURE — 81003 URINALYSIS AUTO W/O SCOPE: CPT

## 2024-06-20 PROCEDURE — 93010 ELECTROCARDIOGRAM REPORT: CPT

## 2024-06-20 PROCEDURE — 82962 GLUCOSE BLOOD TEST: CPT

## 2024-06-20 PROCEDURE — 70496 CT ANGIOGRAPHY HEAD: CPT | Mod: MC

## 2024-06-20 NOTE — ED ADULT TRIAGE NOTE - CHIEF COMPLAINT QUOTE
pt presents to the ED for blurred vision starting 2 hours ago. no neuro hx. pt states "I took my pills and then got blurred vision." denies headache. MD NG CAME TO EVALUATE - CODE STROKE. pt A&Ox4, well appearing, and ambulatory at baseline with no further complaints or discomforts reported at this time.

## 2024-06-20 NOTE — ED ADULT NURSE NOTE - NSFALLRISK_ED_ALL_ED

## 2024-06-20 NOTE — ED PROVIDER NOTE - PATIENT PORTAL LINK FT
You can access the FollowMyHealth Patient Portal offered by [facility] by registering at the following website: [email or web address]. By joining [facility]’s FollowMyHealth portal, you will also be able to view your health information using other applications (apps) compatible with our system.

## 2024-06-20 NOTE — STROKE CODE NOTE - NSMDCONSULT QTN_Y FT
Neurology Consult requested by:   Patient is a 69y old  Female who presents with a chief complaint of    HPI:  68 y/o woman with PMHx of HTN, HLD on aspirin and plavix presents to the ED with acute onset bilateral blurry vision at 13:30, no HA, no numbness no tingling no weakness, wears glasses at baseline. Improving, c/o floaters.  Was in HH this past April, presented with transient L facial numbness. W/u negative for CVA. CTA neck redemonstrated dense atherosclerotic calcification at the right carotid bifurcation with moderate to severe stenosis of the proximal right internal carotid artery. Hx of left carotid endarterectomy. Also underwent temporal artery biopsy on the left for possible GCA, bx results are negative for arteritis.     PAST MEDICAL & SURGICAL HISTORY:  Carotid stenosis, left      Deviated septum      Essential hypertension      Hyperlipidemia, unspecified hyperlipidemia type      Heart murmur      Mitral valve insufficiency, unspecified etiology      Peripheral edema  bilateral      Hiatal hernia with GERD      Irritable bowel syndrome with both constipation and diarrhea      Gall stones  gall bladder removed      Fatty liver disease, nonalcoholic      Urinary tract infection with hematuria, site unspecified  frequent.  Presently on antibiotics      Joint pain of lower limb  bilateral      Hypothyroidism, unspecified type      Morbid obesity due to excess calories      H/O carotid stenosis      Anxiety      Fracture dislocation of wrist joint      S/P cholecystectomy  1991       delivery delivered  1989      H/O arthroscopy of shoulder  Left. "Many years ago"      H/O colonoscopy          FAMILY HISTORY:  Family history of hepatic cirrhosis (Mother)  mother    Family history of heart disease (Mother)  mother    Family history of diabetes mellitus (Mother)  mother      Social Hx:  Nonsmoker, no drug or alcohol use  Medications and Allergies ReviewedMEDICATIONS  (STANDING):     ROS: Pertinent positives in HPI, all other ROS were reviewed and are negative.      Examination:   Vital Signs Last 24 Hrs  HR: 71 (2024 15:42) (71 - 71)  BP: 186/59 (2024 15:42) (186/59 - 186/59)  BP(mean): 94 (2024 15:42) (94 - 94)  RR: 20 (2024 15:42) (20 - 20)  SpO2: 96% (2024 15:42) (96% - 96%)    Parameters below as of 2024 15:42  Patient On (Oxygen Delivery Method): room air          General: Cooperative, NAD   NECK: supple, no masses  ENT: Normal hearing   Vascular : no carotid bruits,   Lungs: CTAB  Chest: RRR, no murmurs  Extremities: nontender, + non pitting edema  Musculoskeletal: no adventitious movements, no joint stiffness  Skin: no rash    Neurological Examination:  NIHSS:0  MS: AOx3. Appropriately interactive, normal affect. Speech fluent w/o paraphasic error, repetition, naming intact   CN: VFFTC, PERLL, EOMI, V1-3 sensation intact, face symmetric, hearing intact, palate elevates symmetrically, tongue midline, SCM equal bilaterally  Motor: normal bulk and tone, no tremor, rigidity or bradykinesia. No drift of extremities, strength  5/5 all over   Sens: Intact to light touch, DSS.    Reflexes: 0-1/4 all over, downgoing toes b/l  Coord:  No dysmetria, DEREK intact   Gait: Cannot test    Labs: pending          Imaging: Reviewed                    < from: CT Angio Neck Stroke Protocol w/ IV Cont (24 @ 07:41) >    IMPRESSION:  CT ANGIOGRAPHY NECK: Redemonstrated dense atherosclerotic calcification   at the right carotid bifurcation with moderate to severe stenosis of the   proximal right internal carotid artery. Status post left carotid   endarterectomy.    CT ANGIOGRAPHY BRAIN:  1.  No evidence of a major vessel occlusion, flow-limiting stenosis or   aneurysm about the [place].  2.  No evidence of dural venous sinus thrombosis or an arteriovenous   malformation    < end of copied text >        A/P: 69 yr old woman hx of NTH, carotid disease, comes to ED c/o transient bilat blurred vision. Improved. Non focal exam, NIHSS 0.   Doubt CVA/TIA. No associated headache, recent left TA bx negative.  Suggest:  f/u labs, imaging  continue asa, plavix, statin  f/u B/p  f/u with opt PCP  ophthalmology fu, she reports has an appointment tomorrow.      No IV tpa given because no measurable deficit  Total Critical Care Time spent: 45 mins Neurology Consult requested by:   Patient is a 69y old  Female who presents with a chief complaint of    HPI:  70 y/o woman with PMHx of HTN, HLD on aspirin and plavix presents to the ED with acute onset bilateral blurry vision at 13:30, no HA, no numbness no tingling no weakness, wears glasses at baseline. Improving, c/o floaters.  Was in HH this past April, presented with transient L facial numbness. W/u negative for CVA. CTA neck redemonstrated dense atherosclerotic calcification at the right carotid bifurcation with moderate to severe stenosis of the proximal right internal carotid artery. Hx of left carotid endarterectomy. Also underwent temporal artery biopsy on the left for possible GCA, bx results are negative for arteritis.     PAST MEDICAL & SURGICAL HISTORY:  Carotid stenosis, left      Deviated septum      Essential hypertension      Hyperlipidemia, unspecified hyperlipidemia type      Heart murmur      Mitral valve insufficiency, unspecified etiology      Peripheral edema  bilateral      Hiatal hernia with GERD      Irritable bowel syndrome with both constipation and diarrhea      Gall stones  gall bladder removed      Fatty liver disease, nonalcoholic      Urinary tract infection with hematuria, site unspecified  frequent.  Presently on antibiotics      Joint pain of lower limb  bilateral      Hypothyroidism, unspecified type      Morbid obesity due to excess calories      H/O carotid stenosis      Anxiety      Fracture dislocation of wrist joint      S/P cholecystectomy  1991       delivery delivered  1989      H/O arthroscopy of shoulder  Left. "Many years ago"      H/O colonoscopy          FAMILY HISTORY:  Family history of hepatic cirrhosis (Mother)  mother    Family history of heart disease (Mother)  mother    Family history of diabetes mellitus (Mother)  mother      Social Hx:  Nonsmoker, no drug or alcohol use  Medications and Allergies ReviewedMEDICATIONS  (STANDING):     ROS: Pertinent positives in HPI, all other ROS were reviewed and are negative.      Examination:   Vital Signs Last 24 Hrs  HR: 71 (2024 15:42) (71 - 71)  BP: 186/59 (2024 15:42) (186/59 - 186/59)  BP(mean): 94 (2024 15:42) (94 - 94)  RR: 20 (2024 15:42) (20 - 20)  SpO2: 96% (2024 15:42) (96% - 96%)    Parameters below as of 2024 15:42  Patient On (Oxygen Delivery Method): room air          General: Cooperative, NAD   NECK: supple, no masses  ENT: Normal hearing   Vascular : no carotid bruits,   Lungs: CTAB  Chest: RRR, no murmurs  Extremities: nontender, + non pitting edema  Musculoskeletal: no adventitious movements, no joint stiffness  Skin: no rash    Neurological Examination:  NIHSS:0  MS: AOx3. Appropriately interactive, normal affect. Speech fluent w/o paraphasic error, repetition, naming intact   CN: VFFTC, PERLL, EOMI, V1-3 sensation intact, face symmetric, hearing intact, palate elevates symmetrically, tongue midline, SCM equal bilaterally  Motor: normal bulk and tone, no tremor, rigidity or bradykinesia. No drift of extremities, strength  5/5 all over   Sens: Intact to light touch, DSS.    Reflexes: 0-1/4 all over, downgoing toes b/l  Coord:  No dysmetria, DEREK intact   Gait: Cannot test    Labs: pending  Complete Blood Count + Automated Diff (24 @ 15:22)   WBC Count: 11.32 K/uL  RBC Count: 4.29 M/uL  Hemoglobin: 13.3 g/dL  Hematocrit: 40.6 %  Mean Cell Volume: 94.6 fl  Mean Cell Hemoglobin: 31.0 pg  Mean Cell Hemoglobin Conc: 32.8 gm/dL  Red Cell Distrib Width: 15.0 %  Platelet Count - Automated: 338 K/uL    < from: CT Brain Stroke Protocol (24 @ 15:18) >    IMPRESSION:    HEAD CT: No acute intracranial hemorrhage or acute territorial infarction.    Notification to clinician of alert:  Dr. VEENA NG was notified about the noncontrast head CT findings   at 3:28 PM on 2024 with readback confirmation. The opportunity for   questions was provided and all questions asked were answered.    CT PERFUSION demonstrated: No asymmetric or territorial perfusion   abnormality.    If symptoms persist consider follow-up imaging with MRI of the brain if   no contraindications.     CTA COW:  Patent intracranial circulation without flow limiting stenosis   or large vessel occlusion.    CTA NECK: Status post left carotid endarterectomy. Unchanged critical   stenosis at the right internal carotid artery origin and severe stenosis   in the proximal left internal carotid artery.  Bilateral vertebral arteries are patent without flow limiting stenosis.          A/P: 69 yr old woman hx of NTH, carotid disease, comes to ED c/o transient bilat blurred vision. Improved. Non focal exam, NIHSS 0.   Doubt CVA/TIA. No associated headache, recent left TA bx negative.  Suggest:  f/u labs, imaging  continue asa, plavix, statin  f/u B/p  f/u with opt PCP  ophthalmology fu, she reports has an appointment tomorrow.      No IV tpa given because no measurable deficit  Total Critical Care Time spent: 45 mins

## 2024-06-20 NOTE — ED ADULT NURSE NOTE - OBJECTIVE STATEMENT
pt in ed c/o blurry vision 2hrs PTA. pt on Clopidogrel 2/2 heart murmur. code stroke activated. PIV inserted. blood work sent. placed on cardiac monitor.

## 2024-06-20 NOTE — ED ADULT NURSE NOTE - CHPI ED NUR SYMPTOMS NEG
----- Message from Shira Zafar MD sent at 6/10/2023  3:09 PM CDT -----  Please notify patient of normal labs    
Called to convey results to patient wife Marisol who verbalized understanding.     In an effort to ensure that our patients [employer], a Team Member has reviewed your chart and identified an opportunity to provide the best care possible. An attempt was made to discuss or schedule overdue Preventive or Disease Management screening.     The Outcome was Contact was made, care gap was discussed - see further documentation. Care Gaps include Immunizations. Patient wife states that shingles vaccination is not covered by their insurance, so they are not interested at this time. Education was provided.       
no chills/no decreased eating/drinking/no dizziness/no fever/no nausea/no pain/no tingling/no vomiting/no weakness

## 2024-06-20 NOTE — ED PROVIDER NOTE - CLINICAL SUMMARY MEDICAL DECISION MAKING FREE TEXT BOX
code stroke initiated on arrival, will proceed with labs, imaging, neuro consult, EKG code stroke initiated on arrival, will proceed with labs, imaging, neuro consult, EKG.  CT imaging shows no ICH or LVO. Pt not a candidate for TNK as she has NIHSS 0 and symptoms improving.   I Dr. Diaz ED attending spoke with Dr. Ko neurology attending who recommends c/w asa, plavix, statin and safe for dc to f/u with ophtho.   EKG NSR without ischemic changes.   Labs code stroke initiated on arrival, will proceed with labs, imaging, neuro consult, EKG.  CT imaging shows no ICH or LVO. Pt not a candidate for TNK as she has NIHSS 0 and symptoms improving.   I Dr. Diaz ED attending spoke with Dr. Ko neurology attending who recommends c/w asa, plavix, statin and safe for dc to f/u with ophtho.   EKG NSR without ischemic changes.   Labs grossly unremarkable. UA negative. On reassessment pt's vision changes resolved and states she feels much better and would like to go home, advised to continue taking asa/plavix/statin, has appt with ophthalmologist tomorrow morning. Pt advised to additionally f/u with pcp and neurologist, given strict return precautions and discharged home in stable condition

## 2024-06-20 NOTE — ED PROVIDER NOTE - NSFOLLOWUPINSTRUCTIONS_ED_ALL_ED_FT
Your CT scans, labs, EKG, urine are all normal.   Follow up with your eye doctor and primary doctor tomorrow. Additionally make an appointment with the neurologist. Return to the Emergency Department for worsening or persistent symptoms, and/or ANY NEW OR CONCERNING SYMPTOMS. If you have issues obtaining follow up, please call: [phone] or [phone]  to obtain a doctor or specialist who takes your insurance in your area.

## 2024-06-20 NOTE — STROKE CODE NOTE - NSMDCONSULT QTN_Y
Physical Therapy Daily Progress Note    TOTAL TIME: 80 MINUTES    Maren Cory reports: saw PCP and had new EMG; said my carpal tunnel has become moderate to severe bilaterally; referred to hand surgeon; shoulder feels better; the tremors I used to have in my arms have gone away       Objective   See Exercise, Manual, and Modality Logs for complete treatment.     THERAPEUTIC EXERCISES/ACTIVITIES ADDED TODAY: see flow sheets     Assessment/Plan  Patient is making mild progress to date; shoulder feels a little better, patient reports it does not hurt or spasm as much- just feels fatigued easily;  strength improving slightly     Progress per Plan of Care           Manual Therapy:         mins  92208;  Therapeutic Exercise:    65     mins  40596;     Neuromuscular Derrick:        mins  92252;    Therapeutic Activity:          mins  78953;     Gait Training:           mins  05841;     Ultrasound:          mins  47429;    Electrical Stimulation:    15     mins  42963 ( );  Dry Needling          mins self-pay    Timed Treatment:   80   mins   Total Treatment:     80   mins    Wojciech Leyva, PT  Physical Therapist   yes

## 2024-06-20 NOTE — ED ADULT NURSE NOTE - NSFALLHARMRISKINTERV_ED_ALL_ED

## 2024-06-20 NOTE — ED PROVIDER NOTE - OBJECTIVE STATEMENT
68 y/o female with PMHx of HTN, HLD on aspirin and plavix presents to the ED with acute on bilateral blurry vision at 13:30, no numbness no tingling no weakness, wears glasses at baseline. Has otherwise been well without other acute complaint 68 y/o female with PMHx of HTN, HLD on aspirin and plavix presents to the ED with acute onset bilateral blurry vision at 13:30, no HA, no numbness no tingling no weakness, wears glasses at baseline. Has otherwise been well without other acute complaint. Denies fever, chills, neck pain, cp, sob, cough, nvd, abdominal pain, urinary symptoms

## 2024-06-20 NOTE — ED PROVIDER NOTE - PHYSICAL EXAMINATION
Constitutional: well appearing, NAD AAOx3  Eyes: EOMI, PERRL  Head: Normocephalic atraumatic  Mouth: no airway obstruction, posterior oropharynx clear without erythema or exudate  Neck: supple  Cardiac: regular rate and rhythm, no MRG  Resp: Lungs CTAB  GI: Abd s/nt/nd  Neuro: CN2-12 intact, strength 5/5x4, sensation grossly intact  Skin: No rashes Constitutional: well appearing, NAD AAOx3  Eyes: EOMI, PERRL, able to count fingers  Head: Normocephalic atraumatic  Mouth: no airway obstruction, posterior oropharynx clear without erythema or exudate  Neck: supple  Cardiac: regular rate and rhythm, no MRG  Resp: Lungs CTAB  GI: Abd s/nt/nd  Neuro: CN2-12 intact, strength 5/5x4, sensation grossly intact  Skin: No rashes

## 2024-06-26 NOTE — PATIENT PROFILE ADULT - LIVING ENVIRONMENT
You were treated for pelvic inflammatory disease.  You received antibiotics here.  Because you may have a urinary tract infection, take antibiotic for 5 days.  Consider calling for primary care doctor appointment with the resources you were given.  Recheck sooner for worse or worrisome symptoms.  
no

## 2024-07-01 RX ORDER — CLOPIDOGREL BISULFATE 75 MG/1
75 TABLET, FILM COATED ORAL
Qty: 90 | Refills: 3 | Status: ACTIVE | COMMUNITY
Start: 2024-07-01 | End: 1900-01-01

## 2024-07-10 ENCOUNTER — APPOINTMENT (OUTPATIENT)
Dept: DERMATOLOGY | Facility: CLINIC | Age: 69
End: 2024-07-10
Payer: MEDICARE

## 2024-07-10 DIAGNOSIS — L81.4 OTHER MELANIN HYPERPIGMENTATION: ICD-10-CM

## 2024-07-10 DIAGNOSIS — Z85.828 PERSONAL HISTORY OF OTHER MALIGNANT NEOPLASM OF SKIN: ICD-10-CM

## 2024-07-10 DIAGNOSIS — L57.0 ACTINIC KERATOSIS: ICD-10-CM

## 2024-07-10 DIAGNOSIS — L82.1 OTHER SEBORRHEIC KERATOSIS: ICD-10-CM

## 2024-07-10 PROCEDURE — 17000 DESTRUCT PREMALG LESION: CPT

## 2024-07-10 PROCEDURE — 99213 OFFICE O/P EST LOW 20 MIN: CPT | Mod: 25

## 2024-07-16 ENCOUNTER — APPOINTMENT (OUTPATIENT)
Dept: VASCULAR SURGERY | Facility: CLINIC | Age: 69
End: 2024-07-16

## 2024-07-16 VITALS
DIASTOLIC BLOOD PRESSURE: 80 MMHG | SYSTOLIC BLOOD PRESSURE: 136 MMHG | HEIGHT: 66 IN | BODY MASS INDEX: 41.78 KG/M2 | WEIGHT: 260 LBS

## 2024-07-16 PROCEDURE — 29580 STRAPPING UNNA BOOT: CPT | Mod: LT

## 2024-07-29 ENCOUNTER — APPOINTMENT (OUTPATIENT)
Dept: VASCULAR SURGERY | Facility: CLINIC | Age: 69
End: 2024-07-29
Payer: MEDICARE

## 2024-07-29 VITALS
HEIGHT: 66 IN | DIASTOLIC BLOOD PRESSURE: 80 MMHG | HEART RATE: 67 BPM | SYSTOLIC BLOOD PRESSURE: 182 MMHG | BODY MASS INDEX: 41.78 KG/M2 | WEIGHT: 260 LBS | OXYGEN SATURATION: 98 %

## 2024-07-29 PROCEDURE — 99213 OFFICE O/P EST LOW 20 MIN: CPT

## 2024-07-29 NOTE — ASSESSMENT
[FreeTextEntry1] : 70yo female longstanding CVI and lymphedema not compliant with any recommendations now with weeping and tiny skin breakdown LLE unna boot applied recomend 20-30mmHg compression on RLE  daily lymphedema pump and elevation follow up 1 wk for wound check

## 2024-07-29 NOTE — HISTORY OF PRESENT ILLNESS
[FreeTextEntry1] : 68 yr F with bilateral carotid stenosis, hld, obesity, prior patient of Dr. Vo's was being followed for cellulitis and bilateral carotid stenosis. She is s/p left carotid endarterectomy in 2017.  Interval History: Today patient presents for possible cellulitis of LLE and for follow up of right carotid stenosis. She states podiatry put her on an antibiotic for cellulitis which is bothering her stomach. Most recent carotid doppler 2/26/24 demonstrated 70-99% right sided stenosis calcified plaque and 50-69% re-stenosis of left sided endarterectomy. She denies any neurological symptoms. She is compliant with antiplatelet and statin. She has never had symptoms of TIA,CVA, amaurosis. Regarding LLE it's improving regards to swelling and pain.  Interval History: Ms. Castillo is here for follow-up for both asymptomatic versus possibly symptomatic carotid artery stenosis along with a recently developed skin tear from traumatic injury 1 day prior. She has been seen several times for bilateral carotid stenosis right worse than left with intermittent episodes of left-sided face numbness with left-sided eye symptoms including vision changes and a left-sided headache. She previously had a rule out for GCA with a negative temporal artery biopsy patient pathology presentation was discussed and reviewed at carotid conference with the universal decision of continuing with an MRI to find if there was any symptomatic disease in bilateral carotids if no symptomatic disease then I will treat the high-grade stenosis on the right side. I discussed this with the patient extensively on the phone and she is now agreeable to an open MRI. Since last visit she has no new neurologic symptoms  Interval History: Here for follow-up of right knee traumatic injury doing well. Denies any swelling pain redness around the injury site denies any fevers or chills secondary to the injury. She does state that she had a recent episode of COVID and had minimal symptoms just a single fever and fatigue. She otherwise has no new complaints today. She has not had her MRI of the brain and is pending for next week.   Interval History: Here for follow up of carotid stenosis and leg swelling. State legs are more swollen but she is not wearing her compression stockings, not using her lymphedema pump (she states it was lost in a recent move) and she is not really elevating her legs. Regarding the carotid disease she has had no new episodes of facial numbness and no TIA, CVA, vision changes and slurred speech. She was unable to get the MRI due to body habitus.  [de-identified] : Since last visit has not worn compression stockings has not use lymphedema pump and has not been elevating her legs over the last few days noted some weeping from her left lower extremity anterior shin.  She denies any fevers or chills denies any cellulitis or redness of the area denies any tenderness.

## 2024-07-29 NOTE — PHYSICAL EXAM
[JVD] : no jugular venous distention  [Normal Breath Sounds] : Normal breath sounds [Normal Rate and Rhythm] : normal rate and rhythm [2+] : left 2+ [Ankle Swelling (On Exam)] : present [] : bilaterally [Ankle Swelling Bilaterally] : severe [de-identified] : well appearing [de-identified] : wnl [FreeTextEntry1] : worsened b/l swelling and lymphedema left anterior shin weeping through small skin tears  unna boot applied to LLE

## 2024-07-31 RX ORDER — AZITHROMYCIN 500 MG/1
0 TABLET, FILM COATED ORAL
Refills: 0 | DISCHARGE
Start: 2024-07-31 | End: 2024-08-04

## 2024-08-05 ENCOUNTER — APPOINTMENT (OUTPATIENT)
Dept: VASCULAR SURGERY | Facility: CLINIC | Age: 69
End: 2024-08-05

## 2024-08-05 PROBLEM — I89.0 LYMPHEDEMA, LIMB: Status: ACTIVE | Noted: 2024-07-29

## 2024-08-05 PROCEDURE — 99213 OFFICE O/P EST LOW 20 MIN: CPT

## 2024-08-05 NOTE — ASSESSMENT
[FreeTextEntry1] : 70yo female longstanding CVI and lymphedema not compliant but now improved  continue 20-30mmHg knee high compression new script given continue daily lymphedema pump  will see at previously made appointment for JORDAN surveillance

## 2024-08-05 NOTE — PHYSICAL EXAM
[JVD] : no jugular venous distention  [Normal Breath Sounds] : Normal breath sounds [Normal Rate and Rhythm] : normal rate and rhythm [2+] : left 2+ [Ankle Swelling (On Exam)] : present [Varicose Veins Of Lower Extremities] : not present [] : present [de-identified] : well appearing [de-identified] : wnl

## 2024-08-05 NOTE — HISTORY OF PRESENT ILLNESS
[FreeTextEntry1] : 68 yr F with bilateral carotid stenosis, hld, obesity, prior patient of Dr. Vo's was being followed for cellulitis and bilateral carotid stenosis. She is s/p left carotid endarterectomy in 2017.  Interval History: Today patient presents for possible cellulitis of LLE and for follow up of right carotid stenosis. She states podiatry put her on an antibiotic for cellulitis which is bothering her stomach. Most recent carotid doppler 2/26/24 demonstrated 70-99% right sided stenosis calcified plaque and 50-69% re-stenosis of left sided endarterectomy. She denies any neurological symptoms. She is compliant with antiplatelet and statin. She has never had symptoms of TIA,CVA, amaurosis. Regarding LLE it's improving regards to swelling and pain.  Interval History: Ms. Castillo is here for follow-up for both asymptomatic versus possibly symptomatic carotid artery stenosis along with a recently developed skin tear from traumatic injury 1 day prior. She has been seen several times for bilateral carotid stenosis right worse than left with intermittent episodes of left-sided face numbness with left-sided eye symptoms including vision changes and a left-sided headache. She previously had a rule out for GCA with a negative temporal artery biopsy patient pathology presentation was discussed and reviewed at carotid conference with the universal decision of continuing with an MRI to find if there was any symptomatic disease in bilateral carotids if no symptomatic disease then I will treat the high-grade stenosis on the right side. I discussed this with the patient extensively on the phone and she is now agreeable to an open MRI. Since last visit she has no new neurologic symptoms  Interval History: Here for follow-up of right knee traumatic injury doing well. Denies any swelling pain redness around the injury site denies any fevers or chills secondary to the injury. She does state that she had a recent episode of COVID and had minimal symptoms just a single fever and fatigue. She otherwise has no new complaints today. She has not had her MRI of the brain and is pending for next week.  Interval History: Here for follow up of carotid stenosis and leg swelling. State legs are more swollen but she is not wearing her compression stockings, not using her lymphedema pump (she states it was lost in a recent move) and she is not really elevating her legs. Regarding the carotid disease she has had no new episodes of facial numbness and no TIA, CVA, vision changes and slurred speech. She was unable to get the MRI due to body habitus.   Interval History: Since last visit has not worn compression stockings has not use lymphedema pump and has not been elevating her legs over the last few days noted some weeping from her left lower extremity anterior shin. She denies any fevers or chills denies any cellulitis or redness of the area denies any tenderness.    [de-identified] : Doing well wound and weeping resolved she removed unna boot. She is using lymphedema pumps occasionally. Overall feeling better

## 2024-08-07 PROBLEM — Z12.11 COLON CANCER SCREENING: Status: ACTIVE | Noted: 2024-08-07

## 2024-08-07 PROBLEM — Z12.4 CERVICAL CANCER SCREENING: Status: ACTIVE | Noted: 2024-08-07

## 2024-08-13 ENCOUNTER — APPOINTMENT (OUTPATIENT)
Dept: OBGYN | Facility: CLINIC | Age: 69
End: 2024-08-13

## 2024-08-13 DIAGNOSIS — Z12.11 ENCOUNTER FOR SCREENING FOR MALIGNANT NEOPLASM OF COLON: ICD-10-CM

## 2024-08-13 DIAGNOSIS — Z12.4 ENCOUNTER FOR SCREENING FOR MALIGNANT NEOPLASM OF CERVIX: ICD-10-CM

## 2024-08-13 RX ORDER — METHYLPREDNISOLONE 4 MG
0 TABLET ORAL
Refills: 0 | DISCHARGE
Start: 2024-08-13 | End: 2024-08-14

## 2024-08-13 RX ORDER — CEFDINIR 300 MG/1
1 CAPSULE ORAL
Refills: 0 | DISCHARGE
Start: 2024-08-13 | End: 2024-08-20

## 2024-08-21 ENCOUNTER — APPOINTMENT (OUTPATIENT)
Dept: DERMATOLOGY | Facility: CLINIC | Age: 69
End: 2024-08-21
Payer: MEDICARE

## 2024-08-21 DIAGNOSIS — L82.0 INFLAMED SEBORRHEIC KERATOSIS: ICD-10-CM

## 2024-08-21 DIAGNOSIS — M71.30 OTHER BURSAL CYST, UNSPECIFIED SITE: ICD-10-CM

## 2024-08-21 PROCEDURE — 17110 DESTRUCTION B9 LES UP TO 14: CPT

## 2024-08-21 PROCEDURE — 99213 OFFICE O/P EST LOW 20 MIN: CPT | Mod: 25

## 2024-08-21 RX ORDER — AMOXICILLIN 500 MG
0 CAPSULE ORAL
Refills: 0 | DISCHARGE
Start: 2024-08-21

## 2024-08-21 NOTE — ASSESSMENT
[FreeTextEntry1] : SKs;  nature explained cryo to traumatized SK left hand;    Therapeutic options and their risks and benefits; along with multiple diagnostic possibilities were discussed at length; risks and benefits of further study were discussed;  myxoid cyst:  No treatment is required unless this lesion becomes symptomatic. will consider excision by hand surgery if +Sx

## 2024-08-21 NOTE — HISTORY OF PRESENT ILLNESS
[de-identified] : The patient has been fit in for urgent appointment.  c/o lesions on hands;  picked at one on left hand  also:  lesion on finger, has been assessed for surgery by hand surgeon

## 2024-08-21 NOTE — PHYSICAL EXAM
[FreeTextEntry3] : Scaling waxy stuck on papules;  b/l dorsal hands  one excoriated SK left dorsal hand  right index finger;  large, non-tender mobile cystic nodule

## 2024-08-25 RX ORDER — CEFUROXIME SODIUM 1.5 G
0 VIAL (EA) INJECTION
Refills: 0 | DISCHARGE
Start: 2024-08-25 | End: 2024-08-27

## 2024-08-26 ENCOUNTER — APPOINTMENT (OUTPATIENT)
Dept: SURGERY | Facility: CLINIC | Age: 69
End: 2024-08-26
Payer: MEDICARE

## 2024-08-26 ENCOUNTER — APPOINTMENT (OUTPATIENT)
Dept: NEUROLOGY | Facility: CLINIC | Age: 69
End: 2024-08-26

## 2024-08-26 PROCEDURE — 99212 OFFICE O/P EST SF 10 MIN: CPT

## 2024-08-26 RX ORDER — CEPHALEXIN 500 MG
1 CAPSULE ORAL
Refills: 0 | DISCHARGE
Start: 2024-08-26

## 2024-08-26 RX ORDER — CEPHALEXIN 500 MG/1
500 TABLET ORAL 3 TIMES DAILY
Qty: 21 | Refills: 0 | Status: ACTIVE | COMMUNITY
Start: 2024-08-26 | End: 1900-01-01

## 2024-08-26 NOTE — PHYSICAL EXAM
[JVD] : no jugular venous distention  [Normal Breath Sounds] : Normal breath sounds [Normal Heart Sounds] : normal heart sounds [Calm] : calm [de-identified] : obese [de-identified] : LE cellulitis

## 2024-08-29 RX ORDER — NITROFURANTOIN MONOHYD/M-CRYST 100 MG
1 CAPSULE ORAL
Refills: 0 | DISCHARGE
Start: 2024-08-29

## 2024-08-30 ENCOUNTER — INPATIENT (INPATIENT)
Facility: HOSPITAL | Age: 69
LOS: 5 days | Discharge: HOME | DRG: 603 | End: 2024-09-05
Attending: STUDENT IN AN ORGANIZED HEALTH CARE EDUCATION/TRAINING PROGRAM | Admitting: FAMILY MEDICINE
Payer: MEDICARE

## 2024-08-30 VITALS — HEIGHT: 66 IN

## 2024-08-30 DIAGNOSIS — L03.90 CELLULITIS, UNSPECIFIED: ICD-10-CM

## 2024-08-30 DIAGNOSIS — Z98.890 OTHER SPECIFIED POSTPROCEDURAL STATES: Chronic | ICD-10-CM

## 2024-08-30 DIAGNOSIS — S62.109A FRACTURE OF UNSPECIFIED CARPAL BONE, UNSPECIFIED WRIST, INITIAL ENCOUNTER FOR CLOSED FRACTURE: Chronic | ICD-10-CM

## 2024-08-30 DIAGNOSIS — Z90.49 ACQUIRED ABSENCE OF OTHER SPECIFIED PARTS OF DIGESTIVE TRACT: Chronic | ICD-10-CM

## 2024-08-30 LAB
ALBUMIN SERPL ELPH-MCNC: 3.1 G/DL — LOW (ref 3.3–5)
ALP SERPL-CCNC: 210 U/L — HIGH (ref 40–120)
ALT FLD-CCNC: 47 U/L — SIGNIFICANT CHANGE UP (ref 12–78)
ANION GAP SERPL CALC-SCNC: 3 MMOL/L — LOW (ref 5–17)
APPEARANCE UR: CLEAR — SIGNIFICANT CHANGE UP
APTT BLD: 32.9 SEC — SIGNIFICANT CHANGE UP (ref 24.5–35.6)
AST SERPL-CCNC: 33 U/L — SIGNIFICANT CHANGE UP (ref 15–37)
BACTERIA # UR AUTO: NEGATIVE /HPF — SIGNIFICANT CHANGE UP
BASOPHILS # BLD AUTO: 0.04 K/UL — SIGNIFICANT CHANGE UP (ref 0–0.2)
BASOPHILS NFR BLD AUTO: 0.4 % — SIGNIFICANT CHANGE UP (ref 0–2)
BILIRUB SERPL-MCNC: 0.8 MG/DL — SIGNIFICANT CHANGE UP (ref 0.2–1.2)
BILIRUB UR-MCNC: NEGATIVE — SIGNIFICANT CHANGE UP
BUN SERPL-MCNC: 16 MG/DL — SIGNIFICANT CHANGE UP (ref 7–23)
CALCIUM SERPL-MCNC: 9.8 MG/DL — SIGNIFICANT CHANGE UP (ref 8.5–10.1)
CAST: 0 /LPF — SIGNIFICANT CHANGE UP (ref 0–4)
CHLORIDE SERPL-SCNC: 109 MMOL/L — HIGH (ref 96–108)
CO2 SERPL-SCNC: 29 MMOL/L — SIGNIFICANT CHANGE UP (ref 22–31)
COLOR SPEC: YELLOW — SIGNIFICANT CHANGE UP
CREAT SERPL-MCNC: 0.74 MG/DL — SIGNIFICANT CHANGE UP (ref 0.5–1.3)
DIFF PNL FLD: ABNORMAL
EGFR: 88 ML/MIN/1.73M2 — SIGNIFICANT CHANGE UP
EOSINOPHIL # BLD AUTO: 0.21 K/UL — SIGNIFICANT CHANGE UP (ref 0–0.5)
EOSINOPHIL NFR BLD AUTO: 2.2 % — SIGNIFICANT CHANGE UP (ref 0–6)
GLUCOSE SERPL-MCNC: 114 MG/DL — HIGH (ref 70–99)
GLUCOSE UR QL: NEGATIVE MG/DL — SIGNIFICANT CHANGE UP
HCT VFR BLD CALC: 39 % — SIGNIFICANT CHANGE UP (ref 34.5–45)
HGB BLD-MCNC: 12.6 G/DL — SIGNIFICANT CHANGE UP (ref 11.5–15.5)
IMM GRANULOCYTES NFR BLD AUTO: 0.3 % — SIGNIFICANT CHANGE UP (ref 0–0.9)
INR BLD: 1 RATIO — SIGNIFICANT CHANGE UP (ref 0.85–1.18)
KETONES UR-MCNC: ABNORMAL MG/DL
LACTATE SERPL-SCNC: 1.1 MMOL/L — SIGNIFICANT CHANGE UP (ref 0.7–2)
LEUKOCYTE ESTERASE UR-ACNC: ABNORMAL
LYMPHOCYTES # BLD AUTO: 2.09 K/UL — SIGNIFICANT CHANGE UP (ref 1–3.3)
LYMPHOCYTES # BLD AUTO: 21.4 % — SIGNIFICANT CHANGE UP (ref 13–44)
MCHC RBC-ENTMCNC: 30.8 PG — SIGNIFICANT CHANGE UP (ref 27–34)
MCHC RBC-ENTMCNC: 32.3 GM/DL — SIGNIFICANT CHANGE UP (ref 32–36)
MCV RBC AUTO: 95.4 FL — SIGNIFICANT CHANGE UP (ref 80–100)
MONOCYTES # BLD AUTO: 0.52 K/UL — SIGNIFICANT CHANGE UP (ref 0–0.9)
MONOCYTES NFR BLD AUTO: 5.3 % — SIGNIFICANT CHANGE UP (ref 2–14)
NEUTROPHILS # BLD AUTO: 6.86 K/UL — SIGNIFICANT CHANGE UP (ref 1.8–7.4)
NEUTROPHILS NFR BLD AUTO: 70.4 % — SIGNIFICANT CHANGE UP (ref 43–77)
NITRITE UR-MCNC: NEGATIVE — SIGNIFICANT CHANGE UP
PH UR: 5.5 — SIGNIFICANT CHANGE UP (ref 5–8)
PLATELET # BLD AUTO: 286 K/UL — SIGNIFICANT CHANGE UP (ref 150–400)
POTASSIUM SERPL-MCNC: 4 MMOL/L — SIGNIFICANT CHANGE UP (ref 3.5–5.3)
POTASSIUM SERPL-SCNC: 4 MMOL/L — SIGNIFICANT CHANGE UP (ref 3.5–5.3)
PROT SERPL-MCNC: 7.2 GM/DL — SIGNIFICANT CHANGE UP (ref 6–8.3)
PROT UR-MCNC: NEGATIVE MG/DL — SIGNIFICANT CHANGE UP
PROTHROM AB SERPL-ACNC: 11.3 SEC — SIGNIFICANT CHANGE UP (ref 9.5–13)
RBC # BLD: 4.09 M/UL — SIGNIFICANT CHANGE UP (ref 3.8–5.2)
RBC # FLD: 14.8 % — HIGH (ref 10.3–14.5)
RBC CASTS # UR COMP ASSIST: 7 /HPF — HIGH (ref 0–4)
SODIUM SERPL-SCNC: 141 MMOL/L — SIGNIFICANT CHANGE UP (ref 135–145)
SP GR SPEC: 1.02 — SIGNIFICANT CHANGE UP (ref 1–1.03)
SQUAMOUS # UR AUTO: 8 /HPF — HIGH (ref 0–5)
UROBILINOGEN FLD QL: 0.2 MG/DL — SIGNIFICANT CHANGE UP (ref 0.2–1)
WBC # BLD: 9.75 K/UL — SIGNIFICANT CHANGE UP (ref 3.8–10.5)
WBC # FLD AUTO: 9.75 K/UL — SIGNIFICANT CHANGE UP (ref 3.8–10.5)
WBC UR QL: 9 /HPF — HIGH (ref 0–5)

## 2024-08-30 PROCEDURE — 93970 EXTREMITY STUDY: CPT | Mod: 26

## 2024-08-30 PROCEDURE — 97116 GAIT TRAINING THERAPY: CPT | Mod: GP

## 2024-08-30 PROCEDURE — 99285 EMERGENCY DEPT VISIT HI MDM: CPT | Mod: FS

## 2024-08-30 PROCEDURE — 83036 HEMOGLOBIN GLYCOSYLATED A1C: CPT

## 2024-08-30 PROCEDURE — 36415 COLL VENOUS BLD VENIPUNCTURE: CPT

## 2024-08-30 PROCEDURE — 80048 BASIC METABOLIC PNL TOTAL CA: CPT

## 2024-08-30 PROCEDURE — 85027 COMPLETE CBC AUTOMATED: CPT

## 2024-08-30 PROCEDURE — 73590 X-RAY EXAM OF LOWER LEG: CPT | Mod: 26,50

## 2024-08-30 PROCEDURE — 97110 THERAPEUTIC EXERCISES: CPT | Mod: GP

## 2024-08-30 PROCEDURE — 99223 1ST HOSP IP/OBS HIGH 75: CPT

## 2024-08-30 PROCEDURE — 80076 HEPATIC FUNCTION PANEL: CPT

## 2024-08-30 PROCEDURE — 97163 PT EVAL HIGH COMPLEX 45 MIN: CPT | Mod: GP

## 2024-08-30 PROCEDURE — 86803 HEPATITIS C AB TEST: CPT

## 2024-08-30 RX ORDER — AZELASTINE HYDROCHLORIDE 137 UG/1
1 SPRAY, METERED NASAL
Refills: 0 | DISCHARGE

## 2024-08-30 RX ORDER — MAGNESIUM, ALUMINUM HYDROXIDE 200-225/5
30 SUSPENSION, ORAL (FINAL DOSE FORM) ORAL EVERY 4 HOURS
Refills: 0 | Status: DISCONTINUED | OUTPATIENT
Start: 2024-08-30 | End: 2024-09-05

## 2024-08-30 RX ORDER — PANTOPRAZOLE SODIUM 40 MG
40 TABLET, DELAYED RELEASE (ENTERIC COATED) ORAL
Refills: 0 | Status: DISCONTINUED | OUTPATIENT
Start: 2024-08-30 | End: 2024-09-05

## 2024-08-30 RX ORDER — VANCOMYCIN/0.9 % SOD CHLORIDE 1.75G/25
2000 PLASTIC BAG, INJECTION (ML) INTRAVENOUS ONCE
Refills: 0 | Status: COMPLETED | OUTPATIENT
Start: 2024-08-30 | End: 2024-08-30

## 2024-08-30 RX ORDER — LOSARTAN POTASSIUM AND HYDROCHLOROTHIAZIDE 100; 25 MG/1; MG/1
1 TABLET, FILM COATED ORAL
Refills: 0 | DISCHARGE

## 2024-08-30 RX ORDER — FLUCONAZOLE 150 MG/1
1 TABLET ORAL
Refills: 0 | DISCHARGE

## 2024-08-30 RX ORDER — TRAMADOL HYDROCHLORIDE 200 MG/1
25 TABLET, EXTENDED RELEASE ORAL EVERY 6 HOURS
Refills: 0 | Status: DISCONTINUED | OUTPATIENT
Start: 2024-08-30 | End: 2024-09-05

## 2024-08-30 RX ORDER — ASPIRIN 81 MG
81 TABLET, DELAYED RELEASE (ENTERIC COATED) ORAL DAILY
Refills: 0 | Status: DISCONTINUED | OUTPATIENT
Start: 2024-08-30 | End: 2024-09-05

## 2024-08-30 RX ORDER — SILVER SULFADIAZINE 1 %
1 CREAM (GRAM) TOPICAL
Refills: 0 | DISCHARGE

## 2024-08-30 RX ORDER — ENOXAPARIN SODIUM 100 MG/ML
40 INJECTION SUBCUTANEOUS EVERY 24 HOURS
Refills: 0 | Status: DISCONTINUED | OUTPATIENT
Start: 2024-08-30 | End: 2024-09-05

## 2024-08-30 RX ORDER — PHENYLEPHRINE HYDROCHLORIDE 1 G/100ML
2 SPRAY NASAL
Refills: 0 | DISCHARGE

## 2024-08-30 RX ORDER — LEVOTHYROXINE SODIUM 100 MCG
50 TABLET ORAL DAILY
Refills: 0 | Status: DISCONTINUED | OUTPATIENT
Start: 2024-08-30 | End: 2024-09-05

## 2024-08-30 RX ORDER — ALPRAZOLAM 0.25 MG
1 TABLET ORAL
Refills: 0 | DISCHARGE

## 2024-08-30 RX ORDER — ALPRAZOLAM 0.25 MG
0.25 TABLET ORAL
Refills: 0 | Status: DISCONTINUED | OUTPATIENT
Start: 2024-08-30 | End: 2024-09-05

## 2024-08-30 RX ORDER — ACETAMINOPHEN 325 MG/1
650 TABLET ORAL EVERY 6 HOURS
Refills: 0 | Status: DISCONTINUED | OUTPATIENT
Start: 2024-08-30 | End: 2024-09-05

## 2024-08-30 RX ORDER — ONDANSETRON 2 MG/ML
4 INJECTION, SOLUTION INTRAMUSCULAR; INTRAVENOUS EVERY 8 HOURS
Refills: 0 | Status: DISCONTINUED | OUTPATIENT
Start: 2024-08-30 | End: 2024-09-05

## 2024-08-30 RX ADMIN — Medication 250 MILLIGRAM(S): at 12:50

## 2024-08-30 RX ADMIN — Medication 1000 MILLIGRAM(S): at 12:50

## 2024-08-30 RX ADMIN — ENOXAPARIN SODIUM 40 MILLIGRAM(S): 100 INJECTION SUBCUTANEOUS at 21:48

## 2024-08-30 RX ADMIN — Medication 80 MILLIGRAM(S): at 21:48

## 2024-08-30 NOTE — H&P ADULT - HISTORY OF PRESENT ILLNESS
68 y/o female with a PMHx of anxiety, carotid stenosis, HTN, fatty liver, Hiatal hernia with GERD, HLD, Hypothyroidism,  IBS, Morbid obesity,  lymphedema, presented to the ED c/o b/l LE redness and swelling x2 days. Pt saw vascular, Dr. Carrasquillo 2 days ago and was placed on Cipro. No  fevers.  70 y/o female with a PMHx of anxiety, carotid stenosis, s/p CEA, HTN, fatty liver, Hiatal hernia with GERD, HLD, Hypothyroidism,  IBS, Morbid obesity, b/l  lymphedema  presented to the ED c/o b/l LE redness and swelling x 2 days. Pt saw vascular, Dr. Carrasquillo 2 days ago and was placed on Cipro with no improvement, was referred to ED for IV abxs.   Pt reports that has leg swelling chronically but got worse and more red, + pain L leg No  fevers. Pt reports had some chills and "  head cold" but did not check temp. Feels overall ok.     In ED: VS stable,. Labs  with no leukocytosis.  Elevated BS, and alk  phos, normal lactate

## 2024-08-30 NOTE — ED STATDOCS - PROGRESS NOTE DETAILS
70 y/o female with a PMHx of anxiety, carotid stenosis, deviated septum, essential HTN, fatty liver, gall stones, carotid stenosis, heart murmur, hiatal hernia with GERD, HLD, hypothyroid, IBS, joint pain, mitral valve insufficiency, morbid obesity, peripheral edema, UTI presents to the ED c/o b/l LE redness and swelling x2 days. Pt saw vascular, Dr. Carrasquillo 2 days ago and was placed on Cipro. Denies fevers. No recent travel.    Exam: Bilateral erythema mid shin to foot, +swelling, + streaking right medial calf, +venous stasis, and lymphedema    Plan: US Doppler bilateral, labs, urine, IVABX, likley admission  Marjorie Shah, DNP Patient prefers admission for IVABX as last time this occurred she was admitted for 10 days.

## 2024-08-30 NOTE — ED STATDOCS - AVIAN FLU SYMPTOMS
Admit Date: (Not on file)    POD * No surgery found *    Procedure:  * No surgery found *    Subjective:     Rosana Mark is a pleasant but unfortunate 81-year-old male with well-known severe peripheral vascular disease. Presents today after recent angiography for possible revascularization done by Dr. Wilder Fernandes. This report notes patient's need for immediate open bypass revascularization due to inability to revascularize via endoscopy. Patient reports today that he is no better today than he was prior to the procedure. Still complaining of 10 out of 10 ischemic rest pain. States that he cannot tolerate the pain anymore and wants to be admitted to the hospital.  Patient states that the pain and discomfort is affecting his ability to complete his ADLs. Patient denies any new onset of ulcerations or skin lesions but states that the right foot feels cold, and is hurting him constantly. Patient reports that if he dangles his foot off the edge of the bed it feels a little better but otherwise he cannot tolerate his legs being elevated. He denies any chest pain or shortness of breath. Denies any dyspnea on exertion. Reports a fair appetite but decreased due to pain issues. Denies any bowel or bladder issues. Patient states that he is been bedridden since his procedure due to the fact that he cannot walk because of pain. Patient states that he was sent home without any pain medicine and has been trying to alleviate his symptoms with Tylenol but it is not working. Patient reports that on his last visit with the surgeon it was suggested that he may need lower extremity bypass surgery. Patient reports today that he is tired of the pain issues and is ready to proceed with surgery as soon as possible. States that he continues to take his daily meds as recommended including aspirin and statin.     OPERATIVE NOTE     DATE OF PROCEDURE: 1/11/22     SURGEON: Franco Blackmon MD     ASSISTANT(S): None     PREOPERATIVE DIAGNOSIS: Ischemic right foot with rest pain  POSTOPERATIVE DIAGNOSIS: same     PROCEDURE PERFORMED: US Guided left femoral access, aortogram, selective catheterization of right iliac artery, selective catheterization of right SFA, selective catheterization of posterior tibial artery, balloon angioplasty of right posterior tibial artery with 2mm balloon     ANESTHESIA: Moderate Sedation     EBL: Minimal     FLUIDS: Minimal     BLOOD ADMINISTERED: None     DRAINS/TUBES: None     IMPLANTS: None     COMPLICATIONS: None     FINDINGS: Occluded runoff with reconstitution of PT just above the ankle     OPERATIVE INDICATIONS: Ischemic rest pain        Objective:   ROS :   General: negative for fever/chills. Patient reports 10 out of 10 lower extremity rest pain, not ambulating because of lower extremity leg pain. Patient states his right foot feels cold and has been unable to ambulate. Eyes: negative for vision loss   HENT: negative for cold symptoms   Respiratory negative for shortness of breath   Cardiac: negative for chest pain   Vascular negative for foot pain at night    Gastrointestinal: negative for abdominal pain   Genitourinary: negative for dysuria    Endocrine: negative for excessive thirst   Skin: negative for rash/lesions or ulcers   Neurological: negative for paralysis   Psychiatric: negative for depression       Blood pressure 130/70, pulse 83, height 5' 9\" (1.753 m), weight 160 lb 0.9 oz (72.6 kg), SpO2 99 %. Physical Exam:    General : Alert and oriented x3. Brought into the room via wheelchair. Very uncomfortable and agitated on this visit. Complaining 10 out of 10 right lower extremity leg and foot pain.   HEENT-PERRLA exactly movements intact, no scleral icterus, mucous membranes pink and moist  Neck-no JVD, no carotid bruits  Heart-regular rate and rhythm no murmurs, rubs or gallops  Chest-clear to auscultation bilaterally no wheezes, rhonchi or rubs  Abdomen-soft, nontender, no palpable organomegaly or masses, no palpable pulsatile masses  Extremities-no clubbing, cyanosis or edema. No wounds or gangrenous changes to the toes or feet. Skin is intact. Feet are pink warm. Left foot warmer than right. Nonpalpable pedal pulses noted bilaterally. No calf tenderness to palpation bilaterally. Pulses-carotid, radial, brachial, femoral 2+ bilaterally. Negative bilateral monophasic doppler signals noted in the dorsalis pedis, posterior tibial  areas.     Labs:   Recent Results (from the past 24 hour(s))   DUPLEX LOWER EXT VEIN MAP BILAT    Collection Time: 01/14/22 11:38 AM   Result Value Ref Range    Right GSV Junc Diam 0.84 cm    Right GSV Thigh Prox Diam 0.55 cm    Right GSV Thigh Mid Diam 0.37 cm    Right GSV Thigh Dist Diam 0.35 cm    Right GSV at Knee Diam 0.34 cm    Right GSV BK Prox Diam 0.38 cm    Right GSV BK Mid Diam 0.26 cm    Right GSV Ankle Diam 0.41 cm    Left GSV Junc Diam 0.68 cm    Left GSV Thigh Prox Diam 0.19 cm    Left GSV Thigh Mid Diam 0.20 cm    Left GSV Thigh Dist Diam 0.21 cm    Left GSV at Knee Diam 0.20 cm    Left GSV BK Prox Diam 0.12 cm    Left GSV BK Mid Diam 0.13 cm    Left GSV Ankle Diam 0.21 cm       Data Review previous angio and subsequent imaging noted  Last note by Dr. Lucia Carrizales stating patient needs lower extremity revascularization bypass. Assessment:     Active Problems:    * No active hospital problems. *      Plan/Recommendations/Medical Decision Making:     Patient instructed we will continue present Rx -continue daily meds as ordered including aspirin statin medication. Patient given a prescription for Percocet 5 and 325 1 p.o. every 4 hours as needed for pain. On discussion with patient about usage of pain medication and the ability to wean him at some time. Pain mapping has been completed on this visit for surgery on Monday. Patient instructed that we will be moving his revascularization bypass case up because of ischemic rest pain.   Tentatively we will schedule patient for right lower extremity revascularization procedure to be done by Dr. Irma Mcintosh on Monday, 1/16/2022. Preop orders were written for chest x-ray, EKG, labs, and COVID testing for preparation for Mondays OR. Patient instructed that he will be needing right lower extremity popliteal to PT bypass with in situ vein. Risk benefits and logistics all detailed both by myself and extensively by Dr. Catherine Ramires  Patient states he understands more than willing to proceed as planned. Patient is appreciative that we are moving his surgery ahead of schedule because he states that he cannot tolerate the pain anymore. We will review all imaging and studies and preop the patient for surgery on Monday a.m.  N.p.o. after midnight Sunday night patient to report to the hospital at all 600 Monday morning. Patient encouraged to call if he has any questions or issues. As stated above patient states he understands above, is appreciative of our services, and is willing to proceed as planned. We will discuss details with my attending. No

## 2024-08-30 NOTE — PATIENT PROFILE ADULT - NSPROPTRIGHTSUPPORTPERSON_GEN_A_NUR
"Chief Complaint   Patient presents with     Well Child       Initial BP 96/59  Pulse 80  Temp 98  F (36.7  C) (Oral)  Ht 5' 6\" (1.676 m)  Wt 110 lb (49.9 kg)  SpO2 98%  BMI 17.75 kg/m2 Estimated body mass index is 17.75 kg/(m^2) as calculated from the following:    Height as of this encounter: 5' 6\" (1.676 m).    Weight as of this encounter: 110 lb (49.9 kg).  Medication Reconciliation: complete   Nicol Light Certified Medical Assistant    " yes

## 2024-08-30 NOTE — INPATIENT CERTIFICATION FOR MEDICARE PATIENTS - THE SEVERITY OF SIGNS/SYMPTOMS. (SEE ED/ADMIT DOCUMENTS)
Notification of Inpatient Admission/Inpatient Authorization Request  This is a Notification of Inpatient Admission/Request for Inpatient Authorization to our facility [id number] [place] [address]  Please be advised that this patient is currently in our facility under Inpatient Status  Below you will find the Attending Physician and Facilitys information including NPI# and contact information for the Utilization  assigned to the [facility] where the patient is receiving services  Please feel free to contact the Utilization Review Department with any questions  Patient Information:  PATIENT NAME: Marielena Stanley  MRN: [id number]  YOB: 1947    PRESENTATION DATE: 3/16/2018  1:31 PM  IP ADMISSION DATE: 3/17/18 2226  DISCHARGE DATE: No discharge date for patient encounter  DISPOSITION: Home/Self Care    Attending Physician:  JOSE DANIEL Mckinnon  [facility] Practioner ID- [id number]  [address] [place] [address]  Phone 1: [phone]  Fax: [phone]        Facility:  [address] [place], [place] [address] [address]   [phone]  Tax ID: [id number]  NPI: [id number]    [address] [place] [address] in the [facility] by Ajay Renteria for 2017  Network Utilization Review Department  Phone: [phone]; Fax [phone]  ATTENTION: The Network Utilization Review Department is now centralized for our 7 Facilities  Make a note that we have a new phone and fax numbers for our Department  Please call with any questions or concerns to [phone] and carefully follow the prompts so that you are directed to the right person  All voicemails are confidential  Fax any determinations, approvals, denials, and requests for initial or continue stay review clinical to [phone]   Due to HIGH CALL volume, it would be easier if you could please send faxed requests to expedite your requests and in part, help us provide discharge notifications faster  1. The severity of signs/symptoms.(See ED/admit documents)

## 2024-08-30 NOTE — PHARMACOTHERAPY INTERVENTION NOTE - COMMENTS
Medication reconciliation completed.  Reviewed Medication list and confirmed med allergies with patient; confirmed with Dr. Browne MedHx.  pt confirms that for the past month she's been prescribed several courses of antibiotics and that she starts some and stops if "they made me feel crummy."

## 2024-08-30 NOTE — ED ADULT NURSE NOTE - NSFALLRISKINTERV_ED_ALL_ED
Assistance OOB with selected safe patient handling equipment if applicable/Assistance with ambulation/Communicate fall risk and risk factors to all staff, patient, and family/Monitor gait and stability/Provide visual cue: yellow wristband, yellow gown, etc/Reinforce activity limits and safety measures with patient and family/Call bell, personal items and telephone in reach/Instruct patient to call for assistance before getting out of bed/chair/stretcher/Non-slip footwear applied when patient is off stretcher/[place] to call system/Physically safe environment - no spills, clutter or unnecessary equipment/Purposeful Proactive Rounding/Room/bathroom lighting operational, light cord in reach

## 2024-08-30 NOTE — H&P ADULT - NSICDXPASTMEDICALHX_GEN_ALL_CORE_FT
PAST MEDICAL HISTORY:  Anxiety     Carotid stenosis, left     Deviated septum     Essential hypertension     Fatty liver disease, nonalcoholic     Gall stones gall bladder removed    H/O carotid stenosis     Heart murmur     Hiatal hernia with GERD     Hyperlipidemia, unspecified hyperlipidemia type     Hypothyroidism, unspecified type     Irritable bowel syndrome with both constipation and diarrhea     Joint pain of lower limb bilateral    Lymphedema     Mitral valve insufficiency, unspecified etiology     Morbid obesity due to excess calories     Peripheral edema bilateral    Urinary tract infection with hematuria, site unspecified frequent.  Presently on antibiotics

## 2024-08-30 NOTE — ED STATDOCS - CLINICAL SUMMARY MEDICAL DECISION MAKING FREE TEXT BOX
Pt with chronic venous stasis here with probable cellulitis right lower leg failing outpatient abx. Plan: labs, US, IV abx.

## 2024-08-30 NOTE — ED ADULT TRIAGE NOTE - CHIEF COMPLAINT QUOTE
Pt states "I think I have cellulitis in my legs again". C/o swelling, redness, and pain to b/l LE x2 days. No fevers.

## 2024-08-30 NOTE — ED STATDOCS - PHYSICAL EXAMINATION
Constitutional: NAD AAOx3  Eyes: EOMI, pupils equal  Head: Normocephalic atraumatic  Mouth: no airway obstruction  Cardiac: regular rate   Resp: Lungs CTAB  GI: Abd s/nt/nd  Neuro: CN2-12 intact  Skin: No rashes Constitutional: NAD AAOx3  Eyes: EOMI, pupils equal  Head: Normocephalic atraumatic  Mouth: no airway obstruction  Cardiac: regular rate   Resp: Lungs CTAB  GI: Abd s/nt/nd  MSK: Lymphedema lower leg. Chronic venous stasis lower legs b/l. Right leg with redness extending mid tibia up to back of knee.  Neuro: CN2-12 intact  Skin: No rashes

## 2024-08-30 NOTE — ED STATDOCS - OBJECTIVE STATEMENT
68 y/o female with a PMHx of anxiety, carotid stenosis, deviated septum, essential HTN, fatty liver, gall stones, carotid stenosis, heart murmur, hiatal hernia with GERD, HLD, hypothyroid, IBS, joint pain, mitral valve insufficiency, morbid obesity, peripheral edema, UTI presents to the ED c/o b/l LE redness and swelling x2 days. Pt saw vascular, Dr. Carrasquillo 2 days ago and was placed on Cipro. Denies fevers. No recent travel. NKDA. No other complaints at this time.

## 2024-08-30 NOTE — H&P ADULT - NSHPPHYSICALEXAM_GEN_ALL_CORE
Vital Signs Last 24 Hrs  T(C): 36.3 (30 Aug 2024 15:26), Max: 36.5 (30 Aug 2024 10:28)  T(F): 97.4 (30 Aug 2024 15:26), Max: 97.7 (30 Aug 2024 10:28)  HR: 59 (30 Aug 2024 15:26) (59 - 66)  BP: 156/71 (30 Aug 2024 15:26) (155/63 - 156/71)  BP(mean): 91 (30 Aug 2024 15:26) (89 - 91)  RR: 19 (30 Aug 2024 15:26) (18 - 19)  SpO2: 100% (30 Aug 2024 15:26) (100% - 100%)    Parameters below as of 30 Aug 2024 15:26  Patient On (Oxygen Delivery Method): room air    PHYSICAL EXAM:  General: in no acute distress  Eyes: EOMI; conjunctiva and sclera clear  Head: Normocephalic; atraumatic  ENMT: No nasal discharge; airway clear  Neck: Supple; non tender; no masses  Respiratory: good air entry.  No wheezes  Cardiovascular: Regular rate and rhythm. S1 and S2 Normal;   Gastrointestinal: Soft non-tender non-distended; Normal bowel sounds  Genitourinary: No  suprapubic  tenderness  Extremities: + B/l LE edema with stasis changes with superimposed LLE erythema and warmth, tender to touch   Neurological: Alert and oriented x 3, non focal   Skin: Warm and dry. No acute rash  Musculoskeletal: Normal muscle tone, without deformities  Psychiatric: Cooperative and appropriate

## 2024-08-30 NOTE — H&P ADULT - ASSESSMENT
68 y/o female with a PMHx of anxiety, carotid stenosis, s/p CEA, HTN, fatty liver, Hiatal hernia with GERD, HLD, Hypothyroidism,  IBS, Morbid obesity, b/l  lymphedema admitted  for:     1. LLE cellulitis. Baseline Chronic Lymphedema   admit to med surg   BCX sent   S/p Vanco  and Ceftriaxone  LE dopplers neg for DVT   C/w Ceftriaxone  Control pain   ID eval       2. Obesity   Pt is interested in discussing diet   Nutrition eval   Consider referral to weight loss clinic at [place]     3. Hyperglycemia  Check HA1c       4. CAD s/p L CEA   C/w ASA and plavix   C/w Lipitor       5. Anxiety  C/w xanax PRN       6. Hypothyroidism   C/w Levothyroxine      7. DVT PPX: Lovenox  SQ      Total tome 78 min

## 2024-08-31 LAB
A1C WITH ESTIMATED AVERAGE GLUCOSE RESULT: 5.5 % — SIGNIFICANT CHANGE UP (ref 4–5.6)
ALBUMIN SERPL ELPH-MCNC: 2.7 G/DL — LOW (ref 3.3–5)
ALP SERPL-CCNC: 183 U/L — HIGH (ref 40–120)
ALT FLD-CCNC: 39 U/L — SIGNIFICANT CHANGE UP (ref 12–78)
ANION GAP SERPL CALC-SCNC: 5 MMOL/L — SIGNIFICANT CHANGE UP (ref 5–17)
AST SERPL-CCNC: 30 U/L — SIGNIFICANT CHANGE UP (ref 15–37)
BILIRUB DIRECT SERPL-MCNC: 0.2 MG/DL — SIGNIFICANT CHANGE UP (ref 0–0.3)
BILIRUB INDIRECT FLD-MCNC: 0.4 MG/DL — SIGNIFICANT CHANGE UP (ref 0.2–1)
BILIRUB SERPL-MCNC: 0.6 MG/DL — SIGNIFICANT CHANGE UP (ref 0.2–1.2)
BUN SERPL-MCNC: 15 MG/DL — SIGNIFICANT CHANGE UP (ref 7–23)
CALCIUM SERPL-MCNC: 9.3 MG/DL — SIGNIFICANT CHANGE UP (ref 8.5–10.1)
CHLORIDE SERPL-SCNC: 111 MMOL/L — HIGH (ref 96–108)
CO2 SERPL-SCNC: 27 MMOL/L — SIGNIFICANT CHANGE UP (ref 22–31)
CREAT SERPL-MCNC: 0.72 MG/DL — SIGNIFICANT CHANGE UP (ref 0.5–1.3)
EGFR: 91 ML/MIN/1.73M2 — SIGNIFICANT CHANGE UP
ESTIMATED AVERAGE GLUCOSE: 111 MG/DL — SIGNIFICANT CHANGE UP (ref 68–114)
GLUCOSE SERPL-MCNC: 121 MG/DL — HIGH (ref 70–99)
HCT VFR BLD CALC: 36.5 % — SIGNIFICANT CHANGE UP (ref 34.5–45)
HCV AB S/CO SERPL IA: 0.1 S/CO — SIGNIFICANT CHANGE UP (ref 0–0.99)
HCV AB SERPL-IMP: SIGNIFICANT CHANGE UP
HGB BLD-MCNC: 11.7 G/DL — SIGNIFICANT CHANGE UP (ref 11.5–15.5)
MCHC RBC-ENTMCNC: 30.4 PG — SIGNIFICANT CHANGE UP (ref 27–34)
MCHC RBC-ENTMCNC: 32.1 GM/DL — SIGNIFICANT CHANGE UP (ref 32–36)
MCV RBC AUTO: 94.8 FL — SIGNIFICANT CHANGE UP (ref 80–100)
PLATELET # BLD AUTO: 254 K/UL — SIGNIFICANT CHANGE UP (ref 150–400)
POTASSIUM SERPL-MCNC: 3.9 MMOL/L — SIGNIFICANT CHANGE UP (ref 3.5–5.3)
POTASSIUM SERPL-SCNC: 3.9 MMOL/L — SIGNIFICANT CHANGE UP (ref 3.5–5.3)
PROT SERPL-MCNC: 6.3 GM/DL — SIGNIFICANT CHANGE UP (ref 6–8.3)
RBC # BLD: 3.85 M/UL — SIGNIFICANT CHANGE UP (ref 3.8–5.2)
RBC # FLD: 15 % — HIGH (ref 10.3–14.5)
SODIUM SERPL-SCNC: 143 MMOL/L — SIGNIFICANT CHANGE UP (ref 135–145)
WBC # BLD: 7.93 K/UL — SIGNIFICANT CHANGE UP (ref 3.8–10.5)
WBC # FLD AUTO: 7.93 K/UL — SIGNIFICANT CHANGE UP (ref 3.8–10.5)

## 2024-08-31 PROCEDURE — 99233 SBSQ HOSP IP/OBS HIGH 50: CPT

## 2024-08-31 RX ORDER — PHENYLEPHRINE HYDROCHLORIDE 1 G/100ML
2 SPRAY NASAL EVERY 4 HOURS
Refills: 0 | Status: DISCONTINUED | OUTPATIENT
Start: 2024-08-31 | End: 2024-08-31

## 2024-08-31 RX ORDER — VANCOMYCIN/0.9 % SOD CHLORIDE 1.75G/25
1500 PLASTIC BAG, INJECTION (ML) INTRAVENOUS EVERY 12 HOURS
Refills: 0 | Status: DISCONTINUED | OUTPATIENT
Start: 2024-08-31 | End: 2024-09-01

## 2024-08-31 RX ORDER — OXYMETAZOLINE HYDROCHLORIDE 0.05 G/100ML
1 SPRAY, METERED NASAL EVERY 12 HOURS
Refills: 0 | Status: DISCONTINUED | OUTPATIENT
Start: 2024-08-31 | End: 2024-09-05

## 2024-08-31 RX ORDER — PHENYLEPHRINE HYDROCHLORIDE 1 G/100ML
2 SPRAY NASAL EVERY 4 HOURS
Refills: 0 | Status: DISCONTINUED | OUTPATIENT
Start: 2024-08-31 | End: 2024-09-05

## 2024-08-31 RX ORDER — FUROSEMIDE 40 MG
20 TABLET ORAL DAILY
Refills: 0 | Status: DISCONTINUED | OUTPATIENT
Start: 2024-08-31 | End: 2024-09-05

## 2024-08-31 RX ADMIN — Medication 40 MILLIGRAM(S): at 05:52

## 2024-08-31 RX ADMIN — ENOXAPARIN SODIUM 40 MILLIGRAM(S): 100 INJECTION SUBCUTANEOUS at 21:07

## 2024-08-31 RX ADMIN — Medication 81 MILLIGRAM(S): at 09:52

## 2024-08-31 RX ADMIN — Medication 75 MILLIGRAM(S): at 09:52

## 2024-08-31 RX ADMIN — Medication 2000 MILLIGRAM(S): at 12:09

## 2024-08-31 RX ADMIN — Medication 0.25 MILLIGRAM(S): at 16:45

## 2024-08-31 RX ADMIN — Medication 300 MILLIGRAM(S): at 12:09

## 2024-08-31 RX ADMIN — Medication 50 MICROGRAM(S): at 05:53

## 2024-08-31 RX ADMIN — Medication 300 MILLIGRAM(S): at 21:05

## 2024-08-31 RX ADMIN — Medication 80 MILLIGRAM(S): at 21:07

## 2024-08-31 NOTE — DIETITIAN INITIAL EVALUATION ADULT - ADD RECOMMEND
Change diet to DASH  MVI w/ minerals daily to ensure 100% RDA met   Monitor bowel movements, if no BM for >3 days, consider implementing bowel regimen.   Monitor PO intake, tolerance, labs and weight.

## 2024-08-31 NOTE — DIETITIAN INITIAL EVALUATION ADULT - NAME AND PHONE
Joana Singh RDN, CDN, [facility]      [phone]   [email or web address]

## 2024-08-31 NOTE — CONSULT NOTE ADULT - SUBJECTIVE AND OBJECTIVE BOX
Patient is a 69y old  Female who presents with a chief complaint of Cellulitis    HPI:  68 y/o female with a PMHx of anxiety, carotid stenosis, s/p CEA, HTN, fatty liver, Hiatal hernia with GERD, HLD, Hypothyroidism,  IBS, Morbid obesity, b/l  lymphedema  presented to the ED c/o b/l LE redness and swelling x 2 days. Pt saw vascular, Dr. Carrasquillo 2 days ago and was placed on Cipro with no improvement, was referred to ED for IV abxs.   Pt reports that has leg swelling chronically but got worse and more red, + pain L leg No fevers. Pt reports had some chills and "  head cold" but did not check temp. Feels overall ok.  In ED: VS stable. Labs with no leukocytosis.  Elevated BS, and alk  phos, normal lactate.     PMH: as above  PSH: as above    Meds: per reconciliation sheet, noted below  MEDICATIONS  (STANDING):  aspirin enteric coated 81 milliGRAM(s) Oral daily  atorvastatin 80 milliGRAM(s) Oral at bedtime  cefTRIAXone Injectable. 2000 milliGRAM(s) IV Push every 24 hours  clopidogrel Tablet 75 milliGRAM(s) Oral daily  enoxaparin Injectable 40 milliGRAM(s) SubCutaneous every 24 hours  levothyroxine 50 MICROGram(s) Oral daily  pantoprazole    Tablet 40 milliGRAM(s) Oral before breakfast  vancomycin  IVPB 1500 milliGRAM(s) IV Intermittent every 12 hours    Allergies    No Known Allergies    Intolerances    cefadroxil (Diarrhea)    Social: no smoking, no alcohol, no illegal drugs; no recent travel, no exposure to TB  FAMILY HISTORY:  Family history of hepatic cirrhosis (Mother)  mother    Family history of heart disease (Mother)  mother    Family history of diabetes mellitus (Mother)  mother       no history of premature cardiovascular disease in first degree relatives    ROS: the patient denies fever, no chills, no HA, no dizziness, no sore throat, no blurry vision, no CP, no palpitations, no SOB, no cough, no abdominal pain, no diarrhea, no N/V, no dysuria, no leg pain, no claudication, no rash, no joint aches, no rectal pain or bleeding, no night sweats    All other systems reviewed and are negative    Vital Signs Last 24 Hrs  T(C): 36.7 (31 Aug 2024 08:33), Max: 36.7 (31 Aug 2024 08:33)  T(F): 98 (31 Aug 2024 08:33), Max: 98 (31 Aug 2024 08:33)  HR: 59 (31 Aug 2024 08:33) (59 - 71)  BP: 110/33 (31 Aug 2024 08:33) (110/33 - 156/71)  BP(mean): 86 (30 Aug 2024 20:25) (86 - 91)  RR: 18 (31 Aug 2024 08:33) (18 - 20)  SpO2: 100% (31 Aug 2024 08:33) (100% - 100%)    Parameters below as of 31 Aug 2024 08:33  Patient On (Oxygen Delivery Method): room air      Daily     Daily     PE:  Constitutional: NAD  HEENT: NC/AT, EOMI, PERRLA, conjunctivae clear; ears and nose atraumatic; pharynx benign  Neck: supple; thyroid not palpable  Back: no tenderness  Respiratory: respiratory effort normal; clear to auscultation  Cardiovascular: S1S2 regular, no murmurs  Abdomen: soft, not tender, not distended, positive BS; liver and spleen WNL  Genitourinary: no suprapubic tenderness  Lymphatic: no LN palpable  Musculoskeletal: no muscle tenderness, no joint swelling or tenderness  Extremities: no pedal edema b/l LE stasis dermatitis, erythema warmth   Neurological/ Psychiatric: AxOx3, Judgement and insight normal;  moving all extremities  Skin: no rashes; no palpable lesions    Labs: all available labs reviewed                        11.7   7.93  )-----------( 254      ( 31 Aug 2024 07:36 )             36.5     08-31    143  |  111<H>  |  15  ----------------------------<  121<H>  3.9   |  27  |  0.72    Ca    9.3      31 Aug 2024 07:36    TPro  6.3  /  Alb  2.7<L>  /  TBili  0.6  /  DBili  0.2  /  AST  30  /  ALT  39  /  AlkPhos  183<H>  08-31     LIVER FUNCTIONS - ( 31 Aug 2024 07:36 )  Alb: 2.7 g/dL / Pro: 6.3 gm/dL / ALK PHOS: 183 U/L / ALT: 39 U/L / AST: 30 U/L / GGT: x           Urinalysis Basic - ( 31 Aug 2024 07:36 )    Color: x / Appearance: x / SG: x / pH: x  Gluc: 121 mg/dL / Ketone: x  / Bili: x / Urobili: x   Blood: x / Protein: x / Nitrite: x   Leuk Esterase: x / RBC: x / WBC x   Sq Epi: x / Non Sq Epi: x / Bacteria: x          Radiology: all available radiological tests reviewed    IMPRESSION:  Technically limited exam.    No evidence of deep venous thrombosis in either lower extremity.    Calf veins not visualized due to edema. Consider repeat evaluation in   7-10 days if clinical concern persists.        Advanced directives addressed: full resuscitation

## 2024-08-31 NOTE — DIETITIAN INITIAL EVALUATION ADULT - PERTINENT LABORATORY DATA
08-30    141  |  109<H>  |  16  ----------------------------<  114<H>  4.0   |  29  |  0.74    Ca    9.8      30 Aug 2024 11:11    TPro  7.2  /  Alb  3.1<L>  /  TBili  0.8  /  DBili  x   /  AST  33  /  ALT  47  /  AlkPhos  210<H>  08-30  A1C with Estimated Average Glucose Result: 5.6 % (04-16-24 @ 10:25)  A1C with Estimated Average Glucose Result: 5.5 % (03-20-24 @ 07:50)

## 2024-08-31 NOTE — DIETITIAN INITIAL EVALUATION ADULT - PERTINENT MEDS FT
MEDICATIONS  (STANDING):  aspirin enteric coated 81 milliGRAM(s) Oral daily  atorvastatin 80 milliGRAM(s) Oral at bedtime  cefTRIAXone Injectable. 1000 milliGRAM(s) IV Push every 24 hours  clopidogrel Tablet 75 milliGRAM(s) Oral daily  enoxaparin Injectable 40 milliGRAM(s) SubCutaneous every 24 hours  levothyroxine 50 MICROGram(s) Oral daily  pantoprazole    Tablet 40 milliGRAM(s) Oral before breakfast    MEDICATIONS  (PRN):  acetaminophen     Tablet .. 650 milliGRAM(s) Oral every 6 hours PRN Temp greater or equal to 38C (100.4F), Mild Pain (1 - 3)  ALPRAZolam 0.25 milliGRAM(s) Oral two times a day PRN for anxiety  aluminum hydroxide/magnesium hydroxide/simethicone Suspension 30 milliLiter(s) Oral every 4 hours PRN Dyspepsia  melatonin 3 milliGRAM(s) Oral at bedtime PRN Insomnia  ondansetron Injectable 4 milliGRAM(s) IV Push every 8 hours PRN Nausea and/or Vomiting  traMADol 25 milliGRAM(s) Oral every 6 hours PRN Moderate Pain (4 - 6)

## 2024-08-31 NOTE — DIETITIAN NUTRITION RISK NOTIFICATION - ADDITIONAL COMMENTS/DIETITIAN RECOMMENDATIONS
Change diet to DASH  MVI w/ minerals daily to ensure 100% RDA met   Allow extra vegetables, salad  Monitor bowel movements, if no BM for >3 days, consider implementing bowel regimen.   Monitor PO intake, tolerance, labs and weight.

## 2024-08-31 NOTE — DIETITIAN INITIAL EVALUATION ADULT - ORAL INTAKE PTA/DIET HISTORY
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  She eats 2 meals a day  - breakfast is usually a muffin, lunch is eaten at [facility].  Pt reports that it holds her for the rest of the day.  PO intake estimated < 75% ENN > one month.

## 2024-08-31 NOTE — CONSULT NOTE ADULT - ASSESSMENT
68 y/o female with a PMHx of anxiety, carotid stenosis, s/p CEA, HTN, fatty liver, Hiatal hernia with GERD, HLD, Hypothyroidism,  IBS, Morbid obesity, b/l  lymphedema  presented to the ED c/o b/l LE redness and swelling x 2 days. Pt saw vascular, Dr. Carrasquillo 2 days ago and was placed on Cipro with no improvement, was referred to ED for IV abxs.   Pt reports that has leg swelling chronically but got worse and more red, + pain L leg No fevers. Pt reports had some chills and "  head cold" but did not check temp. Feels overall ok.  In ED: VS stable. Labs with no leukocytosis.  Elevated BS, and alk  phos, normal lactate.     1. Bilateral LE Stasis dermatitis. Superimposed cellulitis. Venous insufficiency. Morbid obesity  - imaging reviewed  - f/u cultures  - adjust vancomycin [id number] check trough prior to 4th dose  - adjust dose of rocephin to 2gm daily  - continue with antibiotic coverage  - monitor temps  - tolerating abx well so far; no side effects noted  - reason for abx use and side effects reviewed with patient  - supportive care  - fu cbc    Clinical team may change from intravenous to oral antibiotics when the following criteria are met:   1. Patient is clinically improving/stable       a)	Improved signs and symptoms of infection from initial presentation       b)	Afebrile for 24 hours       c)	Leukocytosis trending towards normal range   2. Patient is tolerating oral intake   3. Initial/repeat blood cultures are negative    When above criteria met, may change iv antibiotics to: po doxycycline 100mg BID x 7 day course

## 2024-08-31 NOTE — DIETITIAN INITIAL EVALUATION ADULT - OTHER INFO
70 y/o female with a PMHx of anxiety, carotid stenosis, s/p CEA, HTN, fatty liver, Hiatal hernia with GERD, HLD, Hypothyroidism,  IBS, Morbid obesity, b/l  lymphedema  presented to the ED c/o b/l LE redness and swelling x 2 days. Pt saw vascular, Dr. Carrasquillo 2 days ago and was placed on Cipro with no improvement, was referred to ED for IV abxs.   Pt reports that has leg swelling chronically but got worse and more red, + pain L leg No  fevers. Pt reports had some chills and "  head cold" but did not check temp. Feels overall ok.      70 y/o female with a PMHx of anxiety, carotid stenosis, s/p CEA, HTN, fatty liver, Hiatal hernia with GERD, HLD, Hypothyroidism,  IBS, Morbid obesity, b/l  lymphedema  presented to the ED c/o b/l LE redness and swelling x 2 days. Pt saw vascular, Dr. Carrasquillo 2 days ago and was placed on Cipro with no improvement, was referred to ED for IV abxs.   Pt reports that has leg swelling chronically but got worse and more red, + pain L leg No  fevers. Pt reports had some chills and "  head cold" but did not check temp. Feels overall ok.     Admit dx is cellulitis  RD bedscale wt is 130 kg  287#  NFPE does not reveal muscle wasting, fat wasting   BM last 8/31  No issues with N/V, No issues with chewing/swallowing  PT on consistent carb diet, suggest change to DASH diet

## 2024-09-01 LAB
CULTURE RESULTS: SIGNIFICANT CHANGE UP
SPECIMEN SOURCE: SIGNIFICANT CHANGE UP

## 2024-09-01 PROCEDURE — 99232 SBSQ HOSP IP/OBS MODERATE 35: CPT

## 2024-09-01 RX ORDER — DOXYCYCLINE MONOHYDRATE 100 MG
100 TABLET ORAL EVERY 12 HOURS
Refills: 0 | Status: DISCONTINUED | OUTPATIENT
Start: 2024-09-01 | End: 2024-09-05

## 2024-09-01 RX ADMIN — Medication 300 MILLIGRAM(S): at 08:48

## 2024-09-01 RX ADMIN — Medication 80 MILLIGRAM(S): at 21:06

## 2024-09-01 RX ADMIN — Medication 100 MILLIGRAM(S): at 21:06

## 2024-09-01 RX ADMIN — Medication 20 MILLIGRAM(S): at 08:47

## 2024-09-01 RX ADMIN — Medication 0.25 MILLIGRAM(S): at 08:47

## 2024-09-01 RX ADMIN — Medication 50 MICROGRAM(S): at 05:34

## 2024-09-01 RX ADMIN — Medication 40 MILLIGRAM(S): at 06:31

## 2024-09-01 RX ADMIN — OXYMETAZOLINE HYDROCHLORIDE 1 SPRAY(S): 0.05 SPRAY, METERED NASAL at 08:49

## 2024-09-01 RX ADMIN — Medication 75 MILLIGRAM(S): at 08:47

## 2024-09-01 RX ADMIN — ENOXAPARIN SODIUM 40 MILLIGRAM(S): 100 INJECTION SUBCUTANEOUS at 21:06

## 2024-09-01 RX ADMIN — Medication 81 MILLIGRAM(S): at 08:46

## 2024-09-01 RX ADMIN — Medication 2000 MILLIGRAM(S): at 08:49

## 2024-09-02 ENCOUNTER — TRANSCRIPTION ENCOUNTER (OUTPATIENT)
Age: 69
End: 2024-09-02

## 2024-09-02 PROCEDURE — 99232 SBSQ HOSP IP/OBS MODERATE 35: CPT

## 2024-09-02 RX ORDER — FUROSEMIDE 40 MG
1 TABLET ORAL
Qty: 30 | Refills: 0
Start: 2024-09-02 | End: 2024-10-01

## 2024-09-02 RX ORDER — DOXYCYCLINE MONOHYDRATE 100 MG
1 TABLET ORAL
Qty: 10 | Refills: 0
Start: 2024-09-02 | End: 2024-09-06

## 2024-09-02 RX ADMIN — Medication 50 MICROGRAM(S): at 05:07

## 2024-09-02 RX ADMIN — Medication 80 MILLIGRAM(S): at 20:22

## 2024-09-02 RX ADMIN — OXYMETAZOLINE HYDROCHLORIDE 1 SPRAY(S): 0.05 SPRAY, METERED NASAL at 10:10

## 2024-09-02 RX ADMIN — Medication 100 MILLIGRAM(S): at 10:09

## 2024-09-02 RX ADMIN — Medication 81 MILLIGRAM(S): at 10:08

## 2024-09-02 RX ADMIN — Medication 0.25 MILLIGRAM(S): at 10:09

## 2024-09-02 RX ADMIN — Medication 2000 MILLIGRAM(S): at 12:13

## 2024-09-02 RX ADMIN — Medication 40 MILLIGRAM(S): at 06:03

## 2024-09-02 RX ADMIN — Medication 20 MILLIGRAM(S): at 10:09

## 2024-09-02 RX ADMIN — Medication 100 MILLIGRAM(S): at 20:22

## 2024-09-02 RX ADMIN — ENOXAPARIN SODIUM 40 MILLIGRAM(S): 100 INJECTION SUBCUTANEOUS at 20:22

## 2024-09-02 RX ADMIN — Medication 75 MILLIGRAM(S): at 10:09

## 2024-09-02 NOTE — PHYSICAL THERAPY INITIAL EVALUATION ADULT - ADDITIONAL COMMENTS
Patient reports she was fully Independently with all ADL performance and ambulation with Cane PRN / Occasionally. Patient reports she does not use the stair lift and ascends/descends stairs for exercise. She drives a car to the grocery store and doctor's appointments. Patient denies any fall history

## 2024-09-02 NOTE — DISCHARGE NOTE PROVIDER - NSDCCPCAREPLAN_GEN_ALL_CORE_FT
PRINCIPAL DISCHARGE DIAGNOSIS  Diagnosis: Cellulitis  Assessment and Plan of Treatment:      PRINCIPAL DISCHARGE DIAGNOSIS  Diagnosis: Cellulitis  Assessment and Plan of Treatment: you came in with celluiltis of lower legs. You have history of chronic lymphedema. You were started on antibiotics IV switched to oral antibiotics.  ultrasound of legs shows no DVT / or clots   Please take doxycycline 100mg twice daily for 7 day course  PLEASE follow up with your primary care physician within 1 week of discharge      SECONDARY DISCHARGE DIAGNOSES  Diagnosis: CAD (coronary artery disease)  Assessment and Plan of Treatment: You were previously diagnosed with coronary artery disease. This is a narrowing of the arteries in your heart. Please continue to take your medications as prescribed and follow up with your PCP and cardiologist for further monitoring.  CAD s/p L CEA   Continue home aspirin  and plavix and  Lipitor   PLEASE follow up with your primary care physician within 1 week of discharge    Diagnosis: Anxiety  Assessment and Plan of Treatment: continue home xanax as needed   PLEASE follow up with your primary care physician within 1 week of discharge    Diagnosis: Hypothyroidism  Assessment and Plan of Treatment: continue home Levothyroxine  PLEASE follow up with your primary care physician within 1 week of discharge

## 2024-09-02 NOTE — PHYSICAL THERAPY INITIAL EVALUATION ADULT - GAIT DEVIATIONS NOTED, PT EVAL
MILD LATERAL SWAY / TRENDELENBURG GAIT PATTERN PRESENT/decreased inez/decreased step length/decreased stride length

## 2024-09-02 NOTE — PHYSICAL THERAPY INITIAL EVALUATION ADULT - LEVEL OF INDEPENDENCE: SUPINE/SIT, REHAB EVAL
Detail Level: Zone
Otc Regimen: Recommended sunscreen, discussed SPF 30-50+, including reapplication every 2 hours.
stand-by assist

## 2024-09-02 NOTE — PHYSICAL THERAPY INITIAL EVALUATION ADULT - CRITERIA FOR SKILLED THERAPEUTIC INTERVENTIONS
HISTORY OF PRESENT ILLNESS Victor Manuel Romero is a 65 y.o. male. His intellectually handicapped. He is accompanied by his mother. HPI   He presents for follow up of diabetes mellitus, hypertension and hyperlipidemia. He has a seizure disorder, but no seizures since last visit. Diet and Lifestyle: generally follows a low fat low cholesterol diet, generally follows a low sodium diet, follows a diabetic diet regularly, exercises sporadically, nonsmoker Diabetic ROS - medication compliance: compliant all of the time,  
   home glucose monitoring: not done,  
   home BP [id number]: 120-130's/70's.  
   further diabetic ROS: no polyuria or polydipsia, no numbness, tingling or pain in extremities, no unusual visual symptoms, no hypoglycemia, no medication side effects noted. Cardiovascular ROS: 
He complains of lower extremity edema. He denies palpitations, orthopnea, exertional chest pressure/discomfort, claudication, dyspnea on exertion, dizziness Energy level never go to back to normal after influenza/pneumonia. He used to be very active, now mostly sits all day. At times will go outdoors and hit a few golf balls. He does go the [place] 3 days a week. and enjoys that a great deal, but seems tired at end of day. Mother does not hear him snoring and he does not fall asleep during the day. Seems to sleep well at night. Patient Active Problem List  
Diagnosis Code  Mental retardation F79  
 [facility] G40.909  Psoriasis L40.9  Venous stasis of lower extremity I87.8  Thrombocytopenia due to drugs D69.59, T50.905A  Sleep disorder G47.9  Hypertension, essential I10  Strongyloides stercoralis infection B78.9  Eosinophilia D72.1  Hypercholesterolemia E78.00  
 Uncontrolled type 2 diabetes mellitus without complication, without long-term current use of insulin (HCC) E11.65 Past Medical History:  
Diagnosis Date  Contact dermatitis and other eczema, due to unspecified cause   
 psoriasis  Diabetes ([facility]  Eosinophilia  Hypercholesterolemia  Hypertension  Mental retardation  Seizures ([facility]  Skin cancer Past Surgical History:  
Procedure Laterality Date  ENDOSCOPY, COLON, DIAGNOSTIC  11/08/2007 Dr Jalen Mora normal except small internal hemorrhoids repeat 11/2017 Social History Social History  Marital status: SINGLE Spouse name: N/A  
 Number of children: N/A  
 Years of education: N/A Occupational History  [employer] Social History Main Topics  Smoking status: Never Smoker  Smokeless tobacco: Never Used  Alcohol use No  
 Drug use: No  
 Sexual activity: No  
 
Other Topics Concern  None Social History Narrative Family History Problem Relation Age of Onset 25 [facility] Other Mother PMR  Hypertension Mother  Cancer Father Lung  Diabetes Brother No Known Allergies Current Outpatient Prescriptions Medication Sig Dispense Refill  folic acid (FOLVITE) 1 mg tablet TAKE ONE TABLET BY MOUTH DAILY 90 Tab 2  
 metFORMIN ER (GLUCOPHAGE XR) 750 mg tablet TAKE ONE TABLET BY MOUTH TWO TIMES A  Tab 2  
 ramipril (ALTACE) 5 mg capsule TAKE ONE (1) CAPSULE(S) DAILY 90 Cap 1  
 linagliptin (TRADJENTA) 5 mg tablet Take 1 Tab by mouth daily. 90 Tab 3  pioglitazone (ACTOS) 30 mg tablet Take 30 mg by mouth daily.  chlorhexidine (PERIDEX) 0.12 % solution  atorvastatin (LIPITOR) 40 mg tablet TAKE ONE TABLET BY MOUTH AT BEDTIME 90 Tab 2  
 topiramate (TOPAMAX) 200 mg tablet 1.5 tablets twice a day 360 Tab 3  
 glimepiride (AMARYL) 4 mg tablet Take 2 Tabs by mouth every morning. 180 Tab 3  
 divalproex DR (DEPAKOTE) 500 mg tablet Take one tab in the AM and two tabs at night 270 Tab 3  
 Calcium-Cholecalciferol, D3, (CALTRATE-600 PLUS VITAMIN D3) 600-400 mg-unit Tab Take  by mouth. Review of Systems Constitutional: Positive for malaise/fatigue (does not want to do anything except sit around). Negative for weight loss. Gastrointestinal: Negative for constipation and diarrhea. Musculoskeletal: Negative for back pain and joint pain. Neurological: Negative for tingling and focal weakness. Visit Vitals  /75 (BP 1 Location: Left arm, BP Patient Position: Sitting)  Pulse 69  Temp 97.9 °F (36.6 °C) (Oral)  Resp 14  
 Ht 6' 2\" (1.88 m)  Wt 204 lb (92.5 kg)  SpO2 100%  BMI 26.19 kg/m2 Physical Exam  
Constitutional: He is oriented to person, place, and time. He appears well-developed and well-nourished. HENT:  
Head: Normocephalic and atraumatic. Eyes: Conjunctivae are normal. Pupils are equal, round, and reactive to light. Neck: Neck supple. Carotid bruit is not present. No thyromegaly present. Cardiovascular: Normal rate, regular rhythm and normal heart sounds. PMI is not displaced. Exam reveals no gallop. No murmur heard. Pulses: 
     Dorsalis pedis pulses are 2+ on the right side, and 2+ on the left side. Posterior tibial pulses are 2+ on the right side, and 2+ on the left side. Pulmonary/Chest: Effort normal. He has no wheezes. He has no rhonchi. He has no rales. Abdominal: Soft. Normal appearance. He exhibits no abdominal bruit and no mass. There is no hepatosplenomegaly. There is no tenderness. Musculoskeletal: He exhibits edema (1+ at ankles). Lymphadenopathy:  
  He has no cervical adenopathy. Right: No supraclavicular adenopathy present. Left: No supraclavicular adenopathy present. Neurological: He is alert and oriented to person, place, and time. No sensory deficit. Skin: Skin is warm, dry and intact. No rash noted. Psychiatric: He has a normal mood and affect. His behavior is normal.  
Nursing note and vitals reviewed. Diabetic foot exam:  
 
Left Foot: 
 Visual Exam: normal  
 Pulse DP: 1+ (weak) Filament test: response unreliable Vibratory sensation: response unreliable RighVibratory sensation: response unreliablet Foot: 
 Visual Exam: normal  
 Pulse DP: 1+ (weak) Filament test: Response unreliable Results for orders placed or performed in visit on 10/16/18 AMB POC HEMOGLOBIN A1C Result Value Ref Range Hemoglobin A1c (POC) 8.2 (A) 4.8 - 5.6 % Lab Results Component Value Date/Time Sodium 144 03/27/2018 08:40 AM  
 Potassium 4.7 03/27/2018 08:40 AM  
 Chloride 107 (H) 03/27/2018 08:40 AM  
 CO2 20 03/27/2018 08:40 AM  
 Anion gap 11 02/27/2018 03:53 AM  
 Glucose 142 (H) 03/27/2018 08:40 AM  
 BUN 15 03/27/2018 08:40 AM  
 Creatinine 0.65 (L) 03/27/2018 08:40 AM  
 BUN/Creatinine ratio 23 03/27/2018 08:40 AM  
 GFR est  03/27/2018 08:40 AM  
 GFR est non- 03/27/2018 08:40 AM  
 Calcium 9.1 03/27/2018 08:40 AM  
 Bilirubin, total 0.3 03/27/2018 08:40 AM  
 AST (SGOT) 11 03/27/2018 08:40 AM  
 Alk. phosphatase 50 03/27/2018 08:40 AM  
 Protein, total 6.6 03/27/2018 08:40 AM  
 Albumin 3.7 03/27/2018 08:40 AM  
 Globulin 3.8 02/27/2018 03:53 AM  
 A-G Ratio 1.3 03/27/2018 08:40 AM  
 ALT (SGPT) 9 03/27/2018 08:40 AM  
 
Lab Results Component Value Date/Time Cholesterol, total 133 03/27/2018 08:40 AM  
 HDL Cholesterol 42 03/27/2018 08:40 AM  
 LDL, calculated 66 03/27/2018 08:40 AM  
 VLDL, calculated 25 03/27/2018 08:40 AM  
 Triglyceride 125 03/27/2018 08:40 AM  
 CHOL/HDL Ratio 2.8 03/31/2009 08:40 AM  
 
Lab Results Component Value Date/Time Microalb/Creat ratio (ug/mg creat.) <4.3 07/10/2018 11:08 AM  
 
 
ASSESSMENT and PLAN 
  ICD-10-CM ICD-9-CM 1. Uncontrolled type 2 diabetes mellitus without complication, without long-term current use of insulin (HCC) E11.65 250.02 AMB POC HEMOGLOBIN A1C HM DIABETES FOOT EXAM  
   metFORMIN ER (GLUCOPHAGE XR) 500 mg tablet    TSH 3RD GENERATION  
   HEMOGLOBIN A1C WITH EAG  
 2. Hypertension, essential I10 401.9 CBC WITH AUTOMATED DIFF 3. Hypercholesterolemia E78.00 272.0 4. Seizure disorder ([facility] G40.909 345.90   
5. Mental retardation F79 319   
6. Encounter for immunization Z23 V03.89 INFLUENZA VIRUS VAC QUAD,SPLIT,PRESV FREE SYRINGE IM  
   ADMIN INFLUENZA VIRUS VAC Diagnoses and all orders for this visit: 
 
1. Uncontrolled type 2 diabetes mellitus without complication, without long-term current use of insulin ([facility] Control is worse despite adding a new medication. Appears it is ineffective and will discontinue Tradjenta. Taking statin and ACE/ARB. Discussed diet, lab monitoring and foot care. -     AMB POC HEMOGLOBIN A1C 
-      DIABETES FOOT EXAM 
-    Increase metFORMIN ER (GLUCOPHAGE XR) 500 mg tablet; Take 2 Tabs by mouth two (2) times a day. -     TSH 3RD GENERATION; Future 
-     HEMOGLOBIN A1C WITH EAG; Future 2. Hypertension, essential 
Hypertension is controlled 
-     CBC WITH AUTOMATED DIFF; Future 3. Hypercholesterolemia Hyperlipidemia is controlled 4. [facility] Stable 5. Mental retardation Limits ability to comply with diet and to do adequate foot exam. BMI slightly high, but mother tried to control food and portions sizes. 6. Encounter for immunization -     Influenza virus vaccine (QUADRIVALENT PRES FREE SYRINGE) IM (80689) -     Administration fee () for Medicare insured patients Follow-up Disposition: 
Return in about 3 months (around 1/16/2019) for DM, HTN, chol, fatigue  NON-fasting lab one week prior . lab results and schedule of future lab studies reviewed with patient 
reviewed diet, exercise and weight control 
cardiovascular risk and specific lipid/LDL goals reviewed I have discussed the diagnosis, evaluation and treatment options and the intended plan with the patient. Patient understands and is in agreement.   The patient has received an after-visit summary and questions were answered concerning future plans. I have discussed side effects and warnings of any new medications with the patient as well. impairments found

## 2024-09-02 NOTE — DISCHARGE NOTE PROVIDER - NSDCMRMEDTOKEN_GEN_ALL_CORE_FT
ALPRAZolam 0.25 mg oral tablet: 1 tab(s) orally 2 times a day as needed for  anxiety  aspirin 81 mg oral tablet: 1 tab(s) orally once a day  atorvastatin 80 mg oral tablet: 1 tab(s) orally once a day (at bedtime)  azelastine 137 mcg/inh (0.1%) nasal spray: 1 spray(s) in each nostril 1 to 2 times a day as needed for  allergy symptoms  clopidogrel 75 mg oral tablet: 1 tab(s) orally once a day  doxycycline hyclate 100 mg oral capsule: 1 cap(s) orally 2 times a day  fluconazole 150 mg oral tablet: 1 tab(s) orally every 3 days as needed for yeast infection , prescribed 2 doses as needed.  furosemide 20 mg oral tablet: 1 tab(s) orally once a day  levothyroxine 50 mcg (0.05 mg) oral tablet: 1 tab(s) orally once a day  losartan-hydroCHLOROthiazide 100 mg-12.5 mg oral tablet: 1 tab(s) orally once a day  Medrol Dosepak 4 mg oral tablet: prescribed 8/13, pt states that she took day 1 and stopped  Hira-Synephrine 0.25% nasal spray: 2 spray(s) in each nostril 3 to 4 times a day for Cold Symptoms  omeprazole 40 mg oral delayed release capsule: 1 cap(s) orally once a day  silver sulfADIAZINE 1% topical cream: Apply topically to affected area once a day

## 2024-09-02 NOTE — DISCHARGE NOTE PROVIDER - NSDCFUSCHEDAPPT_GEN_ALL_CORE_FT
[facility]  VASCULAR [address]  Scheduled Appointment: 10/14/2024    Feliberto Love  [facility]  VASCULAR [address]  Scheduled Appointment: 10/14/2024

## 2024-09-02 NOTE — PHYSICAL THERAPY INITIAL EVALUATION ADULT - PRECAUTIONS/LIMITATIONS, REHAB EVAL
----- Message from Baldemar Begum sent at 6/23/2022 10:32 AM EDT -----  Neida Sharri called to report pt's INR. Today, it is 4.4. Pt is taking Warfarin 2 MG daily.   Neida's callback number for orders is [phone].    
fall precautions

## 2024-09-02 NOTE — DISCHARGE NOTE PROVIDER - CARE PROVIDER_API CALL
Chris Abraham  Internal Medicine  [address] [place] [address], [address]  [place] [address]  Phone: [phone]  Fax: [phone]  Follow Up Time:

## 2024-09-02 NOTE — PHYSICAL THERAPY INITIAL EVALUATION ADULT - PLANNED THERAPY INTERVENTIONS, PT EVAL
Stair Training PRN/balance training/bed mobility training/gait training/strengthening/transfer training

## 2024-09-02 NOTE — PHYSICAL THERAPY INITIAL EVALUATION ADULT - LEVEL OF INDEPENDENCE: STAIR NEGOTIATION, REHAB EVAL
NOT ASSESSED AT THIS TIME - NOT A BARRIER TO DISCHARGE AS PATIENT HAS STAIR LIFT TO 2ND FLOOR APARTMENT

## 2024-09-02 NOTE — DISCHARGE NOTE PROVIDER - ATTENDING DISCHARGE PHYSICAL EXAMINATION:
PHYSICAL EXAM:  GENERAL: NAD  HEENT:  anicteric, moist mucous membranes  CHEST/LUNG:  CTA b/l, no rales, wheezes, or rhonchi  HEART:  RRR, S1, S2  ABDOMEN:  BS+, soft, nontender, nondistended  EXTREMITIES: Chronic LE lymphedema   NERVOUS SYSTEM: answers questions and follows commands appropriately

## 2024-09-02 NOTE — PHYSICAL THERAPY INITIAL EVALUATION ADULT - LIVES WITH, PROFILE
Pt lives alone in a 2nd floor [facility] Apartment with 12 steps to enter and has a stair lift as well.

## 2024-09-02 NOTE — PHYSICAL THERAPY INITIAL EVALUATION ADULT - ACTIVE RANGE OF MOTION EXAMINATION, REHAB EVAL
Except BLE AROM Hip and Knee Flexion/Extension limited due to weakness/bilateral upper extremity Active ROM was WFL (within functional limits)/bilateral  lower extremity Active ROM was WFL (within functional limits)

## 2024-09-02 NOTE — DISCHARGE NOTE PROVIDER - DETAILS OF MALNUTRITION DIAGNOSIS/DIAGNOSES
This patient has been assessed with a concern for Malnutrition and was treated during this hospitalization for the following Nutrition diagnosis/diagnoses:     -  08/31/2024: Morbid obesity (BMI > 40)

## 2024-09-02 NOTE — DISCHARGE NOTE PROVIDER - HOSPITAL COURSE
70 y/o female with a PMHx of anxiety, carotid stenosis, s/p CEA, HTN, fatty liver, Hiatal hernia with GERD, HLD, Hypothyroidism,  IBS, Morbid obesity, b/l  lymphedema  presented to the ED c/o b/l LE redness and swelling x 2 days. Pt saw vascular, Dr. Carrasquillo 2 days ago and was placed on Cipro with no improvement, was referred to ED for IV abxs.   Pt reports that has leg swelling chronically but got worse and more red, + pain L leg No  fevers. Pt reports had some chills and "  head cold" but did not check temp. Feels overall ok.     In ED: VS stable,. Labs  with no leukocytosis.  Elevated BS, and alk  phos, normal lactate  (30 Aug 2024 14:31)        ICU Vital Signs Last 24 Hrs  T(C): 36.6 (02 Sep 2024 07:27), Max: 36.7 (01 Sep 2024 21:21)  T(F): 97.9 (02 Sep 2024 07:27), Max: 98.1 (01 Sep 2024 21:21)  HR: 55 (02 Sep 2024 07:27) (54 - 60)  BP: 139/45 (02 Sep 2024 07:27) (137/56 - 154/52)  BP(mean): --  ABP: --  ABP(mean): --  RR: 18 (02 Sep 2024 07:27) (18 - 19)  SpO2: 99% (02 Sep 2024 07:27) (99% - 100%)    O2 Parameters below as of 02 Sep 2024 07:27  Patient On (Oxygen Delivery Method): room air          PHYSICAL EXAM:    Constitutional: NAD, awake and alert,   HEENT: PERR, EOMI, Normal Hearing, MMM  Neck: Soft and supple, No LAD, No JVD  Respiratory: Breath sounds are clear bilaterally, No wheezing, rales or rhonchi  Cardiovascular: S1 and S2, regular rate and rhythm, no Murmurs, gallops or rubs  Gastrointestinal: Bowel Sounds present, soft, nontender, nondistended, no guarding, no rebound  Extremities: No peripheral edema  Vascular: 2+ peripheral pulses  Neurological: A/O x 3, no focal deficits  Musculoskeletal: 5/5 strength b/l upper and lower extremities  Skin: erythema/warmth regressing        LABS: All Labs Reviewed:  RADIOLOGY/EKG: reviewed  · Assessment    70 y/o female with a PMHx of anxiety, carotid stenosis, s/p CEA, HTN, fatty liver, Hiatal hernia with GERD, HLD, Hypothyroidism,  IBS, Morbid obesity, b/l  lymphedema admitted  for:     1. LLE cellulitis. Baseline Chronic Lymphedema   BCX sent : NGTD  Vanco/CFTX. Vanco discontinues -> can rotate to po doxy soon to complete course at home  LE dopplers neg for DVT   C/w Ceftriaxone D#3-> rorate to doxy. D/W ID  cont lasix  D/W ID in detail    2. Obesity   Pt is interested in discussing diet   Nutrition eval   [facility]       3. CAD s/p L CEA   C/w ASA and plavix   C/w Lipitor       4. Anxiety  C/w xanax PRN       5. Hypothyroidism   C/w Levothyroxine      7. DVT PPX: Lovenox  SQ       70 y/o female with a PMHx of anxiety, carotid stenosis, s/p CEA, HTN, fatty liver, Hiatal hernia with GERD, HLD, Hypothyroidism,  IBS, Morbid obesity, b/l  lymphedema  presented to the ED c/o b/l LE redness and swelling x 2 days. Pt saw vascular, Dr. Carrasquillo 2 days ago and was placed on Cipro with no improvement, was referred to ED for IV abxs.   Pt reports that has leg swelling chronically but got worse and more red, + pain L leg No  fevers. Pt reports had some chills and "  head cold" but did not check temp. Feels overall ok.     In ED: VS stable,. Labs  with no leukocytosis.  Elevated BS, and alk  phos, normal lactate  (30 Aug 2024 14:31)      LABS: All Labs Reviewed:  RADIOLOGY/EKG: reviewed  · Assessment    70 y/o female with a PMHx of anxiety, carotid stenosis, s/p CEA, HTN, fatty liver, Hiatal hernia with GERD, HLD, Hypothyroidism,  IBS, Morbid obesity, b/l  lymphedema admitted  for:     1. LLE cellulitis. Baseline Chronic Lymphedema   BCX sent : NGTD  Vanco/CFTX. Vanco discontinues -> can rotate to po doxy soon to complete course at home  LE dopplers neg for DVT   C/w Ceftriaxone D#3-> switched to doxy.   cont lasix  D/W ID in detail    2. Obesity   Pt is interested in discussing diet   Nutrition eval   Consider referral to weight loss clinic at [place]       3. CAD s/p L CEA   C/w ASA and plavix   C/w Lipitor       4. Anxiety  C/w xanax PRN       5. Hypothyroidism   C/w Levothyroxine    patient stable for discharge to home on PO ab x

## 2024-09-03 PROCEDURE — 99232 SBSQ HOSP IP/OBS MODERATE 35: CPT

## 2024-09-03 RX ADMIN — Medication 75 MILLIGRAM(S): at 09:24

## 2024-09-03 RX ADMIN — Medication 81 MILLIGRAM(S): at 09:24

## 2024-09-03 RX ADMIN — Medication 100 MILLIGRAM(S): at 20:44

## 2024-09-03 RX ADMIN — Medication 20 MILLIGRAM(S): at 09:24

## 2024-09-03 RX ADMIN — Medication 50 MICROGRAM(S): at 05:13

## 2024-09-03 RX ADMIN — ENOXAPARIN SODIUM 40 MILLIGRAM(S): 100 INJECTION SUBCUTANEOUS at 20:44

## 2024-09-03 RX ADMIN — Medication 2000 MILLIGRAM(S): at 13:55

## 2024-09-03 RX ADMIN — Medication 100 MILLIGRAM(S): at 09:24

## 2024-09-03 RX ADMIN — Medication 80 MILLIGRAM(S): at 20:44

## 2024-09-03 RX ADMIN — Medication 40 MILLIGRAM(S): at 05:14

## 2024-09-04 ENCOUNTER — APPOINTMENT (OUTPATIENT)
Dept: SURGERY | Facility: CLINIC | Age: 69
End: 2024-09-04

## 2024-09-04 LAB
CULTURE RESULTS: SIGNIFICANT CHANGE UP
CULTURE RESULTS: SIGNIFICANT CHANGE UP
SPECIMEN SOURCE: SIGNIFICANT CHANGE UP
SPECIMEN SOURCE: SIGNIFICANT CHANGE UP

## 2024-09-04 PROCEDURE — 99232 SBSQ HOSP IP/OBS MODERATE 35: CPT

## 2024-09-04 RX ADMIN — Medication 20 MILLIGRAM(S): at 09:28

## 2024-09-04 RX ADMIN — Medication 80 MILLIGRAM(S): at 20:23

## 2024-09-04 RX ADMIN — Medication 100 MILLIGRAM(S): at 09:28

## 2024-09-04 RX ADMIN — Medication 50 MICROGRAM(S): at 04:59

## 2024-09-04 RX ADMIN — Medication 100 MILLIGRAM(S): at 20:23

## 2024-09-04 RX ADMIN — Medication 0.25 MILLIGRAM(S): at 09:43

## 2024-09-04 RX ADMIN — Medication 75 MILLIGRAM(S): at 09:28

## 2024-09-04 RX ADMIN — ENOXAPARIN SODIUM 40 MILLIGRAM(S): 100 INJECTION SUBCUTANEOUS at 20:24

## 2024-09-04 RX ADMIN — Medication 81 MILLIGRAM(S): at 09:28

## 2024-09-04 RX ADMIN — Medication 40 MILLIGRAM(S): at 04:58

## 2024-09-04 NOTE — PROGRESS NOTE ADULT - SUBJECTIVE AND OBJECTIVE BOX
68 y/o female with a PMHx of anxiety, carotid stenosis, s/p CEA, HTN, fatty liver, Hiatal hernia with GERD, HLD, Hypothyroidism,  IBS, Morbid obesity, b/l  lymphedema  presented to the ED c/o b/l LE redness and swelling x 2 days. Pt saw vascular, Dr. Carrasquillo 2 days ago and was placed on Cipro with no improvement, was referred to ED for IV abxs.   Pt reports that has leg swelling chronically but got worse and more red, + pain L leg No  fevers. Pt reports had some chills and "  head cold" but did not check temp. Feels overall ok.     In ED: VS stable,. Labs  with no leukocytosis.  Elevated BS, and alk  phos, normal lactate  (30 Aug 2024 14:31)      sub:         ICU Vital Signs Last 24 Hrs  T(C): 36.5 (01 Sep 2024 07:37), Max: 36.7 (31 Aug 2024 15:20)  T(F): 97.7 (01 Sep 2024 07:37), Max: 98 (31 Aug 2024 15:20)  HR: 56 (01 Sep 2024 07:37) (56 - 61)  BP: 141/39 (01 Sep 2024 07:37) (120/47 - 141/39)  BP(mean): --  ABP: --  ABP(mean): --  RR: 18 (01 Sep 2024 07:37) (18 - 19)  SpO2: 97% (01 Sep 2024 07:37) (97% - 100%)    O2 Parameters below as of 01 Sep 2024 07:37  Patient On (Oxygen Delivery Method): room air          PHYSICAL EXAM:    Constitutional: NAD, awake and alert,   HEENT: PERR, EOMI, Normal Hearing, MMM  Neck: Soft and supple, No LAD, No JVD  Respiratory: Breath sounds are clear bilaterally, No wheezing, rales or rhonchi  Cardiovascular: S1 and S2, regular rate and rhythm, no Murmurs, gallops or rubs  Gastrointestinal: Bowel Sounds present, soft, nontender, nondistended, no guarding, no rebound  Extremities: No peripheral edema  Vascular: 2+ peripheral pulses  Neurological: A/O x 3, no focal deficits  Musculoskeletal: 5/5 strength b/l upper and lower extremities  Skin: erythema/warmth regressing        LABS: All Labs Reviewed:                        11.7   7.93  )-----------( 254      ( 31 Aug 2024 07:36 )             36.5     08-31    143  |  111<H>  |  15  ----------------------------<  121<H>  3.9   |  27  |  0.72    Ca    9.3      31 Aug 2024 07:36    TPro  6.3  /  Alb  2.7<L>  /  TBili  0.6  /  DBili  0.2  /  AST  30  /  ALT  39  /  AlkPhos  183<H>  08-31    PT/INR - ( 30 Aug 2024 11:11 )   PT: 11.3 sec;   INR: 1.00 ratio         PTT - ( 30 Aug 2024 11:11 )  PTT:32.9 sec          Blood Culture:     RADIOLOGY/EKG: reviewed        
  70 y/o female with a PMHx of anxiety, carotid stenosis, s/p CEA, HTN, fatty liver, Hiatal hernia with GERD, HLD, Hypothyroidism,  IBS, Morbid obesity, b/l  lymphedema  presented to the ED c/o b/l LE redness and swelling x 2 days. Pt saw vascular, Dr. Carrasquillo 2 days ago and was placed on Cipro with no improvement, was referred to ED for IV abxs.   Pt reports that has leg swelling chronically but got worse and more red, + pain L leg No  fevers. Pt reports had some chills and "  head cold" but did not check temp. Feels overall ok.     In ED: VS stable,. Labs  with no leukocytosis.  Elevated BS, and alk  phos, normal lactate  (30 Aug 2024 14:31)          ICU Vital Signs Last 24 Hrs  T(C): 36.7 (31 Aug 2024 08:33), Max: 36.7 (31 Aug 2024 08:33)  T(F): 98 (31 Aug 2024 08:33), Max: 98 (31 Aug 2024 08:33)  HR: 59 (31 Aug 2024 08:33) (59 - 71)  BP: 110/33 (31 Aug 2024 08:33) (110/33 - 156/71)  BP(mean): 86 (30 Aug 2024 20:25) (86 - 91)  ABP: --  ABP(mean): --  RR: 18 (31 Aug 2024 08:33) (18 - 20)  SpO2: 100% (31 Aug 2024 08:33) (100% - 100%)    O2 Parameters below as of 31 Aug 2024 08:33  Patient On (Oxygen Delivery Method): room air            PHYSICAL EXAM:    Constitutional: NAD, awake and alert,   HEENT: PERR, EOMI, Normal Hearing, MMM  Neck: Soft and supple, No LAD, No JVD  Respiratory: Breath sounds are clear bilaterally, No wheezing, rales or rhonchi  Cardiovascular: S1 and S2, regular rate and rhythm, no Murmurs, gallops or rubs  Gastrointestinal: Bowel Sounds present, soft, nontender, nondistended, no guarding, no rebound  Extremities: No peripheral edema  Vascular: 2+ peripheral pulses  Neurological: A/O x 3, no focal deficits  Musculoskeletal: 5/5 strength b/l upper and lower extremities  Skin: No rashes        LABS: All Labs Reviewed:                        11.7   7.93  )-----------( 254      ( 31 Aug 2024 07:36 )             36.5     08-31    143  |  111<H>  |  15  ----------------------------<  121<H>  3.9   |  27  |  0.72    Ca    9.3      31 Aug 2024 07:36    TPro  6.3  /  Alb  2.7<L>  /  TBili  0.6  /  DBili  0.2  /  AST  30  /  ALT  39  /  AlkPhos  183<H>  08-31    PT/INR - ( 30 Aug 2024 11:11 )   PT: 11.3 sec;   INR: 1.00 ratio         PTT - ( 30 Aug 2024 11:11 )  PTT:32.9 sec          Blood Culture:     RADIOLOGY/EKG: reviewed        
HOSPITALIST PROGRESS NOTE    SUBJECTIVE / INTERVAL HPI: Patient seen and examined at bedside. No new complaints. Patient feels nervous about going home because her legs don't appear any better. I reassured patient that she has no signs of symptoms of worsening infection.     ROS: All 10 systems reviewed and found to be negative with the exception of what has been described above.     VITAL SIGNS:  Vital Signs Last 24 Hrs  T(C): 36.6 (03 Sep 2024 15:03), Max: 36.6 (03 Sep 2024 14:51)  T(F): 97.8 (03 Sep 2024 15:03), Max: 97.8 (03 Sep 2024 14:51)  HR: 57 (03 Sep 2024 15:03) (56 - 74)  BP: 136/82 (03 Sep 2024 15:03) (132/53 - 150/48)  BP(mean): --  RR: 18 (03 Sep 2024 15:03) (18 - 18)  SpO2: 100% (03 Sep 2024 15:03) (98% - 100%)    Parameters below as of 03 Sep 2024 15:03  Patient On (Oxygen Delivery Method): room air      PHYSICAL EXAM:  General: No acute distress  HEENT: NC/AT; PERRL, anicteric sclera; MMM  Neck: Supple  Cardiovascular: +S1/S2, RRR, no murmurs, rubs, gallops  Respiratory: CTA B/L; no W/R/R  Gastrointestinal: soft, NT/ND; +BSx4  Extremities: Chronic LE lymphedema   Vascular: 2+ radial, DP/PT pulses B/L  Neurological: AAOx3; no focal deficits    MEDICATIONS:  MEDICATIONS  (STANDING):  aspirin enteric coated 81 milliGRAM(s) Oral daily  atorvastatin 80 milliGRAM(s) Oral at bedtime  cefTRIAXone Injectable. 2000 milliGRAM(s) IV Push every 24 hours  clopidogrel Tablet 75 milliGRAM(s) Oral daily  doxycycline monohydrate Capsule 100 milliGRAM(s) Oral every 12 hours  enoxaparin Injectable 40 milliGRAM(s) SubCutaneous every 24 hours  furosemide    Tablet 20 milliGRAM(s) Oral daily  levothyroxine 50 MICROGram(s) Oral daily  pantoprazole    Tablet 40 milliGRAM(s) Oral before breakfast    MEDICATIONS  (PRN):  acetaminophen     Tablet .. 650 milliGRAM(s) Oral every 6 hours PRN Temp greater or equal to 38C (100.4F), Mild Pain (1 - 3)  ALPRAZolam 0.25 milliGRAM(s) Oral two times a day PRN for anxiety  aluminum hydroxide/magnesium hydroxide/simethicone Suspension 30 milliLiter(s) Oral every 4 hours PRN Dyspepsia  melatonin 3 milliGRAM(s) Oral at bedtime PRN Insomnia  ondansetron Injectable 4 milliGRAM(s) IV Push every 8 hours PRN Nausea and/or Vomiting  oxymetazoline 0.05% Nasal Spray 1 Spray(s) Alternating Nostrils every 12 hours PRN Congestion  phenylephrine 0.5% Nasal 2 Spray(s) Nasal every 4 hours PRN Nasal Congestion  traMADol 25 milliGRAM(s) Oral every 6 hours PRN Moderate Pain (4 - 6)      ALLERGIES:  Allergies    No Known Allergies    Intolerances    cefadroxil (Diarrhea)      LABS:              CAPILLARY BLOOD GLUCOSE            RADIOLOGY & ADDITIONAL TESTS: Reviewed.
Date of service: 09-02-24 @ 11:26    pt seen and examined  LEs less redness  has swelling   no fevers     ROS: no fever or chills; denies dizziness, no HA, no SOB or cough, no abdominal pain, no diarrhea or constipation; no dysuria, no urinary frequency    MEDICATIONS  (STANDING):  aspirin enteric coated 81 milliGRAM(s) Oral daily  atorvastatin 80 milliGRAM(s) Oral at bedtime  cefTRIAXone Injectable. 2000 milliGRAM(s) IV Push every 24 hours  clopidogrel Tablet 75 milliGRAM(s) Oral daily  doxycycline monohydrate Capsule 100 milliGRAM(s) Oral every 12 hours  enoxaparin Injectable 40 milliGRAM(s) SubCutaneous every 24 hours  furosemide    Tablet 20 milliGRAM(s) Oral daily  levothyroxine 50 MICROGram(s) Oral daily  pantoprazole    Tablet 40 milliGRAM(s) Oral before breakfast    Vital Signs Last 24 Hrs  T(C): 36.6 (02 Sep 2024 07:27), Max: 36.7 (01 Sep 2024 21:21)  T(F): 97.9 (02 Sep 2024 07:27), Max: 98.1 (01 Sep 2024 21:21)  HR: 55 (02 Sep 2024 07:27) (54 - 60)  BP: 139/45 (02 Sep 2024 07:27) (137/56 - 154/52)  BP(mean): --  RR: 18 (02 Sep 2024 07:27) (18 - 19)  SpO2: 99% (02 Sep 2024 07:27) (99% - 100%)    Parameters below as of 02 Sep 2024 07:27  Patient On (Oxygen Delivery Method): room air      PE:  Constitutional: NAD  HEENT: NC/AT, EOMI, PERRLA, conjunctivae clear; ears and nose atraumatic; pharynx benign  Neck: supple; thyroid not palpable  Back: no tenderness  Respiratory: respiratory effort normal; clear to auscultation  Cardiovascular: S1S2 regular, no murmurs  Abdomen: soft, not tender, not distended, positive BS; liver and spleen WNL  Genitourinary: no suprapubic tenderness  Lymphatic: no LN palpable  Musculoskeletal: no muscle tenderness, no joint swelling or tenderness  Extremities: no pedal edema b/l LE stasis dermatitis, resolved erythema warmth   Neurological/ Psychiatric: AxOx3, Judgement and insight normal;  moving all extremities  Skin: no rashes; no palpable lesions    Labs: all available labs reviewed        Urinalysis Basic - ( 31 Aug 2024 07:36 )    Color: x / Appearance: x / SG: x / pH: x  Gluc: 121 mg/dL / Ketone: x  / Bili: x / Urobili: x   Blood: x / Protein: x / Nitrite: x   Leuk Esterase: x / RBC: x / WBC x   Sq Epi: x / Non Sq Epi: x / Bacteria: x      Culture - Blood (08.30.24 @ 11:11)   Specimen Source: .Blood None  Culture Results:   No growth at 24 hours  Culture - Blood (08.30.24 @ 11:11)   Specimen Source: .Blood None  Culture Results:   No growth at 24 hours  Culture - Urine (08.30.24 @ 12:57)   Specimen Source: .Urine None  Culture Results:   <10,000 CFU/mL Normal Urogenital Vikki  Radiology: all available radiological tests reviewed    IMPRESSION:  Technically limited exam.    No evidence of deep venous thrombosis in either lower extremity.    Calf veins not visualized due to edema. Consider repeat evaluation in   7-10 days if clinical concern persists.        Advanced directives addressed: full resuscitation
Patient is a 69y old  Female who presents with a chief complaint of leg edema and pain (03 Sep 2024 16:47)      INTERVAL HPI/OVERNIGHT EVENTS:  Patient seen and examined at bedside. No new complaints. Discussed in detail with the patient, LE edema is chronic, wants compression stocking for home, will dc tomorrow     ROS: All 10 systems reviewed and found to be negative with the exception of what has been described above.       MEDICATIONS  (STANDING):  aspirin enteric coated 81 milliGRAM(s) Oral daily  atorvastatin 80 milliGRAM(s) Oral at bedtime  clopidogrel Tablet 75 milliGRAM(s) Oral daily  doxycycline monohydrate Capsule 100 milliGRAM(s) Oral every 12 hours  enoxaparin Injectable 40 milliGRAM(s) SubCutaneous every 24 hours  furosemide    Tablet 20 milliGRAM(s) Oral daily  levothyroxine 50 MICROGram(s) Oral daily  pantoprazole    Tablet 40 milliGRAM(s) Oral before breakfast    MEDICATIONS  (PRN):  acetaminophen     Tablet .. 650 milliGRAM(s) Oral every 6 hours PRN Temp greater or equal to 38C (100.4F), Mild Pain (1 - 3)  ALPRAZolam 0.25 milliGRAM(s) Oral two times a day PRN for anxiety  aluminum hydroxide/magnesium hydroxide/simethicone Suspension 30 milliLiter(s) Oral every 4 hours PRN Dyspepsia  melatonin 3 milliGRAM(s) Oral at bedtime PRN Insomnia  ondansetron Injectable 4 milliGRAM(s) IV Push every 8 hours PRN Nausea and/or Vomiting  oxymetazoline 0.05% Nasal Spray 1 Spray(s) Alternating Nostrils every 12 hours PRN Congestion  phenylephrine 0.5% Nasal 2 Spray(s) Nasal every 4 hours PRN Nasal Congestion  traMADol 25 milliGRAM(s) Oral every 6 hours PRN Moderate Pain (4 - 6)      Allergies    No Known Allergies    Intolerances    cefadroxil (Diarrhea)      Vital Signs Last 24 Hrs  T(C): 36.6 (04 Sep 2024 15:13), Max: 36.7 (04 Sep 2024 07:44)  T(F): 97.9 (04 Sep 2024 15:13), Max: 98 (04 Sep 2024 07:44)  HR: 64 (04 Sep 2024 15:13) (60 - 64)  BP: 118/49 (04 Sep 2024 15:13) (118/49 - 139/49)  BP(mean): --  RR: 18 (04 Sep 2024 15:13) (18 - 18)  SpO2: 98% (04 Sep 2024 15:13) (97% - 98%)    Parameters below as of 04 Sep 2024 15:13  Patient On (Oxygen Delivery Method): room air        PHYSICAL EXAM:  GENERAL: NAD  HEENT:  anicteric, moist mucous membranes  CHEST/LUNG:  CTA b/l, no rales, wheezes, or rhonchi  HEART:  RRR, S1, S2  ABDOMEN:  BS+, soft, nontender, nondistended  EXTREMITIES: Chronic LE lymphedema   NERVOUS SYSTEM: answers questions and follows commands appropriately    LABS:      CAPILLARY BLOOD GLUCOSE      Urinalysis with Rflx Culture (collected 08-30-24 @ 12:57)    Culture - Urine (collected 08-30-24 @ 12:57)  Source: .Urine None  Final Report (09-01-24 @ 09:27):    <10,000 CFU/mL Normal Urogenital Vikki    Culture - Blood (collected 08-30-24 @ 11:11)  Source: .Blood None  Final Report (09-04-24 @ 15:00):    No growth at 5 days    Culture - Blood (collected 08-30-24 @ 11:11)  Source: .Blood None  Final Report (09-04-24 @ 15:00):    No growth at 5 days        RADIOLOGY & ADDITIONAL TESTS:    Personally reviewed.     Consultant(s) Notes Reviewed:  [x] YES  [ ] NO    
Date of service: 09-01-24 @ 12:37    pt seen and examined  LEs swollen less redness  no fevers     ROS: no fever or chills; denies dizziness, no HA, no SOB or cough, no abdominal pain, no diarrhea or constipation; no dysuria, no urinary frequency    MEDICATIONS  (STANDING):  aspirin enteric coated 81 milliGRAM(s) Oral daily  atorvastatin 80 milliGRAM(s) Oral at bedtime  cefTRIAXone Injectable. 2000 milliGRAM(s) IV Push every 24 hours  clopidogrel Tablet 75 milliGRAM(s) Oral daily  doxycycline monohydrate Capsule 100 milliGRAM(s) Oral every 12 hours  enoxaparin Injectable 40 milliGRAM(s) SubCutaneous every 24 hours  furosemide    Tablet 20 milliGRAM(s) Oral daily  levothyroxine 50 MICROGram(s) Oral daily  pantoprazole    Tablet 40 milliGRAM(s) Oral before breakfast    Vital Signs Last 24 Hrs  T(C): 36.5 (01 Sep 2024 07:37), Max: 36.7 (31 Aug 2024 15:20)  T(F): 97.7 (01 Sep 2024 07:37), Max: 98 (31 Aug 2024 15:20)  HR: 56 (01 Sep 2024 07:37) (56 - 61)  BP: 141/39 (01 Sep 2024 07:37) (120/47 - 141/39)  BP(mean): --  RR: 18 (01 Sep 2024 07:37) (18 - 19)  SpO2: 97% (01 Sep 2024 07:37) (97% - 100%)    Parameters below as of 01 Sep 2024 07:37  Patient On (Oxygen Delivery Method): room air      PE:  Constitutional: NAD  HEENT: NC/AT, EOMI, PERRLA, conjunctivae clear; ears and nose atraumatic; pharynx benign  Neck: supple; thyroid not palpable  Back: no tenderness  Respiratory: respiratory effort normal; clear to auscultation  Cardiovascular: S1S2 regular, no murmurs  Abdomen: soft, not tender, not distended, positive BS; liver and spleen WNL  Genitourinary: no suprapubic tenderness  Lymphatic: no LN palpable  Musculoskeletal: no muscle tenderness, no joint swelling or tenderness  Extremities: no pedal edema b/l LE stasis dermatitis, erythema warmth   Neurological/ Psychiatric: AxOx3, Judgement and insight normal;  moving all extremities  Skin: no rashes; no palpable lesions    Labs: all available labs reviewed                                 11.7   7.93  )-----------( 254      ( 31 Aug 2024 07:36 )             36.5     08-31    143  |  111<H>  |  15  ----------------------------<  121<H>  3.9   |  27  |  0.72    Ca    9.3      31 Aug 2024 07:36    TPro  6.3  /  Alb  2.7<L>  /  TBili  0.6  /  DBili  0.2  /  AST  30  /  ALT  39  /  AlkPhos  183<H>  08-31        Urinalysis Basic - ( 31 Aug 2024 07:36 )    Color: x / Appearance: x / SG: x / pH: x  Gluc: 121 mg/dL / Ketone: x  / Bili: x / Urobili: x   Blood: x / Protein: x / Nitrite: x   Leuk Esterase: x / RBC: x / WBC x   Sq Epi: x / Non Sq Epi: x / Bacteria: x      Culture - Blood (08.30.24 @ 11:11)   Specimen Source: .Blood None  Culture Results:   No growth at 24 hours  Culture - Blood (08.30.24 @ 11:11)   Specimen Source: .Blood None  Culture Results:   No growth at 24 hours  Culture - Urine (08.30.24 @ 12:57)   Specimen Source: .Urine None  Culture Results:   <10,000 CFU/mL Normal Urogenital Vikki  Radiology: all available radiological tests reviewed    IMPRESSION:  Technically limited exam.    No evidence of deep venous thrombosis in either lower extremity.    Calf veins not visualized due to edema. Consider repeat evaluation in   7-10 days if clinical concern persists.        Advanced directives addressed: full resuscitation

## 2024-09-04 NOTE — PROGRESS NOTE ADULT - ASSESSMENT
70 y/o female with a PMHx of anxiety, carotid stenosis, s/p CEA, HTN, fatty liver, Hiatal hernia with GERD, HLD, Hypothyroidism,  IBS, Morbid obesity, b/l  lymphedema admitted  for:     1. LLE cellulitis. Baseline Chronic Lymphedema   BCX sent : NGTD  LE dopplers neg for DVT   s/p Ceftriaxone. Continue Doxy for 7 days total   D/W ID in detail    2. Obesity   Pt is interested in discussing diet   Nutrition eval   Consider referral to weight loss clinic at [place]     3. CAD s/p L CEA   C/w ASA and plavix   C/w Lipitor     4. Anxiety  C/w xanax PRN     5. Hypothyroidism   C/w Levothyroxine    7. DVT PPX: Lovenox  SQ      DISPO: Stable for discharge home. Patient appealed discharge 
68 y/o female with a PMHx of anxiety, carotid stenosis, s/p CEA, HTN, fatty liver, Hiatal hernia with GERD, HLD, Hypothyroidism,  IBS, Morbid obesity, b/l  lymphedema  presented to the ED c/o b/l LE redness and swelling x 2 days. Pt saw vascular, Dr. Carrasquillo 2 days ago and was placed on Cipro with no improvement, was referred to ED for IV abxs.   Pt reports that has leg swelling chronically but got worse and more red, + pain L leg No fevers. Pt reports had some chills and "  head cold" but did not check temp. Feels overall ok.  In ED: VS stable. Labs with no leukocytosis.  Elevated BS, and alk  phos, normal lactate.     1. Bilateral LE Stasis dermatitis. Superimposed cellulitis. Venous insufficiency. Morbid obesity  - imaging reviewed  - f/u cultures - no growth   - s/p vancomycin [id number] #2 on po doxycycline 100mg BID #2   - on rocephin 2gm daily #4  - continue with antibiotic coverage  - monitor temps  - tolerating abx well so far; no side effects noted  - reason for abx use and side effects reviewed with patient  - supportive care  - fu cbc    Clinical team may change from intravenous to oral antibiotics when the following criteria are met:   1. Patient is clinically improving/stable       a)	Improved signs and symptoms of infection from initial presentation       b)	Afebrile for 24 hours       c)	Leukocytosis trending towards normal range   2. Patient is tolerating oral intake   3. Initial/repeat blood cultures are negative    When above criteria met, may change iv antibiotics to: po doxycycline 100mg BID x 7 day course       
70 y/o female with a PMHx of anxiety, carotid stenosis, s/p CEA, HTN, fatty liver, Hiatal hernia with GERD, HLD, Hypothyroidism,  IBS, Morbid obesity, b/l  lymphedema  presented to the ED c/o b/l LE redness and swelling x 2 days. Pt saw vascular, Dr. Carrasquillo 2 days ago and was placed on Cipro with no improvement, was referred to ED for IV abxs.   Pt reports that has leg swelling chronically but got worse and more red, + pain L leg No fevers. Pt reports had some chills and "  head cold" but did not check temp. Feels overall ok.  In ED: VS stable. Labs with no leukocytosis.  Elevated BS, and alk  phos, normal lactate.     1. Bilateral LE Stasis dermatitis. Superimposed cellulitis. Venous insufficiency. Morbid obesity  - imaging reviewed  - f/u cultures - no growth   - on vancomycin [id number] #[id number] - dc change to po doxycycline 100mg BID   - on rocephin 2gm daily #3  - continue with antibiotic coverage  - monitor temps  - tolerating abx well so far; no side effects noted  - reason for abx use and side effects reviewed with patient  - supportive care  - fu cbc    Clinical team may change from intravenous to oral antibiotics when the following criteria are met:   1. Patient is clinically improving/stable       a)	Improved signs and symptoms of infection from initial presentation       b)	Afebrile for 24 hours       c)	Leukocytosis trending towards normal range   2. Patient is tolerating oral intake   3. Initial/repeat blood cultures are negative    When above criteria met, may change iv antibiotics to: po doxycycline 100mg BID x 7 day course       
70 y/o female with a PMHx of anxiety, carotid stenosis, s/p CEA, HTN, fatty liver, Hiatal hernia with GERD, HLD, Hypothyroidism,  IBS, Morbid obesity, b/l  lymphedema admitted  for:     1. LLE cellulitis. Baseline Chronic Lymphedema   BCX sent : NGTD  Vanco/CFTX. Vanco discontinues -> can rotate to po doxy soon to complete course at home  LE dopplers neg for DVT   C/w Ceftriaxone  cont lasix  D/W ID in detail    2. Obesity   Pt is interested in discussing diet   Nutrition eval   Consider referral to weight loss clinic at [place]       3. CAD s/p L CEA   C/w ASA and plavix   C/w Lipitor       4. Anxiety  C/w xanax PRN       5. Hypothyroidism   C/w Levothyroxine      7. DVT PPX: Lovenox  SQ      
70 y/o female with a PMHx of anxiety, carotid stenosis, s/p CEA, HTN, fatty liver, Hiatal hernia with GERD, HLD, Hypothyroidism,  IBS, Morbid obesity, b/l  lymphedema admitted  for:     1. LLE cellulitis. Baseline Chronic Lymphedema   BCX sent : NGTD  Vanco/CFTX  LE dopplers neg for DVT   C/w Ceftriaxone  Add lasix  D/W ID in detail    2. Obesity   Pt is interested in discussing diet   Nutrition eval   Consider referral to weight loss clinic at [place]       3. CAD s/p L CEA   C/w ASA and plavix   C/w Lipitor       4. Anxiety  C/w xanax PRN       5. Hypothyroidism   C/w Levothyroxine      7. DVT PPX: Lovenox  SQ      
70 y/o female with a PMHx of anxiety, carotid stenosis, s/p CEA, HTN, fatty liver, Hiatal hernia with GERD, HLD, Hypothyroidism,  IBS, Morbid obesity, b/l  lymphedema admitted  for:     1. LLE cellulitis. Baseline Chronic Lymphedema   BCX : NGTD  LE dopplers neg for DVT   s/p Ceftriaxone. Continue Doxy for 7 days total       2. Obesity   Pt is interested in discussing diet   Nutrition eval   Consider referral to weight loss clinic at [place]     3. CAD s/p L CEA   C/w ASA and plavix   C/w Lipitor     4. Anxiety  C/w xanax PRN     5. Hypothyroidism   C/w Levothyroxine    7. DVT PPX: Lovenox  SQ      DISPO: Stable for discharge home in am

## 2024-09-04 NOTE — PROGRESS NOTE ADULT - NUTRITIONAL ASSESSMENT
This patient has been assessed with a concern for Malnutrition and has been determined to have a diagnosis/diagnoses of Morbid obesity (BMI > 40).    This patient is being managed with:   Diet Consistent Carbohydrate w/Evening Snack-  Entered: Aug 30 2024  2:48PM  

## 2024-09-04 NOTE — PROGRESS NOTE ADULT - REASON FOR ADMISSION
leg edema and pain

## 2024-09-05 ENCOUNTER — TRANSCRIPTION ENCOUNTER (OUTPATIENT)
Age: 69
End: 2024-09-05

## 2024-09-05 VITALS
OXYGEN SATURATION: 96 % | HEART RATE: 61 BPM | DIASTOLIC BLOOD PRESSURE: 56 MMHG | SYSTOLIC BLOOD PRESSURE: 147 MMHG | TEMPERATURE: 97 F | RESPIRATION RATE: 18 BRPM

## 2024-09-05 PROBLEM — I89.0 LYMPHEDEMA, NOT ELSEWHERE CLASSIFIED: Chronic | Status: ACTIVE | Noted: 2024-08-30

## 2024-09-05 LAB
ANION GAP SERPL CALC-SCNC: 7 MMOL/L — SIGNIFICANT CHANGE UP (ref 5–17)
BUN SERPL-MCNC: 28 MG/DL — HIGH (ref 7–23)
CALCIUM SERPL-MCNC: 9.8 MG/DL — SIGNIFICANT CHANGE UP (ref 8.5–10.1)
CHLORIDE SERPL-SCNC: 107 MMOL/L — SIGNIFICANT CHANGE UP (ref 96–108)
CO2 SERPL-SCNC: 27 MMOL/L — SIGNIFICANT CHANGE UP (ref 22–31)
CREAT SERPL-MCNC: 0.78 MG/DL — SIGNIFICANT CHANGE UP (ref 0.5–1.3)
EGFR: 83 ML/MIN/1.73M2 — SIGNIFICANT CHANGE UP
GLUCOSE SERPL-MCNC: 105 MG/DL — HIGH (ref 70–99)
HCT VFR BLD CALC: 40.1 % — SIGNIFICANT CHANGE UP (ref 34.5–45)
HGB BLD-MCNC: 12.9 G/DL — SIGNIFICANT CHANGE UP (ref 11.5–15.5)
MCHC RBC-ENTMCNC: 31.1 PG — SIGNIFICANT CHANGE UP (ref 27–34)
MCHC RBC-ENTMCNC: 32.2 GM/DL — SIGNIFICANT CHANGE UP (ref 32–36)
MCV RBC AUTO: 96.6 FL — SIGNIFICANT CHANGE UP (ref 80–100)
PLATELET # BLD AUTO: 294 K/UL — SIGNIFICANT CHANGE UP (ref 150–400)
POTASSIUM SERPL-MCNC: 3.9 MMOL/L — SIGNIFICANT CHANGE UP (ref 3.5–5.3)
POTASSIUM SERPL-SCNC: 3.9 MMOL/L — SIGNIFICANT CHANGE UP (ref 3.5–5.3)
RBC # BLD: 4.15 M/UL — SIGNIFICANT CHANGE UP (ref 3.8–5.2)
RBC # FLD: 15.4 % — HIGH (ref 10.3–14.5)
SODIUM SERPL-SCNC: 141 MMOL/L — SIGNIFICANT CHANGE UP (ref 135–145)
WBC # BLD: 8.7 K/UL — SIGNIFICANT CHANGE UP (ref 3.8–10.5)
WBC # FLD AUTO: 8.7 K/UL — SIGNIFICANT CHANGE UP (ref 3.8–10.5)

## 2024-09-05 PROCEDURE — 99239 HOSP IP/OBS DSCHRG MGMT >30: CPT

## 2024-09-05 RX ADMIN — Medication 40 MILLIGRAM(S): at 04:59

## 2024-09-05 RX ADMIN — Medication 50 MICROGRAM(S): at 04:59

## 2024-09-05 NOTE — DISCHARGE NOTE NURSING/CASE MANAGEMENT/SOCIAL WORK - PATIENT PORTAL LINK FT
You can access the FollowMyHealth Patient Portal offered by [facility] by registering at the following website: [email or web address]. By joining [employer]’s FollowMyHealth portal, you will also be able to view your health information using other applications (apps) compatible with our system.

## 2024-09-06 ENCOUNTER — APPOINTMENT (OUTPATIENT)
Dept: SURGERY | Facility: CLINIC | Age: 69
End: 2024-09-06
Payer: MEDICARE

## 2024-09-06 DIAGNOSIS — L03.90 CELLULITIS, UNSPECIFIED: ICD-10-CM

## 2024-09-06 PROCEDURE — 99212 OFFICE O/P EST SF 10 MIN: CPT

## 2024-09-06 NOTE — HISTORY OF PRESENT ILLNESS
[de-identified] : 68 yo female was recently hospitalized at [facility] for cellulitis. No fever. Legs are better.

## 2024-09-06 NOTE — PHYSICAL EXAM
[JVD] : no jugular venous distention  [Normal Breath Sounds] : Normal breath sounds [Normal Heart Sounds] : normal heart sounds [Skin Ulcer] : no ulcer [Calm] : calm [de-identified] : obese [de-identified] : chronic venous insufficiency changes

## 2024-09-09 ENCOUNTER — APPOINTMENT (OUTPATIENT)
Dept: SURGERY | Facility: CLINIC | Age: 69
End: 2024-09-09
Payer: MEDICARE

## 2024-09-09 PROCEDURE — 29580 STRAPPING UNNA BOOT: CPT | Mod: 50

## 2024-09-10 NOTE — ED ADULT NURSE NOTE - CHIEF COMPLAINT
"  [facility] -  Gynecology / Women's Health Clinic    Subjective:       Patient ID: Antoine Banegas is a 52 y.o. female.    Chief Complaint:  Gynecologic Exam    History of Present Illness  The patient  here for annual exam. Pt had partial hysterectomy in 2017 for fibroids. MG-23 & BIRADS 2. Denies breast or urinary complaints. Denies pelvic pain, abnormal bleeding or discharge. Pt was treated for syphilis with Bicilin x3 per ID clinic. Pt c/o vaginal itching/rash x1 week. Admits to recently using a new oatmeal soap and Dove scented soap. Used OTC feminine cream. 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 Admits tobacco use. Dep. screening 0. Denies fly hx of breast, ovarian, uterine or colon cancer. Pt currently followed by oncology/surgery for anal cancer. DEXA WNL in .    GYN & OB History  No LMP recorded (lmp unknown). Patient has had a hysterectomy.       Review of patient's allergies indicates:  No Known Allergies  Past Medical History:   Diagnosis Date    Abnormal Pap smear of cervix     Anxiety     Cancer     Circulatory anomaly     Hyperlipidemia      OB History    Para Term  AB Living   1 1           SAB IAB Ectopic Multiple Live Births                  # Outcome Date GA Lbr Jonny/2nd Weight Sex Type Anes PTL Lv   1 Para                 Review of Systems  Review of Systems    Negative except for pertinent findings for positives per HPI     Objective:    Physical Exam    /86 (BP Location: Right arm, Patient Position: Sitting, BP Method: Medium (Automatic))   Pulse 88   Temp 98.7 °F (37.1 °C) (Oral)   Resp 20   Ht 5' 6" (1.676 m)   Wt 102 kg (224 lb 12.8 oz)   LMP  (LMP Unknown)   SpO2 97%   BMI 36.28 kg/m²   GENERAL: Well-developed female in no acute distress.  SKIN: Normal to inspection,warm, dry and intact.  BREASTS: No rashes or erythema. No masses, lumps, discharge, tenderness.  VULVA: General appearance " WNL; external genitalia with multiple excoriations to lashell labia majora and clitoral montes de oca  PSYCHIATRIC: Patient is oriented to person, place, and time. Mood and affect are normal.    Bimanual: Declined d/t comfort  Assessment:         ICD-10-CM ICD-9-CM   1. Encounter for annual routine gynecological examination  Z01.419 V72.31   2. Vaginal irritation  N89.8 623.9   3. Visit for screening mammogram  Z12.31 V76.12   4. Tobacco use  Z72.0 305.1     Plan:   Antoine was seen today for gynecologic exam.    Diagnoses and all orders for this visit:    Encounter for annual routine gynecological examination    Vaginal irritation  -     Discontinue: clotrimazole-betamethasone 1-0.05% (LOTRISONE) cream; Apply topically 2 (two) times daily.  -     clotrimazole-betamethasone 1-0.05% (LOTRISONE) cream; Apply topically 2 (two) times daily. To external affected vaginal area    Visit for screening mammogram  -     Mammo Digital Screening Bilat w/ Carlos; Future    Tobacco use    External exam today, pelvic exam and med f/u in 1 week  MG ordered  Clotrimazole cream, Zyrtec for itching and ice/cold compresses  F/u in 1 week  Smoking cessation counseling provided. Instructed on smoking cessation program through [facility] and pharmacological interventions to aid in cessation.  Follow up in about 1 year (around 9/10/2025) for annual exam.     The patient is a 68y Female complaining of groin pain.

## 2024-09-11 ENCOUNTER — NON-APPOINTMENT (OUTPATIENT)
Age: 69
End: 2024-09-11

## 2024-09-11 ENCOUNTER — APPOINTMENT (OUTPATIENT)
Dept: SURGERY | Facility: CLINIC | Age: 69
End: 2024-09-11
Payer: MEDICARE

## 2024-09-11 DIAGNOSIS — I83.009 VARICOSE VEINS OF UNSPECIFIED LOWER EXTREMITY WITH ULCER OF UNSPECIFIED SITE: ICD-10-CM

## 2024-09-11 DIAGNOSIS — L97.909 VARICOSE VEINS OF UNSPECIFIED LOWER EXTREMITY WITH ULCER OF UNSPECIFIED SITE: ICD-10-CM

## 2024-09-11 PROCEDURE — 99212 OFFICE O/P EST SF 10 MIN: CPT

## 2024-09-11 NOTE — PHYSICAL EXAM
[JVD] : no jugular venous distention  [Normal Breath Sounds] : Normal breath sounds [Normal Heart Sounds] : normal heart sounds [Calm] : calm [de-identified] : soft, obese [de-identified] : wounds have closed - no d/c

## 2024-09-12 ENCOUNTER — NON-APPOINTMENT (OUTPATIENT)
Age: 69
End: 2024-09-12

## 2024-09-13 DIAGNOSIS — I25.10 ATHEROSCLEROTIC HEART DISEASE OF NATIVE CORONARY ARTERY WITHOUT ANGINA PECTORIS: ICD-10-CM

## 2024-09-13 DIAGNOSIS — Z79.02 LONG TERM (CURRENT) USE OF ANTITHROMBOTICS/ANTIPLATELETS: ICD-10-CM

## 2024-09-13 DIAGNOSIS — Z90.49 ACQUIRED ABSENCE OF OTHER SPECIFIED PARTS OF DIGESTIVE TRACT: ICD-10-CM

## 2024-09-13 DIAGNOSIS — K21.9 GASTRO-ESOPHAGEAL REFLUX DISEASE WITHOUT ESOPHAGITIS: ICD-10-CM

## 2024-09-13 DIAGNOSIS — K58.9 IRRITABLE BOWEL SYNDROME WITHOUT DIARRHEA: ICD-10-CM

## 2024-09-13 DIAGNOSIS — L03.116 CELLULITIS OF LEFT LOWER LIMB: ICD-10-CM

## 2024-09-13 DIAGNOSIS — E03.9 HYPOTHYROIDISM, UNSPECIFIED: ICD-10-CM

## 2024-09-13 DIAGNOSIS — I10 ESSENTIAL (PRIMARY) HYPERTENSION: ICD-10-CM

## 2024-09-13 DIAGNOSIS — E66.01 MORBID (SEVERE) OBESITY DUE TO EXCESS CALORIES: ICD-10-CM

## 2024-09-13 DIAGNOSIS — E78.5 HYPERLIPIDEMIA, UNSPECIFIED: ICD-10-CM

## 2024-09-13 DIAGNOSIS — Z79.890 HORMONE REPLACEMENT THERAPY: ICD-10-CM

## 2024-09-13 DIAGNOSIS — K44.9 DIAPHRAGMATIC HERNIA WITHOUT OBSTRUCTION OR GANGRENE: ICD-10-CM

## 2024-09-13 DIAGNOSIS — I65.22 OCCLUSION AND STENOSIS OF LEFT CAROTID ARTERY: ICD-10-CM

## 2024-09-13 DIAGNOSIS — I87.2 VENOUS INSUFFICIENCY (CHRONIC) (PERIPHERAL): ICD-10-CM

## 2024-09-13 DIAGNOSIS — R73.9 HYPERGLYCEMIA, UNSPECIFIED: ICD-10-CM

## 2024-09-13 DIAGNOSIS — I89.0 LYMPHEDEMA, NOT ELSEWHERE CLASSIFIED: ICD-10-CM

## 2024-09-13 DIAGNOSIS — K76.0 FATTY (CHANGE OF) LIVER, NOT ELSEWHERE CLASSIFIED: ICD-10-CM

## 2024-09-13 DIAGNOSIS — Z79.82 LONG TERM (CURRENT) USE OF ASPIRIN: ICD-10-CM

## 2024-09-16 ENCOUNTER — APPOINTMENT (OUTPATIENT)
Dept: SURGERY | Facility: CLINIC | Age: 69
End: 2024-09-16
Payer: MEDICARE

## 2024-09-16 VITALS
OXYGEN SATURATION: 98 % | DIASTOLIC BLOOD PRESSURE: 76 MMHG | RESPIRATION RATE: 18 BRPM | SYSTOLIC BLOOD PRESSURE: 158 MMHG | HEART RATE: 73 BPM

## 2024-09-16 PROCEDURE — 99212 OFFICE O/P EST SF 10 MIN: CPT

## 2024-09-16 NOTE — HISTORY OF PRESENT ILLNESS
[de-identified] : 68 yo with bilat LE swelling. Was at  for cellulitis. Was seen at Urgent care over the w/e, given Lasix. Has not taken it yet.

## 2024-09-16 NOTE — PHYSICAL EXAM
[JVD] : no jugular venous distention  [Normal Breath Sounds] : Normal breath sounds [Normal Heart Sounds] : normal heart sounds [Calm] : calm [de-identified] : soft [de-identified] : mild edema

## 2024-09-16 NOTE — ASSESSMENT
[FreeTextEntry1] : Cellulitis better. Still mild edema. Given Lasix - should take. F/U with Dr Abraham advised.

## 2024-09-22 ENCOUNTER — NON-APPOINTMENT (OUTPATIENT)
Age: 69
End: 2024-09-22

## 2024-09-23 ENCOUNTER — NON-APPOINTMENT (OUTPATIENT)
Age: 69
End: 2024-09-23

## 2024-09-25 ENCOUNTER — APPOINTMENT (OUTPATIENT)
Dept: SURGERY | Facility: CLINIC | Age: 69
End: 2024-09-25
Payer: MEDICARE

## 2024-09-25 DIAGNOSIS — L03.90 CELLULITIS, UNSPECIFIED: ICD-10-CM

## 2024-09-25 PROCEDURE — 99212 OFFICE O/P EST SF 10 MIN: CPT

## 2024-09-25 RX ORDER — CEPHALEXIN 500 MG/1
500 TABLET ORAL
Refills: 0 | Status: ACTIVE | COMMUNITY

## 2024-09-25 NOTE — PHYSICAL EXAM
[JVD] : no jugular venous distention  [Normal Breath Sounds] : Normal breath sounds [Normal Heart Sounds] : normal heart sounds [Calm] : calm [de-identified] : soft, obese [de-identified] : not red

## 2024-09-30 ENCOUNTER — APPOINTMENT (OUTPATIENT)
Dept: SURGERY | Facility: CLINIC | Age: 69
End: 2024-09-30
Payer: MEDICARE

## 2024-09-30 ENCOUNTER — APPOINTMENT (OUTPATIENT)
Dept: SURGERY | Facility: CLINIC | Age: 69
End: 2024-09-30

## 2024-09-30 PROCEDURE — 99212 OFFICE O/P EST SF 10 MIN: CPT

## 2024-09-30 NOTE — PLAN
[FreeTextEntry1] : Elevate legs. Wear support hose. Moisturize. F/U as needed. Total time > 12 minutes

## 2024-09-30 NOTE — HISTORY OF PRESENT ILLNESS
[de-identified] : 68 yo female with LE skin change. Recent cellulitis. No recurrence. No fever. Some dry skin.

## 2024-09-30 NOTE — PHYSICAL EXAM
[JVD] : no jugular venous distention  [Normal Breath Sounds] : Normal breath sounds [Normal Heart Sounds] : normal heart sounds [Calm] : calm [FreeTextEntry1] : chronic venous stasis changes [de-identified] : dry skin LE

## 2024-10-06 NOTE — REASON FOR VISIT
[Follow-Up: _____] : a [unfilled] follow-up visit [FreeTextEntry1] : patient had a Venous Duplex study today  No

## 2024-10-14 PROBLEM — Z01.419 ENCOUNTER FOR ANNUAL ROUTINE GYNECOLOGICAL EXAMINATION: Status: ACTIVE | Noted: 2024-10-14

## 2024-10-15 ENCOUNTER — APPOINTMENT (OUTPATIENT)
Dept: SURGERY | Facility: CLINIC | Age: 69
End: 2024-10-15
Payer: MEDICARE

## 2024-10-15 DIAGNOSIS — I87.2 VENOUS INSUFFICIENCY (CHRONIC) (PERIPHERAL): ICD-10-CM

## 2024-10-15 PROCEDURE — 99212 OFFICE O/P EST SF 10 MIN: CPT

## 2024-10-21 ENCOUNTER — APPOINTMENT (OUTPATIENT)
Dept: OBGYN | Facility: CLINIC | Age: 69
End: 2024-10-21

## 2024-10-21 DIAGNOSIS — Z01.419 ENCOUNTER FOR GYNECOLOGICAL EXAMINATION (GENERAL) (ROUTINE) W/OUT ABNORMAL FINDINGS: ICD-10-CM

## 2024-10-28 ENCOUNTER — APPOINTMENT (OUTPATIENT)
Dept: SURGERY | Facility: CLINIC | Age: 69
End: 2024-10-28
Payer: MEDICARE

## 2024-10-28 DIAGNOSIS — I87.2 VENOUS INSUFFICIENCY (CHRONIC) (PERIPHERAL): ICD-10-CM

## 2024-10-28 PROCEDURE — 99212 OFFICE O/P EST SF 10 MIN: CPT

## 2024-11-10 ENCOUNTER — NON-APPOINTMENT (OUTPATIENT)
Age: 69
End: 2024-11-10

## 2024-11-15 ENCOUNTER — EMERGENCY (EMERGENCY)
Facility: HOSPITAL | Age: 69
LOS: 0 days | Discharge: HOME | End: 2024-11-15
Attending: EMERGENCY MEDICINE
Payer: MEDICARE

## 2024-11-15 VITALS
RESPIRATION RATE: 18 BRPM | OXYGEN SATURATION: 100 % | SYSTOLIC BLOOD PRESSURE: 150 MMHG | HEART RATE: 70 BPM | TEMPERATURE: 98 F | DIASTOLIC BLOOD PRESSURE: 63 MMHG

## 2024-11-15 VITALS — HEIGHT: 66 IN

## 2024-11-15 DIAGNOSIS — R60.0 LOCALIZED EDEMA: ICD-10-CM

## 2024-11-15 DIAGNOSIS — E78.5 HYPERLIPIDEMIA, UNSPECIFIED: ICD-10-CM

## 2024-11-15 DIAGNOSIS — Z90.49 ACQUIRED ABSENCE OF OTHER SPECIFIED PARTS OF DIGESTIVE TRACT: Chronic | ICD-10-CM

## 2024-11-15 DIAGNOSIS — J06.9 ACUTE UPPER RESPIRATORY INFECTION, UNSPECIFIED: ICD-10-CM

## 2024-11-15 DIAGNOSIS — B97.89 OTHER VIRAL AGENTS AS THE CAUSE OF DISEASES CLASSIFIED ELSEWHERE: ICD-10-CM

## 2024-11-15 DIAGNOSIS — Z98.890 OTHER SPECIFIED POSTPROCEDURAL STATES: Chronic | ICD-10-CM

## 2024-11-15 DIAGNOSIS — I10 ESSENTIAL (PRIMARY) HYPERTENSION: ICD-10-CM

## 2024-11-15 DIAGNOSIS — R09.81 NASAL CONGESTION: ICD-10-CM

## 2024-11-15 DIAGNOSIS — S62.109A FRACTURE OF UNSPECIFIED CARPAL BONE, UNSPECIFIED WRIST, INITIAL ENCOUNTER FOR CLOSED FRACTURE: Chronic | ICD-10-CM

## 2024-11-15 PROCEDURE — 99282 EMERGENCY DEPT VISIT SF MDM: CPT

## 2024-11-15 PROCEDURE — 99283 EMERGENCY DEPT VISIT LOW MDM: CPT

## 2024-11-15 NOTE — ED ADULT NURSE NOTE - IN THE PAST 12 MONTHS HAVE YOU USED DRUGS OTHER THAN THOSE REQUIRED FOR MEDICAL REASON?
----- Message from Ashley Jurado DO sent at 2/26/2024 12:22 PM CST -----  Please inform patient that gonorrhea and chlamydia were negative/normal.  
Spoke with pt and relayed lab results.  Pt verbalized understanding.  
No

## 2024-11-15 NOTE — ED STATDOCS - OBJECTIVE STATEMENT
68 yo female with hx htn, hld, sent from [facility] [place]  patient states she went to urgent care for her nasal congestion. she has been using neosynephrine nasal drops  denies any headache  denies any chest pain or sob  patient sent from

## 2024-11-15 NOTE — ED STATDOCS - PATIENT PORTAL LINK FT
You can access the FollowMyHealth Patient Portal offered by [facility] by registering at the following website: [email or web address]. By joining [employer]’s FollowMyHealth portal, you will also be able to view your health information using other applications (apps) compatible with our system.

## 2024-11-15 NOTE — ED ADULT TRIAGE NOTE - CHIEF COMPLAINT QUOTE
States she has had cold symptoms x4 days. Went to urgent care and advised to come to ED for evaluation of HTN. Patient states hx of HTN, takes meds daily, states she had anxiety at urgent care and that might be why her BP was elevated. Despite what referral from urgent care states, patient denies symptoms of dizziness, blurry vision, headache. Denies CP/SOB. Only complaining of congestion. BEFAST negative. Self ambulated into ED with no distress noted.

## 2024-11-15 NOTE — ED STATDOCS - CLINICAL SUMMARY MEDICAL DECISION MAKING FREE TEXT BOX
asymptomatic HTN, can follow up with pmd  also d/w patient that the afrin will worsen BP, patient verbalized understanding and agrees with plan

## 2024-11-15 NOTE — ED ADULT NURSE NOTE - NSFALLUNIVINTERV_ED_ALL_ED
Bed/Stretcher in lowest position, wheels locked, appropriate side rails in place/Call bell, personal items and telephone in reach/Instruct patient to call for assistance before getting out of bed/chair/stretcher/Non-slip footwear applied when patient is off stretcher/[place] to call system/Physically safe environment - no spills, clutter or unnecessary equipment/Purposeful proactive rounding/Room/bathroom lighting operational, light cord in reach

## 2024-11-15 NOTE — ED STATDOCS - PHYSICAL EXAMINATION
Physical Exam:  Gen: NAD, non-toxic appearing, able to ambulate without assistance  Head: NCAT  HEENT: EOMI, PEERLA, normal conjunctiva, tongue midline, oral mucosa moist  Lung: CTAB, no respiratory distress, no wheezes/rhonchi/rales B/L, speaking in full sentences  CV: RRR, no murmurs, rubs or gallops, distal pulses 2+ b/l  Abd: soft, nontender, no distention, no guarding, no rigidity, no rebound tenderness  MSK: no visible deformities, ROM normal in UE/LE  Skin: Warm, well perfused, no rash  Psych: normal affect, calm Physical Exam:  Gen: NAD, non-toxic appearing, able to ambulate without assistance  Head: NCAT  HEENT: EOMI, PEERLA, normal conjunctiva, tongue midline, oral mucosa moist  Lung: CTAB, no respiratory distress, no wheezes/rhonchi/rales B/L, speaking in full sentences  CV: RRR, no murmurs, rubs or gallops, distal pulses 2+ b/l  Abd: soft, nontender, no distention, no guarding, no rigidity, no rebound tenderness  b/l LE edema

## 2024-11-16 ENCOUNTER — NON-APPOINTMENT (OUTPATIENT)
Age: 69
End: 2024-11-16

## 2024-11-17 ENCOUNTER — EMERGENCY (EMERGENCY)
Facility: HOSPITAL | Age: 69
LOS: 0 days | Discharge: HOME | End: 2024-11-17
Attending: STUDENT IN AN ORGANIZED HEALTH CARE EDUCATION/TRAINING PROGRAM
Payer: MEDICARE

## 2024-11-17 VITALS
SYSTOLIC BLOOD PRESSURE: 185 MMHG | RESPIRATION RATE: 18 BRPM | OXYGEN SATURATION: 100 % | HEART RATE: 64 BPM | DIASTOLIC BLOOD PRESSURE: 66 MMHG | WEIGHT: 279.77 LBS | TEMPERATURE: 97 F

## 2024-11-17 VITALS — HEIGHT: 66 IN

## 2024-11-17 DIAGNOSIS — S62.109A FRACTURE OF UNSPECIFIED CARPAL BONE, UNSPECIFIED WRIST, INITIAL ENCOUNTER FOR CLOSED FRACTURE: Chronic | ICD-10-CM

## 2024-11-17 DIAGNOSIS — Z98.890 OTHER SPECIFIED POSTPROCEDURAL STATES: Chronic | ICD-10-CM

## 2024-11-17 DIAGNOSIS — I65.29 OCCLUSION AND STENOSIS OF UNSPECIFIED CAROTID ARTERY: ICD-10-CM

## 2024-11-17 DIAGNOSIS — Z90.49 ACQUIRED ABSENCE OF OTHER SPECIFIED PARTS OF DIGESTIVE TRACT: Chronic | ICD-10-CM

## 2024-11-17 DIAGNOSIS — I89.0 LYMPHEDEMA, NOT ELSEWHERE CLASSIFIED: ICD-10-CM

## 2024-11-17 DIAGNOSIS — J32.9 CHRONIC SINUSITIS, UNSPECIFIED: ICD-10-CM

## 2024-11-17 DIAGNOSIS — K58.9 IRRITABLE BOWEL SYNDROME, UNSPECIFIED: ICD-10-CM

## 2024-11-17 DIAGNOSIS — E78.5 HYPERLIPIDEMIA, UNSPECIFIED: ICD-10-CM

## 2024-11-17 DIAGNOSIS — E03.9 HYPOTHYROIDISM, UNSPECIFIED: ICD-10-CM

## 2024-11-17 DIAGNOSIS — F32.A DEPRESSION, UNSPECIFIED: ICD-10-CM

## 2024-11-17 DIAGNOSIS — I10 ESSENTIAL (PRIMARY) HYPERTENSION: ICD-10-CM

## 2024-11-17 PROCEDURE — 99283 EMERGENCY DEPT VISIT LOW MDM: CPT

## 2024-11-17 RX ORDER — AMOXICILLIN AND CLAVULANATE POTASSIUM 600; 42.9 MG/5ML; MG/5ML
1 POWDER, FOR SUSPENSION ORAL ONCE
Refills: 0 | Status: COMPLETED | OUTPATIENT
Start: 2024-11-17 | End: 2024-11-17

## 2024-11-17 RX ORDER — AMOXICILLIN AND CLAVULANATE POTASSIUM 600; 42.9 MG/5ML; MG/5ML
875 POWDER, FOR SUSPENSION ORAL
Qty: 10 | Refills: 0
Start: 2024-11-17 | End: 2024-11-21

## 2024-11-17 RX ORDER — FLUCONAZOLE, SODIUM CHLORIDE 2 MG/ML
1 INJECTION INTRAVENOUS
Qty: 1 | Refills: 0
Start: 2024-11-17 | End: 2024-11-17

## 2024-11-17 RX ADMIN — AMOXICILLIN AND CLAVULANATE POTASSIUM 1 TABLET(S): 600; 42.9 POWDER, FOR SUSPENSION ORAL at 17:14

## 2024-11-17 NOTE — ED ADULT NURSE NOTE - NS_ED_NURSE_TEACHING_TOPIC_ED_A_ED
Pt educated on all d/c instructions with return verbal understanding of all d/c instructions to myself.

## 2024-11-17 NOTE — ED ADULT TRIAGE NOTE - CHIEF COMPLAINT QUOTE
pt states shes getting over a cold and her "node completely closed" pt sniffling, denies cough, fevers. States she was walking up the stairs when it just "completely shut" and she was hoping it would go away but hasn't.

## 2024-11-17 NOTE — ED STATDOCS - PATIENT PORTAL LINK FT
You can access the FollowMyHealth Patient Portal offered by [facility] by registering at the following website: [email or web address]. By joining [employer]’s FollowMyHealth portal, you will also be able to view your health information using other applications (apps) compatible with our system.

## 2024-11-17 NOTE — ED STATDOCS - CLINICAL SUMMARY MEDICAL DECISION MAKING FREE TEXT BOX
69-year-old female presenting with nasal congestion over the past few days.  History and exam most consistent with sinusitis, likely viral but patient requesting antibiotic.  Will give short course of Augmentin.  Patient also states she frequently gets a yeast infection with any antibiotics, will send fluconazole to preferred pharmacy as precaution.  Patient stable for discharge.

## 2024-11-17 NOTE — ED STATDOCS - NSFOLLOWUPINSTRUCTIONS_ED_ALL_ED_FT
Discontinue Hira-Synephrine nasal spray use as this can increase your blood pressure.  Use saline nasal sprays, Layla pot, or alternative methods to treat symptomatically.  You may also inhale steam from shower to loosen secretions.    Please  your antibiotics from the pharmacy and take them as prescribed. Continue taking until finished, even if you feel completely better. Inform your primary doctor if you experience rash, shortness of breath, abdominal pain, or diarrhea.     Use Fluconazole if you develop a yeast infection.     Rest, drink plenty of fluids.  Advance activity as tolerated.  Continue all previously prescribed medications as directed.  Follow up with your primary care physician in 48-72 hours- bring copies of your results.  Return to the ER for worsening or persistent symptoms, and/or ANY NEW OR CONCERNING SYMPTOMS. If you have issues obtaining follow up, please call: [phone] to obtain a doctor or specialist who takes your insurance in your area.

## 2024-11-17 NOTE — ED STATDOCS - PHYSICAL EXAMINATION
GENERAL: A&Ox4, non-toxic appearing, no acute distress  HEENT: NCAT, EOMI, oral mucosa moist, normal conjunctiva, nasal congestion, boggy turbinates  RESP: CTAB, no respiratory distress, speaking in full sentences, no stridor  CV: RRR  MSK: no visible deformities  NEURO: no focal sensory or motor deficits, CN 2-12 grossly intact  SKIN: warm, normal color, well perfused, no rash  PSYCH: normal affect

## 2024-11-17 NOTE — ED STATDOCS - OBJECTIVE STATEMENT
61-year-old female PMH hypertension, anxiety, carotid stenosis, hiatal hernia, HLD, hypothyroid, IBS, lymphedema, presents to the emergency department for nasal congestion.  Patient states she has been dealing with a sinus infection over the past couple of days, prior to arrival felt like she could not breathe through either nostril as if somebody were occluding her nose.  Symptoms have resolved at this time.  She did not expel anything from the nose of the mouth.  She denies any systemic symptoms.  Has been using Hira-Synephrine nasal spray "a lot". 69-year-old female PMH hypertension, anxiety, carotid stenosis, hiatal hernia, HLD, hypothyroid, IBS, lymphedema, presents to the emergency department for nasal congestion.  Patient states she has been dealing with a sinus infection over the past couple of days, prior to arrival felt like she could not breathe through either nostril as if somebody were occluding her nose.  Symptoms have resolved at this time.  She did not expel anything from the nose of the mouth.  She denies any systemic symptoms.  Has been using Hira-Synephrine nasal spray "a lot".

## 2024-11-19 ENCOUNTER — NON-APPOINTMENT (OUTPATIENT)
Age: 69
End: 2024-11-19

## 2024-11-20 ENCOUNTER — APPOINTMENT (OUTPATIENT)
Dept: VASCULAR SURGERY | Facility: CLINIC | Age: 69
End: 2024-11-20
Payer: MEDICARE

## 2024-11-20 ENCOUNTER — APPOINTMENT (OUTPATIENT)
Dept: VASCULAR SURGERY | Facility: CLINIC | Age: 69
End: 2024-11-20

## 2024-11-20 PROCEDURE — 99214 OFFICE O/P EST MOD 30 MIN: CPT

## 2024-11-20 PROCEDURE — 93880 EXTRACRANIAL BILAT STUDY: CPT

## 2024-11-23 RX ORDER — CEPHALEXIN 500 MG/1
500 TABLET ORAL 3 TIMES DAILY
Qty: 21 | Refills: 0 | Status: ACTIVE | COMMUNITY
Start: 2024-11-23 | End: 1900-01-01

## 2024-11-23 RX ORDER — CEPHALEXIN 500 MG
1 CAPSULE ORAL
Refills: 0 | DISCHARGE
Start: 2024-11-23

## 2024-11-26 RX ORDER — DOXYCYCLINE HYCLATE 150 MG/1
1 TABLET, COATED ORAL
Qty: 10 | Refills: 0 | DISCHARGE
Start: 2024-11-26

## 2024-11-27 ENCOUNTER — APPOINTMENT (OUTPATIENT)
Dept: SURGERY | Facility: CLINIC | Age: 69
End: 2024-11-27
Payer: MEDICARE

## 2024-11-27 DIAGNOSIS — L03.90 CELLULITIS, UNSPECIFIED: ICD-10-CM

## 2024-11-27 PROCEDURE — 99212 OFFICE O/P EST SF 10 MIN: CPT

## 2024-11-29 ENCOUNTER — INPATIENT (INPATIENT)
Facility: HOSPITAL | Age: 69
LOS: 3 days | Discharge: HOME | DRG: 603 | End: 2024-12-03
Attending: INTERNAL MEDICINE | Admitting: STUDENT IN AN ORGANIZED HEALTH CARE EDUCATION/TRAINING PROGRAM
Payer: MEDICARE

## 2024-11-29 VITALS — HEIGHT: 66 IN

## 2024-11-29 DIAGNOSIS — Z98.890 OTHER SPECIFIED POSTPROCEDURAL STATES: Chronic | ICD-10-CM

## 2024-11-29 DIAGNOSIS — I82.409 ACUTE EMBOLISM AND THROMBOSIS OF UNSPECIFIED DEEP VEINS OF UNSPECIFIED LOWER EXTREMITY: ICD-10-CM

## 2024-11-29 DIAGNOSIS — Z29.9 ENCOUNTER FOR PROPHYLACTIC MEASURES, UNSPECIFIED: ICD-10-CM

## 2024-11-29 DIAGNOSIS — M79.89 OTHER SPECIFIED SOFT TISSUE DISORDERS: ICD-10-CM

## 2024-11-29 DIAGNOSIS — L03.90 CELLULITIS, UNSPECIFIED: ICD-10-CM

## 2024-11-29 DIAGNOSIS — S62.109A FRACTURE OF UNSPECIFIED CARPAL BONE, UNSPECIFIED WRIST, INITIAL ENCOUNTER FOR CLOSED FRACTURE: Chronic | ICD-10-CM

## 2024-11-29 DIAGNOSIS — I10 ESSENTIAL (PRIMARY) HYPERTENSION: ICD-10-CM

## 2024-11-29 DIAGNOSIS — Z90.49 ACQUIRED ABSENCE OF OTHER SPECIFIED PARTS OF DIGESTIVE TRACT: Chronic | ICD-10-CM

## 2024-11-29 DIAGNOSIS — E03.9 HYPOTHYROIDISM, UNSPECIFIED: ICD-10-CM

## 2024-11-29 LAB
ALBUMIN SERPL ELPH-MCNC: 3.1 G/DL — LOW (ref 3.3–5)
ALP SERPL-CCNC: 246 U/L — HIGH (ref 40–120)
ALT FLD-CCNC: 61 U/L — SIGNIFICANT CHANGE UP (ref 12–78)
ANION GAP SERPL CALC-SCNC: 1 MMOL/L — LOW (ref 5–17)
APTT BLD: 31.5 SEC — SIGNIFICANT CHANGE UP (ref 24.5–35.6)
AST SERPL-CCNC: 50 U/L — HIGH (ref 15–37)
BASOPHILS # BLD AUTO: 0.05 K/UL — SIGNIFICANT CHANGE UP (ref 0–0.2)
BASOPHILS NFR BLD AUTO: 0.4 % — SIGNIFICANT CHANGE UP (ref 0–2)
BILIRUB SERPL-MCNC: 0.6 MG/DL — SIGNIFICANT CHANGE UP (ref 0.2–1.2)
BUN SERPL-MCNC: 19 MG/DL — SIGNIFICANT CHANGE UP (ref 7–23)
CALCIUM SERPL-MCNC: 9.7 MG/DL — SIGNIFICANT CHANGE UP (ref 8.5–10.1)
CHLORIDE SERPL-SCNC: 112 MMOL/L — HIGH (ref 96–108)
CO2 SERPL-SCNC: 30 MMOL/L — SIGNIFICANT CHANGE UP (ref 22–31)
CREAT SERPL-MCNC: 0.79 MG/DL — SIGNIFICANT CHANGE UP (ref 0.5–1.3)
EGFR: 81 ML/MIN/1.73M2 — SIGNIFICANT CHANGE UP
EOSINOPHIL # BLD AUTO: 0.14 K/UL — SIGNIFICANT CHANGE UP (ref 0–0.5)
EOSINOPHIL NFR BLD AUTO: 1.2 % — SIGNIFICANT CHANGE UP (ref 0–6)
GLUCOSE SERPL-MCNC: 91 MG/DL — SIGNIFICANT CHANGE UP (ref 70–99)
HCT VFR BLD CALC: 43.6 % — SIGNIFICANT CHANGE UP (ref 34.5–45)
HGB BLD-MCNC: 14.2 G/DL — SIGNIFICANT CHANGE UP (ref 11.5–15.5)
IMM GRANULOCYTES NFR BLD AUTO: 0.5 % — SIGNIFICANT CHANGE UP (ref 0–0.9)
INR BLD: 1.02 RATIO — SIGNIFICANT CHANGE UP (ref 0.85–1.16)
LACTATE SERPL-SCNC: 0.9 MMOL/L — SIGNIFICANT CHANGE UP (ref 0.7–2)
LYMPHOCYTES # BLD AUTO: 2.83 K/UL — SIGNIFICANT CHANGE UP (ref 1–3.3)
LYMPHOCYTES # BLD AUTO: 24.4 % — SIGNIFICANT CHANGE UP (ref 13–44)
MCHC RBC-ENTMCNC: 30.7 PG — SIGNIFICANT CHANGE UP (ref 27–34)
MCHC RBC-ENTMCNC: 32.6 G/DL — SIGNIFICANT CHANGE UP (ref 32–36)
MCV RBC AUTO: 94.4 FL — SIGNIFICANT CHANGE UP (ref 80–100)
MONOCYTES # BLD AUTO: 0.57 K/UL — SIGNIFICANT CHANGE UP (ref 0–0.9)
MONOCYTES NFR BLD AUTO: 4.9 % — SIGNIFICANT CHANGE UP (ref 2–14)
NEUTROPHILS # BLD AUTO: 7.93 K/UL — HIGH (ref 1.8–7.4)
NEUTROPHILS NFR BLD AUTO: 68.6 % — SIGNIFICANT CHANGE UP (ref 43–77)
PLATELET # BLD AUTO: 340 K/UL — SIGNIFICANT CHANGE UP (ref 150–400)
POTASSIUM SERPL-MCNC: 3.8 MMOL/L — SIGNIFICANT CHANGE UP (ref 3.5–5.3)
POTASSIUM SERPL-SCNC: 3.8 MMOL/L — SIGNIFICANT CHANGE UP (ref 3.5–5.3)
PROT SERPL-MCNC: 7.6 GM/DL — SIGNIFICANT CHANGE UP (ref 6–8.3)
PROTHROM AB SERPL-ACNC: 11.7 SEC — SIGNIFICANT CHANGE UP (ref 9.9–13.4)
RBC # BLD: 4.62 M/UL — SIGNIFICANT CHANGE UP (ref 3.8–5.2)
RBC # FLD: 15.4 % — HIGH (ref 10.3–14.5)
SODIUM SERPL-SCNC: 143 MMOL/L — SIGNIFICANT CHANGE UP (ref 135–145)
WBC # BLD: 11.58 K/UL — HIGH (ref 3.8–10.5)
WBC # FLD AUTO: 11.58 K/UL — HIGH (ref 3.8–10.5)

## 2024-11-29 PROCEDURE — 94640 AIRWAY INHALATION TREATMENT: CPT

## 2024-11-29 PROCEDURE — 36415 COLL VENOUS BLD VENIPUNCTURE: CPT

## 2024-11-29 PROCEDURE — 84484 ASSAY OF TROPONIN QUANT: CPT

## 2024-11-29 PROCEDURE — 84100 ASSAY OF PHOSPHORUS: CPT

## 2024-11-29 PROCEDURE — 80061 LIPID PANEL: CPT

## 2024-11-29 PROCEDURE — 85730 THROMBOPLASTIN TIME PARTIAL: CPT

## 2024-11-29 PROCEDURE — 97116 GAIT TRAINING THERAPY: CPT | Mod: GP

## 2024-11-29 PROCEDURE — 85027 COMPLETE CBC AUTOMATED: CPT

## 2024-11-29 PROCEDURE — 93971 EXTREMITY STUDY: CPT | Mod: 26,LT

## 2024-11-29 PROCEDURE — 97162 PT EVAL MOD COMPLEX 30 MIN: CPT | Mod: GP

## 2024-11-29 PROCEDURE — 85379 FIBRIN DEGRADATION QUANT: CPT

## 2024-11-29 PROCEDURE — 80053 COMPREHEN METABOLIC PANEL: CPT

## 2024-11-29 PROCEDURE — 99223 1ST HOSP IP/OBS HIGH 75: CPT

## 2024-11-29 PROCEDURE — 93005 ELECTROCARDIOGRAM TRACING: CPT

## 2024-11-29 PROCEDURE — 80048 BASIC METABOLIC PNL TOTAL CA: CPT

## 2024-11-29 PROCEDURE — 71275 CT ANGIOGRAPHY CHEST: CPT | Mod: MC

## 2024-11-29 PROCEDURE — 85652 RBC SED RATE AUTOMATED: CPT

## 2024-11-29 PROCEDURE — 99285 EMERGENCY DEPT VISIT HI MDM: CPT | Mod: FS

## 2024-11-29 PROCEDURE — 83735 ASSAY OF MAGNESIUM: CPT

## 2024-11-29 PROCEDURE — 82962 GLUCOSE BLOOD TEST: CPT

## 2024-11-29 PROCEDURE — 83036 HEMOGLOBIN GLYCOSYLATED A1C: CPT

## 2024-11-29 PROCEDURE — 83880 ASSAY OF NATRIURETIC PEPTIDE: CPT

## 2024-11-29 PROCEDURE — 71045 X-RAY EXAM CHEST 1 VIEW: CPT

## 2024-11-29 PROCEDURE — 86140 C-REACTIVE PROTEIN: CPT

## 2024-11-29 PROCEDURE — 97530 THERAPEUTIC ACTIVITIES: CPT | Mod: GP

## 2024-11-29 PROCEDURE — 87070 CULTURE OTHR SPECIMN AEROBIC: CPT

## 2024-11-29 PROCEDURE — 85610 PROTHROMBIN TIME: CPT

## 2024-11-29 PROCEDURE — 73630 X-RAY EXAM OF FOOT: CPT | Mod: 26,LT

## 2024-11-29 PROCEDURE — 85025 COMPLETE CBC W/AUTO DIFF WBC: CPT

## 2024-11-29 RX ORDER — FUROSEMIDE 40 MG/1
40 TABLET ORAL
Refills: 0 | Status: DISCONTINUED | OUTPATIENT
Start: 2024-11-29 | End: 2024-12-03

## 2024-11-29 RX ORDER — LOSARTAN POTASSIUM 100 MG/1
100 TABLET, FILM COATED ORAL DAILY
Refills: 0 | Status: DISCONTINUED | OUTPATIENT
Start: 2024-11-29 | End: 2024-12-03

## 2024-11-29 RX ORDER — VANCOMYCIN HCL 900 MCG/MG
1000 POWDER (GRAM) MISCELLANEOUS ONCE
Refills: 0 | Status: DISCONTINUED | OUTPATIENT
Start: 2024-11-29 | End: 2024-11-29

## 2024-11-29 RX ORDER — VANCOMYCIN HCL 900 MCG/MG
2000 POWDER (GRAM) MISCELLANEOUS ONCE
Refills: 0 | Status: COMPLETED | OUTPATIENT
Start: 2024-11-29 | End: 2024-11-29

## 2024-11-29 RX ORDER — ONDANSETRON HYDROCHLORIDE 4 MG/1
4 TABLET, FILM COATED ORAL EVERY 8 HOURS
Refills: 0 | Status: DISCONTINUED | OUTPATIENT
Start: 2024-11-29 | End: 2024-12-03

## 2024-11-29 RX ORDER — APIXABAN 2.5 MG/1
10 TABLET, FILM COATED ORAL EVERY 12 HOURS
Refills: 0 | Status: DISCONTINUED | OUTPATIENT
Start: 2024-11-29 | End: 2024-12-03

## 2024-11-29 RX ORDER — ACETAMINOPHEN 500MG 500 MG/1
650 TABLET, COATED ORAL EVERY 6 HOURS
Refills: 0 | Status: DISCONTINUED | OUTPATIENT
Start: 2024-11-29 | End: 2024-12-03

## 2024-11-29 RX ORDER — CLOPIDOGREL 75 MG/1
75 TABLET, FILM COATED ORAL DAILY
Refills: 0 | Status: DISCONTINUED | OUTPATIENT
Start: 2024-11-29 | End: 2024-12-03

## 2024-11-29 RX ORDER — LEVOTHYROXINE SODIUM 150 MCG
50 TABLET ORAL DAILY
Refills: 0 | Status: DISCONTINUED | OUTPATIENT
Start: 2024-11-29 | End: 2024-12-03

## 2024-11-29 RX ORDER — MAGNESIUM, ALUMINUM HYDROXIDE 200-225/5
30 SUSPENSION, ORAL (FINAL DOSE FORM) ORAL EVERY 4 HOURS
Refills: 0 | Status: DISCONTINUED | OUTPATIENT
Start: 2024-11-29 | End: 2024-12-03

## 2024-11-29 RX ORDER — VANCOMYCIN HCL 900 MCG/MG
1250 POWDER (GRAM) MISCELLANEOUS EVERY 12 HOURS
Refills: 0 | Status: DISCONTINUED | OUTPATIENT
Start: 2024-11-29 | End: 2024-12-01

## 2024-11-29 RX ORDER — SODIUM CHLORIDE 9 MG/ML
1800 INJECTION, SOLUTION INTRAMUSCULAR; INTRAVENOUS; SUBCUTANEOUS ONCE
Refills: 0 | Status: COMPLETED | OUTPATIENT
Start: 2024-11-29 | End: 2024-11-29

## 2024-11-29 RX ORDER — ACETAMINOPHEN, DIPHENHYDRAMINE HCL, PHENYLEPHRINE HCL 325; 25; 5 MG/1; MG/1; MG/1
3 TABLET ORAL AT BEDTIME
Refills: 0 | Status: DISCONTINUED | OUTPATIENT
Start: 2024-11-29 | End: 2024-12-03

## 2024-11-29 RX ORDER — PIPERACILLIN SODIUM AND TAZOBACTAM SODIUM 4; .5 G/20ML; G/20ML
3.38 INJECTION, POWDER, LYOPHILIZED, FOR SOLUTION INTRAVENOUS ONCE
Refills: 0 | Status: COMPLETED | OUTPATIENT
Start: 2024-11-29 | End: 2024-11-29

## 2024-11-29 RX ORDER — CALCIUM CARBONATE 500(1250)
2 TABLET ORAL ONCE
Refills: 0 | Status: DISCONTINUED | OUTPATIENT
Start: 2024-11-29 | End: 2024-12-03

## 2024-11-29 RX ADMIN — Medication 80 MILLIGRAM(S): at 21:36

## 2024-11-29 RX ADMIN — CLOPIDOGREL 75 MILLIGRAM(S): 75 TABLET, FILM COATED ORAL at 18:26

## 2024-11-29 RX ADMIN — SODIUM CHLORIDE 1800 MILLILITER(S): 9 INJECTION, SOLUTION INTRAMUSCULAR; INTRAVENOUS; SUBCUTANEOUS at 15:30

## 2024-11-29 RX ADMIN — PIPERACILLIN SODIUM AND TAZOBACTAM SODIUM 200 GRAM(S): 4; .5 INJECTION, POWDER, LYOPHILIZED, FOR SOLUTION INTRAVENOUS at 15:31

## 2024-11-29 RX ADMIN — APIXABAN 10 MILLIGRAM(S): 2.5 TABLET, FILM COATED ORAL at 21:36

## 2024-11-29 RX ADMIN — LOSARTAN POTASSIUM 100 MILLIGRAM(S): 100 TABLET, FILM COATED ORAL at 18:25

## 2024-11-29 RX ADMIN — Medication 250 MILLIGRAM(S): at 15:57

## 2024-11-29 NOTE — ED ADULT NURSE NOTE - NSFALLRISKINTERV_ED_ALL_ED

## 2024-11-29 NOTE — ED ADULT TRIAGE NOTE - CHIEF COMPLAINT QUOTE
Pt sent to ED by her vascular MD for r/o cellulitis to the LLE starting yesterday. +swelling +redness +pain. Denies fever.

## 2024-11-29 NOTE — PATIENT PROFILE ADULT - FALL HARM RISK - UNIVERSAL INTERVENTIONS
Bed in lowest position, wheels locked, appropriate side rails in place/Call bell, personal items and telephone in reach/Instruct patient to call for assistance before getting out of bed or chair/Non-slip footwear when patient is out of bed/[place] to call system/Physically safe environment - no spills, clutter or unnecessary equipment/Purposeful Proactive Rounding/Room/bathroom lighting operational, light cord in reach

## 2024-11-29 NOTE — ED STATDOCS - PROGRESS NOTE DETAILS
I, CHELSEY Bhardwaj personally discussed the case with consultant, Dr. Trinidad  attending hospitalist, about pt who has cellulitis/DVT and will need hospital admission." Abel: discussed with pt +DVT. consult for vascular and podiatry

## 2024-11-29 NOTE — H&P ADULT - PROBLEM SELECTOR PLAN 1
cellulitis +/- stasis dermatitis   C/w vancomycin  ID consult cellulitis +/- stasis dermatitis, lymphedema  C/w vancomycin  c/w lasix  ID consult

## 2024-11-29 NOTE — ED STATDOCS - CLINICAL SUMMARY MEDICAL DECISION MAKING FREE TEXT BOX
69-year-old female history of hypertension high cholesterol venous stasis follows with vascular Dr. Carrasquillo presents to the emergency department for left lower extremity redness warmth and tenderness.  Patient states that she had an ulcer develop on her left third toe 2 weeks ago was started on doxycycline and has been on it for 7 days.  States that over the past few days noticed redness going up to her calf and lower leg and called her vascular doctor and was sent in for evaluation.  Exam with redness and warmth to the left lower leg normal pulses ulcer on the base of the left third toe no crepitus no fluctuance compartments soft patient does have significant bilateral lower extremity swelling that is chronic as well as dry cracked skin consistent with her venous stasis.  Given spreading redness in the setting of being on antibiotics for a week will do septic workup give IV antibiotics ultrasound x-ray and admit

## 2024-11-29 NOTE — ED ADULT TRIAGE NOTE - PAIN RATING/NUMBER SCALE (0-10): ACTIVITY
0 (no pain/absence of nonverbal indicators of pain) The office will call you with your next follow up appointment with CEDRICK Schaefer

## 2024-11-29 NOTE — ED STATDOCS - MDM ORDERS SUBMITTED SELECTION
Blanca left VM after hours on 2/7 stating that she lost track of time and will return call to writer 2/8.   Imaging Studies/Medications

## 2024-11-29 NOTE — ED STATDOCS - PHYSICAL EXAMINATION
Constitutional: NAD AAOx3  Eyes: PERRLA EOMI  Head: Normocephalic atraumatic  Mouth: MMM  Cardiac: regular rate   Resp: unlabored breathing  GI: Abd s/nt/nd  Neuro: grossly normal and intact  Skin: No visible rashes, dry cracked skin consistent with her venous stasis  Ext: redness and warmth to the left lower leg normal pulses ulcer on the base of the left third toe no crepitus no fluctuance compartments soft, significant bilateral lower extremity swelling that is chronic Constitutional: NAD AAOx3  Eyes: PERRLA EOMI  Head: Normocephalic atraumatic  Mouth: MMM  Cardiac: regular rate   Resp: unlabored breathing clear b/l  GI: Abd s/nt/nd  Neuro: grossly normal and intact  Skin: No visible rashes, dry cracked skin consistent with her venous stasis  Ext: redness and warmth to the left lower leg normal pulses ulcer on the base of the left third toe no crepitus no fluctuance compartments soft, significant bilateral lower extremity swelling that is chronic

## 2024-11-29 NOTE — H&P ADULT - NSHPLABSRESULTS_GEN_ALL_CORE
LABS:  cret                        14.2   11.58 )-----------( 340      ( 29 Nov 2024 15:05 )             43.6     11-29    143  |  112[H]  |  19  ----------------------------<  91  3.8   |  30  |  0.79    Ca    9.7      29 Nov 2024 15:05    TPro  7.6  /  Alb  3.1[L]  /  TBili  0.6  /  DBili  x   /  AST  50[H]  /  ALT  61  /  AlkPhos  246[H]  11-29    PT/INR - ( 29 Nov 2024 15:05 )   PT: 11.7 sec;   INR: 1.02 ratio         PTT - ( 29 Nov 2024 15:05 )  PTT:31.5 sec    < from: US Duplex Venous Lower Ext Ltd, Left (11.29.24 @ 16:29) >    IMPRESSION:  Technically limited exam.  Acute deep venous thrombosis: below the knee.    Limited evaluation of the calf veins.        --- End of Report ---    < end of copied text >

## 2024-11-29 NOTE — H&P ADULT - HISTORY OF PRESENT ILLNESS
69F w/ h/o HTN, HLD, Hypothyroidism, Venous stasis, follows with vascular Dr. Carrasquillo presents with lower extremity swelling.  69F w/ h/o anxiety, carotid stenosis, s/p CEA, HTN, fatty liver, Hiatal hernia with GERD, HLD, Hypothyroidism, IBS, Morbid obesity, b/l  lymphedema follows with vascular Dr. Carrasquillo presents with left lower extremity swelling and worsening erythema. Patient has left plantar foot ulcer, has been on doxy last week for cellulitis, Has had worsening of leg swelling and redness despite antibiotic treatment. No other complaints and denies other ROS.

## 2024-11-29 NOTE — ED STATDOCS - DR. NAME
Meme normal... Well appearing, awake, alert, oriented to person, place, time/situation and in no apparent distress., mildly anxious

## 2024-11-29 NOTE — ED ADULT NURSE NOTE - OBJECTIVE STATEMENT
Pt is a 70 y/o female who presents to the ED sent my MD for cellulitis to [facility]. Pt states she went to a UC for a head cold and mentioned she was experiencing swelling and redness to left leg. Pt was advised to go to ER. Pt denies any pain to area.

## 2024-11-29 NOTE — H&P ADULT - NSHPPHYSICALEXAM_GEN_ALL_CORE
T(C): 36.6 (11-29-24 @ 13:51), Max: 36.6 (11-29-24 @ 13:51)  HR: 76 (11-29-24 @ 13:51) (76 - 76)  BP: 171/86 (11-29-24 @ 13:51) (171/86 - 171/86)  RR: 18 (11-29-24 @ 13:51) (18 - 18)  SpO2: 100% (11-29-24 @ 13:51) (100% - 100%)    General: non-toxic  HEENT: non-traumatic, perrla, eomi  Cardio: s1s2 regular rate and rhythm  Lungs: comfortable breathing, clear to auscultation  Abdomen: Soft, non-tender, non-distended  Neuro: AOx4  Ext: +2 b/l LE swelling T(C): 36.6 (11-29-24 @ 13:51), Max: 36.6 (11-29-24 @ 13:51)  HR: 76 (11-29-24 @ 13:51) (76 - 76)  BP: 171/86 (11-29-24 @ 13:51) (171/86 - 171/86)  RR: 18 (11-29-24 @ 13:51) (18 - 18)  SpO2: 100% (11-29-24 @ 13:51) (100% - 100%)    General: non-toxic, obese  HEENT: non-traumatic, perrla, eomi  Cardio: s1s2 regular rate and rhythm  Lungs: comfortable breathing, clear to auscultation  Abdomen: Soft, non-tender, non-distended  Neuro: AOx4  Ext: +2 b/l LE swelling, erythema. Erythema on left foot, with plantar ulcer.

## 2024-11-29 NOTE — PATIENT PROFILE ADULT - MONEY FOR FOOD
DATE OF SERVICE: 5/3/2023    DIAGNOSIS:  Adenocarcinoma, lung, right (HCC)  Staging form: Lung, AJCC 8th Edition  - Clinical stage from 4/24/2023: Stage SUDHAKAR (cT1c, cN0, cM1a) - Signed by Sam Jaimes M.D. on 4/24/2023  Histopathologic type: Adenocarcinoma, NOS  Stage prefix: Initial diagnosis       TREATMENT:  Radiation Therapy Episodes       Active Episodes       Radiation Therapy: SBRT (4/24/2023)                   Radiation Treatments         Plan Last Treated On Elapsed Days Fractions Treated Prescribed Fraction Dose (cGy) Prescribed Total Dose (cGy)    L [place] 5/3/2023 2 @ [id number] 3 of 8 750 6,000    RUL Lung SBRT 5/3/2023 2 @ [id number] 3 of 5 1,100 5,500                  Reference Point Last Treated On Elapsed Days Most Recent Session Dose (cGy) Total Dose (cGy)    L [place]um cp 5/3/2023 2 @ [id number] 889 2,666    PTV_L Hilum 5/3/2023 2 @ [id number] 750 2,250    PTV_R 5/3/2023 2 @ [id number] 1,100 3,300    RUL cp 5/3/2023 2 @ [id number] 1,370 4,109                            STEREOTACTIC PROCEDURE NOTE:    Called by Truebeam machine to verify treatment parameters including:  treatment site, treatment dose, and treatment setup prior to stereotactic treatment..    Patient was placed in the treatment position with use of immobilization device and  laser guidance. CBCT images were acquired for target localization.  Images were reviewed in the axial, coronal, and saggital views and shifts were made as necessary to ensure that patient position matched simulation position.      Treatment delivered per  prescription.  The medical physicist was present throughout the set-up, verification and treatment delivery to oversee the procedure and ensure all parameters agreed with the computerized plan.    I have personally reviewed the relevant data, performed the target localization, and determined all relevant factors for this patient’s simulation.      
no

## 2024-11-29 NOTE — ED STATDOCS - ATTENDING APP SHARED VISIT CONTRIBUTION OF CARE
I, Cody Valentine MD, personally saw the patient with ACP.  I have personally performed a face to face diagnostic evaluation on this patient.   The initial assessment was performed by myself and then the patient was handed off to the ACP. The patient was followed and re-evaluated by the ACP. All labs, imaging and procedures were evaluated and performed by the ACP and I was available for consultation if any questions in the patients care came up.   I personally made/approved the management plan and take responsibility for the patient management.

## 2024-11-30 DIAGNOSIS — I25.10 ATHEROSCLEROTIC HEART DISEASE OF NATIVE CORONARY ARTERY WITHOUT ANGINA PECTORIS: ICD-10-CM

## 2024-11-30 LAB
A1C WITH ESTIMATED AVERAGE GLUCOSE RESULT: 5.6 % — SIGNIFICANT CHANGE UP (ref 4–5.6)
ALBUMIN SERPL ELPH-MCNC: 2.5 G/DL — LOW (ref 3.3–5)
ALP SERPL-CCNC: 205 U/L — HIGH (ref 40–120)
ALT FLD-CCNC: 59 U/L — SIGNIFICANT CHANGE UP (ref 12–78)
ANION GAP SERPL CALC-SCNC: 5 MMOL/L — SIGNIFICANT CHANGE UP (ref 5–17)
APTT BLD: 37.2 SEC — HIGH (ref 24.5–35.6)
AST SERPL-CCNC: 45 U/L — HIGH (ref 15–37)
BASOPHILS # BLD AUTO: 0.04 K/UL — SIGNIFICANT CHANGE UP (ref 0–0.2)
BASOPHILS NFR BLD AUTO: 0.4 % — SIGNIFICANT CHANGE UP (ref 0–2)
BILIRUB SERPL-MCNC: 0.4 MG/DL — SIGNIFICANT CHANGE UP (ref 0.2–1.2)
BUN SERPL-MCNC: 21 MG/DL — SIGNIFICANT CHANGE UP (ref 7–23)
CALCIUM SERPL-MCNC: 8.4 MG/DL — LOW (ref 8.5–10.1)
CHLORIDE SERPL-SCNC: 112 MMOL/L — HIGH (ref 96–108)
CHOLEST SERPL-MCNC: 194 MG/DL — SIGNIFICANT CHANGE UP
CO2 SERPL-SCNC: 27 MMOL/L — SIGNIFICANT CHANGE UP (ref 22–31)
CREAT SERPL-MCNC: 0.78 MG/DL — SIGNIFICANT CHANGE UP (ref 0.5–1.3)
CRP SERPL-MCNC: 15.6 MG/ML — HIGH (ref 0–5)
D DIMER BLD IA.RAPID-MCNC: 1308 NG/ML DDU — HIGH
EGFR: 83 ML/MIN/1.73M2 — SIGNIFICANT CHANGE UP
EOSINOPHIL # BLD AUTO: 0.17 K/UL — SIGNIFICANT CHANGE UP (ref 0–0.5)
EOSINOPHIL NFR BLD AUTO: 1.9 % — SIGNIFICANT CHANGE UP (ref 0–6)
ERYTHROCYTE [SEDIMENTATION RATE] IN BLOOD: 36 MM/HR — HIGH (ref 0–20)
ESTIMATED AVERAGE GLUCOSE: 114 MG/DL — SIGNIFICANT CHANGE UP (ref 68–114)
GLUCOSE SERPL-MCNC: 117 MG/DL — HIGH (ref 70–99)
HCT VFR BLD CALC: 36.1 % — SIGNIFICANT CHANGE UP (ref 34.5–45)
HDLC SERPL-MCNC: 62 MG/DL — SIGNIFICANT CHANGE UP
HGB BLD-MCNC: 11.6 G/DL — SIGNIFICANT CHANGE UP (ref 11.5–15.5)
IMM GRANULOCYTES NFR BLD AUTO: 0.8 % — SIGNIFICANT CHANGE UP (ref 0–0.9)
INR BLD: 1.22 RATIO — HIGH (ref 0.85–1.16)
LIPID PNL WITH DIRECT LDL SERPL: 122 MG/DL — HIGH
LYMPHOCYTES # BLD AUTO: 2.24 K/UL — SIGNIFICANT CHANGE UP (ref 1–3.3)
LYMPHOCYTES # BLD AUTO: 25.2 % — SIGNIFICANT CHANGE UP (ref 13–44)
MAGNESIUM SERPL-MCNC: 1.9 MG/DL — SIGNIFICANT CHANGE UP (ref 1.6–2.6)
MCHC RBC-ENTMCNC: 30.4 PG — SIGNIFICANT CHANGE UP (ref 27–34)
MCHC RBC-ENTMCNC: 32.1 G/DL — SIGNIFICANT CHANGE UP (ref 32–36)
MCV RBC AUTO: 94.8 FL — SIGNIFICANT CHANGE UP (ref 80–100)
MONOCYTES # BLD AUTO: 0.61 K/UL — SIGNIFICANT CHANGE UP (ref 0–0.9)
MONOCYTES NFR BLD AUTO: 6.9 % — SIGNIFICANT CHANGE UP (ref 2–14)
NEUTROPHILS # BLD AUTO: 5.76 K/UL — SIGNIFICANT CHANGE UP (ref 1.8–7.4)
NEUTROPHILS NFR BLD AUTO: 64.8 % — SIGNIFICANT CHANGE UP (ref 43–77)
NON HDL CHOLESTEROL: 132 MG/DL — HIGH
NT-PROBNP SERPL-SCNC: 688 PG/ML — HIGH (ref 0–125)
PHOSPHATE SERPL-MCNC: 3.1 MG/DL — SIGNIFICANT CHANGE UP (ref 2.5–4.5)
PLATELET # BLD AUTO: 288 K/UL — SIGNIFICANT CHANGE UP (ref 150–400)
POTASSIUM SERPL-MCNC: 3.7 MMOL/L — SIGNIFICANT CHANGE UP (ref 3.5–5.3)
POTASSIUM SERPL-SCNC: 3.7 MMOL/L — SIGNIFICANT CHANGE UP (ref 3.5–5.3)
PROT SERPL-MCNC: 5.9 GM/DL — LOW (ref 6–8.3)
PROTHROM AB SERPL-ACNC: 14.4 SEC — HIGH (ref 9.9–13.4)
RBC # BLD: 3.81 M/UL — SIGNIFICANT CHANGE UP (ref 3.8–5.2)
RBC # FLD: 15.6 % — HIGH (ref 10.3–14.5)
SODIUM SERPL-SCNC: 144 MMOL/L — SIGNIFICANT CHANGE UP (ref 135–145)
TRIGL SERPL-MCNC: 54 MG/DL — SIGNIFICANT CHANGE UP
TROPONIN I, HIGH SENSITIVITY RESULT: 16.03 NG/L — SIGNIFICANT CHANGE UP
WBC # BLD: 8.89 K/UL — SIGNIFICANT CHANGE UP (ref 3.8–10.5)
WBC # FLD AUTO: 8.89 K/UL — SIGNIFICANT CHANGE UP (ref 3.8–10.5)

## 2024-11-30 PROCEDURE — 99233 SBSQ HOSP IP/OBS HIGH 50: CPT

## 2024-11-30 PROCEDURE — 71045 X-RAY EXAM CHEST 1 VIEW: CPT | Mod: 26

## 2024-11-30 PROCEDURE — 71275 CT ANGIOGRAPHY CHEST: CPT | Mod: 26

## 2024-11-30 PROCEDURE — 93010 ELECTROCARDIOGRAM REPORT: CPT

## 2024-11-30 RX ORDER — POVIDONE, POLYVINYL ALCOHOL 20; 27 G/1000ML; G/1000ML
1 SOLUTION OPHTHALMIC THREE TIMES A DAY
Refills: 0 | Status: DISCONTINUED | OUTPATIENT
Start: 2024-11-30 | End: 2024-12-03

## 2024-11-30 RX ORDER — PHENYLEPHRINE HCL 0.25 %
1 AEROSOL, SPRAY WITH PUMP (ML) NASAL EVERY 8 HOURS
Refills: 0 | Status: DISCONTINUED | OUTPATIENT
Start: 2024-11-30 | End: 2024-12-03

## 2024-11-30 RX ADMIN — POVIDONE, POLYVINYL ALCOHOL 1 DROP(S): 20; 27 SOLUTION OPHTHALMIC at 16:41

## 2024-11-30 RX ADMIN — Medication 80 MILLIGRAM(S): at 21:43

## 2024-11-30 RX ADMIN — FUROSEMIDE 40 MILLIGRAM(S): 40 TABLET ORAL at 04:57

## 2024-11-30 RX ADMIN — POVIDONE, POLYVINYL ALCOHOL 1 DROP(S): 20; 27 SOLUTION OPHTHALMIC at 21:42

## 2024-11-30 RX ADMIN — LOSARTAN POTASSIUM 100 MILLIGRAM(S): 100 TABLET, FILM COATED ORAL at 10:17

## 2024-11-30 RX ADMIN — APIXABAN 10 MILLIGRAM(S): 2.5 TABLET, FILM COATED ORAL at 21:43

## 2024-11-30 RX ADMIN — Medication 166.67 MILLIGRAM(S): at 04:56

## 2024-11-30 RX ADMIN — FUROSEMIDE 40 MILLIGRAM(S): 40 TABLET ORAL at 13:56

## 2024-11-30 RX ADMIN — APIXABAN 10 MILLIGRAM(S): 2.5 TABLET, FILM COATED ORAL at 10:17

## 2024-11-30 RX ADMIN — Medication 50 MICROGRAM(S): at 04:57

## 2024-11-30 RX ADMIN — Medication 1 SPRAY(S): at 16:41

## 2024-11-30 RX ADMIN — Medication 30 MILLILITER(S): at 17:02

## 2024-11-30 RX ADMIN — CLOPIDOGREL 75 MILLIGRAM(S): 75 TABLET, FILM COATED ORAL at 10:17

## 2024-11-30 RX ADMIN — Medication 1 SPRAY(S): at 21:43

## 2024-11-30 RX ADMIN — Medication 166.67 MILLIGRAM(S): at 16:41

## 2024-11-30 NOTE — CONSULT NOTE ADULT - SUBJECTIVE AND OBJECTIVE BOX
Patient is a 69y old  Female who presents with a chief complaint of   HPI:  69F w/ h/o anxiety, carotid stenosis, s/p CEA, HTN, fatty liver, Hiatal hernia with GERD, HLD, Hypothyroidism, IBS, Morbid obesity, b/l  lymphedema follows with vascular Dr. Carrasquillo presents with left lower extremity swelling and worsening erythema. Patient has left plantar foot ulcer, has been on doxy last week for cellulitis, Has had worsening of leg swelling and redness despite antibiotic treatment. No other complaints and denies other ROS.  (2024 17:59)  Above HPI reviewed and reconciled with patient    69F with anxiety, carotid stenosis, s/p CEA, HTN, fatty liver, Hiatal hernia with GERD, HLD, Hypothyroidism, IBS, Morbid obesity, b/l  lymphedema presents  with LLE edema and erythema  Recently completed Doxy last week with persistent swelling and redness  Denies any fever or chills  Afebrile on admission, on RA  No leukocytosis  ESR 36, CRP 15  Cr 0.78  CTA without PE  US with L acute DVT  XR with OM, soft tissue edema    PAST MEDICAL & SURGICAL HISTORY:  Carotid stenosis, left      Deviated septum      Essential hypertension      Hyperlipidemia, unspecified hyperlipidemia type      Heart murmur      Mitral valve insufficiency, unspecified etiology      Peripheral edema  bilateral      Hiatal hernia with GERD      Irritable bowel syndrome with both constipation and diarrhea      Gall stones  gall bladder removed      Fatty liver disease, nonalcoholic      Urinary tract infection with hematuria, site unspecified  frequent.  Presently on antibiotics      Joint pain of lower limb  bilateral      Hypothyroidism, unspecified type      Morbid obesity due to excess calories      H/O carotid stenosis      Anxiety      Lymphedema      Fracture dislocation of wrist joint      S/P cholecystectomy  1991       delivery delivered  1989      H/O arthroscopy of shoulder  Left. "Many years ago"      H/O colonoscopy          FAMILY HISTORY:  Family history of hepatic cirrhosis (Mother)  mother    Family history of heart disease (Mother)  mother    Family history of diabetes mellitus (Mother)  mother      Social Hx:    Allergies  No Known Allergies    ANTIMICROBIALS (past 90 days)  MEDICATIONS  (STANDING):  piperacillin/tazobactam IVPB...   200 mL/Hr IV Intermittent (24 @ 15:31)    vancomycin  IVPB   250 mL/Hr IV Intermittent (24 @ 15:57)    vancomycin  IVPB   166.67 mL/Hr IV Intermittent (24 @ 04:56)        ACTIVE ANTIMICROBIALS  vancomycin  IVPB 1250 every 12 hours ( --- )    MEDICATIONS  (STANDING):  acetaminophen     Tablet .. 650 every 6 hours PRN  aluminum hydroxide/magnesium hydroxide/simethicone Suspension 30 every 4 hours PRN  apixaban 10 every 12 hours  atorvastatin 80 at bedtime  calcium carbonate    500 mG (Tums) Chewable 2 once  clopidogrel Tablet 75 daily  furosemide    Tablet 40 two times a day  hydrochlorothiazide 12.5 daily  levothyroxine 50 daily  losartan 100 daily  melatonin 3 at bedtime PRN  ondansetron Injectable 4 every 8 hours PRN      REVIEW OF SYSTEMS  [  ] ROS unobtainable because:    [  ] All other systems negative except as noted below:	    Constitutional:  [ ] fever [ ] chills  [ ] weight loss  [ ] weakness  Skin:  [ ] rash [ ] phlebitis	  Eyes: [ ] icterus [ ] pain  [ ] discharge	  ENMT: [ ] sore throat  [ ] thrush [ ] ulcers [ ] exudates  Respiratory: [ ] dyspnea [ ] hemoptysis [ ] cough [ ] sputum	  Cardiovascular:  [ ] chest pain [ ] palpitations [ ] edema	  Gastrointestinal:  [ ] nausea [ ] vomiting [ ] diarrhea [ ] constipation [ ] pain	  Genitourinary:  [ ] dysuria [ ] frequency [ ] hematuria [ ] discharge [ ] flank pain  [ ] incontinence  Musculoskeletal:  [ ] myalgias [ ] arthralgias [ ] arthritis  [ ] back pain  Neurological:  [ ] headache [ ] seizures  [ ] confusion/altered mental status  Psychiatric:  [ ] anxiety [ ] depression	  Hematology/Lymphatics:  [ ] lymphadenopathy  Endocrine:  [ ] adrenal [ ] thyroid  Allergic/Immunologic:	 [ ] transplant [ ] seasonal    Vital Signs Last 24 Hrs  T(C): 36.5 (2024 08:18), Max: 36.6 (2024 13:51)  T(F): 97.7 (2024 08:18), Max: 97.8 (2024 13:51)  HR: 49 (2024 08:18) (49 - 76)  BP: 148/49 (2024 08:18) (112/42 - 184/72)  BP(mean): 80 (2024 18:23) (80 - 110)  RR: 19 (2024 08:18) (18 - 19)  SpO2: 99% (2024 08:18) (99% - 100%)    Parameters below as of 2024 08:18  Patient On (Oxygen Delivery Method): room air        Physical Exam:  Constitutional:  well preserved, comfortable  Head/Eyes: no icterus  LUNGS:  CTA  CVS:  regular rhythm  Abd:  soft, non-tender; non-distended  Ext:  no edema  Vascular:  IV site no erythema tenderness or discharge  Neuro: AAO X 3, non- focal    Labs: all available labs reviewed                        11.6   8.89  )-----------( 288      ( 2024 04:32 )             36.1         144  |  112[H]  |  21  ----------------------------<  117[H]  3.7   |  27  |  0.78    Ca    8.4[L]      2024 04:32  Phos  3.1       Mg     1.9         TPro  5.9[L]  /  Alb  2.5[L]  /  TBili  0.4  /  DBili  x   /  AST  45[H]  /  ALT  59  /  AlkPhos  205[H]  30     LIVER FUNCTIONS - ( 2024 04:32 )  Alb: 2.5 g/dL / Pro: 5.9 gm/dL / ALK PHOS: 205 U/L / ALT: 59 U/L / AST: 45 U/L / GGT: x           Urinalysis Basic - ( 2024 04:32 )    Color: x / Appearance: x / SG: x / pH: x  Gluc: 117 mg/dL / Ketone: x  / Bili: x / Urobili: x   Blood: x / Protein: x / Nitrite: x   Leuk Esterase: x / RBC: x / WBC x   Sq Epi: x / Non Sq Epi: x / Bacteria: x          Radiology: all available radiological tests reviewed  < from: CT Angio Chest PE Protocol w/ IV Cont (24 @ 06:50) >  IMPRESSION:  No evidence of acute pulmonary embolism. A few small calcified an   noncalcified pulmonary nodules.    No segmental or subsegmental consolidations.    < end of copied text >  < from: US Duplex Venous Lower Ext Ltd, Left (24 @ 16:29) >  IMPRESSION:  Technically limited exam.  Acute deep venous thrombosis: below the knee.    Limited evaluation of the calf veins.    < end of copied text >      Advanced directives addressed: full resuscitation Patient is a 69y old  Female who presents with a chief complaint of   HPI:  69F w/ h/o anxiety, carotid stenosis, s/p CEA, HTN, fatty liver, Hiatal hernia with GERD, HLD, Hypothyroidism, IBS, Morbid obesity, b/l  lymphedema follows with vascular Dr. Carrasquillo presents with left lower extremity swelling and worsening erythema. Patient has left plantar foot ulcer, has been on doxy last week for cellulitis, Has had worsening of leg swelling and redness despite antibiotic treatment. No other complaints and denies other ROS.  (2024 17:59)  Above HPI reviewed and reconciled with patient    69F with anxiety, carotid stenosis, s/p CEA, HTN, fatty liver, Hiatal hernia with GERD, HLD, Hypothyroidism, IBS, Morbid obesity, b/l  lymphedema presents  with LLE edema and erythema  Recently completed Doxy last week with persistent swelling and redness  Denies any fever or chills  Afebrile on admission, on RA  No leukocytosis  ESR 36, CRP 15  Cr 0.78  CTA without PE  US with L acute DVT  XR with OM, soft tissue edema    PAST MEDICAL & SURGICAL HISTORY:  Carotid stenosis, left      Deviated septum      Essential hypertension      Hyperlipidemia, unspecified hyperlipidemia type      Heart murmur      Mitral valve insufficiency, unspecified etiology      Peripheral edema  bilateral      Hiatal hernia with GERD      Irritable bowel syndrome with both constipation and diarrhea      Gall stones  gall bladder removed      Fatty liver disease, nonalcoholic      Urinary tract infection with hematuria, site unspecified  frequent.  Presently on antibiotics      Joint pain of lower limb  bilateral      Hypothyroidism, unspecified type      Morbid obesity due to excess calories      H/O carotid stenosis      Anxiety      Lymphedema      Fracture dislocation of wrist joint      S/P cholecystectomy  1991       delivery delivered  1989      H/O arthroscopy of shoulder  Left. "Many years ago"      H/O colonoscopy          FAMILY HISTORY:  Family history of hepatic cirrhosis (Mother)  mother    Family history of heart disease (Mother)  mother    Family history of diabetes mellitus (Mother)  mother      Social Hx: Denies alcohol, tobacco, recreational drug use    Allergies  No Known Allergies    ANTIMICROBIALS (past 90 days)  MEDICATIONS  (STANDING):  piperacillin/tazobactam IVPB...   200 mL/Hr IV Intermittent (24 @ 15:31)    vancomycin  IVPB   250 mL/Hr IV Intermittent (24 @ 15:57)    vancomycin  IVPB   166.67 mL/Hr IV Intermittent (24 @ 04:56)        ACTIVE ANTIMICROBIALS  vancomycin  IVPB 1250 every 12 hours ( --- )    MEDICATIONS  (STANDING):  acetaminophen     Tablet .. 650 every 6 hours PRN  aluminum hydroxide/magnesium hydroxide/simethicone Suspension 30 every 4 hours PRN  apixaban 10 every 12 hours  atorvastatin 80 at bedtime  calcium carbonate    500 mG (Tums) Chewable 2 once  clopidogrel Tablet 75 daily  furosemide    Tablet 40 two times a day  hydrochlorothiazide 12.5 daily  levothyroxine 50 daily  losartan 100 daily  melatonin 3 at bedtime PRN  ondansetron Injectable 4 every 8 hours PRN      REVIEW OF SYSTEMS  [  ] ROS unobtainable because:    [ x ] All other systems negative except as noted below:	    Constitutional:  [ ] fever [ ] chills  [ ] weight loss  [ ] weakness  Skin:  [ ] rash [ ] phlebitis	  Eyes: [ ] icterus [ ] pain  [ ] discharge	  ENMT: [ ] sore throat  [ ] thrush [ ] ulcers [ ] exudates  Respiratory: [ ] dyspnea [ ] hemoptysis [ ] cough [ ] sputum	  Cardiovascular:  [ ] chest pain [ ] palpitations [ ] edema	  Gastrointestinal:  [ ] nausea [ ] vomiting [ ] diarrhea [ ] constipation [ ] pain	  Genitourinary:  [ ] dysuria [ ] frequency [ ] hematuria [ ] discharge [ ] flank pain  [ ] incontinence  Musculoskeletal:  [ ] myalgias [ ] arthralgias [ ] arthritis  [ ] back pain  Neurological:  [ ] headache [ ] seizures  [ ] confusion/altered mental status  Psychiatric:  [ ] anxiety [ ] depression	  Hematology/Lymphatics:  [ ] lymphadenopathy  Endocrine:  [ ] adrenal [ ] thyroid  Allergic/Immunologic:	 [ ] transplant [ ] seasonal    Vital Signs Last 24 Hrs  T(C): 36.5 (2024 08:18), Max: 36.6 (2024 13:51)  T(F): 97.7 (2024 08:18), Max: 97.8 (2024 13:51)  HR: 49 (2024 08:18) (49 - 76)  BP: 148/49 (2024 08:18) (112/42 - 184/72)  BP(mean): 80 (2024 18:23) (80 - 110)  RR: 19 (2024 08:18) (18 - 19)  SpO2: 99% (2024 08:18) (99% - 100%)    Parameters below as of 2024 08:18  Patient On (Oxygen Delivery Method): room air        Physical Exam:  Constitutional:  NAD  Head/Eyes: no icterus  LUNGS:  CTA  CVS:  regular rhythm  Abd:  soft, non-tender; non-distended  Ext: BLE edema, L > R, erythema  Vascular:  IV site no erythema tenderness or discharge  Neuro: AAO X 3, non- focal    Labs: all available labs reviewed                        11.6   8.89  )-----------( 288      ( 2024 04:32 )             36.1         144  |  112[H]  |  21  ----------------------------<  117[H]  3.7   |  27  |  0.78    Ca    8.4[L]      2024 04:32  Phos  3.1       Mg     1.9         TPro  5.9[L]  /  Alb  2.5[L]  /  TBili  0.4  /  DBili  x   /  AST  45[H]  /  ALT  59  /  AlkPhos  205[H]  30     LIVER FUNCTIONS - ( 2024 04:32 )  Alb: 2.5 g/dL / Pro: 5.9 gm/dL / ALK PHOS: 205 U/L / ALT: 59 U/L / AST: 45 U/L / GGT: x           Urinalysis Basic - ( 2024 04:32 )    Color: x / Appearance: x / SG: x / pH: x  Gluc: 117 mg/dL / Ketone: x  / Bili: x / Urobili: x   Blood: x / Protein: x / Nitrite: x   Leuk Esterase: x / RBC: x / WBC x   Sq Epi: x / Non Sq Epi: x / Bacteria: x          Radiology: all available radiological tests reviewed  < from: CT Angio Chest PE Protocol w/ IV Cont (24 @ 06:50) >  IMPRESSION:  No evidence of acute pulmonary embolism. A few small calcified an   noncalcified pulmonary nodules.    No segmental or subsegmental consolidations.    < end of copied text >  < from: US Duplex Venous Lower Ext Ltd, Left (24 @ 16:29) >  IMPRESSION:  Technically limited exam.  Acute deep venous thrombosis: below the knee.    Limited evaluation of the calf veins.    < end of copied text >      Advanced directives addressed: full resuscitation

## 2024-11-30 NOTE — CONSULT NOTE ADULT - ASSESSMENT
A: 69-year-old Female seen for the followin. Partial Thickness ulceration to the Left 3rd toe, stable  2. Cellulitis to Left Foot  3. Peripheral Vascular Disease  4. Lymphedema        P:   Chart reviewed and Patient evaluated;  Discussed diagnosis and treatment with patient. Discussed importance of daily foot examinations, proper shoe gear, importance of tight glycemic control.   X-rays reviewed -->  No soft tissue emphysema. Calcaneal spurring is seen. Official read above  Wound flush with normal saline  Applied betadine with dry sterile dressing  WBAT  to Left foot in dispensed surgical shoe  Offload bilateral heels while bedbound  Continue antibiotics as per ID  Podiatry to continue with local wound care   All additional care per Med appreciated  Patient demonstrated verbal understanding of all interventions and tolerated interventions well without any complications.   Podiatry will follow while in house      Case D/W attending Dr. Del Castillo

## 2024-11-30 NOTE — PROGRESS NOTE ADULT - SUBJECTIVE AND OBJECTIVE BOX
HOSPITALIST ATTENDING PROGRESS NOTE     Chart and meds reviewed. Patient seen and examined     Interval Hx/Events: Pt seen and evaluated. Has mild tenderness of the LE, otherwise, no other acute complaints.       All other systems and founds to be negative with exception of what has been described above.         PHYSICAL EXAM:  Vital Signs Last 24 Hrs  T(C): 36.5 (30 Nov 2024 08:18), Max: 36.6 (29 Nov 2024 18:23)  T(F): 97.7 (30 Nov 2024 08:18), Max: 97.8 (29 Nov 2024 18:23)  HR: 49 (30 Nov 2024 08:18) (49 - 58)  BP: 148/49 (30 Nov 2024 08:18) (112/42 - 184/72)  BP(mean): 80 (29 Nov 2024 18:23) (80 - 80)  RR: 19 (30 Nov 2024 08:18) (18 - 19)  SpO2: 99% (30 Nov 2024 08:18) (99% - 100%)    Parameters below as of 30 Nov 2024 08:18  Patient On (Oxygen Delivery Method): room air    GEN: NAD   HEENT: EOMI,  moist mucous membranes  NECK : Soft and supple, no JVD  LUNG: CTABL, No wheezing, rales or rhonchi  CVS: S1S2+, RRR, no M/G/R  GI: BS+, soft, NT/ND, no guarding, no rebound  EXTREMITIES: +LE erythema, +chronic venous changes   VASCULAR: 2+ peripheral pulses  NEURO: AAOx3, grossly non-focal   SKIN: No rashes    HOME MEDICATIONS:  Home Medications:  cephalexin 500 mg oral tablet: 1 tab(s) orally 3 times a day for 7 days ***course not complete*** (29 Nov 2024 17:43)  doxycycline hyclate 100 mg oral capsule: 1 cap(s) orally 2 times a day for 10 days ***course not complete*** (29 Nov 2024 17:43)  furosemide 40 mg oral tablet: 1 tab(s) orally 2 times a day (29 Nov 2024 17:43)  levothyroxine 50 mcg (0.05 mg) oral tablet: 1 tab(s) orally once a day (29 Nov 2024 16:41)  losartan-hydroCHLOROthiazide 100 mg-12.5 mg oral tablet: 1 tab(s) orally once a day (29 Nov 2024 16:41)  Hira-Synephrine 0.25% nasal spray: 2 spray(s) in each nostril 3 to 4 times a day as needed for (29 Nov 2024 16:40)      MEDICATIONS  MEDICATIONS  (STANDING):  apixaban 10 milliGRAM(s) Oral every 12 hours  artificial tears (preservative free) Ophthalmic Solution 1 Drop(s) Both EYES three times a day  atorvastatin 80 milliGRAM(s) Oral at bedtime  calcium carbonate    500 mG (Tums) Chewable 2 Tablet(s) Chew once  clopidogrel Tablet 75 milliGRAM(s) Oral daily  furosemide    Tablet 40 milliGRAM(s) Oral two times a day  hydrochlorothiazide 12.5 milliGRAM(s) Oral daily  levothyroxine 50 MICROGram(s) Oral daily  losartan 100 milliGRAM(s) Oral daily  phenylephrine 0.5% Nasal 1 Spray(s) Both Nostrils every 8 hours  vancomycin  IVPB 1250 milliGRAM(s) IV Intermittent every 12 hours      LABS: All Labs Reviewed:                        11.6   8.89  )-----------( 288      ( 30 Nov 2024 04:32 )             36.1     11-30    144  |  112[H]  |  21  ----------------------------<  117[H]  3.7   |  27  |  0.78    Ca    8.4[L]      30 Nov 2024 04:32  Phos  3.1     11-30  Mg     1.9     11-30    TPro  5.9[L]  /  Alb  2.5[L]  /  TBili  0.4  /  DBili  x   /  AST  45[H]  /  ALT  59  /  AlkPhos  205[H]  11-30    PT/INR - ( 30 Nov 2024 08:40 )   PT: 14.4 sec;   INR: 1.22 ratio         PTT - ( 30 Nov 2024 08:40 )  PTT:37.2 sec    Urinalysis Basic - ( 30 Nov 2024 04:32 )    Color: x / Appearance: x / SG: x / pH: x  Gluc: 117 mg/dL / Ketone: x  / Bili: x / Urobili: x   Blood: x / Protein: x / Nitrite: x   Leuk Esterase: x / RBC: x / WBC x   Sq Epi: x / Non Sq Epi: x / Bacteria: x        Blood Culture:   I&O's Detail    CAPILLARY BLOOD GLUCOSE            CARDIOLOGY TESTING         EKG     ECHO       RADIOLOGY HOSPITALIST ATTENDING PROGRESS NOTE     Chart and meds reviewed. Patient seen and examined     Interval Hx/Events: Pt seen and evaluated. Has mild tenderness of the LE, otherwise, no other acute complaints.       All other systems and founds to be negative with exception of what has been described above.         PHYSICAL EXAM:  Vital Signs Last 24 Hrs  T(C): 36.5 (30 Nov 2024 08:18), Max: 36.6 (29 Nov 2024 18:23)  T(F): 97.7 (30 Nov 2024 08:18), Max: 97.8 (29 Nov 2024 18:23)  HR: 49 (30 Nov 2024 08:18) (49 - 58)  BP: 148/49 (30 Nov 2024 08:18) (112/42 - 184/72)  BP(mean): 80 (29 Nov 2024 18:23) (80 - 80)  RR: 19 (30 Nov 2024 08:18) (18 - 19)  SpO2: 99% (30 Nov 2024 08:18) (99% - 100%)    Parameters below as of 30 Nov 2024 08:18  Patient On (Oxygen Delivery Method): room air    GEN: NAD   HEENT: EOMI,  moist mucous membranes  NECK : Soft and supple, no JVD  LUNG: CTABL, No wheezing, rales or rhonchi  CVS: S1S2+, RRR, no M/G/R  GI: BS+, soft, NT/ND, no guarding, no rebound  EXTREMITIES: +LE erythema, +chronic venous changes   VASCULAR: 2+ peripheral pulses  NEURO: AAOx3, grossly non-focal   SKIN: No rashes    HOME MEDICATIONS:  Home Medications:  cephalexin 500 mg oral tablet: 1 tab(s) orally 3 times a day for 7 days ***course not complete*** (29 Nov 2024 17:43)  doxycycline hyclate 100 mg oral capsule: 1 cap(s) orally 2 times a day for 10 days ***course not complete*** (29 Nov 2024 17:43)  furosemide 40 mg oral tablet: 1 tab(s) orally 2 times a day (29 Nov 2024 17:43)  levothyroxine 50 mcg (0.05 mg) oral tablet: 1 tab(s) orally once a day (29 Nov 2024 16:41)  losartan-hydroCHLOROthiazide 100 mg-12.5 mg oral tablet: 1 tab(s) orally once a day (29 Nov 2024 16:41)  Hira-Synephrine 0.25% nasal spray: 2 spray(s) in each nostril 3 to 4 times a day as needed for (29 Nov 2024 16:40)      MEDICATIONS  MEDICATIONS  (STANDING):  apixaban 10 milliGRAM(s) Oral every 12 hours  artificial tears (preservative free) Ophthalmic Solution 1 Drop(s) Both EYES three times a day  atorvastatin 80 milliGRAM(s) Oral at bedtime  calcium carbonate    500 mG (Tums) Chewable 2 Tablet(s) Chew once  clopidogrel Tablet 75 milliGRAM(s) Oral daily  furosemide    Tablet 40 milliGRAM(s) Oral two times a day  hydrochlorothiazide 12.5 milliGRAM(s) Oral daily  levothyroxine 50 MICROGram(s) Oral daily  losartan 100 milliGRAM(s) Oral daily  phenylephrine 0.5% Nasal 1 Spray(s) Both Nostrils every 8 hours  vancomycin  IVPB 1250 milliGRAM(s) IV Intermittent every 12 hours      LABS: All Labs Reviewed:                        11.6   8.89  )-----------( 288      ( 30 Nov 2024 04:32 )             36.1     11-30    144  |  112[H]  |  21  ----------------------------<  117[H]  3.7   |  27  |  0.78    Ca    8.4[L]      30 Nov 2024 04:32  Phos  3.1     11-30  Mg     1.9     11-30    TPro  5.9[L]  /  Alb  2.5[L]  /  TBili  0.4  /  DBili  x   /  AST  45[H]  /  ALT  59  /  AlkPhos  205[H]  11-30    PT/INR - ( 30 Nov 2024 08:40 )   PT: 14.4 sec;   INR: 1.22 ratio         PTT - ( 30 Nov 2024 08:40 )  PTT:37.2 sec    Urinalysis Basic - ( 30 Nov 2024 04:32 )    Color: x / Appearance: x / SG: x / pH: x  Gluc: 117 mg/dL / Ketone: x  / Bili: x / Urobili: x   Blood: x / Protein: x / Nitrite: x   Leuk Esterase: x / RBC: x / WBC x   Sq Epi: x / Non Sq Epi: x / Bacteria: x        Blood Culture:   I&O's Detail    CAPILLARY BLOOD GLUCOSE            CARDIOLOGY TESTING   EKG   ECHO      RADIOLOGY  < from: CT Angio Chest PE Protocol w/ IV Cont (11.30.24 @ 06:50) >  LUNGS AND LARGE AIRWAYS: Patent central airways. Calcified granulomas in   the left lower lobe and right middle lobe. A few less than 0.3 cm   pulmonary nodules. Linear scarring in the lingula. No segmental   consolidations.  PLEURA: No pleural effusion.  VESSELS: Aortic calcifications. Coronary artery calcifications. No   evidence of acute pulmonary embolism.  HEART: Heart size is normal. No pericardial effusion.  MEDIASTINUM AND MARK: No lymphadenopathy.  CHEST WALL AND LOWER NECK: Within normal limits.  VISUALIZED UPPER ABDOMEN: Focal area of calcification along the posterior   medial right hepatic lobe unchanged. Partially visualized cholecystectomy   clips. Calcification the periphery of the anterior right hepatic lobe   unchanged.  BONES: Degenerative changes.    IMPRESSION:  No evidence of acute pulmonary embolism. A few small calcified an   noncalcified pulmonary nodules.    No segmental or subsegmental consolidations.    < from: US Duplex Venous Lower Ext Ltd, Left (11.29.24 @ 16:29) >  FINDINGS:    Technically limited exam.  There is normal compressibility of the left common femoral, femoral and   popliteal veins.  The contralateral common femoral vein is patent.  Doppler examination shows normal spontaneous and phasic flow.    There is thrombus within the posterior tibial trunk. Posterior tibial and   peroneal veins are not well visualized due to edema and body habitus.    IMPRESSION:  Technically limited exam.  Acute deep venous thrombosis: below the knee.    Limited evaluation of the calf veins.

## 2024-11-30 NOTE — PROGRESS NOTE ADULT - ASSESSMENT
69F admitted for cellulitis and DVT.     #Chronic venous stasis, lymphedema, probable overlying cellulitis  -C/W. Vanco  -Follow-up on cultures  -ID following    #Acute deep thrombosis, below the knee, involving the posterior tibial trunk  -CTA: Negative for PE  -Eliquis    #CAD  -C/W Plavix  C/W Lipitor    #HTN  C/W losartan, hydrochlorothiazide    #Hypothyroidism  -C/W Synthroid    #Incidental CT findings  -Pulmonary nodules: Follow-up as outpatient    #VTE prophylaxis  -Eliquis 69F admitted for cellulitis and DVT.     #Chronic venous stasis, lymphedema, probable overlying cellulitis  -C/W. Vanco  -Follow-up on cultures  -ID following    #Left 3rd toe wound   -podiatry eval     #Acute deep thrombosis, below the knee, involving the posterior tibial trunk  -CTA: Negative for PE  -Eliquis    #CAD  -C/W Plavix  C/W Lipitor    #HTN  C/W losartan, hydrochlorothiazide    #Hypothyroidism  -C/W Synthroid    #Incidental CT findings  -Pulmonary nodules: Follow-up as outpatient    #VTE prophylaxis  -Eliquis

## 2024-11-30 NOTE — CONSULT NOTE ADULT - SUBJECTIVE AND OBJECTIVE BOX
Date of consult: 2024    HPI:  69F w/ h/o anxiety, carotid stenosis, s/p CEA, HTN, fatty liver, Hiatal hernia with GERD, HLD, Hypothyroidism, IBS, Morbid obesity, b/l  lymphedema follows with vascular Dr. Carrasquillo presents with left lower extremity swelling and worsening erythema. Patient has left plantar foot ulcer, has been on doxy last week for cellulitis, Has had worsening of leg swelling and redness despite antibiotic treatment. No other complaints and denies other ROS.  (2024 17:59)  Podiatry was consulted for left third toe wound. Patient states she noticed the wound a week ago upon visit to podiatrist. Pt was placed on doxycycline which she left home. patient rate the pain as 4/10 . Pt denies chills, fever and shortness of breath.            PMH: Carotid stenosis, left    Nonintractable episodic headache, unspecified headache type    Deviated septum    Acute recurrent sinusitis, unspecified location    Essential hypertension    Hyperlipidemia, unspecified hyperlipidemia type    Heart murmur    Mitral valve insufficiency, unspecified etiology    Peripheral edema    Gastroesophageal reflux disease, esophagitis presence not specified    Hiatal hernia with GERD    Irritable bowel syndrome with both constipation and diarrhea    Gall stones    Fatty liver disease, nonalcoholic    Urinary tract infection with hematuria, site unspecified    Joint pain of lower limb    Hypothyroidism, unspecified type    Morbid obesity due to excess calories    Anxiety and depression    H/O carotid stenosis    Anxiety    Lymphedema      PSH: Fracture dislocation of wrist joint    S/P cholecystectomy     delivery delivered    H/O arthroscopy of shoulder    H/O colonoscopy        Allergies: No Known Allergies  cefadroxil (Diarrhea)      Labs:               11.6   8.89  )-----------( 288      ( 2024 04:32 )             36.1     WBC Trend  8.89 Date ( @ 04:32)  11.58[H] Date ( @ 15:05)      Chem      144  |  112[H]  |  21  ----------------------------<  117[H]  3.7   |  27  |  0.78    Ca    8.4[L]      2024 04:32  Phos  3.1       Mg     1.9         TPro  5.9[L]  /  Alb  2.5[L]  /  TBili  0.4  /  DBili  x   /  AST  45[H]  /  ALT  59  /  AlkPhos  205[H]            T(F): 97.7 (24 @ 08:18), Max: 97.8 (24 @ 18:23)  HR: 49 (24 @ 08:18) (49 - 58)  BP: 148/49 (24 @ 08:18) (112/42 - 184/72)  RR: 19 (24 @ 08:18) (18 - 19)  SpO2: 99% (24 @ 08:18) (99% - 100%)  Wt(kg): --    REVIEW OF SYSTEMS:    CONSTITUTIONAL: No weakness, fevers or chills  EYES: No visual changes  RESPIRATORY: No cough, wheezing; No shortness of breath  CARDIOVASCULAR: No chest pain or palpitations  GASTROINTESTINAL: No abdominal or epigastric pain. No nausea, vomiting; No diarrhea or constipation.   GENITOURINARY: No dysuria, frequency or hematuria  NEUROLOGICAL: No numbness or weakness  SKIN: See physical examination.  All other review of systems is negative unless indicated above    Physical Exam:   Constitutional: NAD, alert;  Lower Extremity Focus  Derm:  Skin warm, dry and supple bilateral.    Right: Open lesion to the tip of the 3rd with hyperkeratotic border, wound base Granular patches , wound size ( 0.8cm X 0.6 cm X 0.2cm), + non-pitting edema, - genia-wound erythema, - purulence, - fluctuance, - tracking/tunneling, - probe to bone.   Vascular: Dorsalis Pedis and Posterior Tibial pulses weakly palpable.   Neuro: Protective sensation intact to the level of the digits bilateral.  MSK: Muscle strength 5/5 all major muscle groups bilateral. observed musculoskeletal contractures to the 3rd toe      < from: Xray Foot AP + Lateral + Oblique, Left (24 @ 15:39) >    PROCEDURE DATE:  2024          INTERPRETATION:  History:   Rule out osteomyelitis    Technique: Left foot 3 films    Comparison: None    Findings/  Impression:Nofracture. No lytic or blastic lesion. No soft tissue   swelling. No radiopaque foreign body. No gas lucencies in the soft   tissues. Calcaneal spurring is seen. Follow-up MRI as clinically warranted    --- End of Report ---            LEONARDO STEVEN MD; Attending Interventional Radiologist  This document has been electronically signed. 2024  9:58AM    < end of copied text >  < from: US Duplex Venous Lower Ext Ltd, Left (.24 @ 16:29) >  ACC: [id number] EXAM:  US DPLX LWR EXT VEINS LTD LT   ORDERED BY: DIPAK WEBBER     PROCEDURE DATE:  2024          INTERPRETATION:  CLINICAL INFORMATION: Left leg edema and redness    COMPARISON: 2024.    TECHNIQUE: Duplex sonography of the LEFT LOWER extremity veins with color   and spectral Doppler, with and without compression.    FINDINGS:    Technically limited exam.  There is normal compressibility of the left common femoral, femoral and   popliteal veins.  The contralateral common femoral vein is patent.  Doppler examination shows normal spontaneous and phasic flow.    There is thrombus within the posterior tibial trunk. Posterior tibial and   peroneal veins are not well visualized due to edema and body habitus.    IMPRESSION:  Technically limited exam.  Acute deep venous thrombosis: below the knee.    Limited evaluation of the calf veins.        --- End of Report ---            COSMO FITZGERALD MD; Attending Radiologist  This document has been electronically signed. 2024  4:49PM    < end of copied text >

## 2024-11-30 NOTE — CHART NOTE - NSCHARTNOTEFT_GEN_A_CORE
NIGHT ACP / HOSPITALIST       69F w/ h/o anxiety, carotid stenosis, s/p CEA, HTN, fatty liver, Hiatal hernia with GERD, HLD, Hypothyroidism, IBS, Morbid obesity, b/l  lymphedema who presented to  with c/o left lower extremity swelling and worsening erythema. Patient has left plantar foot ulcer, has been on doxy last week for cellulitis, Has had worsening of leg swelling and redness despite antibiotic treatment. Admitted on 11/30/24 for LE cellulitis s/p Vanco and Zosyn and LLE DVT started on Eliquis. CALLED BY RN as pt c/o +SOB requiring oxygen. Pt seen and evaluated at bedside    PHYSICAL EXAM:  General: non-toxic, obese  HEENT: non-traumatic, perrla, eomi  Cardio: s1s2 regular rate and rhythm  Lungs: comfortable breathing, clear to auscultation  Abdomen: Soft, non-tender, non-distended  Neuro: AOx4  Ext: +2 b/l LE swelling, erythema. Erythema on left foot, with plantar ulcer.    A/P:   STAT EKG  STAT Labs  STAT CXR  Continue with 2LNC - with O2 sat of 100%  Continue Eliquis   D-Dimer - 1308  CE neg x 1   CT Angio Chest ordered -  f/u   ProBNP  - pending   Placed on remote tele w/ continuous pulse ox   AM team to follow       BECCA Erwin-BC  Dept of Medicine  VIA TEAMS or [phone]

## 2024-11-30 NOTE — CONSULT NOTE ADULT - ASSESSMENT
Assessment:  69F with anxiety, carotid stenosis, s/p CEA, HTN, fatty liver, Hiatal hernia with GERD, HLD, Hypothyroidism, IBS, Morbid obesity, b/l  lymphedema presents 11/29 with LLE edema and erythema  Recently completed Doxy last week with persistent swelling and redness  Denies any fever or chills  Afebrile on admission, on RA  No leukocytosis  ESR 36, CRP 15  Cr 0.78  CTA without PE  US with L acute DVT  XR with OM, soft tissue edema    Antimicrobials:  vancomycin  IVPB 1250 every 12 hours (11/29 --- )    Impression:   #Acute LLE DVT  #HTN  #Bilateral Chronic Lymphoedema     Recommendations:        Clinical team may change from intravenous to oral antibiotics when the following criteria are met:   1. Patient is clinically improving/stable       a)	Improved signs and symptoms of infection from initial presentation       b)	Afebrile for 24 hours       c)	Leukocytosis trending towards normal range   2. Patient is tolerating oral intake   3. Initial/repeat blood cultures are negative OR do not need to wait for preliminary blood cultures to result    When above criteria met, may change iv antibiotics to: agent, dose, frequency, duration.  Cannot advise changing to oral antibiotic therapy until culture sensitivity is available.   Assessment:  69F with anxiety, carotid stenosis, s/p CEA, HTN, fatty liver, Hiatal hernia with GERD, HLD, Hypothyroidism, IBS, Morbid obesity, b/l  lymphedema presents 11/29 with LLE edema and erythema  Recently completed Doxy last week with persistent swelling and redness  Denies any fever or chills  Afebrile on admission, on RA  No leukocytosis  ESR 36, CRP 15  Cr 0.78  CTA without PE  US with L acute DVT  XR with OM, soft tissue edema    Antimicrobials:  vancomycin  IVPB 1250 every 12 hours (11/29 --- )    Impression:   #Acute LLE DVT  #HTN  #Bilateral Chronic Lymphoedema   - persistent swelling and redness despite antibiotic, now found to have DVT, low suspicion for cellulitis, likely all from clot  - afebrile without leukocytosis    Recommendations:  - discontinue vancomycin, monitor off antibiotic  - monitor temperature curve  - trend WBC  - DVT management per primary team    HH Token not applicable

## 2024-12-01 LAB
ANION GAP SERPL CALC-SCNC: 3 MMOL/L — LOW (ref 5–17)
BUN SERPL-MCNC: 24 MG/DL — HIGH (ref 7–23)
CALCIUM SERPL-MCNC: 9.4 MG/DL — SIGNIFICANT CHANGE UP (ref 8.5–10.1)
CHLORIDE SERPL-SCNC: 106 MMOL/L — SIGNIFICANT CHANGE UP (ref 96–108)
CO2 SERPL-SCNC: 29 MMOL/L — SIGNIFICANT CHANGE UP (ref 22–31)
CREAT SERPL-MCNC: 0.98 MG/DL — SIGNIFICANT CHANGE UP (ref 0.5–1.3)
EGFR: 63 ML/MIN/1.73M2 — SIGNIFICANT CHANGE UP
GLUCOSE BLDC GLUCOMTR-MCNC: 154 MG/DL — HIGH (ref 70–99)
GLUCOSE SERPL-MCNC: 128 MG/DL — HIGH (ref 70–99)
GRAM STN FLD: SIGNIFICANT CHANGE UP
HCT VFR BLD CALC: 38.8 % — SIGNIFICANT CHANGE UP (ref 34.5–45)
HGB BLD-MCNC: 12.5 G/DL — SIGNIFICANT CHANGE UP (ref 11.5–15.5)
MCHC RBC-ENTMCNC: 30.6 PG — SIGNIFICANT CHANGE UP (ref 27–34)
MCHC RBC-ENTMCNC: 32.2 G/DL — SIGNIFICANT CHANGE UP (ref 32–36)
MCV RBC AUTO: 94.9 FL — SIGNIFICANT CHANGE UP (ref 80–100)
PLATELET # BLD AUTO: 304 K/UL — SIGNIFICANT CHANGE UP (ref 150–400)
POTASSIUM SERPL-MCNC: 3.6 MMOL/L — SIGNIFICANT CHANGE UP (ref 3.5–5.3)
POTASSIUM SERPL-SCNC: 3.6 MMOL/L — SIGNIFICANT CHANGE UP (ref 3.5–5.3)
RBC # BLD: 4.09 M/UL — SIGNIFICANT CHANGE UP (ref 3.8–5.2)
RBC # FLD: 15.8 % — HIGH (ref 10.3–14.5)
SODIUM SERPL-SCNC: 138 MMOL/L — SIGNIFICANT CHANGE UP (ref 135–145)
SPECIMEN SOURCE: SIGNIFICANT CHANGE UP
WBC # BLD: 8.64 K/UL — SIGNIFICANT CHANGE UP (ref 3.8–10.5)
WBC # FLD AUTO: 8.64 K/UL — SIGNIFICANT CHANGE UP (ref 3.8–10.5)

## 2024-12-01 PROCEDURE — 93010 ELECTROCARDIOGRAM REPORT: CPT

## 2024-12-01 PROCEDURE — 99232 SBSQ HOSP IP/OBS MODERATE 35: CPT

## 2024-12-01 RX ORDER — DOXYCYCLINE HYCLATE 150 MG/1
100 TABLET, COATED ORAL EVERY 12 HOURS
Refills: 0 | Status: DISCONTINUED | OUTPATIENT
Start: 2024-12-01 | End: 2024-12-02

## 2024-12-01 RX ADMIN — FUROSEMIDE 40 MILLIGRAM(S): 40 TABLET ORAL at 06:25

## 2024-12-01 RX ADMIN — APIXABAN 10 MILLIGRAM(S): 2.5 TABLET, FILM COATED ORAL at 09:58

## 2024-12-01 RX ADMIN — POVIDONE, POLYVINYL ALCOHOL 1 DROP(S): 20; 27 SOLUTION OPHTHALMIC at 18:17

## 2024-12-01 RX ADMIN — DOXYCYCLINE HYCLATE 110 MILLIGRAM(S): 150 TABLET, COATED ORAL at 22:26

## 2024-12-01 RX ADMIN — POVIDONE, POLYVINYL ALCOHOL 1 DROP(S): 20; 27 SOLUTION OPHTHALMIC at 06:25

## 2024-12-01 RX ADMIN — CLOPIDOGREL 75 MILLIGRAM(S): 75 TABLET, FILM COATED ORAL at 09:58

## 2024-12-01 RX ADMIN — Medication 50 MICROGRAM(S): at 06:25

## 2024-12-01 RX ADMIN — Medication 80 MILLIGRAM(S): at 22:23

## 2024-12-01 RX ADMIN — Medication 1 SPRAY(S): at 22:27

## 2024-12-01 RX ADMIN — Medication 1 SPRAY(S): at 18:11

## 2024-12-01 RX ADMIN — APIXABAN 10 MILLIGRAM(S): 2.5 TABLET, FILM COATED ORAL at 22:23

## 2024-12-01 RX ADMIN — POVIDONE, POLYVINYL ALCOHOL 1 DROP(S): 20; 27 SOLUTION OPHTHALMIC at 22:23

## 2024-12-01 RX ADMIN — Medication 166.67 MILLIGRAM(S): at 03:28

## 2024-12-01 RX ADMIN — FUROSEMIDE 40 MILLIGRAM(S): 40 TABLET ORAL at 18:18

## 2024-12-01 RX ADMIN — LOSARTAN POTASSIUM 100 MILLIGRAM(S): 100 TABLET, FILM COATED ORAL at 09:57

## 2024-12-01 RX ADMIN — Medication 1 SPRAY(S): at 06:25

## 2024-12-01 NOTE — PROGRESS NOTE ADULT - SUBJECTIVE AND OBJECTIVE BOX
HOSPITALIST ATTENDING PROGRESS NOTE     Chart and meds reviewed. Patient seen and examined     Interval Hx/Events: Pt seen and evaluated. Has mild tenderness of the LE, otherwise, no other acute complaints.     12/1: Seen and evaluated. No acute complaints.       All other systems and founds to be negative with exception of what has been described above.     PHYSICAL EXAM:  Vital Signs Last 24 Hrs  T(C): 36.2 (01 Dec 2024 09:23), Max: 36.8 (01 Dec 2024 02:08)  T(F): 97.2 (01 Dec 2024 09:23), Max: 98.2 (01 Dec 2024 02:08)  HR: 62 (01 Dec 2024 09:23) (48 - 62)  BP: 124/59 (01 Dec 2024 09:23) (124/59 - 146/49)  RR: 18 (01 Dec 2024 09:23) (18 - 19)  SpO2: 98% (01 Dec 2024 09:23) (98% - 100%)    Parameters below as of 01 Dec 2024 09:23  Patient On (Oxygen Delivery Method): room air        GEN: NAD   HEENT: EOMI,  moist mucous membranes  NECK : Soft and supple, no JVD  LUNG: CTABL, No wheezing, rales or rhonchi  CVS: S1S2+, RRR, no M/G/R  GI: BS+, soft, NT/ND, no guarding, no rebound  EXTREMITIES: +LE erythema, +chronic venous changes   VASCULAR: 2+ peripheral pulses  NEURO: AAOx3, grossly non-focal   SKIN: No rashes    HOME MEDICATIONS:  Home Medications:  cephalexin 500 mg oral tablet: 1 tab(s) orally 3 times a day for 7 days ***course not complete*** (29 Nov 2024 17:43)  doxycycline hyclate 100 mg oral capsule: 1 cap(s) orally 2 times a day for 10 days ***course not complete*** (29 Nov 2024 17:43)  furosemide 40 mg oral tablet: 1 tab(s) orally 2 times a day (29 Nov 2024 17:43)  levothyroxine 50 mcg (0.05 mg) oral tablet: 1 tab(s) orally once a day (29 Nov 2024 16:41)  losartan-hydroCHLOROthiazide 100 mg-12.5 mg oral tablet: 1 tab(s) orally once a day (29 Nov 2024 16:41)  Hira-Synephrine 0.25% nasal spray: 2 spray(s) in each nostril 3 to 4 times a day as needed for (29 Nov 2024 16:40)      MEDICATIONS  MEDICATIONS  (STANDING):  apixaban 10 milliGRAM(s) Oral every 12 hours  artificial tears (preservative free) Ophthalmic Solution 1 Drop(s) Both EYES three times a day  atorvastatin 80 milliGRAM(s) Oral at bedtime  calcium carbonate    500 mG (Tums) Chewable 2 Tablet(s) Chew once  clopidogrel Tablet 75 milliGRAM(s) Oral daily  furosemide    Tablet 40 milliGRAM(s) Oral two times a day  hydrochlorothiazide 12.5 milliGRAM(s) Oral daily  levothyroxine 50 MICROGram(s) Oral daily  losartan 100 milliGRAM(s) Oral daily  phenylephrine 0.5% Nasal 1 Spray(s) Both Nostrils every 8 hours  vancomycin  IVPB 1250 milliGRAM(s) IV Intermittent every 12 hours    MEDICATIONS  (PRN):  acetaminophen     Tablet .. 650 milliGRAM(s) Oral every 6 hours PRN Mild Pain (1 - 3)  aluminum hydroxide/magnesium hydroxide/simethicone Suspension 30 milliLiter(s) Oral every 4 hours PRN Dyspepsia  melatonin 3 milliGRAM(s) Oral at bedtime PRN Insomnia  ondansetron Injectable 4 milliGRAM(s) IV Push every 8 hours PRN Nausea and/or Vomiting        LABS: All Labs Reviewed:                        12.5   8.64  )-----------( 304      ( 01 Dec 2024 07:30 )             38.8   12-01    138  |  106  |  24[H]  ----------------------------<  128[H]  3.6   |  29  |  0.98    Ca    9.4      01 Dec 2024 07:30  Phos  3.1     11-30  Mg     1.9     11-30    TPro  5.9[L]  /  Alb  2.5[L]  /  TBili  0.4  /  DBili  x   /  AST  45[H]  /  ALT  59  /  AlkPhos  205[H]  11-30        Blood Culture:   I&O's Detail    CAPILLARY BLOOD GLUCOSE            CARDIOLOGY TESTING   EKG   ECHO      RADIOLOGY  < from: CT Angio Chest PE Protocol w/ IV Cont (11.30.24 @ 06:50) >  LUNGS AND LARGE AIRWAYS: Patent central airways. Calcified granulomas in   the left lower lobe and right middle lobe. A few less than 0.3 cm   pulmonary nodules. Linear scarring in the lingula. No segmental   consolidations.  PLEURA: No pleural effusion.  VESSELS: Aortic calcifications. Coronary artery calcifications. No   evidence of acute pulmonary embolism.  HEART: Heart size is normal. No pericardial effusion.  MEDIASTINUM AND MARK: No lymphadenopathy.  CHEST WALL AND LOWER NECK: Within normal limits.  VISUALIZED UPPER ABDOMEN: Focal area of calcification along the posterior   medial right hepatic lobe unchanged. Partially visualized cholecystectomy   clips. Calcification the periphery of the anterior right hepatic lobe   unchanged.  BONES: Degenerative changes.    IMPRESSION:  No evidence of acute pulmonary embolism. A few small calcified an   noncalcified pulmonary nodules.    No segmental or subsegmental consolidations.    < from: US Duplex Venous Lower Ext Ltd, Left (11.29.24 @ 16:29) >  FINDINGS:    Technically limited exam.  There is normal compressibility of the left common femoral, femoral and   popliteal veins.  The contralateral common femoral vein is patent.  Doppler examination shows normal spontaneous and phasic flow.    There is thrombus within the posterior tibial trunk. Posterior tibial and   peroneal veins are not well visualized due to edema and body habitus.    IMPRESSION:  Technically limited exam.  Acute deep venous thrombosis: below the knee.    Limited evaluation of the calf veins.       negative

## 2024-12-01 NOTE — PHYSICAL THERAPY INITIAL EVALUATION ADULT - ADDITIONAL COMMENTS
Lives in 2nd floor apartment with 12 steps to enter, no steps within home   DME: Cane, RW (used as needed)   +

## 2024-12-01 NOTE — PROGRESS NOTE ADULT - SUBJECTIVE AND OBJECTIVE BOX
Date of Service:12-01-24 @ 12:43  Interval History/ROS: Afebrile overnight, on RA, WBC within normal limits, BCx negative, reports no fever or chills    REVIEW OF SYSTEMS  [  ] ROS unobtainable because:    [ x ] All other systems negative except as noted below    Constitutional:  [ ] fever [ ] chills  [ ] weight loss  [ ]night sweat  [ ]poor appetite/PO intake [ ]fatigue   Skin:  [ ] rash [ ] phlebitis	  Eyes: [ ] icterus [ ] pain  [ ] discharge	  ENMT: [ ] sore throat  [ ] thrush [ ] ulcers [ ] exudates [ ]anosmia  Respiratory: [ ] dyspnea [ ] hemoptysis [ ] cough [ ] sputum	  Cardiovascular:  [ ] chest pain [ ] palpitations [ ] edema	  Gastrointestinal:  [ ] nausea [ ] vomiting [ ] diarrhea [ ] constipation [ ] pain	  Genitourinary:  [ ] dysuria [ ] frequency [ ] hematuria [ ] discharge [ ] flank pain  [ ] incontinence  Musculoskeletal:  [ ] myalgias [ ] arthralgias [ ] arthritis  [ ] back pain  Neurological:  [ ] headache [ ] weakness [ ] seizures  [ ] confusion/altered mental status    Allergies  No Known Allergies        ANTIMICROBIALS:    vancomycin  IVPB 1250 every 12 hours        OTHER MEDS: MEDICATIONS  (STANDING):  acetaminophen     Tablet .. 650 every 6 hours PRN  aluminum hydroxide/magnesium hydroxide/simethicone Suspension 30 every 4 hours PRN  apixaban 10 every 12 hours  atorvastatin 80 at bedtime  calcium carbonate    500 mG (Tums) Chewable 2 once  clopidogrel Tablet 75 daily  furosemide    Tablet 40 two times a day  hydrochlorothiazide 12.5 daily  levothyroxine 50 daily  losartan 100 daily  melatonin 3 at bedtime PRN  ondansetron Injectable 4 every 8 hours PRN      Vital Signs Last 24 Hrs  T(F): 97.2 (12-01-24 @ 09:23), Max: 98.2 (12-01-24 @ 02:08)    Vital Signs Last 24 Hrs  HR: 62 (12-01-24 @ 09:23) (48 - 62)  BP: 124/59 (12-01-24 @ 09:23) (124/59 - 146/49)  RR: 18 (12-01-24 @ 09:23)  SpO2: 98% (12-01-24 @ 09:23) (98% - 100%)  Wt(kg): --    EXAM:    Constitutional:  NAD  Head/Eyes: no icterus  LUNGS:  CTA  CVS:  regular rhythm  Abd:  soft, non-tender; non-distended  Ext: BLE edema, L > R, erythema  Vascular:  IV site no erythema tenderness or discharge  Neuro: AAO X 3, non- focal    Labs:                        12.5   8.64  )-----------( 304      ( 01 Dec 2024 07:30 )             38.8     12-01    138  |  106  |  24[H]  ----------------------------<  128[H]  3.6   |  29  |  0.98    Ca    9.4      01 Dec 2024 07:30  Phos  3.1     11-30  Mg     1.9     11-30    TPro  5.9[L]  /  Alb  2.5[L]  /  TBili  0.4  /  DBili  x   /  AST  45[H]  /  ALT  59  /  AlkPhos  205[H]  11-30      WBC Trend:  WBC Count: 8.64 (12-01-24 @ 07:30)  WBC Count: 8.89 (11-30-24 @ 04:32)  WBC Count: 11.58 (11-29-24 @ 15:05)      Creatine Trend:  Creatinine: 0.98 (12-01)  Creatinine: 0.78 (11-30)  Creatinine: 0.79 (11-29)      Liver Biochemical Testing Trend:  Alanine Aminotransferase (ALT/SGPT): 59 (11-30)  Alanine Aminotransferase (ALT/SGPT): 61 (11-29)  Alanine Aminotransferase (ALT/SGPT): 39 (08-31)  Alanine Aminotransferase (ALT/SGPT): 47 (08-30)  Alanine Aminotransferase (ALT/SGPT): 25 (06-20)  Aspartate Aminotransferase (AST/SGOT): 45 (11-30-24 @ 04:32)  Aspartate Aminotransferase (AST/SGOT): 50 (11-29-24 @ 15:05)  Aspartate Aminotransferase (AST/SGOT): 30 (08-31-24 @ 07:36)  Aspartate Aminotransferase (AST/SGOT): 33 (08-30-24 @ 11:11)  Aspartate Aminotransferase (AST/SGOT): 23 (06-20-24 @ 15:22)  Bilirubin Total: 0.4 (11-30)  Bilirubin Total: 0.6 (11-29)  Bilirubin Direct: 0.2 (08-31)  Bilirubin Total: 0.6 (08-31)  Bilirubin Total: 0.8 (08-30)      Trend LDH      Urinalysis Basic - ( 01 Dec 2024 07:30 )    Color: x / Appearance: x / SG: x / pH: x  Gluc: 128 mg/dL / Ketone: x  / Bili: x / Urobili: x   Blood: x / Protein: x / Nitrite: x   Leuk Esterase: x / RBC: x / WBC x   Sq Epi: x / Non Sq Epi: x / Bacteria: x        MICROBIOLOGY:    MRSA PCR Result.: NotDetec (04-16-24 @ 10:25)      Culture - Blood (collected 29 Nov 2024 15:05)  Source: .Blood BLOOD  Preliminary Report:    No growth at 24 hours    Culture - Blood (collected 29 Nov 2024 15:05)  Source: .Blood BLOOD  Preliminary Report:    No growth at 24 hours    Culture - Urine (collected 30 Aug 2024 12:57)  Source: .Urine None  Final Report:    <10,000 CFU/mL Normal Urogenital Vikki    Urinalysis with Rflx Culture (collected 30 Aug 2024 12:57)    Culture - Blood (collected 30 Aug 2024 11:11)  Source: .Blood None  Final Report:    No growth at 5 days    Culture - Blood (collected 30 Aug 2024 11:11)  Source: .Blood None  Final Report:    No growth at 5 days    Urinalysis with Rflx Culture (collected 20 Jun 2024 16:17)    Culture - Urine (collected 13 Apr 2024 20:44)  Source: Clean Catch None  Final Report:    <10,000 CFU/mL Normal Urogenital Vikki    Culture - Blood (collected 22 May 2023 16:04)  Source: .Blood None  Final Report:    No Growth Final    Culture - Blood (collected 22 May 2023 16:04)  Source: .Blood None  Final Report:    No Growth Final    C-Reactive Protein: 15.6 (11-30)      D-Dimer Assay, Quantitative: 1308 (11-30)          Lactate, Blood: 0.9 (11-29 @ 15:05)    Sedimentation Rate, Erythrocyte: 36 mm/hr (11-30-24 @ 04:32)  Sedimentation Rate, Erythrocyte: 72 mm/hr (04-23-24 @ 08:26)  Sedimentation Rate, Erythrocyte: 74 mm/hr (04-20-24 @ 13:34)  Sedimentation Rate, Erythrocyte: 63 mm/hr (03-21-24 @ 08:06)  Sedimentation Rate, Erythrocyte: 82 mm/hr (03-20-24 @ 11:15)  Sedimentation Rate, Erythrocyte: 66 mm/hr (03-19-24 @ 20:17)      RADIOLOGY:  imaging below personally reviewed    Xray Chest 1 View- PORTABLE-Urgent:  (30 Nov 2024 04:23)

## 2024-12-01 NOTE — PHYSICAL THERAPY INITIAL EVALUATION ADULT - GENERAL OBSERVATIONS, REHAB EVAL
Pt found supine in bed on 5S in NAD, agreeable to participate in PT Evaluation. Pt is able to perform bed mobility and sit<>stand transfers with CGA to RW. Pt is able to ambulate 75 feet with RW and CGA. Pt returned to bed with call bell and phone in reach, bed alarm on, BOSSMAN Koehler is aware.

## 2024-12-01 NOTE — PROGRESS NOTE ADULT - ASSESSMENT
69F admitted for cellulitis and DVT.     #Chronic venous stasis, lymphedema, probable overlying cellulitis  -C/W. Vanco  -Follow-up on cultures  -ID following    #Left 3rd toe wound   -wound care, per podiatry     #Acute deep thrombosis, below the knee, involving the posterior tibial trunk  -CTA: Negative for PE  -Eliquis    #CAD  -C/W Plavix  C/W Lipitor    #HTN  C/W losartan, hydrochlorothiazide    #Hypothyroidism  -C/W Synthroid    #Incidental CT findings  -Pulmonary nodules: Follow-up as outpatient    #VTE prophylaxis  -Eliquis      discharge plan

## 2024-12-01 NOTE — PHYSICAL THERAPY INITIAL EVALUATION ADULT - PERTINENT HX OF CURRENT PROBLEM, REHAB EVAL
69F w/ h/o anxiety, carotid stenosis, s/p CEA, HTN, fatty liver, Hiatal hernia with GERD, HLD, Hypothyroidism, IBS, Morbid obesity, b/l  lymphedema follows with vascular Dr. Carrasquillo presents with left lower extremity swelling and worsening erythema. Patient has left plantar foot ulcer, has been on doxy last week for cellulitis, Has had worsening of leg swelling and redness despite antibiotic treatment. No other complaints and denies other ROS.  (29 Nov 2024 17:59)  Podiatry was consulted for left third toe wound. Patient states she noticed the wound a week ago upon visit to podiatrist. Pt was placed on doxycycline which she left home. patient rate the pain as 4/10 . Pt denies chills, fever and shortness of breath.

## 2024-12-01 NOTE — PROGRESS NOTE ADULT - SUBJECTIVE AND OBJECTIVE BOX
2024: Patient was seen by the podiatry team with the attending present. Patient was resting comfortably bedside. No acute distress            PMH: Carotid stenosis, left    Nonintractable episodic headache, unspecified headache type    Deviated septum    Acute recurrent sinusitis, unspecified location    Essential hypertension    Hyperlipidemia, unspecified hyperlipidemia type    Heart murmur    Mitral valve insufficiency, unspecified etiology    Peripheral edema    Gastroesophageal reflux disease, esophagitis presence not specified    Hiatal hernia with GERD    Irritable bowel syndrome with both constipation and diarrhea    Gall stones    Fatty liver disease, nonalcoholic    Urinary tract infection with hematuria, site unspecified    Joint pain of lower limb    Hypothyroidism, unspecified type    Morbid obesity due to excess calories    Anxiety and depression    H/O carotid stenosis    Anxiety    Lymphedema      PSH: Fracture dislocation of wrist joint    S/P cholecystectomy     delivery delivered    H/O arthroscopy of shoulder    H/O colonoscopy        Allergies: No Known Allergies  cefadroxil (Diarrhea)      Labs:                        12.5   8.64  )-----------( 304      ( 01 Dec 2024 07:30 )             38.8            138  |  106  |  24[H]  ----------------------------<  128[H]  3.6   |  29  |  0.98    Ca    9.4      01 Dec 2024 07:30  Phos  3.1     11-30  Mg     1.9     11-30    TPro  5.9[L]  /  Alb  2.5[L]  /  TBili  0.4  /  DBili  x   /  AST  45[H]  /  ALT  59  /  AlkPhos  205[H]  11-30      Vital Signs Last 24 Hrs  T(C): 36.4 (01 Dec 2024 16:08), Max: 36.8 (01 Dec 2024 02:08)  T(F): 97.5 (01 Dec 2024 16:08), Max: 98.2 (01 Dec 2024 02:08)  HR: 58 (01 Dec 2024 16:08) (48 - 62)  BP: 147/58 (01 Dec 2024 16:08) (124/59 - 147/58)  BP(mean): --  RR: 18 (01 Dec 2024 16:08) (18 - 19)  SpO2: 98% (01 Dec 2024 16:08) (98% - 100%)    Parameters below as of 01 Dec 2024 16:08  Patient On (Oxygen Delivery Method): room air          REVIEW OF SYSTEMS:    CONSTITUTIONAL: No weakness, fevers or chills  EYES: No visual changes  RESPIRATORY: No cough, wheezing; No shortness of breath  CARDIOVASCULAR: No chest pain or palpitations  GASTROINTESTINAL: No abdominal or epigastric pain. No nausea, vomiting; No diarrhea or constipation.   GENITOURINARY: No dysuria, frequency or hematuria  NEUROLOGICAL: No numbness or weakness  SKIN: See physical examination.  All other review of systems is negative unless indicated above    Physical Exam:   Constitutional: NAD, alert;  Lower Extremity Focus  Derm:  Skin warm, dry and supple bilateral.    Right: Open lesion to the tip of the 3rd with hyperkeratotic border, wound base Granular patches , wound size ( 0.8cm X 0.6 cm X 0.2cm), + non-pitting edema, - genia-wound erythema, - purulence, - fluctuance, - tracking/tunneling, - probe to bone.   Vascular: Dorsalis Pedis and Posterior Tibial pulses weakly palpable.   Neuro: Protective sensation intact to the level of the digits bilateral.  MSK: Muscle strength 5/5 all major muscle groups bilateral. Observed musculoskeletal contractures to the 3rd toe      < from: Xray Foot AP + Lateral + Oblique, Left (24 @ 15:39) >    PROCEDURE DATE:  2024          INTERPRETATION:  History:   Rule out osteomyelitis    Technique: Left foot 3 films    Comparison: None    Findings/  Impression:Nofracture. No lytic or blastic lesion. No soft tissue   swelling. No radiopaque foreign body. No gas lucencies in the soft   tissues. Calcaneal spurring is seen. Follow-up MRI as clinically warranted    --- End of Report ---            LEONARDO HARRIS MD; Attending Interventional Radiologist  This document has been electronically signed. 2024  9:58AM    < end of copied text >  < from: US Duplex Venous Lower Ext Ltd, Left (24 @ 16:29) >  ACC: [id number] EXAM:  US DPLX LWR EXT VEINS LTD LT   ORDERED BY: DIPAK WEBBER     PROCEDURE DATE:  2024          INTERPRETATION:  CLINICAL INFORMATION: Left leg edema and redness    COMPARISON: 2024.    TECHNIQUE: Duplex sonography of the LEFT LOWER extremity veins with color   and spectral Doppler, with and without compression.    FINDINGS:    Technically limited exam.  There is normal compressibility of the left common femoral, femoral and   popliteal veins.  The contralateral common femoral vein is patent.  Doppler examination shows normal spontaneous and phasic flow.    There is thrombus within the posterior tibial trunk. Posterior tibial and   peroneal veins are not well visualized due to edema and body habitus.    IMPRESSION:  Technically limited exam.  Acute deep venous thrombosis: below the knee.    Limited evaluation of the calf veins.        --- End of Report ---            COSMO FITZGERALD MD; Attending Radiologist  This document has been electronically signed. 2024  4:49PM    < end of copied text >

## 2024-12-01 NOTE — PROGRESS NOTE ADULT - ASSESSMENT
A: 69-year-old Female seen for the followin. Partial Thickness ulceration to the Left 3rd toe, stable  2. Cellulitis to Left Foot  3. Peripheral Vascular Disease  4. Lymphedema        P:   Chart reviewed and Patient evaluated;  Discussed diagnosis and treatment with patient. Discussed importance of daily foot examinations, proper shoe gear, importance of tight glycemic control.   X-rays reviewed: No soft tissue emphysema. Calcaneal spurring is seen. Official read above  Wound flush with normal saline  Wound Culture taken --> Pending  Applied betadine with dry sterile dressing  WBAT  to Left foot in dispensed surgical shoe  Offload bilateral heels while bedbound  Continue antibiotics as per ID  Podiatry to continue with local wound care   All additional care per Med appreciated  Patient demonstrated verbal understanding of all interventions and tolerated interventions well without any complications.   Podiatry will follow while in house      Case D/W attending Dr. Del Castillo       A: 69-year-old Female seen for the followin. Partial Thickness ulceration to the Left 3rd toe, stable  2. Cellulitis to Left Foot  3. Peripheral Vascular Disease  4. Lymphedema        P:   Chart reviewed and Patient evaluated;  Discussed diagnosis and treatment with patient. Discussed importance of daily foot examinations, proper shoe gear, importance of tight glycemic control.   X-rays reviewed: No soft tissue emphysema. Calcaneal spurring is seen. Official read above  Patient refused MRI or any further imaging at this time, although aware of how important it's for diagnosis.  Wound probed to the periosteum, no obvious probing to bone  Wound flush with normal saline  Wound Culture taken --> Pending  Applied betadine with dry sterile dressing  WBAT  to Left foot in dispensed surgical shoe  Offload bilateral heels while bedbound  Continue antibiotics as per ID  Podiatry to continue with local wound care   All additional care per Med appreciated  Patient demonstrated verbal understanding of all interventions and tolerated interventions well without any complications.   Podiatry will follow while in house      Case D/W attending Dr. Del Castillo

## 2024-12-01 NOTE — PROGRESS NOTE ADULT - ASSESSMENT
Assessment:  69F with anxiety, carotid stenosis, s/p CEA, HTN, fatty liver, Hiatal hernia with GERD, HLD, Hypothyroidism, IBS, Morbid obesity, b/l  lymphedema presents 11/29 with LLE edema and erythema  Recently completed Doxy last week with persistent swelling and redness  Denies any fever or chills  Afebrile on admission, on RA  No leukocytosis  ESR 36, CRP 15  Cr 0.78  CTA without PE  US with L acute DVT  XR without OM, soft tissue edema  BCx negative    Antimicrobials:  vancomycin  IVPB 1250 every 12 hours (11/29 --- )    Impression:   #Acute LLE DVT  #Possible LLE Cellulitis  #HTN  #Bilateral Chronic Lymphoedema   - persistent swelling and redness despite antibiotic, now found to have DVT, likely all from clot  - remains afebrile without leukocytosis, will empirically continue abx but really suspicious that redness and pain is from DVT    Recommendations:  - s/p Vancomycin 1250mg q12 (Day #2-3) switch to Doxy 100mg q12  - monitor temperature curve  - trend WBC  - DVT management per primary team  - can transition to PO antibiotic once ready for discharge; Doxy 100mg q12 to complete 7-day course  - ID service will sign off. Please reconsult/call as needed    Clinical team may change from intravenous to oral antibiotics when the following criteria are met:   1. Patient is clinically improving/stable       a)	Improved signs and symptoms of infection from initial presentation       b)	Afebrile for 24 hours       c)	Leukocytosis trending towards normal range   2. Patient is tolerating oral intake   3. Initial/repeat blood cultures are negative OR do not need to wait for preliminary blood cultures to result    When above criteria met may change iv antibiotics to an oral agent as above

## 2024-12-02 ENCOUNTER — TRANSCRIPTION ENCOUNTER (OUTPATIENT)
Age: 69
End: 2024-12-02

## 2024-12-02 PROCEDURE — 99233 SBSQ HOSP IP/OBS HIGH 50: CPT

## 2024-12-02 RX ORDER — APIXABAN 2.5 MG/1
1 TABLET, FILM COATED ORAL
Qty: 74 | Refills: 0
Start: 2024-12-02 | End: 2024-12-31

## 2024-12-02 RX ORDER — FLUTICASONE PROPIONATE 50 MCG
2 SPRAY, SUSPENSION NASAL
Refills: 0 | Status: DISCONTINUED | OUTPATIENT
Start: 2024-12-02 | End: 2024-12-03

## 2024-12-02 RX ORDER — DOXYCYCLINE HYCLATE 150 MG/1
100 TABLET, COATED ORAL EVERY 12 HOURS
Refills: 0 | Status: DISCONTINUED | OUTPATIENT
Start: 2024-12-02 | End: 2024-12-03

## 2024-12-02 RX ADMIN — FUROSEMIDE 40 MILLIGRAM(S): 40 TABLET ORAL at 14:06

## 2024-12-02 RX ADMIN — APIXABAN 10 MILLIGRAM(S): 2.5 TABLET, FILM COATED ORAL at 22:28

## 2024-12-02 RX ADMIN — LOSARTAN POTASSIUM 100 MILLIGRAM(S): 100 TABLET, FILM COATED ORAL at 09:25

## 2024-12-02 RX ADMIN — FUROSEMIDE 40 MILLIGRAM(S): 40 TABLET ORAL at 06:31

## 2024-12-02 RX ADMIN — POVIDONE, POLYVINYL ALCOHOL 1 DROP(S): 20; 27 SOLUTION OPHTHALMIC at 14:06

## 2024-12-02 RX ADMIN — POVIDONE, POLYVINYL ALCOHOL 1 DROP(S): 20; 27 SOLUTION OPHTHALMIC at 22:28

## 2024-12-02 RX ADMIN — Medication 50 MICROGRAM(S): at 06:30

## 2024-12-02 RX ADMIN — Medication 2 SPRAY(S): at 16:36

## 2024-12-02 RX ADMIN — Medication 80 MILLIGRAM(S): at 22:28

## 2024-12-02 RX ADMIN — Medication 1 SPRAY(S): at 23:05

## 2024-12-02 RX ADMIN — POVIDONE, POLYVINYL ALCOHOL 1 DROP(S): 20; 27 SOLUTION OPHTHALMIC at 06:31

## 2024-12-02 RX ADMIN — DOXYCYCLINE HYCLATE 110 MILLIGRAM(S): 150 TABLET, COATED ORAL at 09:25

## 2024-12-02 RX ADMIN — Medication 1 SPRAY(S): at 14:07

## 2024-12-02 RX ADMIN — DOXYCYCLINE HYCLATE 100 MILLIGRAM(S): 150 TABLET, COATED ORAL at 22:28

## 2024-12-02 RX ADMIN — APIXABAN 10 MILLIGRAM(S): 2.5 TABLET, FILM COATED ORAL at 09:50

## 2024-12-02 RX ADMIN — CLOPIDOGREL 75 MILLIGRAM(S): 75 TABLET, FILM COATED ORAL at 09:25

## 2024-12-02 RX ADMIN — Medication 1 SPRAY(S): at 06:31

## 2024-12-02 NOTE — DISCHARGE NOTE PROVIDER - NSDCMRMEDTOKEN_GEN_ALL_CORE_FT
atorvastatin 80 mg oral tablet: 1 tab(s) orally once a day (at bedtime)  cephalexin 500 mg oral tablet: 1 tab(s) orally 3 times a day for 7 days ***course not complete***  clopidogrel 75 mg oral tablet: 1 tab(s) orally once a day  doxycycline hyclate 100 mg oral capsule: 1 cap(s) orally 2 times a day for 10 days ***course not complete***  Eliquis 5 mg oral tablet: 1 tab(s) orally 2 times a day Take 2 tabs two times a day for 7 days, and then 1 tab daily  furosemide 40 mg oral tablet: 1 tab(s) orally 2 times a day  levothyroxine 50 mcg (0.05 mg) oral tablet: 1 tab(s) orally once a day  losartan-hydroCHLOROthiazide 100 mg-12.5 mg oral tablet: 1 tab(s) orally once a day  Hira-Synephrine 0.25% nasal spray: 2 spray(s) in each nostril 3 to 4 times a day as needed for   atorvastatin 80 mg oral tablet: 1 tab(s) orally once a day (at bedtime)  cephalexin 500 mg oral tablet: 1 tab(s) orally 3 times a day for 7 days ***course not complete***  clopidogrel 75 mg oral tablet: 1 tab(s) orally once a day  doxycycline hyclate 100 mg oral capsule: 1 cap(s) orally 2 times a day for 10 days ***course not complete***  Eliquis 5 mg oral tablet: 1 tab(s) orally 2 times a day Take 2 tabs two times a day for 7 days, and then 1 tab 2 times a day  furosemide 40 mg oral tablet: 1 tab(s) orally 2 times a day  levothyroxine 50 mcg (0.05 mg) oral tablet: 1 tab(s) orally once a day  losartan-hydroCHLOROthiazide 100 mg-12.5 mg oral tablet: 1 tab(s) orally once a day  Hira-Synephrine 0.25% nasal spray: 2 spray(s) in each nostril 3 to 4 times a day as needed for   atorvastatin 80 mg oral tablet: 1 tab(s) orally once a day (at bedtime)  clopidogrel 75 mg oral tablet: 1 tab(s) orally once a day  doxycycline monohydrate 50 mg oral capsule: 2 cap(s) orally every 12 hours  Eliquis 5 mg oral tablet: 1 tab(s) orally 2 times a day Take 2 tabs two times a day for 7 days, and then 1 tab 2 times a day  fluticasone 50 mcg/inh nasal spray: 2 spray(s) nasal 2 times a day  furosemide 40 mg oral tablet: 1 tab(s) orally 2 times a day  levothyroxine 50 mcg (0.05 mg) oral tablet: 1 tab(s) orally once a day  losartan-hydroCHLOROthiazide 100 mg-12.5 mg oral tablet: 1 tab(s) orally once a day

## 2024-12-02 NOTE — DISCHARGE NOTE PROVIDER - NSDCFUADDINST_GEN_ALL_CORE_FT
Wound care instruction for the Left 3rd toe to be changed every other day upon discharge:  - Please remove old dressings gently  - Apply betadine paint to the left 3rd toe wound  - Cover with band aid

## 2024-12-02 NOTE — PROGRESS NOTE ADULT - SUBJECTIVE AND OBJECTIVE BOX
Patient is seen and examined. Chart is reviewed. Has some nasal congestion.    Gen: No fever, chills, weakness  ENT: No visual changes or throat pain  Neck: No pain or stiffness  Respiratory: No cough or wheezing  Cardiovascular: No chest pain or palpitations  Gastrointestinal: No abdominal pain, nausea, vomiting, constipation, or diarrhea  Hematologic: No easy bleeding or bruising  Neurologic: No numbness or focal weakness  Psych: No depression or insomnia  Skin: No rash or itching      MEDICATIONS  (STANDING):  apixaban 10 milliGRAM(s) Oral every 12 hours  artificial tears (preservative free) Ophthalmic Solution 1 Drop(s) Both EYES three times a day  atorvastatin 80 milliGRAM(s) Oral at bedtime  calcium carbonate    500 mG (Tums) Chewable 2 Tablet(s) Chew once  clopidogrel Tablet 75 milliGRAM(s) Oral daily  doxycycline monohydrate Capsule 100 milliGRAM(s) Oral every 12 hours  fluticasone propionate 50 MICROgram(s)/spray Nasal Spray 2 Spray(s) Both Nostrils two times a day  furosemide    Tablet 40 milliGRAM(s) Oral two times a day  hydrochlorothiazide 12.5 milliGRAM(s) Oral daily  levothyroxine 50 MICROGram(s) Oral daily  losartan 100 milliGRAM(s) Oral daily  phenylephrine 0.5% Nasal 1 Spray(s) Both Nostrils every 8 hours    MEDICATIONS  (PRN):  acetaminophen     Tablet .. 650 milliGRAM(s) Oral every 6 hours PRN Mild Pain (1 - 3)  aluminum hydroxide/magnesium hydroxide/simethicone Suspension 30 milliLiter(s) Oral every 4 hours PRN Dyspepsia  melatonin 3 milliGRAM(s) Oral at bedtime PRN Insomnia  ondansetron Injectable 4 milliGRAM(s) IV Push every 8 hours PRN Nausea and/or Vomiting      Vital Signs Last 24 Hrs  T(C): 36.9 (02 Dec 2024 08:49), Max: 37.1 (01 Dec 2024 22:00)  T(F): 98.5 (02 Dec 2024 08:49), Max: 98.8 (01 Dec 2024 22:00)  HR: 61 (02 Dec 2024 09:15) (58 - 66)  BP: 154/57 (02 Dec 2024 09:15) (122/90 - 154/57)  BP(mean): --  RR: 18 (02 Dec 2024 09:15) (18 - 18)  SpO2: 98% (02 Dec 2024 09:15) (98% - 98%)    Parameters below as of 02 Dec 2024 09:15  Patient On (Oxygen Delivery Method): room air      GEN: NAD   HEENT: EOMI,  moist mucous membranes  NECK : Soft and supple, no JVD  LUNG: CTABL, No wheezing, rales or rhonchi  CVS: S1S2+, RRR, no M/G/R  GI: BS+, soft, NT/ND, no guarding, no rebound  EXTREMITIES: +LE erythema, +chronic venous changes   VASCULAR: 2+ peripheral pulses  NEURO: AAOx3, grossly non-focal   SKIN: No rashes        LABS:                          12.5   8.64  )-----------( 304      ( 01 Dec 2024 07:30 )             38.8     01 Dec 2024 07:30    138    |  106    |  24     ----------------------------<  128    3.6     |  29     |  0.98     Ca    9.4        01 Dec 2024 07:30          CAPILLARY BLOOD GLUCOSE      POCT Blood Glucose.: 154 mg/dL (01 Dec 2024 22:24)        Urinalysis Basic - ( 01 Dec 2024 07:30 )    Color: x / Appearance: x / SG: x / pH: x  Gluc: 128 mg/dL / Ketone: x  / Bili: x / Urobili: x   Blood: x / Protein: x / Nitrite: x   Leuk Esterase: x / RBC: x / WBC x   Sq Epi: x / Non Sq Epi: x / Bacteria: x        RADIOLOGY:

## 2024-12-02 NOTE — DISCHARGE NOTE PROVIDER - NSDCCPCAREPLAN_GEN_ALL_CORE_FT
PRINCIPAL DISCHARGE DIAGNOSIS  Diagnosis: Cellulitis  Assessment and Plan of Treatment:       SECONDARY DISCHARGE DIAGNOSES  Diagnosis: DVT, lower extremity  Assessment and Plan of Treatment:

## 2024-12-02 NOTE — PROGRESS NOTE ADULT - ASSESSMENT
69F admitted for cellulitis and DVT.     #Chronic venous stasis, lymphedema, probable overlying cellulitis  -clinically improved  -changed to Oral Doxycycline     #Left 3rd toe wound   -wound care, per podiatry     #Acute deep thrombosis, below the knee, involving the posterior tibial trunk  -CTA: Negative for PE  -Eliquis    #CAD  -C/W Plavix  C/W Lipitor    #HTN  C/W losartan, hydrochlorothiazide    #Hypothyroidism  -C/W Synthroid    #Incidental CT findings  -Pulmonary nodules: Follow-up as outpatient    #VTE prophylaxis  -Eliquis    # Nasal congestion  -Flonase     discharge plan for tomorrow

## 2024-12-02 NOTE — DISCHARGE NOTE PROVIDER - HOSPITAL COURSE
69F w/ h/o anxiety, carotid stenosis, s/p CEA, HTN, fatty liver, Hiatal hernia with GERD, HLD, Hypothyroidism, IBS, Morbid obesity, b/l  lymphedema follows with vascular Dr. Carrasquillo presents with left lower extremity swelling and worsening erythema. Patient has left plantar foot ulcer, has been on doxy last week for cellulitis, Has had worsening of leg swelling and redness despite antibiotic treatment. No other complaints and denies other ROS.     #Chronic venous stasis, lymphedema, probable overlying cellulitis  -clinically improved  -changed to Oral Doxycycline     #Left 3rd toe wound   -wound care, per podiatry   Wound care instruction for the Left 3rd toe to be changed every other day upon discharge:  - Please remove old dressings gently  - Apply betadine paint to the left 3rd toe wound  - Cover with band aid     #Acute deep thrombosis, below the knee, involving the posterior tibial trunk  -CTA: Negative for PE  -Eliquis    #CAD  -C/W Plavix  C/W Lipitor    #HTN  C/W losartan, hydrochlorothiazide    #Hypothyroidism  -C/W Synthroid    #Incidental CT findings  -Pulmonary nodules: Follow-up as outpatient

## 2024-12-02 NOTE — PROGRESS NOTE ADULT - ASSESSMENT
A: 69-year-old Female seen for the followin. Partial Thickness ulceration to the Left 3rd toe, stable  2. Cellulitis to Left Foot  3. Peripheral Vascular Disease  4. Lymphedema        P:   Chart reviewed and Patient evaluated;  Discussed diagnosis and treatment with patient. Discussed importance of daily foot examinations, proper shoe gear, importance of tight glycemic control.   X-rays reviewed: No soft tissue emphysema. Calcaneal spurring is seen. Official read above  Patient refused MRI or any further imaging at this time, although aware of how important it's for diagnosis.  Wound probed to the periosteum, no obvious probing to bone  Wound flush with normal saline  Wound Culture taken --> no growth to date  Applied betadine with dry sterile dressing  WBAT  to Left foot in dispensed surgical shoe  ID reccs appreciated-->remains afebrile without leukocytosis, will empirically continue abx but really suspicious that redness and pain is from DVT, PO antibiotic once ready for discharge; Doxy 100mg q12 7-day course  No acute podiatry surgical intervention warranted at this time. Pt to f/u with Dr Del Castillo within 1 week after discharge  All additional care per Med appreciated  Patient demonstrated verbal understanding of all interventions and tolerated interventions well without any complications.   Podiatry will follow while in house      Case D/W attending Dr. Del Castillo    Wound care instruction for the Left 3rd toe to be changed every other day upon discharge:  - Please remove old dressings gently  - Apply betadine paint to the left 3rd toe wound  - Cover with band aid   Thank you

## 2024-12-02 NOTE — DISCHARGE NOTE PROVIDER - CARE PROVIDER_API CALL
BAMBI BOLDEN  [id number]  [place] [address]  Phone: [phone]  Fax: [phone]  Established Patient  Follow Up Time:

## 2024-12-02 NOTE — PROGRESS NOTE ADULT - SUBJECTIVE AND OBJECTIVE BOX
2024: Patient seen by podiatry team today AM, no overnight events. pt resting bedside, NAD.      PMH: Carotid stenosis, left    Nonintractable episodic headache, unspecified headache type    Deviated septum    Acute recurrent sinusitis, unspecified location    Essential hypertension    Hyperlipidemia, unspecified hyperlipidemia type    Heart murmur    Mitral valve insufficiency, unspecified etiology    Peripheral edema    Gastroesophageal reflux disease, esophagitis presence not specified    Hiatal hernia with GERD    Irritable bowel syndrome with both constipation and diarrhea    Gall stones    Fatty liver disease, nonalcoholic    Urinary tract infection with hematuria, site unspecified    Joint pain of lower limb    Hypothyroidism, unspecified type    Morbid obesity due to excess calories    Anxiety and depression    H/O carotid stenosis    Anxiety    Lymphedema      PSH: Fracture dislocation of wrist joint    S/P cholecystectomy     delivery delivered    H/O arthroscopy of shoulder    H/O colonoscopy        Allergies: No Known Allergies  cefadroxil (Diarrhea)      Labs:                        12.5   8.64  )-----------( 304      ( 01 Dec 2024 07:30 )             38.8       138  |  106  |  24[H]  ----------------------------<  128[H]  3.6   |  29  |  0.98    Ca    9.4      01 Dec 2024 07:30    Vital Signs Last 24 Hrs  T(C): 36.9 (02 Dec 2024 08:49), Max: 37.1 (01 Dec 2024 22:00)  T(F): 98.5 (02 Dec 2024 08:49), Max: 98.8 (01 Dec 2024 22:00)  HR: 61 (02 Dec 2024 09:15) (58 - 66)  BP: 154/57 (02 Dec 2024 09:15) (122/90 - 154/57)  BP(mean): --  RR: 18 (02 Dec 2024 09:15) (18 - 18)  SpO2: 98% (02 Dec 2024 09:15) (98% - 98%)    Parameters below as of 02 Dec 2024 09:15  Patient On (Oxygen Delivery Method): room air    REVIEW OF SYSTEMS:    CONSTITUTIONAL: No weakness, fevers or chills  EYES: No visual changes  RESPIRATORY: No cough, wheezing; No shortness of breath  CARDIOVASCULAR: No chest pain or palpitations  GASTROINTESTINAL: No abdominal or epigastric pain. No nausea, vomiting; No diarrhea or constipation.   GENITOURINARY: No dysuria, frequency or hematuria  NEUROLOGICAL: No numbness or weakness  SKIN: See physical examination.  All other review of systems is negative unless indicated above    Physical Exam:   Constitutional: NAD, alert;  Lower Extremity Focus  Derm:  Skin warm, dry and supple bilateral.    Right: Open lesion to the tip of the 3rd with hyperkeratotic border, wound base Granular, wound size ( 0.8cm X 0.6 cm X 0.2cm), + non-pitting edema, - genia-wound erythema, - purulence, - fluctuance, - tracking/tunneling, - probe to bone.   Vascular: Dorsalis Pedis and Posterior Tibial pulses weakly palpable.   Neuro: Protective sensation intact to the level of the digits bilateral.  MSK: Muscle strength 5/5 all major muscle groups bilateral. Observed musculoskeletal contractures to the 3rd toe      < from: Xray Foot AP + Lateral + Oblique, Left (24 @ 15:39) >    PROCEDURE DATE:  2024          INTERPRETATION:  History:   Rule out osteomyelitis    Technique: Left foot 3 films    Comparison: None    Findings/  Impression:Nofracture. No lytic or blastic lesion. No soft tissue   swelling. No radiopaque foreign body. No gas lucencies in the soft   tissues. Calcaneal spurring is seen. Follow-up MRI as clinically warranted    --- End of Report ---            LEONARDO HARRIS MD; Attending Interventional Radiologist  This document has been electronically signed. 2024  9:58AM    < end of copied text >  < from: US Duplex Venous Lower Ext Ltd, Left (24 @ 16:29) >  ACC: [id number] EXAM:  US DPLX LWR EXT VEINS LTD LT   ORDERED BY: DIPAK WEBBER     PROCEDURE DATE:  2024          INTERPRETATION:  CLINICAL INFORMATION: Left leg edema and redness    COMPARISON: 2024.    TECHNIQUE: Duplex sonography of the LEFT LOWER extremity veins with color   and spectral Doppler, with and without compression.    FINDINGS:    Technically limited exam.  There is normal compressibility of the left common femoral, femoral and   popliteal veins.  The contralateral common femoral vein is patent.  Doppler examination shows normal spontaneous and phasic flow.    There is thrombus within the posterior tibial trunk. Posterior tibial and   peroneal veins are not well visualized due to edema and body habitus.    IMPRESSION:  Technically limited exam.  Acute deep venous thrombosis: below the knee.    Limited evaluation of the calf veins.        --- End of Report ---            COSMO FITZGERALD MD; Attending Radiologist  This document has been electronically signed. 2024  4:49PM    < end of copied text >

## 2024-12-03 ENCOUNTER — TRANSCRIPTION ENCOUNTER (OUTPATIENT)
Age: 69
End: 2024-12-03

## 2024-12-03 VITALS
DIASTOLIC BLOOD PRESSURE: 78 MMHG | OXYGEN SATURATION: 100 % | TEMPERATURE: 97 F | HEART RATE: 50 BPM | SYSTOLIC BLOOD PRESSURE: 131 MMHG | RESPIRATION RATE: 20 BRPM

## 2024-12-03 PROCEDURE — 99239 HOSP IP/OBS DSCHRG MGMT >30: CPT

## 2024-12-03 RX ORDER — DOXYCYCLINE HYCLATE 150 MG/1
2 TABLET, COATED ORAL
Qty: 28 | Refills: 0
Start: 2024-12-03 | End: 2024-12-09

## 2024-12-03 RX ORDER — APIXABAN 2.5 MG/1
1 TABLET, FILM COATED ORAL
Qty: 74 | Refills: 0
Start: 2024-12-03 | End: 2025-01-01

## 2024-12-03 RX ORDER — FUROSEMIDE 40 MG/1
1 TABLET ORAL
Qty: 60 | Refills: 1
Start: 2024-12-03 | End: 2025-01-31

## 2024-12-03 RX ORDER — FUROSEMIDE 40 MG/1
1 TABLET ORAL
Refills: 0 | DISCHARGE

## 2024-12-03 RX ORDER — FLUTICASONE PROPIONATE 50 MCG
2 SPRAY, SUSPENSION NASAL
Qty: 0 | Refills: 0 | DISCHARGE
Start: 2024-12-03

## 2024-12-03 RX ADMIN — CLOPIDOGREL 75 MILLIGRAM(S): 75 TABLET, FILM COATED ORAL at 11:10

## 2024-12-03 RX ADMIN — POVIDONE, POLYVINYL ALCOHOL 1 DROP(S): 20; 27 SOLUTION OPHTHALMIC at 05:12

## 2024-12-03 RX ADMIN — APIXABAN 10 MILLIGRAM(S): 2.5 TABLET, FILM COATED ORAL at 11:10

## 2024-12-03 RX ADMIN — Medication 1 SPRAY(S): at 05:20

## 2024-12-03 RX ADMIN — DOXYCYCLINE HYCLATE 100 MILLIGRAM(S): 150 TABLET, COATED ORAL at 11:11

## 2024-12-03 RX ADMIN — Medication 50 MICROGRAM(S): at 05:12

## 2024-12-03 RX ADMIN — LOSARTAN POTASSIUM 100 MILLIGRAM(S): 100 TABLET, FILM COATED ORAL at 11:11

## 2024-12-03 RX ADMIN — FUROSEMIDE 40 MILLIGRAM(S): 40 TABLET ORAL at 05:12

## 2024-12-03 NOTE — DISCHARGE NOTE NURSING/CASE MANAGEMENT/SOCIAL WORK - FINANCIAL ASSISTANCE
[facility] provides services at a reduced cost to those who are determined to be eligible through [facility]’s financial assistance program. Information regarding [facility]’s financial assistance program can be found by going to [email or web address] or by calling [phone].

## 2024-12-03 NOTE — PHARMACOTHERAPY INTERVENTION NOTE - COMMENTS
Medication history complete. Medications and allergies reviewed with patient and confirmed with . 
Patient was prescribed Eliquis for treatment of DVT. Patient was educated on proper administration and side effects of medication. Patient was provided with [employer] education packet and is aware of $30 copay.

## 2024-12-03 NOTE — PROGRESS NOTE ADULT - SUBJECTIVE AND OBJECTIVE BOX
12/3/2024: Patient followed by podiatry team for Left LE wound. Chart reviewed, no overnight events, Pt remains afebrile, VSS      PMH: Carotid stenosis, left    Nonintractable episodic headache, unspecified headache type    Deviated septum    Acute recurrent sinusitis, unspecified location    Essential hypertension    Hyperlipidemia, unspecified hyperlipidemia type    Heart murmur    Mitral valve insufficiency, unspecified etiology    Peripheral edema    Gastroesophageal reflux disease, esophagitis presence not specified    Hiatal hernia with GERD    Irritable bowel syndrome with both constipation and diarrhea    Gall stones    Fatty liver disease, nonalcoholic    Urinary tract infection with hematuria, site unspecified    Joint pain of lower limb    Hypothyroidism, unspecified type    Morbid obesity due to excess calories    Anxiety and depression    H/O carotid stenosis    Anxiety    Lymphedema      PSH: Fracture dislocation of wrist joint    S/P cholecystectomy     delivery delivered    H/O arthroscopy of shoulder    H/O colonoscopy        Allergies: No Known Allergies  cefadroxil (Diarrhea)      Labs:                        12.5   8.64  )-----------( 304      ( 01 Dec 2024 07:30 )             38.8       138  |  106  |  24[H]  ----------------------------<  128[H]  3.6   |  29  |  0.98    Ca    9.4      01 Dec 2024 07:30    Vital Signs Last 24 Hrs  T(C): 36.3 (03 Dec 2024 08:00), Max: 36.7 (02 Dec 2024 16:27)  T(F): 97.3 (03 Dec 2024 08:00), Max: 98.1 (02 Dec 2024 16:27)  HR: 50 (03 Dec 2024 08:00) (50 - 96)  BP: 131/78 (03 Dec 2024 08:00) (106/42 - 138/53)  BP(mean): --  RR: 20 (03 Dec 2024 08:00) (18 - 20)  SpO2: 100% (03 Dec 2024 08:00) (98% - 100%)    Parameters below as of 03 Dec 2024 08:00  Patient On (Oxygen Delivery Method): room air    REVIEW OF SYSTEMS:    CONSTITUTIONAL: No weakness, fevers or chills  EYES: No visual changes  RESPIRATORY: No cough, wheezing; No shortness of breath  CARDIOVASCULAR: No chest pain or palpitations  GASTROINTESTINAL: No abdominal or epigastric pain. No nausea, vomiting; No diarrhea or constipation.   GENITOURINARY: No dysuria, frequency or hematuria  NEUROLOGICAL: No numbness or weakness  SKIN: See physical examination.  All other review of systems is negative unless indicated above    Physical Exam:   Constitutional: NAD, alert;  Lower Extremity Focus  Derm:  Skin warm, dry and supple bilateral.    Right: Open lesion to the tip of the 3rd with hyperkeratotic border, wound base Granular, wound size ( 0.8cm X 0.6 cm X 0.2cm), + non-pitting edema, - genia-wound erythema, - purulence, - fluctuance, - tracking/tunneling, - probe to bone.   Vascular: Dorsalis Pedis and Posterior Tibial pulses weakly palpable.   Neuro: Protective sensation intact to the level of the digits bilateral.  MSK: Muscle strength 5/5 all major muscle groups bilateral. Observed musculoskeletal contractures to the 3rd toe      < from: Xray Foot AP + Lateral + Oblique, Left (24 @ 15:39) >    PROCEDURE DATE:  2024          INTERPRETATION:  History:   Rule out osteomyelitis    Technique: Left foot 3 films    Comparison: None    Findings/  Impression:Nofracture. No lytic or blastic lesion. No soft tissue   swelling. No radiopaque foreign body. No gas lucencies in the soft   tissues. Calcaneal spurring is seen. Follow-up MRI as clinically warranted    --- End of Report ---            LEONARDO HARRIS MD; Attending Interventional Radiologist  This document has been electronically signed. 2024  9:58AM    < end of copied text >  < from: US Duplex Venous Lower Ext Ltd, Left (24 @ 16:29) >  ACC: [id number] EXAM:  US DPLX LWR EXT VEINS LTD LT   ORDERED BY: DIPAK WEBBER     PROCEDURE DATE:  2024          INTERPRETATION:  CLINICAL INFORMATION: Left leg edema and redness    COMPARISON: 2024.    TECHNIQUE: Duplex sonography of the LEFT LOWER extremity veins with color   and spectral Doppler, with and without compression.    FINDINGS:    Technically limited exam.  There is normal compressibility of the left common femoral, femoral and   popliteal veins.  The contralateral common femoral vein is patent.  Doppler examination shows normal spontaneous and phasic flow.    There is thrombus within the posterior tibial trunk. Posterior tibial and   peroneal veins are not well visualized due to edema and body habitus.    IMPRESSION:  Technically limited exam.  Acute deep venous thrombosis: below the knee.    Limited evaluation of the calf veins.        --- End of Report ---            COSMO FITZGERALD MD; Attending Radiologist  This document has been electronically signed. 2024  4:49PM    < end of copied text >

## 2024-12-03 NOTE — DISCHARGE NOTE NURSING/CASE MANAGEMENT/SOCIAL WORK - NSDCPEFALRISK_GEN_ALL_CORE
For information on Fall & Injury Prevention, visit: [email or web address] OR  [email or web address] OR  https://www.cdc.gov/steadi/patient.html

## 2024-12-03 NOTE — DISCHARGE NOTE NURSING/CASE MANAGEMENT/SOCIAL WORK - PATIENT PORTAL LINK FT
You can access the FollowMyHealth Patient Portal offered by [facility] by registering at the following website: [email or web address]. By joining "[employer]’s FollowMyHealth portal, you will also be able to view your health information using other applications (apps) compatible with our system.

## 2024-12-06 LAB
CULTURE RESULTS: SIGNIFICANT CHANGE UP
SPECIMEN SOURCE: SIGNIFICANT CHANGE UP

## 2024-12-09 ENCOUNTER — APPOINTMENT (OUTPATIENT)
Dept: SURGERY | Facility: CLINIC | Age: 69
End: 2024-12-09
Payer: MEDICARE

## 2024-12-09 DIAGNOSIS — L03.90 CELLULITIS, UNSPECIFIED: ICD-10-CM

## 2024-12-09 PROCEDURE — 99212 OFFICE O/P EST SF 10 MIN: CPT

## 2024-12-11 DIAGNOSIS — F41.9 ANXIETY DISORDER, UNSPECIFIED: ICD-10-CM

## 2024-12-11 DIAGNOSIS — Z79.890 HORMONE REPLACEMENT THERAPY: ICD-10-CM

## 2024-12-11 DIAGNOSIS — K44.9 DIAPHRAGMATIC HERNIA WITHOUT OBSTRUCTION OR GANGRENE: ICD-10-CM

## 2024-12-11 DIAGNOSIS — I73.9 PERIPHERAL VASCULAR DISEASE, UNSPECIFIED: ICD-10-CM

## 2024-12-11 DIAGNOSIS — L03.116 CELLULITIS OF LEFT LOWER LIMB: ICD-10-CM

## 2024-12-11 DIAGNOSIS — K21.9 GASTRO-ESOPHAGEAL REFLUX DISEASE WITHOUT ESOPHAGITIS: ICD-10-CM

## 2024-12-11 DIAGNOSIS — I89.0 LYMPHEDEMA, NOT ELSEWHERE CLASSIFIED: ICD-10-CM

## 2024-12-11 DIAGNOSIS — E03.9 HYPOTHYROIDISM, UNSPECIFIED: ICD-10-CM

## 2024-12-11 DIAGNOSIS — Z88.1 ALLERGY STATUS TO OTHER ANTIBIOTIC AGENTS: ICD-10-CM

## 2024-12-11 DIAGNOSIS — Z79.82 LONG TERM (CURRENT) USE OF ASPIRIN: ICD-10-CM

## 2024-12-11 DIAGNOSIS — I10 ESSENTIAL (PRIMARY) HYPERTENSION: ICD-10-CM

## 2024-12-11 DIAGNOSIS — R91.1 SOLITARY PULMONARY NODULE: ICD-10-CM

## 2024-12-11 DIAGNOSIS — I82.442 ACUTE EMBOLISM AND THROMBOSIS OF LEFT TIBIAL VEIN: ICD-10-CM

## 2024-12-11 DIAGNOSIS — K76.0 FATTY (CHANGE OF) LIVER, NOT ELSEWHERE CLASSIFIED: ICD-10-CM

## 2024-12-11 DIAGNOSIS — I87.8 OTHER SPECIFIED DISORDERS OF VEINS: ICD-10-CM

## 2024-12-11 DIAGNOSIS — R09.81 NASAL CONGESTION: ICD-10-CM

## 2024-12-11 DIAGNOSIS — K58.9 IRRITABLE BOWEL SYNDROME, UNSPECIFIED: ICD-10-CM

## 2024-12-11 DIAGNOSIS — E78.00 PURE HYPERCHOLESTEROLEMIA, UNSPECIFIED: ICD-10-CM

## 2024-12-11 DIAGNOSIS — I05.9 RHEUMATIC MITRAL VALVE DISEASE, UNSPECIFIED: ICD-10-CM

## 2024-12-11 DIAGNOSIS — I25.10 ATHEROSCLEROTIC HEART DISEASE OF NATIVE CORONARY ARTERY WITHOUT ANGINA PECTORIS: ICD-10-CM

## 2024-12-11 DIAGNOSIS — Z90.49 ACQUIRED ABSENCE OF OTHER SPECIFIED PARTS OF DIGESTIVE TRACT: ICD-10-CM

## 2024-12-11 DIAGNOSIS — L97.529 NON-PRESSURE CHRONIC ULCER OF OTHER PART OF LEFT FOOT WITH UNSPECIFIED SEVERITY: ICD-10-CM

## 2024-12-11 DIAGNOSIS — E66.01 MORBID (SEVERE) OBESITY DUE TO EXCESS CALORIES: ICD-10-CM

## 2024-12-11 DIAGNOSIS — Z87.19 PERSONAL HISTORY OF OTHER DISEASES OF THE DIGESTIVE SYSTEM: ICD-10-CM

## 2024-12-19 ENCOUNTER — APPOINTMENT (OUTPATIENT)
Dept: SURGERY | Facility: CLINIC | Age: 69
End: 2024-12-19
Payer: MEDICARE

## 2024-12-19 PROCEDURE — 99212 OFFICE O/P EST SF 10 MIN: CPT

## 2025-01-02 ENCOUNTER — APPOINTMENT (OUTPATIENT)
Dept: SURGERY | Facility: CLINIC | Age: 70
End: 2025-01-02
Payer: MEDICARE

## 2025-01-02 DIAGNOSIS — I82.409 ACUTE EMBOLISM AND THROMBOSIS OF UNSPECIFIED DEEP VEINS OF UNSPECIFIED LOWER EXTREMITY: ICD-10-CM

## 2025-01-02 PROCEDURE — 99212 OFFICE O/P EST SF 10 MIN: CPT

## 2025-01-09 ENCOUNTER — OUTPATIENT (OUTPATIENT)
Dept: OUTPATIENT SERVICES | Facility: HOSPITAL | Age: 70
LOS: 1 days | End: 2025-01-09
Payer: MEDICARE

## 2025-01-09 ENCOUNTER — APPOINTMENT (OUTPATIENT)
Dept: ULTRASOUND IMAGING | Facility: CLINIC | Age: 70
End: 2025-01-09
Payer: MEDICARE

## 2025-01-09 ENCOUNTER — APPOINTMENT (OUTPATIENT)
Dept: SURGERY | Facility: CLINIC | Age: 70
End: 2025-01-09
Payer: MEDICARE

## 2025-01-09 DIAGNOSIS — I87.2 VENOUS INSUFFICIENCY (CHRONIC) (PERIPHERAL): ICD-10-CM

## 2025-01-09 DIAGNOSIS — Z98.890 OTHER SPECIFIED POSTPROCEDURAL STATES: Chronic | ICD-10-CM

## 2025-01-09 DIAGNOSIS — S62.109A FRACTURE OF UNSPECIFIED CARPAL BONE, UNSPECIFIED WRIST, INITIAL ENCOUNTER FOR CLOSED FRACTURE: Chronic | ICD-10-CM

## 2025-01-09 DIAGNOSIS — I82.409 ACUTE EMBOLISM AND THROMBOSIS OF UNSPECIFIED DEEP VEINS OF UNSPECIFIED LOWER EXTREMITY: ICD-10-CM

## 2025-01-09 DIAGNOSIS — Z90.49 ACQUIRED ABSENCE OF OTHER SPECIFIED PARTS OF DIGESTIVE TRACT: Chronic | ICD-10-CM

## 2025-01-09 PROCEDURE — 93971 EXTREMITY STUDY: CPT | Mod: 26,LT

## 2025-01-09 PROCEDURE — 99212 OFFICE O/P EST SF 10 MIN: CPT

## 2025-01-09 PROCEDURE — 93971 EXTREMITY STUDY: CPT

## 2025-01-09 RX ORDER — APIXABAN 5 MG/1
5 TABLET, FILM COATED ORAL
Refills: 0 | Status: ACTIVE | COMMUNITY

## 2025-01-10 ENCOUNTER — NON-APPOINTMENT (OUTPATIENT)
Age: 70
End: 2025-01-10

## 2025-02-04 ENCOUNTER — NON-APPOINTMENT (OUTPATIENT)
Age: 70
End: 2025-02-04

## 2025-02-05 ENCOUNTER — APPOINTMENT (OUTPATIENT)
Dept: SURGERY | Facility: CLINIC | Age: 70
End: 2025-02-05
Payer: MEDICARE

## 2025-02-05 PROCEDURE — 29580 STRAPPING UNNA BOOT: CPT | Mod: 50

## 2025-02-10 ENCOUNTER — APPOINTMENT (OUTPATIENT)
Dept: SURGERY | Facility: CLINIC | Age: 70
End: 2025-02-10
Payer: MEDICARE

## 2025-02-10 PROCEDURE — 29580 STRAPPING UNNA BOOT: CPT | Mod: RT

## 2025-02-13 ENCOUNTER — APPOINTMENT (OUTPATIENT)
Dept: SURGERY | Facility: CLINIC | Age: 70
End: 2025-02-13
Payer: MEDICARE

## 2025-02-13 PROCEDURE — 99212 OFFICE O/P EST SF 10 MIN: CPT

## 2025-02-13 RX ORDER — FLUTICASONE PROPIONATE 50 UG/1
50 SPRAY, METERED NASAL
Qty: 16 | Refills: 0 | Status: ACTIVE | COMMUNITY
Start: 2025-02-10

## 2025-02-20 ENCOUNTER — APPOINTMENT (OUTPATIENT)
Dept: SURGERY | Facility: CLINIC | Age: 70
End: 2025-02-20
Payer: MEDICARE

## 2025-02-20 PROCEDURE — 99212 OFFICE O/P EST SF 10 MIN: CPT

## 2025-02-21 ENCOUNTER — APPOINTMENT (OUTPATIENT)
Dept: SURGERY | Facility: CLINIC | Age: 70
End: 2025-02-21
Payer: MEDICARE

## 2025-02-21 DIAGNOSIS — I87.2 VENOUS INSUFFICIENCY (CHRONIC) (PERIPHERAL): ICD-10-CM

## 2025-02-21 PROCEDURE — 99212 OFFICE O/P EST SF 10 MIN: CPT

## 2025-02-27 ENCOUNTER — APPOINTMENT (OUTPATIENT)
Dept: SURGERY | Facility: CLINIC | Age: 70
End: 2025-02-27
Payer: MEDICARE

## 2025-02-27 PROCEDURE — 99212 OFFICE O/P EST SF 10 MIN: CPT

## 2025-02-27 RX ORDER — CIPROFLOXACIN HYDROCHLORIDE 500 MG/1
500 TABLET, FILM COATED ORAL
Qty: 14 | Refills: 0 | Status: ACTIVE | COMMUNITY
Start: 2025-02-27 | End: 1900-01-01

## 2025-02-28 ENCOUNTER — NON-APPOINTMENT (OUTPATIENT)
Age: 70
End: 2025-02-28

## 2025-03-03 ENCOUNTER — APPOINTMENT (OUTPATIENT)
Dept: SURGERY | Facility: CLINIC | Age: 70
End: 2025-03-03
Payer: MEDICARE

## 2025-03-03 PROCEDURE — 99212 OFFICE O/P EST SF 10 MIN: CPT

## 2025-03-05 ENCOUNTER — APPOINTMENT (OUTPATIENT)
Dept: SURGERY | Facility: CLINIC | Age: 70
End: 2025-03-05
Payer: MEDICARE

## 2025-03-05 PROCEDURE — 99212 OFFICE O/P EST SF 10 MIN: CPT

## 2025-03-05 RX ORDER — LEVOFLOXACIN 500 MG/1
500 TABLET, FILM COATED ORAL DAILY
Qty: 10 | Refills: 0 | Status: ACTIVE | COMMUNITY
Start: 2025-03-05 | End: 1900-01-01

## 2025-03-05 NOTE — PATIENT PROFILE ADULT - FALL HARM RISK - FACTORS NURSING JUDGEMENT
Speech Therapy      Visit Type: Treatment  -  Clinical swallow  Reason for consult: Swallow evaluation due to acute CVA (R MCA distribution cortical and subcortical infarcts)    Relevant History/Co-morbidities: previous CVA    Patient is known to this service from a previous hospital admission in 2020. During that admission, a regular diet with thin liquids was recommended. The patient was subsequently seen by speech therapy on inpatient rehabiliation unit for cognition.    SUBJECTIVE  Patient alert and cooperative with his spouse and daughter present at bedside.     Functional Cognition:    - Expression is verbal.     - Following commands intact.      Affect/Behavior: alert, calm and cooperative    Pain at onset of session:   Patient does not demonstrate pain behaviors.      OBJECTIVE      Swallow    Numbers listed with consistency indicate IDDSI diet level.    Thin (0)   - Amount given: 4-5 ounces   - Oral phase: intact   - Pharyngeal phase: clinically unremarkable  Patient drank from water bottle with both single and consecutive sips.  Bolus control was deemed to be intact, swallow response was timely; No clinical signs and symptoms of aspiration appreciated.  Both family and nurse deny any observed or reported difficulty with swallowing over the past 24 hrs.  CXR from 3/4 was negative                  Education:   - Present and ready to learn: patient's family  Education provided during session:  - dysphagia and communication  - Results of above outlined education: Verbalizes understanding    ASSESSMENT  Progress: Goals met    Discharge needs based on today's assessment:  - Current level of function: at baseline level of function  - Impairments that require further therapy intervention: dysphagia    Patient tolerating regular diet with thin liquids without difficulty or clinical signs and symptoms of aspiration.  Recommend continuation of regular diet.  While patient will benefit from a speech/language  evaluation, patient likely to transfer to  in next 1-2 days and will have this evaluation completed at the next site of care.        RECOMMENDATIONS     -Diet:          *Liquid- Thin and Regular    -Medication Administration:         *patient preference    -Feeding Guidelines:          *encourage self feeding/assist as needed , sit up straight in bed/chair , small bites  and slow rate of intake     -Speech Reviewed Swallow:         *with patient/family and with clinical caregivers    GOALS  Review Date: 3/5/2025  Long Term Goals: (to be met by time of discharge from hospital)  Patient to tolerate a regular diet with thin liquids without overt clinical signs of aspiration or change in respiratory condition.Met    Patient to participate in motor speech and cognitive-communicative evaluation as clinically indicated.     Patient at End of Session:   Location: in bed  Safety measures: alarm system in place/re-engaged  Handoff to: nurse ([place] via phone)  Documented in the chart in the following areas: Assessment.       Therapy procedure time and total treatment time can be found documented on the Time Entry flowsheet   No

## 2025-03-06 NOTE — ED ADULT NURSE NOTE - AS PAIN REST
Pt arrives are ER via walking, cc of tooth/jaw pain. PT had a root canal at noon today and since then has had extreme pain to left jaw with it radiating to left eye and head. \"It's the worst earache in the world.\"   Pt's dentist stated there is another bad tooth on the lower, the root canal was done on a upper.    Pt tried OTC with no little relief. Pt followed instructions and seeking pain management   0 (no pain/absence of nonverbal indicators of pain)

## 2025-03-07 ENCOUNTER — APPOINTMENT (OUTPATIENT)
Dept: SURGERY | Facility: CLINIC | Age: 70
End: 2025-03-07
Payer: MEDICARE

## 2025-03-07 ENCOUNTER — APPOINTMENT (OUTPATIENT)
Dept: SURGERY | Facility: CLINIC | Age: 70
End: 2025-03-07

## 2025-03-07 PROCEDURE — 99212 OFFICE O/P EST SF 10 MIN: CPT

## 2025-03-12 ENCOUNTER — APPOINTMENT (OUTPATIENT)
Dept: SURGERY | Facility: CLINIC | Age: 70
End: 2025-03-12
Payer: MEDICARE

## 2025-03-12 DIAGNOSIS — L03.90 CELLULITIS, UNSPECIFIED: ICD-10-CM

## 2025-03-12 PROCEDURE — 99212 OFFICE O/P EST SF 10 MIN: CPT

## 2025-03-14 ENCOUNTER — APPOINTMENT (OUTPATIENT)
Dept: SURGERY | Facility: CLINIC | Age: 70
End: 2025-03-14

## 2025-03-21 ENCOUNTER — EMERGENCY (EMERGENCY)
Facility: HOSPITAL | Age: 70
LOS: 0 days | Discharge: HOME | End: 2025-03-21
Attending: STUDENT IN AN ORGANIZED HEALTH CARE EDUCATION/TRAINING PROGRAM
Payer: MEDICARE

## 2025-03-21 VITALS
DIASTOLIC BLOOD PRESSURE: 79 MMHG | OXYGEN SATURATION: 99 % | TEMPERATURE: 98 F | SYSTOLIC BLOOD PRESSURE: 160 MMHG | RESPIRATION RATE: 19 BRPM | HEART RATE: 73 BPM

## 2025-03-21 VITALS — WEIGHT: 279.99 LBS

## 2025-03-21 DIAGNOSIS — S62.109A FRACTURE OF UNSPECIFIED CARPAL BONE, UNSPECIFIED WRIST, INITIAL ENCOUNTER FOR CLOSED FRACTURE: Chronic | ICD-10-CM

## 2025-03-21 DIAGNOSIS — R06.02 SHORTNESS OF BREATH: ICD-10-CM

## 2025-03-21 DIAGNOSIS — R09.81 NASAL CONGESTION: ICD-10-CM

## 2025-03-21 DIAGNOSIS — Z98.890 OTHER SPECIFIED POSTPROCEDURAL STATES: Chronic | ICD-10-CM

## 2025-03-21 DIAGNOSIS — Z79.01 LONG TERM (CURRENT) USE OF ANTICOAGULANTS: ICD-10-CM

## 2025-03-21 DIAGNOSIS — Z90.49 ACQUIRED ABSENCE OF OTHER SPECIFIED PARTS OF DIGESTIVE TRACT: Chronic | ICD-10-CM

## 2025-03-21 LAB
ALBUMIN SERPL ELPH-MCNC: 3 G/DL — LOW (ref 3.3–5)
ALP SERPL-CCNC: 193 U/L — HIGH (ref 40–120)
ALT FLD-CCNC: 31 U/L — SIGNIFICANT CHANGE UP (ref 12–78)
ANION GAP SERPL CALC-SCNC: 4 MMOL/L — LOW (ref 5–17)
APTT BLD: 33.2 SEC — SIGNIFICANT CHANGE UP (ref 24.5–35.6)
AST SERPL-CCNC: 27 U/L — SIGNIFICANT CHANGE UP (ref 15–37)
BASOPHILS # BLD AUTO: 0.04 K/UL — SIGNIFICANT CHANGE UP (ref 0–0.2)
BASOPHILS NFR BLD AUTO: 0.4 % — SIGNIFICANT CHANGE UP (ref 0–2)
BILIRUB SERPL-MCNC: 0.4 MG/DL — SIGNIFICANT CHANGE UP (ref 0.2–1.2)
BUN SERPL-MCNC: 23 MG/DL — SIGNIFICANT CHANGE UP (ref 7–23)
CALCIUM SERPL-MCNC: 9.8 MG/DL — SIGNIFICANT CHANGE UP (ref 8.5–10.1)
CHLORIDE SERPL-SCNC: 109 MMOL/L — HIGH (ref 96–108)
CO2 SERPL-SCNC: 27 MMOL/L — SIGNIFICANT CHANGE UP (ref 22–31)
CREAT SERPL-MCNC: 0.93 MG/DL — SIGNIFICANT CHANGE UP (ref 0.5–1.3)
EGFR: 67 ML/MIN/1.73M2 — SIGNIFICANT CHANGE UP
EGFR: 67 ML/MIN/1.73M2 — SIGNIFICANT CHANGE UP
EOSINOPHIL # BLD AUTO: 0.29 K/UL — SIGNIFICANT CHANGE UP (ref 0–0.5)
EOSINOPHIL NFR BLD AUTO: 3.2 % — SIGNIFICANT CHANGE UP (ref 0–6)
FLUAV AG NPH QL: SIGNIFICANT CHANGE UP
FLUBV AG NPH QL: SIGNIFICANT CHANGE UP
GLUCOSE SERPL-MCNC: 98 MG/DL — SIGNIFICANT CHANGE UP (ref 70–99)
HCT VFR BLD CALC: 37.7 % — SIGNIFICANT CHANGE UP (ref 34.5–45)
HGB BLD-MCNC: 12.2 G/DL — SIGNIFICANT CHANGE UP (ref 11.5–15.5)
IMM GRANULOCYTES # BLD AUTO: 0.03 K/UL — SIGNIFICANT CHANGE UP (ref 0–0.07)
IMM GRANULOCYTES NFR BLD AUTO: 0.3 % — SIGNIFICANT CHANGE UP (ref 0–0.9)
INR BLD: 1.04 RATIO — SIGNIFICANT CHANGE UP (ref 0.85–1.16)
LYMPHOCYTES # BLD AUTO: 1.67 K/UL — SIGNIFICANT CHANGE UP (ref 1–3.3)
LYMPHOCYTES NFR BLD AUTO: 18.4 % — SIGNIFICANT CHANGE UP (ref 13–44)
MAGNESIUM SERPL-MCNC: 1.9 MG/DL — SIGNIFICANT CHANGE UP (ref 1.6–2.6)
MCHC RBC-ENTMCNC: 30.5 PG — SIGNIFICANT CHANGE UP (ref 27–34)
MCHC RBC-ENTMCNC: 32.4 G/DL — SIGNIFICANT CHANGE UP (ref 32–36)
MCV RBC AUTO: 94.3 FL — SIGNIFICANT CHANGE UP (ref 80–100)
MONOCYTES # BLD AUTO: 0.54 K/UL — SIGNIFICANT CHANGE UP (ref 0–0.9)
MONOCYTES NFR BLD AUTO: 6 % — SIGNIFICANT CHANGE UP (ref 2–14)
NEUTROPHILS # BLD AUTO: 6.49 K/UL — SIGNIFICANT CHANGE UP (ref 1.8–7.4)
NEUTROPHILS NFR BLD AUTO: 71.7 % — SIGNIFICANT CHANGE UP (ref 43–77)
NRBC # BLD AUTO: 0 K/UL — SIGNIFICANT CHANGE UP (ref 0–0)
NRBC # FLD: 0 K/UL — SIGNIFICANT CHANGE UP (ref 0–0)
NRBC BLD AUTO-RTO: 0 /100 WBCS — SIGNIFICANT CHANGE UP (ref 0–0)
NT-PROBNP SERPL-SCNC: 246 PG/ML — HIGH (ref 0–125)
PLATELET # BLD AUTO: 307 K/UL — SIGNIFICANT CHANGE UP (ref 150–400)
PMV BLD: 8.8 FL — SIGNIFICANT CHANGE UP (ref 7–13)
POTASSIUM SERPL-MCNC: 3.9 MMOL/L — SIGNIFICANT CHANGE UP (ref 3.5–5.3)
POTASSIUM SERPL-SCNC: 3.9 MMOL/L — SIGNIFICANT CHANGE UP (ref 3.5–5.3)
PROT SERPL-MCNC: 7.4 GM/DL — SIGNIFICANT CHANGE UP (ref 6–8.3)
PROTHROM AB SERPL-ACNC: 12 SEC — SIGNIFICANT CHANGE UP (ref 9.9–13.4)
RBC # BLD: 4 M/UL — SIGNIFICANT CHANGE UP (ref 3.8–5.2)
RBC # FLD: 14.8 % — HIGH (ref 10.3–14.5)
RSV RNA NPH QL NAA+NON-PROBE: SIGNIFICANT CHANGE UP
SARS-COV-2 RNA SPEC QL NAA+PROBE: SIGNIFICANT CHANGE UP
SODIUM SERPL-SCNC: 140 MMOL/L — SIGNIFICANT CHANGE UP (ref 135–145)
SOURCE RESPIRATORY: SIGNIFICANT CHANGE UP
TROPONIN I, HIGH SENSITIVITY RESULT: 17.66 NG/L — SIGNIFICANT CHANGE UP
WBC # BLD: 9.06 K/UL — SIGNIFICANT CHANGE UP (ref 3.8–10.5)
WBC # FLD AUTO: 9.06 K/UL — SIGNIFICANT CHANGE UP (ref 3.8–10.5)

## 2025-03-21 PROCEDURE — 71045 X-RAY EXAM CHEST 1 VIEW: CPT

## 2025-03-21 PROCEDURE — 80053 COMPREHEN METABOLIC PANEL: CPT

## 2025-03-21 PROCEDURE — 84484 ASSAY OF TROPONIN QUANT: CPT

## 2025-03-21 PROCEDURE — 83880 ASSAY OF NATRIURETIC PEPTIDE: CPT

## 2025-03-21 PROCEDURE — 85730 THROMBOPLASTIN TIME PARTIAL: CPT

## 2025-03-21 PROCEDURE — 71045 X-RAY EXAM CHEST 1 VIEW: CPT | Mod: 26

## 2025-03-21 PROCEDURE — 83735 ASSAY OF MAGNESIUM: CPT

## 2025-03-21 PROCEDURE — 36415 COLL VENOUS BLD VENIPUNCTURE: CPT

## 2025-03-21 PROCEDURE — 99285 EMERGENCY DEPT VISIT HI MDM: CPT

## 2025-03-21 PROCEDURE — 85025 COMPLETE CBC W/AUTO DIFF WBC: CPT

## 2025-03-21 PROCEDURE — 93010 ELECTROCARDIOGRAM REPORT: CPT

## 2025-03-21 PROCEDURE — 93005 ELECTROCARDIOGRAM TRACING: CPT

## 2025-03-21 PROCEDURE — 99285 EMERGENCY DEPT VISIT HI MDM: CPT | Mod: 25

## 2025-03-21 PROCEDURE — 0241U: CPT

## 2025-03-21 PROCEDURE — 85610 PROTHROMBIN TIME: CPT

## 2025-03-21 NOTE — ED ADULT NURSE NOTE - NSFALLHARMRISKINTERV_ED_ALL_ED
Assistance OOB with selected safe patient handling equipment if applicable/Communicate risk of Fall with Harm to all staff, patient, and family/Monitor gait and stability/Provide patient with walking aids/Provide visual cue: red socks, yellow wristband, yellow gown, etc/Reinforce activity limits and safety measures with patient and family/Bed in lowest position, wheels locked, appropriate side rails in place/Call bell, personal items and telephone in reach/Instruct patient to call for assistance before getting out of bed/chair/stretcher/Non-slip footwear applied when patient is off stretcher/[place] to call system/Physically safe environment - no spills, clutter or unnecessary equipment/Purposeful Proactive Rounding/Room/bathroom lighting operational, light cord in reach

## 2025-03-21 NOTE — ED PROVIDER NOTE - SKIN, MLM
Skin normal color for race, warm, dry and intact. No evidence of rash; chronic b/l lower extremity- lymphedema (baseline)

## 2025-03-21 NOTE — ED ADULT NURSE REASSESSMENT NOTE - NS ED NURSE REASSESS COMMENT FT1
Pt son on speaker phone with RN in treatment room reporting that he is concerned about the patient requesting a psych consult. The son's concern is that the patient is not "right" and he reports that she is addicted to nasal spray. Son is concerned that patient had a panic attack prior to coming in to the ER today, pt is denying this. Pt has been calm and cooperative during her stay in the ER. Pt is denying harm to self and/or others, pt reports that she is safe at her house, pt does not want to be seen by psych this admission and reports that she has an upcoming appointment with neuro in a few weeks.  Pt does not wish to speak with a  and does not want any resources at this time.

## 2025-03-21 NOTE — ED ADULT NURSE NOTE - CHIEF COMPLAINT QUOTE
No
Patient presents to the ER from the [facility] with complaints of bilateral lower extremity edema, congestion, and shortness of breath. Unable to obtain temp in triage as patient is very anxious.

## 2025-03-21 NOTE — ED ADULT TRIAGE NOTE - CHIEF COMPLAINT QUOTE
Patient presents to the ER from the [facility] with complaints of bilateral lower extremity edema, congestion, and shortness of breath. Unable to obtain temp in triage as patient is very anxious.

## 2025-03-21 NOTE — ED PROVIDER NOTE - PATIENT PORTAL LINK FT
You can access the FollowMyHealth Patient Portal offered by [facility] by registering at the following website: [email or web address]. By joining [employer]’s FollowMyHealth portal, you will also be able to view your health information using other applications (apps) compatible with our system.

## 2025-03-21 NOTE — ED ADULT NURSE NOTE - OBJECTIVE STATEMENT
Patient presents to the ER from the [facility] with complaints of bilateral lower extremity edema, congestion, and shortness of breath. Pt is alert and oriented x4, follows all commands. Pt is on room air.

## 2025-03-21 NOTE — ED PROVIDER NOTE - CLINICAL SUMMARY MEDICAL DECISION MAKING FREE TEXT BOX
70 yo female with ?lower extremity edema- at baseline per patient with nasal congestion; patient with no sob upon arrival; no chest pain; anticoagulated on eliquis; basic labs; screening ekg, cxr, swab; re-eval 70 yo female with ?lower extremity edema- at baseline per patient with nasal congestion; patient with no sob upon arrival; no chest pain; anticoagulated on eliquis; basic labs; screening ekg, cxr, swab; re-eval    Key DO: cxr neg; patient anticoagulated with eliquis; patient with no sob at rest or on exertion; reports sob was due to nasal congestion; feels comfortable with dc home at this time; defers admission for further work up; instructed to f/u with pmd as instructed; strict return precautions given.

## 2025-03-21 NOTE — ED ADULT TRIAGE NOTE - HOW PATIENT ADDRESSED, PROFILE
[place]
41yo right hand dominant female patient with approximately 6-7yr history of numbness and tingling of all fingers on right hand and pain right thumb and wrist area and approximately 1yr history of right elbow pain. She has had Cortisone injections x 3 and has gone for physical therapy without significant improvement. She has wrist immobilizer but does not find that it helps and is not using currently. She is not taking any medication for pain. She rates the pain at 9-10/10 when it does occur in what she describes as "flare ups" which last for several days. She takes Aleve or Advil with partial relief. She was told that surgery is recommended and presents today for PSTs.

## 2025-03-21 NOTE — ED PROVIDER NOTE - CHIEF COMPLAINT
The patient is a 69y Female complaining of shortness of breath. [place] website --- [email or web address]/[place] Website --- www.quitnet.com

## 2025-03-21 NOTE — ED PROVIDER NOTE - OBJECTIVE STATEMENT
Patient is a 69-year-old female who reports that she was at [facility] when she felt lower extremity edema that has now resolved and congestion which made her feel like she cannot breathe.  Patient reports that she still feels congested requesting to use nasal spray upon arrival to emergency department.  Patient denies chest pain shortness of breath reports that her lower remedy edema is now at baseline.  Patient with history of lipedema.  Patient is on Eliquis for anticoagulation.

## 2025-04-02 ENCOUNTER — APPOINTMENT (OUTPATIENT)
Dept: SURGERY | Facility: CLINIC | Age: 70
End: 2025-04-02
Payer: MEDICARE

## 2025-04-02 DIAGNOSIS — I87.2 VENOUS INSUFFICIENCY (CHRONIC) (PERIPHERAL): ICD-10-CM

## 2025-04-02 PROCEDURE — 99212 OFFICE O/P EST SF 10 MIN: CPT

## 2025-04-07 RX ORDER — CIPROFLOXACIN HYDROCHLORIDE 500 MG/1
500 TABLET, FILM COATED ORAL
Qty: 14 | Refills: 0 | Status: ACTIVE | COMMUNITY
Start: 2025-04-07 | End: 1900-01-01

## 2025-04-10 ENCOUNTER — APPOINTMENT (OUTPATIENT)
Dept: UROLOGY | Facility: CLINIC | Age: 70
End: 2025-04-10

## 2025-04-10 VITALS
HEART RATE: 71 BPM | OXYGEN SATURATION: 98 % | SYSTOLIC BLOOD PRESSURE: 177 MMHG | HEIGHT: 65 IN | BODY MASS INDEX: 39.99 KG/M2 | WEIGHT: 240 LBS | RESPIRATION RATE: 18 BRPM | DIASTOLIC BLOOD PRESSURE: 84 MMHG

## 2025-04-10 DIAGNOSIS — R35.0 FREQUENCY OF MICTURITION: ICD-10-CM

## 2025-04-10 DIAGNOSIS — N31.8 OTHER NEUROMUSCULAR DYSFUNCTION OF BLADDER: ICD-10-CM

## 2025-04-10 DIAGNOSIS — R35.1 NOCTURIA: ICD-10-CM

## 2025-04-10 PROCEDURE — 99204 OFFICE O/P NEW MOD 45 MIN: CPT

## 2025-04-10 RX ORDER — OXYBUTYNIN CHLORIDE 10 MG/1
10 TABLET, EXTENDED RELEASE ORAL
Qty: 30 | Refills: 11 | Status: ACTIVE | COMMUNITY
Start: 2025-04-10 | End: 1900-01-01

## 2025-04-10 RX ORDER — HYDROCHLOROTHIAZIDE 12.5 MG/1
12.5 TABLET ORAL
Qty: 30 | Refills: 11 | Status: ACTIVE | COMMUNITY
Start: 2025-04-10 | End: 1900-01-01

## 2025-04-12 LAB
APPEARANCE: CLEAR
BACTERIA: ABNORMAL /HPF
BILIRUBIN URINE: NEGATIVE
BLOOD URINE: NEGATIVE
CALCIUM OXALATE CRYSTALS: PRESENT
CAST: 0 /LPF
COLOR: YELLOW
EPITHELIAL CELLS: 3 /HPF
GLUCOSE QUALITATIVE U: NEGATIVE MG/DL
KETONES URINE: NEGATIVE MG/DL
LEUKOCYTE ESTERASE URINE: ABNORMAL
MICROSCOPIC-UA: NORMAL
NITRITE URINE: NEGATIVE
PH URINE: 5.5
PROTEIN URINE: NEGATIVE MG/DL
RED BLOOD CELLS URINE: 15 /HPF
REVIEW: NORMAL
SPECIFIC GRAVITY URINE: 1.02
UROBILINOGEN URINE: 0.2 MG/DL
WHITE BLOOD CELLS URINE: 1 /HPF

## 2025-04-15 LAB — BACTERIA UR CULT: NORMAL

## 2025-04-16 LAB — URINE CYTOLOGY: NORMAL

## 2025-04-24 ENCOUNTER — APPOINTMENT (OUTPATIENT)
Dept: SURGERY | Facility: CLINIC | Age: 70
End: 2025-04-24
Payer: MEDICARE

## 2025-04-24 DIAGNOSIS — I87.2 VENOUS INSUFFICIENCY (CHRONIC) (PERIPHERAL): ICD-10-CM

## 2025-04-24 PROCEDURE — 99212 OFFICE O/P EST SF 10 MIN: CPT

## 2025-04-24 RX ORDER — FUROSEMIDE 20 MG/1
20 TABLET ORAL DAILY
Qty: 3 | Refills: 0 | Status: ACTIVE | COMMUNITY
Start: 2025-04-24 | End: 1900-01-01

## 2025-04-25 ENCOUNTER — APPOINTMENT (OUTPATIENT)
Dept: UROLOGY | Facility: CLINIC | Age: 70
End: 2025-04-25

## 2025-06-04 ENCOUNTER — APPOINTMENT (OUTPATIENT)
Dept: SURGERY | Facility: CLINIC | Age: 70
End: 2025-06-04
Payer: MEDICARE

## 2025-06-04 DIAGNOSIS — I87.2 VENOUS INSUFFICIENCY (CHRONIC) (PERIPHERAL): ICD-10-CM

## 2025-06-04 PROCEDURE — 99212 OFFICE O/P EST SF 10 MIN: CPT

## 2025-06-15 ENCOUNTER — NON-APPOINTMENT (OUTPATIENT)
Age: 70
End: 2025-06-15

## 2025-06-18 ENCOUNTER — APPOINTMENT (OUTPATIENT)
Dept: SURGERY | Facility: CLINIC | Age: 70
End: 2025-06-18
Payer: MEDICARE

## 2025-06-18 PROCEDURE — 99212 OFFICE O/P EST SF 10 MIN: CPT

## 2025-06-18 RX ORDER — CEPHALEXIN 500 MG/1
500 TABLET ORAL 3 TIMES DAILY
Qty: 21 | Refills: 0 | Status: ACTIVE | COMMUNITY
Start: 2025-06-18 | End: 1900-01-01

## 2025-07-08 ENCOUNTER — APPOINTMENT (OUTPATIENT)
Dept: SURGERY | Facility: CLINIC | Age: 70
End: 2025-07-08

## 2025-07-11 ENCOUNTER — APPOINTMENT (OUTPATIENT)
Dept: SURGERY | Facility: CLINIC | Age: 70
End: 2025-07-11
Payer: MEDICARE

## 2025-07-11 PROCEDURE — 29580 STRAPPING UNNA BOOT: CPT | Mod: LT

## 2025-07-17 ENCOUNTER — APPOINTMENT (OUTPATIENT)
Dept: SURGERY | Facility: CLINIC | Age: 70
End: 2025-07-17
Payer: MEDICARE

## 2025-07-17 PROCEDURE — 29580 STRAPPING UNNA BOOT: CPT | Mod: LT

## 2025-07-21 ENCOUNTER — APPOINTMENT (OUTPATIENT)
Dept: SURGERY | Facility: CLINIC | Age: 70
End: 2025-07-21
Payer: MEDICARE

## 2025-07-21 PROCEDURE — 99212 OFFICE O/P EST SF 10 MIN: CPT

## 2025-07-21 RX ORDER — FUROSEMIDE 20 MG/1
20 TABLET ORAL
Qty: 20 | Refills: 0 | Status: ACTIVE | COMMUNITY
Start: 2025-07-21 | End: 1900-01-01

## 2025-07-24 ENCOUNTER — APPOINTMENT (OUTPATIENT)
Dept: SURGERY | Facility: CLINIC | Age: 70
End: 2025-07-24
Payer: MEDICARE

## 2025-07-24 ENCOUNTER — APPOINTMENT (OUTPATIENT)
Dept: SURGERY | Facility: CLINIC | Age: 70
End: 2025-07-24

## 2025-07-24 PROCEDURE — 99212 OFFICE O/P EST SF 10 MIN: CPT

## 2025-07-26 ENCOUNTER — EMERGENCY (EMERGENCY)
Facility: HOSPITAL | Age: 70
LOS: 0 days | Discharge: HOME | End: 2025-07-26
Attending: EMERGENCY MEDICINE
Payer: MEDICARE

## 2025-07-26 VITALS
OXYGEN SATURATION: 100 % | TEMPERATURE: 98 F | SYSTOLIC BLOOD PRESSURE: 202 MMHG | DIASTOLIC BLOOD PRESSURE: 78 MMHG | RESPIRATION RATE: 19 BRPM | HEART RATE: 89 BPM

## 2025-07-26 VITALS — WEIGHT: 293 LBS

## 2025-07-26 DIAGNOSIS — S62.109A FRACTURE OF UNSPECIFIED CARPAL BONE, UNSPECIFIED WRIST, INITIAL ENCOUNTER FOR CLOSED FRACTURE: Chronic | ICD-10-CM

## 2025-07-26 DIAGNOSIS — I65.29 OCCLUSION AND STENOSIS OF UNSPECIFIED CAROTID ARTERY: ICD-10-CM

## 2025-07-26 DIAGNOSIS — K58.9 IRRITABLE BOWEL SYNDROME, UNSPECIFIED: ICD-10-CM

## 2025-07-26 DIAGNOSIS — L03.116 CELLULITIS OF LEFT LOWER LIMB: ICD-10-CM

## 2025-07-26 DIAGNOSIS — Z79.01 LONG TERM (CURRENT) USE OF ANTICOAGULANTS: ICD-10-CM

## 2025-07-26 DIAGNOSIS — I87.8 OTHER SPECIFIED DISORDERS OF VEINS: ICD-10-CM

## 2025-07-26 DIAGNOSIS — I10 ESSENTIAL (PRIMARY) HYPERTENSION: ICD-10-CM

## 2025-07-26 DIAGNOSIS — Z90.49 ACQUIRED ABSENCE OF OTHER SPECIFIED PARTS OF DIGESTIVE TRACT: Chronic | ICD-10-CM

## 2025-07-26 DIAGNOSIS — Z98.890 OTHER SPECIFIED POSTPROCEDURAL STATES: Chronic | ICD-10-CM

## 2025-07-26 DIAGNOSIS — E03.9 HYPOTHYROIDISM, UNSPECIFIED: ICD-10-CM

## 2025-07-26 DIAGNOSIS — R09.89 OTHER SPECIFIED SYMPTOMS AND SIGNS INVOLVING THE CIRCULATORY AND RESPIRATORY SYSTEMS: ICD-10-CM

## 2025-07-26 DIAGNOSIS — L03.115 CELLULITIS OF RIGHT LOWER LIMB: ICD-10-CM

## 2025-07-26 DIAGNOSIS — R09.81 NASAL CONGESTION: ICD-10-CM

## 2025-07-26 DIAGNOSIS — Z87.19 PERSONAL HISTORY OF OTHER DISEASES OF THE DIGESTIVE SYSTEM: ICD-10-CM

## 2025-07-26 DIAGNOSIS — K76.0 FATTY (CHANGE OF) LIVER, NOT ELSEWHERE CLASSIFIED: ICD-10-CM

## 2025-07-26 DIAGNOSIS — L21.9 SEBORRHEIC DERMATITIS, UNSPECIFIED: ICD-10-CM

## 2025-07-26 DIAGNOSIS — E78.5 HYPERLIPIDEMIA, UNSPECIFIED: ICD-10-CM

## 2025-07-26 DIAGNOSIS — I89.0 LYMPHEDEMA, NOT ELSEWHERE CLASSIFIED: ICD-10-CM

## 2025-07-26 LAB
ALBUMIN SERPL ELPH-MCNC: 3.1 G/DL — LOW (ref 3.3–5)
ALP SERPL-CCNC: 284 U/L — HIGH (ref 40–120)
ALT FLD-CCNC: 37 U/L — SIGNIFICANT CHANGE UP (ref 12–78)
ANION GAP SERPL CALC-SCNC: 5 MMOL/L — SIGNIFICANT CHANGE UP (ref 5–17)
APTT BLD: 31.1 SEC — SIGNIFICANT CHANGE UP (ref 26.1–36.8)
AST SERPL-CCNC: 28 U/L — SIGNIFICANT CHANGE UP (ref 15–37)
BASOPHILS # BLD AUTO: 0.04 K/UL — SIGNIFICANT CHANGE UP (ref 0–0.2)
BASOPHILS NFR BLD AUTO: 0.5 % — SIGNIFICANT CHANGE UP (ref 0–2)
BILIRUB SERPL-MCNC: 0.7 MG/DL — SIGNIFICANT CHANGE UP (ref 0.2–1.2)
BUN SERPL-MCNC: 18 MG/DL — SIGNIFICANT CHANGE UP (ref 7–23)
CALCIUM SERPL-MCNC: 9.5 MG/DL — SIGNIFICANT CHANGE UP (ref 8.5–10.1)
CHLORIDE SERPL-SCNC: 112 MMOL/L — HIGH (ref 96–108)
CO2 SERPL-SCNC: 23 MMOL/L — SIGNIFICANT CHANGE UP (ref 22–31)
CREAT SERPL-MCNC: 0.84 MG/DL — SIGNIFICANT CHANGE UP (ref 0.5–1.3)
EGFR: 75 ML/MIN/1.73M2 — SIGNIFICANT CHANGE UP
EGFR: 75 ML/MIN/1.73M2 — SIGNIFICANT CHANGE UP
EOSINOPHIL # BLD AUTO: 0.24 K/UL — SIGNIFICANT CHANGE UP (ref 0–0.5)
EOSINOPHIL NFR BLD AUTO: 2.8 % — SIGNIFICANT CHANGE UP (ref 0–6)
GLUCOSE SERPL-MCNC: 116 MG/DL — HIGH (ref 70–99)
HCT VFR BLD CALC: 40.4 % — SIGNIFICANT CHANGE UP (ref 34.5–45)
HGB BLD-MCNC: 12.9 G/DL — SIGNIFICANT CHANGE UP (ref 11.5–15.5)
IMM GRANULOCYTES # BLD AUTO: 0.03 K/UL — SIGNIFICANT CHANGE UP (ref 0–0.07)
IMM GRANULOCYTES NFR BLD AUTO: 0.4 % — SIGNIFICANT CHANGE UP (ref 0–0.9)
INR BLD: 0.98 RATIO — SIGNIFICANT CHANGE UP (ref 0.85–1.16)
LACTATE SERPL-SCNC: 1.5 MMOL/L — SIGNIFICANT CHANGE UP (ref 0.7–2)
LYMPHOCYTES # BLD AUTO: 2.04 K/UL — SIGNIFICANT CHANGE UP (ref 1–3.3)
LYMPHOCYTES NFR BLD AUTO: 23.9 % — SIGNIFICANT CHANGE UP (ref 13–44)
MCHC RBC-ENTMCNC: 29.7 PG — SIGNIFICANT CHANGE UP (ref 27–34)
MCHC RBC-ENTMCNC: 31.9 G/DL — LOW (ref 32–36)
MCV RBC AUTO: 92.9 FL — SIGNIFICANT CHANGE UP (ref 80–100)
MONOCYTES # BLD AUTO: 0.45 K/UL — SIGNIFICANT CHANGE UP (ref 0–0.9)
MONOCYTES NFR BLD AUTO: 5.3 % — SIGNIFICANT CHANGE UP (ref 2–14)
NEUTROPHILS # BLD AUTO: 5.73 K/UL — SIGNIFICANT CHANGE UP (ref 1.8–7.4)
NEUTROPHILS NFR BLD AUTO: 67.1 % — SIGNIFICANT CHANGE UP (ref 43–77)
NRBC # BLD AUTO: 0 K/UL — SIGNIFICANT CHANGE UP (ref 0–0)
NRBC # FLD: 0 K/UL — SIGNIFICANT CHANGE UP (ref 0–0)
NRBC BLD AUTO-RTO: 0 /100 WBCS — SIGNIFICANT CHANGE UP (ref 0–0)
PLATELET # BLD AUTO: 271 K/UL — SIGNIFICANT CHANGE UP (ref 150–400)
PMV BLD: 9.2 FL — SIGNIFICANT CHANGE UP (ref 7–13)
POTASSIUM SERPL-MCNC: 4.1 MMOL/L — SIGNIFICANT CHANGE UP (ref 3.5–5.3)
POTASSIUM SERPL-SCNC: 4.1 MMOL/L — SIGNIFICANT CHANGE UP (ref 3.5–5.3)
PROT SERPL-MCNC: 7.6 GM/DL — SIGNIFICANT CHANGE UP (ref 6–8.3)
PROTHROM AB SERPL-ACNC: 11.6 SEC — SIGNIFICANT CHANGE UP (ref 9.9–13.4)
RBC # BLD: 4.35 M/UL — SIGNIFICANT CHANGE UP (ref 3.8–5.2)
RBC # FLD: 15.1 % — HIGH (ref 10.3–14.5)
SODIUM SERPL-SCNC: 140 MMOL/L — SIGNIFICANT CHANGE UP (ref 135–145)
WBC # BLD: 8.53 K/UL — SIGNIFICANT CHANGE UP (ref 3.8–10.5)
WBC # FLD AUTO: 8.53 K/UL — SIGNIFICANT CHANGE UP (ref 3.8–10.5)

## 2025-07-26 PROCEDURE — 96374 THER/PROPH/DIAG INJ IV PUSH: CPT

## 2025-07-26 PROCEDURE — 71046 X-RAY EXAM CHEST 2 VIEWS: CPT | Mod: 26

## 2025-07-26 PROCEDURE — 36415 COLL VENOUS BLD VENIPUNCTURE: CPT

## 2025-07-26 PROCEDURE — 85610 PROTHROMBIN TIME: CPT

## 2025-07-26 PROCEDURE — 83605 ASSAY OF LACTIC ACID: CPT

## 2025-07-26 PROCEDURE — 71046 X-RAY EXAM CHEST 2 VIEWS: CPT

## 2025-07-26 PROCEDURE — 73590 X-RAY EXAM OF LOWER LEG: CPT | Mod: LT

## 2025-07-26 PROCEDURE — 99285 EMERGENCY DEPT VISIT HI MDM: CPT | Mod: FS

## 2025-07-26 PROCEDURE — 99284 EMERGENCY DEPT VISIT MOD MDM: CPT | Mod: 25

## 2025-07-26 PROCEDURE — 87040 BLOOD CULTURE FOR BACTERIA: CPT | Mod: 91

## 2025-07-26 PROCEDURE — 85025 COMPLETE CBC W/AUTO DIFF WBC: CPT

## 2025-07-26 PROCEDURE — 96375 TX/PRO/DX INJ NEW DRUG ADDON: CPT

## 2025-07-26 PROCEDURE — 80053 COMPREHEN METABOLIC PANEL: CPT

## 2025-07-26 PROCEDURE — 73590 X-RAY EXAM OF LOWER LEG: CPT | Mod: 26,LT

## 2025-07-26 PROCEDURE — 85730 THROMBOPLASTIN TIME PARTIAL: CPT

## 2025-07-26 RX ORDER — SULFAMETHOXAZOLE/TRIMETHOPRIM 800-160 MG
1 TABLET ORAL ONCE
Refills: 0 | Status: COMPLETED | OUTPATIENT
Start: 2025-07-26 | End: 2025-07-26

## 2025-07-26 RX ORDER — METHYLPREDNISOLONE ACETATE 80 MG/ML
125 INJECTION, SUSPENSION INTRA-ARTICULAR; INTRALESIONAL; INTRAMUSCULAR; SOFT TISSUE ONCE
Refills: 0 | Status: COMPLETED | OUTPATIENT
Start: 2025-07-26 | End: 2025-07-26

## 2025-07-26 RX ORDER — DIPHENHYDRAMINE HCL 12.5MG/5ML
25 ELIXIR ORAL ONCE
Refills: 0 | Status: COMPLETED | OUTPATIENT
Start: 2025-07-26 | End: 2025-07-26

## 2025-07-26 RX ORDER — CEFTRIAXONE 500 MG/1
1000 INJECTION, POWDER, FOR SOLUTION INTRAMUSCULAR; INTRAVENOUS ONCE
Refills: 0 | Status: COMPLETED | OUTPATIENT
Start: 2025-07-26 | End: 2025-07-26

## 2025-07-26 RX ORDER — VANCOMYCIN HCL IN 5 % DEXTROSE 1.5G/250ML
2000 PLASTIC BAG, INJECTION (ML) INTRAVENOUS ONCE
Refills: 0 | Status: COMPLETED | OUTPATIENT
Start: 2025-07-26 | End: 2025-07-26

## 2025-07-26 RX ORDER — VANCOMYCIN HCL IN 5 % DEXTROSE 1.5G/250ML
1000 PLASTIC BAG, INJECTION (ML) INTRAVENOUS ONCE
Refills: 0 | Status: DISCONTINUED | OUTPATIENT
Start: 2025-07-26 | End: 2025-07-26

## 2025-07-26 RX ORDER — SULFAMETHOXAZOLE/TRIMETHOPRIM 800-160 MG
1 TABLET ORAL
Qty: 14 | Refills: 0
Start: 2025-07-26 | End: 2025-08-01

## 2025-07-26 RX ORDER — METHYLPREDNISOLONE ACETATE 80 MG/ML
125 INJECTION, SUSPENSION INTRA-ARTICULAR; INTRALESIONAL; INTRAMUSCULAR; SOFT TISSUE ONCE
Refills: 0 | Status: DISCONTINUED | OUTPATIENT
Start: 2025-07-26 | End: 2025-07-26

## 2025-07-26 RX ADMIN — Medication 20 MILLIGRAM(S): at 13:13

## 2025-07-26 RX ADMIN — Medication 25 MILLIGRAM(S): at 12:12

## 2025-07-26 RX ADMIN — CEFTRIAXONE 1000 MILLIGRAM(S): 500 INJECTION, POWDER, FOR SOLUTION INTRAMUSCULAR; INTRAVENOUS at 11:56

## 2025-07-26 RX ADMIN — Medication 1 TABLET(S): at 14:17

## 2025-07-26 RX ADMIN — METHYLPREDNISOLONE ACETATE 125 MILLIGRAM(S): 80 INJECTION, SUSPENSION INTRA-ARTICULAR; INTRALESIONAL; INTRAMUSCULAR; SOFT TISSUE at 13:15

## 2025-07-26 RX ADMIN — Medication 250 MILLIGRAM(S): at 12:00

## 2025-07-28 ENCOUNTER — APPOINTMENT (OUTPATIENT)
Dept: SURGERY | Facility: CLINIC | Age: 70
End: 2025-07-28
Payer: MEDICARE

## 2025-07-28 PROCEDURE — 29580 STRAPPING UNNA BOOT: CPT | Mod: LT

## 2025-07-31 ENCOUNTER — APPOINTMENT (OUTPATIENT)
Dept: SURGERY | Facility: CLINIC | Age: 70
End: 2025-07-31
Payer: MEDICARE

## 2025-07-31 PROCEDURE — 29580 STRAPPING UNNA BOOT: CPT | Mod: LT

## 2025-07-31 RX ORDER — SULFAMETHOXAZOLE AND TRIMETHOPRIM 800; 160 MG/1; MG/1
800-160 TABLET ORAL
Qty: 14 | Refills: 0 | Status: ACTIVE | COMMUNITY
Start: 2025-07-26

## 2025-08-04 ENCOUNTER — APPOINTMENT (OUTPATIENT)
Dept: SURGERY | Facility: CLINIC | Age: 70
End: 2025-08-04
Payer: MEDICARE

## 2025-08-04 ENCOUNTER — NON-APPOINTMENT (OUTPATIENT)
Age: 70
End: 2025-08-04

## 2025-08-07 ENCOUNTER — APPOINTMENT (OUTPATIENT)
Dept: SURGERY | Facility: CLINIC | Age: 70
End: 2025-08-07

## 2025-08-07 ENCOUNTER — APPOINTMENT (OUTPATIENT)
Dept: SURGERY | Facility: CLINIC | Age: 70
End: 2025-08-07
Payer: MEDICARE

## 2025-08-07 PROCEDURE — 29580 STRAPPING UNNA BOOT: CPT | Mod: LT

## 2025-08-14 ENCOUNTER — APPOINTMENT (OUTPATIENT)
Dept: SURGERY | Facility: CLINIC | Age: 70
End: 2025-08-14
Payer: MEDICARE

## 2025-08-14 PROCEDURE — 99212 OFFICE O/P EST SF 10 MIN: CPT

## 2025-08-20 ENCOUNTER — APPOINTMENT (OUTPATIENT)
Dept: DERMATOLOGY | Facility: CLINIC | Age: 70
End: 2025-08-20

## 2025-08-20 ENCOUNTER — APPOINTMENT (OUTPATIENT)
Dept: SURGERY | Facility: CLINIC | Age: 70
End: 2025-08-20
Payer: MEDICARE

## 2025-08-20 VITALS
SYSTOLIC BLOOD PRESSURE: 167 MMHG | WEIGHT: 240 LBS | DIASTOLIC BLOOD PRESSURE: 71 MMHG | HEART RATE: 76 BPM | BODY MASS INDEX: 39.99 KG/M2 | RESPIRATION RATE: 16 BRPM | HEIGHT: 65 IN

## 2025-08-20 PROCEDURE — 29580 STRAPPING UNNA BOOT: CPT | Mod: LT

## 2025-08-28 ENCOUNTER — APPOINTMENT (OUTPATIENT)
Dept: SURGERY | Facility: CLINIC | Age: 70
End: 2025-08-28
Payer: MEDICARE

## 2025-08-28 DIAGNOSIS — I87.2 VENOUS INSUFFICIENCY (CHRONIC) (PERIPHERAL): ICD-10-CM

## 2025-08-28 PROCEDURE — 99212 OFFICE O/P EST SF 10 MIN: CPT

## 2025-09-08 ENCOUNTER — APPOINTMENT (OUTPATIENT)
Dept: SURGERY | Facility: CLINIC | Age: 70
End: 2025-09-08
Payer: MEDICARE

## 2025-09-08 DIAGNOSIS — I87.2 VENOUS INSUFFICIENCY (CHRONIC) (PERIPHERAL): ICD-10-CM

## 2025-09-08 PROCEDURE — 99212 OFFICE O/P EST SF 10 MIN: CPT

## 2025-09-14 ENCOUNTER — NON-APPOINTMENT (OUTPATIENT)
Age: 70
End: 2025-09-14